# Patient Record
Sex: MALE | Race: ASIAN | NOT HISPANIC OR LATINO | URBAN - METROPOLITAN AREA
[De-identification: names, ages, dates, MRNs, and addresses within clinical notes are randomized per-mention and may not be internally consistent; named-entity substitution may affect disease eponyms.]

---

## 2018-08-02 ENCOUNTER — INPATIENT (INPATIENT)
Facility: HOSPITAL | Age: 48
LOS: 3 days | Discharge: HOME | End: 2018-08-06
Attending: HOSPITALIST | Admitting: HOSPITALIST
Payer: COMMERCIAL

## 2018-08-02 VITALS
DIASTOLIC BLOOD PRESSURE: 105 MMHG | OXYGEN SATURATION: 100 % | HEART RATE: 117 BPM | RESPIRATION RATE: 20 BRPM | TEMPERATURE: 98 F | SYSTOLIC BLOOD PRESSURE: 136 MMHG

## 2018-08-02 DIAGNOSIS — R18.8 OTHER ASCITES: ICD-10-CM

## 2018-08-02 LAB
ALBUMIN SERPL ELPH-MCNC: 4.1 G/DL — SIGNIFICANT CHANGE UP (ref 3.3–5)
ALP SERPL-CCNC: 73 U/L — SIGNIFICANT CHANGE UP (ref 40–120)
ALT FLD-CCNC: 303 U/L — HIGH (ref 4–41)
APPEARANCE UR: CLEAR — SIGNIFICANT CHANGE UP
AST SERPL-CCNC: 392 U/L — HIGH (ref 4–40)
BASE EXCESS BLDV CALC-SCNC: -3.4 MMOL/L — SIGNIFICANT CHANGE UP
BASOPHILS # BLD AUTO: 0.04 K/UL — SIGNIFICANT CHANGE UP (ref 0–0.2)
BASOPHILS NFR BLD AUTO: 0.3 % — SIGNIFICANT CHANGE UP (ref 0–2)
BILIRUB SERPL-MCNC: 2 MG/DL — HIGH (ref 0.2–1.2)
BILIRUB UR-MCNC: NEGATIVE — SIGNIFICANT CHANGE UP
BLOOD UR QL VISUAL: NEGATIVE — SIGNIFICANT CHANGE UP
BUN SERPL-MCNC: 8 MG/DL — SIGNIFICANT CHANGE UP (ref 7–23)
CALCIUM SERPL-MCNC: 9.3 MG/DL — SIGNIFICANT CHANGE UP (ref 8.4–10.5)
CHLORIDE SERPL-SCNC: 100 MMOL/L — SIGNIFICANT CHANGE UP (ref 98–107)
CO2 SERPL-SCNC: 16 MMOL/L — LOW (ref 22–31)
COLOR SPEC: YELLOW — SIGNIFICANT CHANGE UP
CREAT SERPL-MCNC: 0.88 MG/DL — SIGNIFICANT CHANGE UP (ref 0.5–1.3)
EOSINOPHIL # BLD AUTO: 0.04 K/UL — SIGNIFICANT CHANGE UP (ref 0–0.5)
EOSINOPHIL NFR BLD AUTO: 0.3 % — SIGNIFICANT CHANGE UP (ref 0–6)
GAS PNL BLDV: 126 MMOL/L — LOW (ref 136–146)
GLUCOSE BLDV-MCNC: 153 — HIGH (ref 70–99)
GLUCOSE SERPL-MCNC: 144 MG/DL — HIGH (ref 70–99)
GLUCOSE UR-MCNC: NEGATIVE — SIGNIFICANT CHANGE UP
HCO3 BLDV-SCNC: 20 MMOL/L — SIGNIFICANT CHANGE UP (ref 20–27)
HCT VFR BLD CALC: 46.7 % — SIGNIFICANT CHANGE UP (ref 39–50)
HCT VFR BLDV CALC: 49.9 % — SIGNIFICANT CHANGE UP (ref 39–51)
HGB BLD-MCNC: 15.7 G/DL — SIGNIFICANT CHANGE UP (ref 13–17)
HGB BLDV-MCNC: 16.3 G/DL — SIGNIFICANT CHANGE UP (ref 13–17)
HYALINE CASTS # UR AUTO: SIGNIFICANT CHANGE UP (ref 0–?)
IMM GRANULOCYTES # BLD AUTO: 0.04 # — SIGNIFICANT CHANGE UP
IMM GRANULOCYTES NFR BLD AUTO: 0.3 % — SIGNIFICANT CHANGE UP (ref 0–1.5)
KETONES UR-MCNC: NEGATIVE — SIGNIFICANT CHANGE UP
LEUKOCYTE ESTERASE UR-ACNC: NEGATIVE — SIGNIFICANT CHANGE UP
LIDOCAIN IGE QN: 19 U/L — SIGNIFICANT CHANGE UP (ref 7–60)
LYMPHOCYTES # BLD AUTO: 1.76 K/UL — SIGNIFICANT CHANGE UP (ref 1–3.3)
LYMPHOCYTES # BLD AUTO: 15.3 % — SIGNIFICANT CHANGE UP (ref 13–44)
MAGNESIUM SERPL-MCNC: 1.9 MG/DL — SIGNIFICANT CHANGE UP (ref 1.6–2.6)
MCHC RBC-ENTMCNC: 32.1 PG — SIGNIFICANT CHANGE UP (ref 27–34)
MCHC RBC-ENTMCNC: 33.6 % — SIGNIFICANT CHANGE UP (ref 32–36)
MCV RBC AUTO: 95.5 FL — SIGNIFICANT CHANGE UP (ref 80–100)
MONOCYTES # BLD AUTO: 1.49 K/UL — HIGH (ref 0–0.9)
MONOCYTES NFR BLD AUTO: 12.9 % — SIGNIFICANT CHANGE UP (ref 2–14)
MUCOUS THREADS # UR AUTO: SIGNIFICANT CHANGE UP
NEUTROPHILS # BLD AUTO: 8.17 K/UL — HIGH (ref 1.8–7.4)
NEUTROPHILS NFR BLD AUTO: 70.9 % — SIGNIFICANT CHANGE UP (ref 43–77)
NITRITE UR-MCNC: NEGATIVE — SIGNIFICANT CHANGE UP
NON-SQ EPI CELLS # UR AUTO: <1 — SIGNIFICANT CHANGE UP
NRBC # FLD: 0.09 — SIGNIFICANT CHANGE UP
NT-PROBNP SERPL-SCNC: 4755 PG/ML — SIGNIFICANT CHANGE UP
PCO2 BLDV: 40 MMHG — LOW (ref 41–51)
PH BLDV: 7.35 PH — SIGNIFICANT CHANGE UP (ref 7.32–7.43)
PH UR: 6 — SIGNIFICANT CHANGE UP (ref 4.6–8)
PHOSPHATE SERPL-MCNC: 4.3 MG/DL — SIGNIFICANT CHANGE UP (ref 2.5–4.5)
PLATELET # BLD AUTO: 188 K/UL — SIGNIFICANT CHANGE UP (ref 150–400)
PMV BLD: 10.9 FL — SIGNIFICANT CHANGE UP (ref 7–13)
PO2 BLDV: 33 MMHG — LOW (ref 35–40)
POTASSIUM BLDV-SCNC: 10.1 MMOL/L — CRITICAL HIGH (ref 3.4–4.5)
POTASSIUM SERPL-MCNC: 4.8 MMOL/L — SIGNIFICANT CHANGE UP (ref 3.5–5.3)
POTASSIUM SERPL-SCNC: 4.8 MMOL/L — SIGNIFICANT CHANGE UP (ref 3.5–5.3)
PROT SERPL-MCNC: 6.6 G/DL — SIGNIFICANT CHANGE UP (ref 6–8.3)
PROT UR-MCNC: 100 MG/DL — SIGNIFICANT CHANGE UP
RBC # BLD: 4.89 M/UL — SIGNIFICANT CHANGE UP (ref 4.2–5.8)
RBC # FLD: 11.8 % — SIGNIFICANT CHANGE UP (ref 10.3–14.5)
RBC CASTS # UR COMP ASSIST: SIGNIFICANT CHANGE UP (ref 0–?)
SAO2 % BLDV: 52.6 % — LOW (ref 60–85)
SODIUM SERPL-SCNC: 133 MMOL/L — LOW (ref 135–145)
SP GR SPEC: 1.01 — SIGNIFICANT CHANGE UP (ref 1–1.04)
SQUAMOUS # UR AUTO: SIGNIFICANT CHANGE UP
TROPONIN T, HIGH SENSITIVITY: 12 NG/L — SIGNIFICANT CHANGE UP (ref ?–14)
TROPONIN T, HIGH SENSITIVITY: 12 NG/L — SIGNIFICANT CHANGE UP (ref ?–14)
UROBILINOGEN FLD QL: NORMAL MG/DL — SIGNIFICANT CHANGE UP
WBC # BLD: 11.54 K/UL — HIGH (ref 3.8–10.5)
WBC # FLD AUTO: 11.54 K/UL — HIGH (ref 3.8–10.5)
WBC UR QL: SIGNIFICANT CHANGE UP (ref 0–?)

## 2018-08-02 PROCEDURE — 71046 X-RAY EXAM CHEST 2 VIEWS: CPT | Mod: 26

## 2018-08-02 PROCEDURE — 74177 CT ABD & PELVIS W/CONTRAST: CPT | Mod: 26

## 2018-08-02 PROCEDURE — 93010 ELECTROCARDIOGRAM REPORT: CPT

## 2018-08-02 PROCEDURE — 76705 ECHO EXAM OF ABDOMEN: CPT | Mod: 26

## 2018-08-02 RX ORDER — PIPERACILLIN AND TAZOBACTAM 4; .5 G/20ML; G/20ML
3.38 INJECTION, POWDER, LYOPHILIZED, FOR SOLUTION INTRAVENOUS EVERY 8 HOURS
Qty: 0 | Refills: 0 | Status: DISCONTINUED | OUTPATIENT
Start: 2018-08-03 | End: 2018-08-06

## 2018-08-02 RX ORDER — CEFTRIAXONE 500 MG/1
1 INJECTION, POWDER, FOR SOLUTION INTRAMUSCULAR; INTRAVENOUS ONCE
Qty: 0 | Refills: 0 | Status: COMPLETED | OUTPATIENT
Start: 2018-08-02 | End: 2018-08-02

## 2018-08-02 RX ORDER — ONDANSETRON 8 MG/1
4 TABLET, FILM COATED ORAL ONCE
Qty: 0 | Refills: 0 | Status: COMPLETED | OUTPATIENT
Start: 2018-08-02 | End: 2018-08-02

## 2018-08-02 RX ORDER — SODIUM CHLORIDE 9 MG/ML
2000 INJECTION INTRAMUSCULAR; INTRAVENOUS; SUBCUTANEOUS ONCE
Qty: 0 | Refills: 0 | Status: COMPLETED | OUTPATIENT
Start: 2018-08-02 | End: 2018-08-02

## 2018-08-02 RX ORDER — PIPERACILLIN AND TAZOBACTAM 4; .5 G/20ML; G/20ML
3.38 INJECTION, POWDER, LYOPHILIZED, FOR SOLUTION INTRAVENOUS ONCE
Qty: 0 | Refills: 0 | Status: COMPLETED | OUTPATIENT
Start: 2018-08-02 | End: 2018-08-02

## 2018-08-02 RX ORDER — MORPHINE SULFATE 50 MG/1
4 CAPSULE, EXTENDED RELEASE ORAL ONCE
Qty: 0 | Refills: 0 | Status: DISCONTINUED | OUTPATIENT
Start: 2018-08-02 | End: 2018-08-02

## 2018-08-02 RX ORDER — CEFTRIAXONE 500 MG/1
INJECTION, POWDER, FOR SOLUTION INTRAMUSCULAR; INTRAVENOUS
Qty: 0 | Refills: 0 | Status: DISCONTINUED | OUTPATIENT
Start: 2018-08-02 | End: 2018-08-02

## 2018-08-02 RX ORDER — LIDOCAINE 4 G/100G
10 CREAM TOPICAL ONCE
Qty: 0 | Refills: 0 | Status: COMPLETED | OUTPATIENT
Start: 2018-08-02 | End: 2018-08-02

## 2018-08-02 RX ORDER — MORPHINE SULFATE 50 MG/1
2 CAPSULE, EXTENDED RELEASE ORAL ONCE
Qty: 0 | Refills: 0 | Status: DISCONTINUED | OUTPATIENT
Start: 2018-08-02 | End: 2018-08-02

## 2018-08-02 RX ORDER — FAMOTIDINE 10 MG/ML
20 INJECTION INTRAVENOUS ONCE
Qty: 0 | Refills: 0 | Status: COMPLETED | OUTPATIENT
Start: 2018-08-02 | End: 2018-08-02

## 2018-08-02 RX ORDER — SODIUM CHLORIDE 9 MG/ML
3 INJECTION INTRAMUSCULAR; INTRAVENOUS; SUBCUTANEOUS EVERY 8 HOURS
Qty: 0 | Refills: 0 | Status: DISCONTINUED | OUTPATIENT
Start: 2018-08-02 | End: 2018-08-06

## 2018-08-02 RX ORDER — FUROSEMIDE 40 MG
20 TABLET ORAL ONCE
Qty: 0 | Refills: 0 | Status: COMPLETED | OUTPATIENT
Start: 2018-08-02 | End: 2018-08-02

## 2018-08-02 RX ORDER — ENOXAPARIN SODIUM 100 MG/ML
40 INJECTION SUBCUTANEOUS EVERY 24 HOURS
Qty: 0 | Refills: 0 | Status: DISCONTINUED | OUTPATIENT
Start: 2018-08-02 | End: 2018-08-06

## 2018-08-02 RX ADMIN — ONDANSETRON 4 MILLIGRAM(S): 8 TABLET, FILM COATED ORAL at 12:19

## 2018-08-02 RX ADMIN — Medication 30 MILLILITER(S): at 12:20

## 2018-08-02 RX ADMIN — Medication 20 MILLIGRAM(S): at 16:39

## 2018-08-02 RX ADMIN — SODIUM CHLORIDE 2000 MILLILITER(S): 9 INJECTION INTRAMUSCULAR; INTRAVENOUS; SUBCUTANEOUS at 11:55

## 2018-08-02 RX ADMIN — FAMOTIDINE 20 MILLIGRAM(S): 10 INJECTION INTRAVENOUS at 12:20

## 2018-08-02 RX ADMIN — LIDOCAINE 10 MILLILITER(S): 4 CREAM TOPICAL at 12:20

## 2018-08-02 RX ADMIN — MORPHINE SULFATE 4 MILLIGRAM(S): 50 CAPSULE, EXTENDED RELEASE ORAL at 12:20

## 2018-08-02 RX ADMIN — PIPERACILLIN AND TAZOBACTAM 200 GRAM(S): 4; .5 INJECTION, POWDER, LYOPHILIZED, FOR SOLUTION INTRAVENOUS at 23:44

## 2018-08-02 RX ADMIN — MORPHINE SULFATE 2 MILLIGRAM(S): 50 CAPSULE, EXTENDED RELEASE ORAL at 22:26

## 2018-08-02 RX ADMIN — MORPHINE SULFATE 2 MILLIGRAM(S): 50 CAPSULE, EXTENDED RELEASE ORAL at 22:41

## 2018-08-02 RX ADMIN — CEFTRIAXONE 100 GRAM(S): 500 INJECTION, POWDER, FOR SOLUTION INTRAMUSCULAR; INTRAVENOUS at 16:11

## 2018-08-02 NOTE — ED PROVIDER NOTE - OBJECTIVE STATEMENT
48yo M, no pmh (does not really see doctors) p/w epigastric pain x 1wk. + abd distention, + 1d of NBNB diarrhea which has since resolved. Decreased appetite, hasn't had any PO intake in 5d. +palpitations and sob during exertion. epigastric pain doesn't radiate. +flatus, normal BM. Denies n/v, abd surgeries, f/c, cough, urinary symptoms, recnet illnesses, recent travel, h/o DVT, peripheral edema.

## 2018-08-02 NOTE — ED ADULT TRIAGE NOTE - CHIEF COMPLAINT QUOTE
Pt. c/o epigastric pain, sob, nausea and palpitations x 5 days. States pain is worse after eating. Abdomen distended -last BM yesterday AM.

## 2018-08-02 NOTE — ED PROVIDER NOTE - MEDICAL DECISION MAKING DETAILS
48yo M, no pmh (does not really see doctors) p/w epigastric pain x 1wk. Epigastric pain, abd distention on exam. r/o obstruction, cardiac etiology. Labs, trop, ekg, cxr, gi cocktail, pain control, ctap.

## 2018-08-02 NOTE — ED PROVIDER NOTE - ATTENDING CONTRIBUTION TO CARE
I agree with the above H&P.  Briefly this is a 47 year male denies pmh presents with abd pain and increasing abdominal distention.  1 BM yeserday. denies cp, denies sob.  increasing orthopnea.  no fever.  will check lab s ruq us, ct abdomen pelvis to rule out gb disease or sbo, send labs for pancreatitis. send trop and bnp. some concern for cardiac diease, although ekg nondiagnostic

## 2018-08-02 NOTE — ED PROVIDER NOTE - NS ED ROS FT
Constitutional: no fevers, chills  HEENT: no visual changes, no sore throat, no rhinorrhea  CV: no cp  Resp: no sob  GI: + abd pain, +diarrhea, +n/v/constipation  : no dysuria, hematuria  MSK: no joint pains  skin: no rashes  neuro: no HA, no confusion

## 2018-08-02 NOTE — ED PROVIDER NOTE - PHYSICAL EXAMINATION
Vitals: WNL  Gen: laying comfortably in NAD  Head: NCAT  ENT: sclerae white, anicterus, moist mucous membranes. No exudates. Neck supple, no LAD,  no carotid bruits, no JVD  CV: RRR. Audible S1 and S2. No murmurs, rubs, gallops, S3, nor S4, 2+ radial and DP pulses   Pulm: Clear to auscultation bilaterally. No wheezes, rales, or rhonchi  Abd: soft, normoactive BS x4, distended, generalized ttp abd x4, no rebound, no guarding, no rashes  Musculoskeletal:  No peripheral edema  Skin: no lesions or scars noted  Neurologic: AAOx3  : no CVA tenderness Vitals: WNL  Gen: laying comfortably in NAD  Head: NCAT  ENT: sclerae white, anicterus, dry mucous membranes. No exudates. Neck supple, no LAD,  no carotid bruits, no JVD  CV: RRR. Audible S1 and S2. No murmurs, rubs, gallops, S3, nor S4, 2+ radial and DP pulses   Pulm: Clear to auscultation bilaterally. No wheezes, rales, or rhonchi  Abd: soft, normoactive BS x4, distended, generalized ttp abd x4, no rebound, no guarding, no rashes  Musculoskeletal:  No peripheral edema  Skin: no lesions or scars noted  Neurologic: AAOx3  : no CVA tenderness

## 2018-08-02 NOTE — ED PROVIDER NOTE - PROGRESS NOTE DETAILS
Joe SCHRADER: pt with small ascites in abd and small pleural effusion, cardiomegaly on CT. Will need cardiac w/u, pt likely with chf. Pt now complaining of palpitations (tachy 117) and "drowning." Likely fluid overload, will give lasix. Pt with thickening of gallbladder wall on US - will need HIDA.

## 2018-08-02 NOTE — ED ADULT NURSE NOTE - OBJECTIVE STATEMENT
received pt A&Ox3 in no apparent distress at this time. #20g IVL to L hand, bloods drawn and sent to the lab. no s/s of infiltration noted at this time.

## 2018-08-03 DIAGNOSIS — K59.00 CONSTIPATION, UNSPECIFIED: ICD-10-CM

## 2018-08-03 DIAGNOSIS — R80.9 PROTEINURIA, UNSPECIFIED: ICD-10-CM

## 2018-08-03 DIAGNOSIS — I51.7 CARDIOMEGALY: ICD-10-CM

## 2018-08-03 DIAGNOSIS — Z29.9 ENCOUNTER FOR PROPHYLACTIC MEASURES, UNSPECIFIED: ICD-10-CM

## 2018-08-03 DIAGNOSIS — R18.8 OTHER ASCITES: ICD-10-CM

## 2018-08-03 DIAGNOSIS — E87.1 HYPO-OSMOLALITY AND HYPONATREMIA: ICD-10-CM

## 2018-08-03 LAB
ALBUMIN SERPL ELPH-MCNC: 4.2 G/DL — SIGNIFICANT CHANGE UP (ref 3.3–5)
ALP SERPL-CCNC: 86 U/L — SIGNIFICANT CHANGE UP (ref 40–120)
ALT FLD-CCNC: 857 U/L — HIGH (ref 4–41)
AST SERPL-CCNC: 1351 U/L — HIGH (ref 4–40)
BILIRUB DIRECT SERPL-MCNC: 1.1 MG/DL — HIGH (ref 0.1–0.2)
BILIRUB SERPL-MCNC: 2.9 MG/DL — HIGH (ref 0.2–1.2)
BUN SERPL-MCNC: 11 MG/DL — SIGNIFICANT CHANGE UP (ref 7–23)
CALCIUM SERPL-MCNC: 8.5 MG/DL — SIGNIFICANT CHANGE UP (ref 8.4–10.5)
CHLORIDE SERPL-SCNC: 99 MMOL/L — SIGNIFICANT CHANGE UP (ref 98–107)
CHOLEST SERPL-MCNC: 105 MG/DL — LOW (ref 120–199)
CO2 SERPL-SCNC: 18 MMOL/L — LOW (ref 22–31)
CREAT SERPL-MCNC: 1.19 MG/DL — SIGNIFICANT CHANGE UP (ref 0.5–1.3)
GLUCOSE SERPL-MCNC: 122 MG/DL — HIGH (ref 70–99)
HBA1C BLD-MCNC: 5.4 % — SIGNIFICANT CHANGE UP (ref 4–5.6)
HCT VFR BLD CALC: 45.6 % — SIGNIFICANT CHANGE UP (ref 39–50)
HDLC SERPL-MCNC: 32 MG/DL — LOW (ref 35–55)
HGB BLD-MCNC: 15 G/DL — SIGNIFICANT CHANGE UP (ref 13–17)
LIPID PNL WITH DIRECT LDL SERPL: 69 MG/DL — SIGNIFICANT CHANGE UP
MAGNESIUM SERPL-MCNC: 1.7 MG/DL — SIGNIFICANT CHANGE UP (ref 1.6–2.6)
MCHC RBC-ENTMCNC: 31.8 PG — SIGNIFICANT CHANGE UP (ref 27–34)
MCHC RBC-ENTMCNC: 32.9 % — SIGNIFICANT CHANGE UP (ref 32–36)
MCV RBC AUTO: 96.6 FL — SIGNIFICANT CHANGE UP (ref 80–100)
NRBC # FLD: 0.07 — SIGNIFICANT CHANGE UP
PHOSPHATE SERPL-MCNC: 3.3 MG/DL — SIGNIFICANT CHANGE UP (ref 2.5–4.5)
PLATELET # BLD AUTO: 159 K/UL — SIGNIFICANT CHANGE UP (ref 150–400)
PMV BLD: 11.4 FL — SIGNIFICANT CHANGE UP (ref 7–13)
POTASSIUM SERPL-MCNC: 4.5 MMOL/L — SIGNIFICANT CHANGE UP (ref 3.5–5.3)
POTASSIUM SERPL-SCNC: 4.5 MMOL/L — SIGNIFICANT CHANGE UP (ref 3.5–5.3)
PROT SERPL-MCNC: 6.6 G/DL — SIGNIFICANT CHANGE UP (ref 6–8.3)
RBC # BLD: 4.72 M/UL — SIGNIFICANT CHANGE UP (ref 4.2–5.8)
RBC # FLD: 11.8 % — SIGNIFICANT CHANGE UP (ref 10.3–14.5)
SODIUM SERPL-SCNC: 136 MMOL/L — SIGNIFICANT CHANGE UP (ref 135–145)
TRIGL SERPL-MCNC: 77 MG/DL — SIGNIFICANT CHANGE UP (ref 10–149)
TSH SERPL-MCNC: 1.02 UIU/ML — SIGNIFICANT CHANGE UP (ref 0.27–4.2)
WBC # BLD: 13.94 K/UL — HIGH (ref 3.8–10.5)
WBC # FLD AUTO: 13.94 K/UL — HIGH (ref 3.8–10.5)

## 2018-08-03 PROCEDURE — 93306 TTE W/DOPPLER COMPLETE: CPT | Mod: 26

## 2018-08-03 PROCEDURE — 99223 1ST HOSP IP/OBS HIGH 75: CPT | Mod: GC

## 2018-08-03 PROCEDURE — 12345: CPT

## 2018-08-03 PROCEDURE — 78226 HEPATOBILIARY SYSTEM IMAGING: CPT | Mod: 26,GC

## 2018-08-03 RX ORDER — SENNA PLUS 8.6 MG/1
2 TABLET ORAL AT BEDTIME
Qty: 0 | Refills: 0 | Status: DISCONTINUED | OUTPATIENT
Start: 2018-08-03 | End: 2018-08-06

## 2018-08-03 RX ORDER — DOCUSATE SODIUM 100 MG
100 CAPSULE ORAL THREE TIMES A DAY
Qty: 0 | Refills: 0 | Status: DISCONTINUED | OUTPATIENT
Start: 2018-08-03 | End: 2018-08-06

## 2018-08-03 RX ORDER — FUROSEMIDE 40 MG
20 TABLET ORAL DAILY
Qty: 0 | Refills: 0 | Status: DISCONTINUED | OUTPATIENT
Start: 2018-08-03 | End: 2018-08-03

## 2018-08-03 RX ORDER — CARVEDILOL PHOSPHATE 80 MG/1
3.12 CAPSULE, EXTENDED RELEASE ORAL EVERY 12 HOURS
Qty: 0 | Refills: 0 | Status: DISCONTINUED | OUTPATIENT
Start: 2018-08-03 | End: 2018-08-04

## 2018-08-03 RX ORDER — FUROSEMIDE 40 MG
20 TABLET ORAL
Qty: 0 | Refills: 0 | Status: DISCONTINUED | OUTPATIENT
Start: 2018-08-03 | End: 2018-08-07

## 2018-08-03 RX ORDER — LACTOBACILLUS ACIDOPHILUS 100MM CELL
1 CAPSULE ORAL EVERY 8 HOURS
Qty: 0 | Refills: 0 | Status: DISCONTINUED | OUTPATIENT
Start: 2018-08-03 | End: 2018-08-06

## 2018-08-03 RX ADMIN — SODIUM CHLORIDE 3 MILLILITER(S): 9 INJECTION INTRAMUSCULAR; INTRAVENOUS; SUBCUTANEOUS at 05:12

## 2018-08-03 RX ADMIN — SODIUM CHLORIDE 3 MILLILITER(S): 9 INJECTION INTRAMUSCULAR; INTRAVENOUS; SUBCUTANEOUS at 14:07

## 2018-08-03 RX ADMIN — SODIUM CHLORIDE 3 MILLILITER(S): 9 INJECTION INTRAMUSCULAR; INTRAVENOUS; SUBCUTANEOUS at 22:05

## 2018-08-03 RX ADMIN — PIPERACILLIN AND TAZOBACTAM 25 GRAM(S): 4; .5 INJECTION, POWDER, LYOPHILIZED, FOR SOLUTION INTRAVENOUS at 22:05

## 2018-08-03 RX ADMIN — Medication 1 TABLET(S): at 22:03

## 2018-08-03 RX ADMIN — Medication 100 MILLIGRAM(S): at 22:03

## 2018-08-03 RX ADMIN — ENOXAPARIN SODIUM 40 MILLIGRAM(S): 100 INJECTION SUBCUTANEOUS at 05:12

## 2018-08-03 RX ADMIN — CARVEDILOL PHOSPHATE 3.12 MILLIGRAM(S): 80 CAPSULE, EXTENDED RELEASE ORAL at 17:14

## 2018-08-03 RX ADMIN — Medication 1 TABLET(S): at 14:13

## 2018-08-03 RX ADMIN — Medication 20 MILLIGRAM(S): at 05:12

## 2018-08-03 RX ADMIN — PIPERACILLIN AND TAZOBACTAM 25 GRAM(S): 4; .5 INJECTION, POWDER, LYOPHILIZED, FOR SOLUTION INTRAVENOUS at 14:33

## 2018-08-03 RX ADMIN — Medication 1 TABLET(S): at 05:12

## 2018-08-03 RX ADMIN — Medication 20 MILLIGRAM(S): at 17:14

## 2018-08-03 RX ADMIN — PIPERACILLIN AND TAZOBACTAM 25 GRAM(S): 4; .5 INJECTION, POWDER, LYOPHILIZED, FOR SOLUTION INTRAVENOUS at 05:12

## 2018-08-03 RX ADMIN — SENNA PLUS 2 TABLET(S): 8.6 TABLET ORAL at 22:03

## 2018-08-03 NOTE — PROGRESS NOTE ADULT - PROBLEM SELECTOR PLAN 2
ProBNP = 4755 likely heart failure  ECG = sinus tachycardia at 105 bpm, QTc = 533, ?T-wave inversions in V3 to V6  F/UTTE.   Monitor I's O' s daily weight, Lytes, PT and dietary consult.   s/p 1 dose of lasix, will get cards consult  Low salt diet when not NPO.

## 2018-08-03 NOTE — H&P ADULT - ASSESSMENT
47M admitted for Ascites, cholecystis and Cardiomegaly. 47M admitted for Ascites, cholecystitis and Cardiomegaly. 47M, with no significant PMH, admitted for Ascites, cholecystitis and Cardiomegaly.

## 2018-08-03 NOTE — H&P ADULT - FAMILY HISTORY
No pertinent family history in first degree relatives Mother  Still living? Unknown  Family history of heart disease, Age at diagnosis: Age Unknown     Father  Still living? Unknown  Family history of hypertension, Age at diagnosis: Age Unknown     Grandparent  Still living? Unknown  Family history of lung disease, Age at diagnosis: Age Unknown

## 2018-08-03 NOTE — CONSULT NOTE ADULT - SUBJECTIVE AND OBJECTIVE BOX
Chief Complaint:  Patient is a 47y old  Male who presents with a chief complaint of Abdominal pain x 5 days (03 Aug 2018 00:42)    HPI:    46 y/o M w/ no significant past medical history presenting with abdominal pain; hepatology consulted for evaluation of elevated liver enzymes.    Allergies:  Motrin (Hives)    Home Medications:  None    Hospital Medications:  enoxaparin Injectable 40 milliGRAM(s) SubCutaneous every 24 hours  furosemide   Injectable 20 milliGRAM(s) IV Push daily  lactobacillus acidophilus 1 Tablet(s) Oral every 8 hours  multivitamin 1 Tablet(s) Oral daily  piperacillin/tazobactam IVPB. 3.375 Gram(s) IV Intermittent every 8 hours  sodium chloride 0.9% lock flush 3 milliLiter(s) IV Push every 8 hours      PMHX/PSHX:  No pertinent past medical history  No significant past surgical history      Family history:  Family history of lung disease (Grandparent)  Family history of hypertension (Father)  Family history of heart disease (Mother)  No pertinent family history in first degree relatives    Social History:   Tob:  EtOH:  Illicit Drugs:    ROS:     General:  No wt loss, fevers, chills, night sweats, fatigue,   Eyes:  Good vision, no reported pain  ENT:  No sore throat, pain, runny nose, dysphagia  CV:  No pain, palpitations, hypo/hypertension  Resp:  No dyspnea, cough, tachypnea, wheezing  GI:  No pain, No nausea, No vomiting, No diarrhea, No constipation, No weight loss, No fever, No pruritis, No rectal bleeding, No tarry stools, No dysphagia,  :  No pain, bleeding, incontinence, nocturia  Muscle:  No pain, weakness  Neuro:  No weakness, tingling, memory problems  Psych:  No fatigue, insomnia, mood problems, depression  Endocrine:  No polyuria, polydipsia, cold/heat intolerance  Heme:  No petechiae, ecchymosis, easy bruisability  Skin:  No rash, tattoos, scars, edema      PHYSICAL EXAM:     GENERAL:  Appears stated age, well-groomed, well-nourished, no distress  HEENT:  NC/AT,  conjunctivae clear and pink, no thyromegaly, nodules, adenopathy, no JVD, sclera -anicteric  CHEST:  Full & symmetric excursion, no increased effort, breath sounds clear  HEART:  Regular rhythm, S1, S2, no murmur/rub/S3/S4, no abdominal bruit, no edema  ABDOMEN:  Soft, non-tender, non-distended, normoactive bowel sounds,  no masses ,no hepato-splenomegaly, no signs of chronic liver disease  EXTEREMITIES:  no cyanosis,clubbing or edema  SKIN:  No rash/erythema/ecchymoses/petechiae/wounds/abscess/warm/dry  NEURO:  Alert, oriented, no asterixis, no tremor, no encephalopathy    Vital Signs:  Vital Signs Last 24 Hrs  T(C): 36.7 (03 Aug 2018 05:07), Max: 37.4 (02 Aug 2018 21:25)  T(F): 98 (03 Aug 2018 05:07), Max: 99.4 (02 Aug 2018 21:25)  HR: 116 (03 Aug 2018 05:07) (110 - 119)  BP: 133/99 (03 Aug 2018 05:07) (128/64 - 140/101)  BP(mean): --  RR: 16 (03 Aug 2018 05:07) (15 - 20)  SpO2: 97% (03 Aug 2018 05:07) (96% - 100%)  Daily Height in cm: 162.56 (03 Aug 2018 00:42)    Daily Weight in k (03 Aug 2018 05:07)    LABS:                        15.0   13.94 )-----------( 159      ( 03 Aug 2018 07:06 )             45.6     Mean Cell Volume: 96.6 fL (-18 @ 07:06)        136  |  99  |  11  ----------------------------<  122<H>  4.5   |  18<L>  |  1.19    Ca    8.5      03 Aug 2018 07:06  Phos  3.3     -  Mg     1.7     -    TPro  6.6  /  Alb  4.2  /  TBili  2.9<H>  /  DBili  1.1<H>  /  AST  1351<H>  /  ALT  857<H>  /  AlkPhos  86  -03    LIVER FUNCTIONS - ( 03 Aug 2018 07:06 )  Alb: 4.2 g/dL / Pro: 6.6 g/dL / ALK PHOS: 86 u/L / ALT: 857 u/L / AST: 1351 u/L / GGT: x             Urinalysis Basic - ( 02 Aug 2018 11:50 )    Color: YELLOW / Appearance: CLEAR / S.015 / pH: 6.0  Gluc: NEGATIVE / Ketone: NEGATIVE  / Bili: NEGATIVE / Urobili: NORMAL mg/dL   Blood: NEGATIVE / Protein: 100 mg/dL / Nitrite: NEGATIVE   Leuk Esterase: NEGATIVE / RBC: 2-5 / WBC 0-2   Sq Epi: OCC / Non Sq Epi: x / Bacteria: x                              15.0   13.94 )-----------( 159      ( 03 Aug 2018 07:06 )             45.6                         15.7   11.54 )-----------( 188      ( 02 Aug 2018 11:40 )             46.7     Imaging: Chief Complaint:  Patient is a 47y old  Male who presents with a chief complaint of Abdominal pain x 5 days (03 Aug 2018 00:42)    HPI:    46 y/o M w/ no significant past medical history presenting with abdominal pain; hepatology consulted for evaluation of elevated liver enzymes. The patient developed abdominal pain approximately 6 days ago. The pain was located in his epigastrium and upper right abdomen and radiated to his right flank. He described as a severe, intermittent, cramping sensation. He endorses one episode of non-bloody bilious emesis and reduced PO intake. The patient takes a multivitamin, but otherwise takes no new medications and has not started any new medications. The patient does endorse progressive shortness of breath and lower extremity edema and he was found to have a BNP of >4500. RUQ U/S was notable for thickened gallbladder wall, however, follow-up HIDA scan did not show any evidence of cholecystitis. Upon evaluation, the patient no longer complains of abdominal pain.    Allergies:  Motrin (Hives)    Home Medications:  None    Hospital Medications:  enoxaparin Injectable 40 milliGRAM(s) SubCutaneous every 24 hours  furosemide   Injectable 20 milliGRAM(s) IV Push daily  lactobacillus acidophilus 1 Tablet(s) Oral every 8 hours  multivitamin 1 Tablet(s) Oral daily  piperacillin/tazobactam IVPB. 3.375 Gram(s) IV Intermittent every 8 hours  sodium chloride 0.9% lock flush 3 milliLiter(s) IV Push every 8 hours    PMHX/PSHX:  No pertinent past medical history  No significant past surgical history    Family history:  Family history of lung disease (Grandparent)  Family history of hypertension (Father)  Family history of heart disease (Mother)  No pertinent family history in first degree relatives    Social History:   Tob: Former smoker, quit 10 years ago  EtOH: Occasional   Illicit Drugs: Denies    ROS:     General:  No fevers, chills, night sweats  CV:  No pain, palpitations  Pulm:  + dyspnea, cough  GI:  + pain, + nausea, + vomiting, No diarrhea, No constipation, No weight loss, No fever, No pruritis, No rectal bleeding, No tarry stools, No dysphagia  :  No pain, bleeding, incontinence, nocturia  Muscle:  No pain  Skin:  No rash    PHYSICAL EXAM:     GENERAL:  NAD  HEENT:  NC/AT, no scleral icterus  CHEST:  CTAB, no w/r/r  HEART:  RRR  ABDOMEN:  Soft, NT, non-distended, normoactive bowel sounds,  no masses ,no hepato-splenomegaly, no signs of chronic liver disease  EXTREMITIES: + LE edema  SKIN:  No rash  NEURO:  AAO x 3    Vital Signs:  Vital Signs Last 24 Hrs  T(C): 36.7 (03 Aug 2018 05:07), Max: 37.4 (02 Aug 2018 21:25)  T(F): 98 (03 Aug 2018 05:07), Max: 99.4 (02 Aug 2018 21:25)  HR: 116 (03 Aug 2018 05:07) (110 - 119)  BP: 133/99 (03 Aug 2018 05:07) (128/64 - 140/101)  BP(mean): --  RR: 16 (03 Aug 2018 05:07) (15 - 20)  SpO2: 97% (03 Aug 2018 05:07) (96% - 100%)  Daily Height in cm: 162.56 (03 Aug 2018 00:42)    Daily Weight in k (03 Aug 2018 05:07)    LABS:                        15.0   13.94 )-----------( 159      ( 03 Aug 2018 07:06 )             45.6     Mean Cell Volume: 96.6 fL (-18 @ 07:06)        136  |  99  |  11  ----------------------------<  122<H>  4.5   |  18<L>  |  1.19    Ca    8.5      03 Aug 2018 07:06  Phos  3.3       Mg     1.7         TPro  6.6  /  Alb  4.2  /  TBili  2.9<H>  /  DBili  1.1<H>  /  AST  1351<H>  /  ALT  857<H>  /  AlkPhos  86  03    LIVER FUNCTIONS - ( 03 Aug 2018 07:06 )  Alb: 4.2 g/dL / Pro: 6.6 g/dL / ALK PHOS: 86 u/L / ALT: 857 u/L / AST: 1351 u/L / GGT: x             Urinalysis Basic - ( 02 Aug 2018 11:50 )    Color: YELLOW / Appearance: CLEAR / S.015 / pH: 6.0  Gluc: NEGATIVE / Ketone: NEGATIVE  / Bili: NEGATIVE / Urobili: NORMAL mg/dL   Blood: NEGATIVE / Protein: 100 mg/dL / Nitrite: NEGATIVE   Leuk Esterase: NEGATIVE / RBC: 2-5 / WBC 0-2   Sq Epi: OCC / Non Sq Epi: x / Bacteria: x               15.0   13.94 )-----------( 159      ( 03 Aug 2018 07:06 )             45.6                         15.7   11.54 )-----------( 188      ( 02 Aug 2018 11:40 )             46.7     Imaging:    < from: US Abdomen Limited (18 @ 15:16) >  IMPRESSION:   1.  Limited exam as patient declined to complete exam.  2.  No cholelithiasis. Nonspecific thickening of the gallbladder wall. If   there is clinical concern for cholecystitis, consider nuclear medicine   scan.  3.  No biliary distention.    < end of copied text >    < from: NM Hepatobiliary Scan w/wo Gall Bladder (18 @ 09:24) >  IMPRESSION: Normal hepatobiliary scan. No radionuclide evidence of acute   cholecystitis.    < end of copied text >

## 2018-08-03 NOTE — H&P ADULT - RS GEN PE MLT RESP DETAILS PC
no wheezes/respirations non-labored/airway patent/no chest wall tenderness/no rhonchi/no subcutaneous emphysema/no intercostal retractions/no rales/clear to auscultation bilaterally/good air movement/breath sounds equal

## 2018-08-03 NOTE — PROGRESS NOTE ADULT - PROBLEM SELECTOR PLAN 1
Transaminitis w/ AST/ALT = 393/303  CT A/P w/ findings of - "LOWER CHEST: Cardiomegaly. Small right and trace left pleural effusions.  LIVER: Normal morphology. Subcentimeter hyperenhancing lesion in the right hepatic lobe dome with flash filling hemangioma or perfusion defect (series 2 image 15). Reflux of contrast into hepatic veins.  BILE DUCTS: Normal caliber.  GALLBLADDER: Within normal limits.  SPLEEN: 1.1 cm hyperenhancing lesion consistent with a hemangioma." HIDA scan neg  for Cholecystitis.  pt feels hungry will start on clears, f/u GI consult  will f/u with them about abx  Continue Zosyn for now, continue lactobacillus for normal robb protection.  F/U Hepatitis panel  F/U Coagulation profile  f/u LE avelinao

## 2018-08-03 NOTE — H&P ADULT - NEGATIVE MUSCULOSKELETAL SYMPTOMS
no muscle cramps/no stiffness/no arm pain L/no joint swelling/no muscle weakness/no myalgia/no neck pain/no arthritis

## 2018-08-03 NOTE — H&P ADULT - PROBLEM SELECTOR PLAN 2
CM  F/U EKG, TTE.   Monitor I's O' s daily weight, Lytes, PT and dietary consult.   give Lasix 20 mg IV.  Low salt diet when not NPO. ProBNP = 4755  ECG = sinus tachycardia at 105 bpm, QTc = 533, ?T-wave inversions in V3 to V6  F/U EKG, TTE.   Monitor I's O' s daily weight, Lytes, PT and dietary consult.   give Lasix 20 mg IV.  Low salt diet when not NPO.  f/u TSH level  Usually recommend HOB elevation, as tolerated, but patient reports feeling better when lying flat or almost flat - possibility of hepatopulmonary syndrome ProBNP = 4755  ECG = sinus tachycardia at 105 bpm, QTc = 533, ?T-wave inversions in V3 to V6  F/U EKG, TTE.   Monitor I's O' s daily weight, Lytes, PT and dietary consult.   give Lasix 20 mg IV.  Low salt diet when not NPO.  f/u TSH level

## 2018-08-03 NOTE — PROGRESS NOTE ADULT - ASSESSMENT
47M, with no significant PMH, admitted for Ascites, possible cholecystitis and Cardiomegaly found to have elevated bnp likely from heart failure.

## 2018-08-03 NOTE — PROGRESS NOTE ADULT - SUBJECTIVE AND OBJECTIVE BOX
Patient is a 47y old  Male who presents with a chief complaint of Abdominal pain x 5 days (03 Aug 2018 00:42)      SUBJECTIVE / OVERNIGHT EVENTS: Pt seen and examined at 11:45am, no overnight events, tele with sinus tachycardia at 110/mt,  pt reports improvement in his sob and abdominal pain, no nausea, vomiting or chest pain, has leg swelling, no other complaints. Pt reports last bm on Tuesday.    MEDICATIONS  (STANDING):  carvedilol 3.125 milliGRAM(s) Oral every 12 hours  enoxaparin Injectable 40 milliGRAM(s) SubCutaneous every 24 hours  furosemide   Injectable 20 milliGRAM(s) IV Push two times a day  lactobacillus acidophilus 1 Tablet(s) Oral every 8 hours  multivitamin 1 Tablet(s) Oral daily  piperacillin/tazobactam IVPB. 3.375 Gram(s) IV Intermittent every 8 hours  sodium chloride 0.9% lock flush 3 milliLiter(s) IV Push every 8 hours    MEDICATIONS  (PRN):      Vital Signs Last 24 Hrs  T(C): 36.8 (03 Aug 2018 13:51), Max: 37.4 (02 Aug 2018 21:25)  T(F): 98.2 (03 Aug 2018 13:51), Max: 99.4 (02 Aug 2018 21:25)  HR: 109 (03 Aug 2018 13:51) (109 - 119)  BP: 144/98 (03 Aug 2018 13:51) (128/64 - 144/98)  BP(mean): --  RR: 18 (03 Aug 2018 13:51) (15 - 20)  SpO2: 98% (03 Aug 2018 13:51) (96% - 100%)  CAPILLARY BLOOD GLUCOSE        I&O's Summary    02 Aug 2018 07:01  -  03 Aug 2018 07:00  --------------------------------------------------------  IN: 0 mL / OUT: 380 mL / NET: -380 mL    03 Aug 2018 07:01  -  03 Aug 2018 15:15  --------------------------------------------------------  IN: 200 mL / OUT: 0 mL / NET: 200 mL        PHYSICAL EXAM:  GENERAL: NAD, well-developed  CHEST/LUNG: Clear to auscultation bilaterally; No wheeze  HEART: Regular rate and rhythm  ABDOMEN: Soft, + epigastric tenderness, + distention  EXTREMITIES:  B/L LE edema +  PSYCH: calm  NEUROLOGY: AAOx3  SKIN: No rashes or lesions    LABS:                        15.0   13.94 )-----------( 159      ( 03 Aug 2018 07:06 )             45.6     08-03    136  |  99  |  11  ----------------------------<  122<H>  4.5   |  18<L>  |  1.19    Ca    8.5      03 Aug 2018 07:06  Phos  3.3     08-03  Mg     1.7     08-03    TPro  6.6  /  Alb  4.2  /  TBili  2.9<H>  /  DBili  1.1<H>  /  AST  1351<H>  /  ALT  857<H>  /  AlkPhos  86  08-03          Urinalysis Basic - ( 02 Aug 2018 11:50 )    Color: YELLOW / Appearance: CLEAR / S.015 / pH: 6.0  Gluc: NEGATIVE / Ketone: NEGATIVE  / Bili: NEGATIVE / Urobili: NORMAL mg/dL   Blood: NEGATIVE / Protein: 100 mg/dL / Nitrite: NEGATIVE   Leuk Esterase: NEGATIVE / RBC: 2-5 / WBC 0-2   Sq Epi: OCC / Non Sq Epi: x / Bacteria: x        RADIOLOGY & ADDITIONAL TESTS:    Imaging Personally Reviewed:    Consultant(s) Notes Reviewed:      Care Discussed with Consultants/Other Providers:

## 2018-08-03 NOTE — H&P ADULT - NEUROLOGICAL DETAILS
sensation intact/responds to verbal commands/normal strength/no spontaneous movement/alert and oriented x 3

## 2018-08-03 NOTE — CONSULT NOTE ADULT - ASSESSMENT
Impression:    1. Elevated liver enzymes: Differential diagnosis includes biliary pathology versus viral hepatitis versus congestive hepatotopathy: Patient's history consistent with passed gallstone as he had acute onset of epigastric / RUQ pain, which has since resolved without intervention. No evidence of cholecystitis on HIDA scan. Liver enzymes may be elevated in the setting of new diagnosis of heart failure.  2. Elevated BNP: Concern for new diagnosis of HF. TTE pending.    Recommendations:  - Monitor daily CMP, INR  - MRCP to evaluate biliary tree  - Acute hepatitis panel (A, B, C, E)  - Avoid hepatotoxins  - Heart failure work-up per primary team    Carlos Patel MD  Gastroenterology Fellow  Pager number: [phone] / [phone]

## 2018-08-03 NOTE — H&P ADULT - NSHPSOCIALHISTORY_GEN_ALL_CORE
.  Lives with a spouse.  Denies Nicotine.   Denies ETOH. .  Lives with a spouse.  No history of nicotine use  No history of alcohol abuse  No history of illegal drug use

## 2018-08-03 NOTE — H&P ADULT - HISTORY OF PRESENT ILLNESS
47M with no past medical history, experiencing constant epigastric pain, progressive anasarca, progressive DRUMMOND after ambulating 1 flight of steps, exertional palpitations, weight gain, orthopnoea, nausea and vomit x 1 non bloody episode on 7/31/18, decreased appetite, flatulence.   Denies diarrhea, chills, cough, diaphoresis, dysuria, body aches.  In the Ed, the pt was started on Ceftriaxone IV the switched to Zosyn IV, Zofran ,MSo4,  Pepcid IV and Maalox po.  The pt is currently abdominal pain and nausea free. 47M with no past medical history, experiencing constant epigastric pain, progressive anasarca, progressive DRUMMOND after ambulating 1 flight of steps, exertional palpitations, weight gain, orthopnoea, nausea and vomit x 1 non bloody episode on 7/31/18, decreased appetite, flatulence.  Prefers to lie semi-supine in bed because of abdominal discomfort.  Denies diarrhea, chills, cough, diaphoresis, dysuria, body aches.  In the ED, the pt was started on Ceftriaxone IV then switched to Zosyn IV, Zofran, Morphine sulfate,  Pepcid IV and Maalox po.  The pt is currently abdominal pain and nausea free.    Vital signs in ED as follows: BP = 136/105, HR = 117, RR = 20, T = 36.8 C (98.3 F), O2 Sat = 100% on RA.

## 2018-08-03 NOTE — H&P ADULT - NSHPLABSRESULTS_GEN_ALL_CORE
CT A ABD & PELVIS =No bowel obstruction. Cardiomegaly with small right and trace left pleural effusions. Small volume abdominal and pelvic ascites, most likely cardiogenic in etiology.  ABD Sono 1.  Limited exam as patient declined to complete exam.  2.  No cholelithiasis. Nonspecific thickening of the gallbladder wall. If there is clinical concern for cholecystitis, consider nuclear medicine scan.  3.  No biliary distention   CXR= Clear lungs. No pleural effusions or pneumothorax.  WBC = 11.5  AST/ ALt= 392/ 303   UA = Clear  Trop = 12---> 12  ProBNP= 4755  Glucose =144  BUN/ Creatinine= 8/ 0.88

## 2018-08-03 NOTE — H&P ADULT - NEGATIVE NEUROLOGICAL SYMPTOMS
no weakness/no generalized seizures/no vertigo/no syncope/no tremors/no headache/no focal seizures/no loss of sensation/no difficulty walking/no loss of consciousness/no paresthesias

## 2018-08-03 NOTE — H&P ADULT - GASTROINTESTINAL DETAILS
no bruit/no rebound tenderness/no rigidity/bowel sounds normal/nontender/no guarding/no masses palpable

## 2018-08-03 NOTE — CONSULT NOTE ADULT - ASSESSMENT
EKG - Sinus tach @ 116 bpm T wave inversion v4 - v6 LVH   Echo - Severe LV dysfunction mod MR, sev pulm HTN     a/p     1) Acute systolic CHF - increase lasix to 20mg q12, coreg 3.125mg q12, continue diuresis will need cath early next week    2) Constipation - cont colace and senna    3) DVT prophylasix - sc lovenox

## 2018-08-03 NOTE — CONSULT NOTE ADULT - SUBJECTIVE AND OBJECTIVE BOX
Malcolm Kerns MD  Interventional Cardiology / Advance Heart Failure and Cardiac Transplant Specialist  [place] Office : [address] [place] [id number]  Tel:   [place] Office : [address] [facility] [place] [address]  Tel: [phone]  Cell : [phone]    HISTORY OF PRESENT ILLNESS:  47M with no past medical history, experiencing constant epigastric pain, progressive anasarca, progressive DRUMMOND after ambulating 1 flight of steps, exertional palpitations, weight gain, orthopnoea, nausea and vomit x 1 non bloody episode on 7/31/18, decreased appetite, flatulence.  Prefers to lie semi-supine in bed because of abdominal discomfort.  Denies diarrhea, chills, cough, diaphoresis, dysuria, body aches. no h/o heart disease in the past, get SOB since saturday when he started having epigastric pain and diarrhea, no CP on exertion, no recent viral illnesses, no family h/o cardiomyopathy      PAST MEDICAL & SURGICAL HISTORY:  No pertinent past medical history  No significant past surgical history    	    MEDICATIONS:  carvedilol 3.125 milliGRAM(s) Oral every 12 hours  enoxaparin Injectable 40 milliGRAM(s) SubCutaneous every 24 hours  furosemide   Injectable 20 milliGRAM(s) IV Push two times a day    piperacillin/tazobactam IVPB. 3.375 Gram(s) IV Intermittent every 8 hours        docusate sodium 100 milliGRAM(s) Oral three times a day  senna 2 Tablet(s) Oral at bedtime      multivitamin 1 Tablet(s) Oral daily  sodium chloride 0.9% lock flush 3 milliLiter(s) IV Push every 8 hours      FAMILY HISTORY:  Family history of lung disease (Grandparent): maternal grandmother (no history of smoking)  Family history of hypertension (Father): father  Family history of heart disease (Mother): mother        Allergies    Motrin (Hives)    Intolerances    	      PHYSICAL EXAM:  T(C): 36.8 (08-03-18 @ 13:51), Max: 37.4 (08-02-18 @ 21:25)  HR: 109 (08-03-18 @ 13:51) (109 - 119)  BP: 144/98 (08-03-18 @ 13:51) (128/64 - 144/98)  RR: 18 (08-03-18 @ 13:51) (15 - 20)  SpO2: 98% (08-03-18 @ 13:51) (96% - 100%)  Wt(kg): --  I&O's Summary    02 Aug 2018 07:01  -  03 Aug 2018 07:00  --------------------------------------------------------  IN: 0 mL / OUT: 380 mL / NET: -380 mL    03 Aug 2018 07:01  -  03 Aug 2018 15:33  --------------------------------------------------------  IN: 200 mL / OUT: 0 mL / NET: 200 mL        Appearance: Normal	  HEENT:   Normal oral mucosa, PERRL, EOMI	  Cardiovascular: Normal S1 S2, + JVD, No murmurs, No edema  Respiratory: Lungs clear to auscultation	  Gastrointestinal:  Soft, Non-tender, + BS	  Extremities: 2+ edema    LABS:	 	    CARDIAC MARKERS:                                  15.0   13.94 )-----------( 159      ( 03 Aug 2018 07:06 )             45.6     08-03    136  |  99  |  11  ----------------------------<  122<H>  4.5   |  18<L>  |  1.19    Ca    8.5      03 Aug 2018 07:06  Phos  3.3     08-03  Mg     1.7     08-03    TPro  6.6  /  Alb  4.2  /  TBili  2.9<H>  /  DBili  1.1<H>  /  AST  1351<H>  /  ALT  857<H>  /  AlkPhos  86  08-03    proBNP:   Lipid Profile:   HgA1c: Hemoglobin A1C, Whole Blood: 5.4 % (08-03 @ 07:06)    TSH: Thyroid Stimulating Hormone, Serum: 1.02 uIU/mL (08-03 @ 07:06)      ASSESSMENT/PLAN:

## 2018-08-03 NOTE — H&P ADULT - PROBLEM SELECTOR PLAN 1
F/U HIDA scan to r/o Cholecystis.  NPO except meds for HIDA.  Spoke with Nuclear Med. HIDA scheduled for 8/3/18 AM.  Continue Zosyn &  lactobacillus for nereida robb protection. F/U HIDA scan to r/o Cholecystis.  NPO except meds for HIDA.  Spoke with Nuclear Med. HIDA scheduled for 8/3/18 AM.  Continue Zosyn &  lactobacillus for nereida robb protection.  F/U Hepatitis panel. Transaminitis w/ AST/ALT = 393/303  CT A/P w/ findings of - "LOWER CHEST: Cardiomegaly. Small right and trace left pleural effusions.  LIVER: Normal morphology. Subcentimeter hyperenhancing lesion in the right hepatic lobe dome with flash filling hemangioma or perfusion defect (series 2 image 15). Reflux of contrast into hepatic veins.  BILE DUCTS: Normal caliber.  GALLBLADDER: Within normal limits.  SPLEEN: 1.1 cm hyperenhancing lesion consistent with a hemangioma."  F/U HIDA scan to eval for Cholecystitis.  NPO except meds for HIDA.  Spoke with Nuclear Med. HIDA scheduled for 8/3/18 AM.  Continue Zosyn, continue lactobacillus for normal robb protection.  F/U Hepatitis panel  F/U Coagulation profile  Usually recommend HOB elevation, as tolerated, but patient reports feeling better when lying flat or almost flat - possibility of hepatopulmonary syndrome Transaminitis w/ AST/ALT = 393/303  CT A/P w/ findings of - "LOWER CHEST: Cardiomegaly. Small right and trace left pleural effusions.  LIVER: Normal morphology. Subcentimeter hyperenhancing lesion in the right hepatic lobe dome with flash filling hemangioma or perfusion defect (series 2 image 15). Reflux of contrast into hepatic veins.  BILE DUCTS: Normal caliber.  GALLBLADDER: Within normal limits.  SPLEEN: 1.1 cm hyperenhancing lesion consistent with a hemangioma."  F/U HIDA scan to eval for Cholecystitis.  NPO except meds for HIDA.  Spoke with Nuclear Med. HIDA scheduled for 8/3/18 AM.  Continue Zosyn, continue lactobacillus for normal robb protection.  F/U Hepatitis panel  F/U Coagulation profile  HOB elevation, as tolerated

## 2018-08-03 NOTE — H&P ADULT - PROBLEM SELECTOR PLAN 3
VTE with Lovenox sub cut.  Fall, Aspiration, safety and seizure precautions. Sodium = 133, in the setting of volume overload (ascites, proBNP = 4755)  - calculated serum osmolality = 277  - currently NPO  - f/u trend with repeat lab-work

## 2018-08-04 LAB
ALBUMIN SERPL ELPH-MCNC: 3.5 G/DL — SIGNIFICANT CHANGE UP (ref 3.3–5)
ALP SERPL-CCNC: 71 U/L — SIGNIFICANT CHANGE UP (ref 40–120)
ALT FLD-CCNC: 548 U/L — HIGH (ref 4–41)
APTT BLD: 30.5 SEC — SIGNIFICANT CHANGE UP (ref 27.5–37.4)
AST SERPL-CCNC: 363 U/L — HIGH (ref 4–40)
BILIRUB SERPL-MCNC: 3.2 MG/DL — HIGH (ref 0.2–1.2)
BUN SERPL-MCNC: 10 MG/DL — SIGNIFICANT CHANGE UP (ref 7–23)
CALCIUM SERPL-MCNC: 8.1 MG/DL — LOW (ref 8.4–10.5)
CHLORIDE SERPL-SCNC: 99 MMOL/L — SIGNIFICANT CHANGE UP (ref 98–107)
CO2 SERPL-SCNC: 26 MMOL/L — SIGNIFICANT CHANGE UP (ref 22–31)
CREAT SERPL-MCNC: 0.99 MG/DL — SIGNIFICANT CHANGE UP (ref 0.5–1.3)
GLUCOSE SERPL-MCNC: 132 MG/DL — HIGH (ref 70–99)
HAV IGM SER-ACNC: NONREACTIVE — SIGNIFICANT CHANGE UP
HBV CORE IGM SER-ACNC: NONREACTIVE — SIGNIFICANT CHANGE UP
HBV SURFACE AG SER-ACNC: NONREACTIVE — SIGNIFICANT CHANGE UP
HCT VFR BLD CALC: 42.9 % — SIGNIFICANT CHANGE UP (ref 39–50)
HCV AB S/CO SERPL IA: 0.12 S/CO — SIGNIFICANT CHANGE UP
HCV AB SERPL-IMP: SIGNIFICANT CHANGE UP
HGB BLD-MCNC: 14.4 G/DL — SIGNIFICANT CHANGE UP (ref 13–17)
INR BLD: 1.48 — HIGH (ref 0.88–1.17)
MCHC RBC-ENTMCNC: 31.9 PG — SIGNIFICANT CHANGE UP (ref 27–34)
MCHC RBC-ENTMCNC: 33.6 % — SIGNIFICANT CHANGE UP (ref 32–36)
MCV RBC AUTO: 94.9 FL — SIGNIFICANT CHANGE UP (ref 80–100)
NRBC # FLD: 0.04 — SIGNIFICANT CHANGE UP
NT-PROBNP SERPL-SCNC: 2447 PG/ML — SIGNIFICANT CHANGE UP
PLATELET # BLD AUTO: 145 K/UL — LOW (ref 150–400)
PMV BLD: 11.8 FL — SIGNIFICANT CHANGE UP (ref 7–13)
POTASSIUM SERPL-MCNC: 3.5 MMOL/L — SIGNIFICANT CHANGE UP (ref 3.5–5.3)
POTASSIUM SERPL-SCNC: 3.5 MMOL/L — SIGNIFICANT CHANGE UP (ref 3.5–5.3)
PROT SERPL-MCNC: 6.2 G/DL — SIGNIFICANT CHANGE UP (ref 6–8.3)
PROTHROM AB SERPL-ACNC: 17.1 SEC — HIGH (ref 9.8–13.1)
RBC # BLD: 4.52 M/UL — SIGNIFICANT CHANGE UP (ref 4.2–5.8)
RBC # FLD: 11.9 % — SIGNIFICANT CHANGE UP (ref 10.3–14.5)
SODIUM SERPL-SCNC: 137 MMOL/L — SIGNIFICANT CHANGE UP (ref 135–145)
WBC # BLD: 9.94 K/UL — SIGNIFICANT CHANGE UP (ref 3.8–10.5)
WBC # FLD AUTO: 9.94 K/UL — SIGNIFICANT CHANGE UP (ref 3.8–10.5)

## 2018-08-04 PROCEDURE — 99233 SBSQ HOSP IP/OBS HIGH 50: CPT

## 2018-08-04 RX ORDER — POLYETHYLENE GLYCOL 3350 17 G/17G
17 POWDER, FOR SOLUTION ORAL ONCE
Qty: 0 | Refills: 0 | Status: COMPLETED | OUTPATIENT
Start: 2018-08-04 | End: 2018-08-04

## 2018-08-04 RX ORDER — SIMETHICONE 80 MG/1
80 TABLET, CHEWABLE ORAL ONCE
Qty: 0 | Refills: 0 | Status: COMPLETED | OUTPATIENT
Start: 2018-08-04 | End: 2018-08-04

## 2018-08-04 RX ADMIN — ENOXAPARIN SODIUM 40 MILLIGRAM(S): 100 INJECTION SUBCUTANEOUS at 05:29

## 2018-08-04 RX ADMIN — PIPERACILLIN AND TAZOBACTAM 25 GRAM(S): 4; .5 INJECTION, POWDER, LYOPHILIZED, FOR SOLUTION INTRAVENOUS at 21:21

## 2018-08-04 RX ADMIN — Medication 1 TABLET(S): at 21:21

## 2018-08-04 RX ADMIN — Medication 1 TABLET(S): at 12:55

## 2018-08-04 RX ADMIN — PIPERACILLIN AND TAZOBACTAM 25 GRAM(S): 4; .5 INJECTION, POWDER, LYOPHILIZED, FOR SOLUTION INTRAVENOUS at 05:30

## 2018-08-04 RX ADMIN — Medication 100 MILLIGRAM(S): at 13:06

## 2018-08-04 RX ADMIN — SENNA PLUS 2 TABLET(S): 8.6 TABLET ORAL at 21:21

## 2018-08-04 RX ADMIN — SIMETHICONE 80 MILLIGRAM(S): 80 TABLET, CHEWABLE ORAL at 14:04

## 2018-08-04 RX ADMIN — Medication 100 MILLIGRAM(S): at 05:30

## 2018-08-04 RX ADMIN — CARVEDILOL PHOSPHATE 3.12 MILLIGRAM(S): 80 CAPSULE, EXTENDED RELEASE ORAL at 05:29

## 2018-08-04 RX ADMIN — PIPERACILLIN AND TAZOBACTAM 25 GRAM(S): 4; .5 INJECTION, POWDER, LYOPHILIZED, FOR SOLUTION INTRAVENOUS at 13:06

## 2018-08-04 RX ADMIN — SODIUM CHLORIDE 3 MILLILITER(S): 9 INJECTION INTRAMUSCULAR; INTRAVENOUS; SUBCUTANEOUS at 05:31

## 2018-08-04 RX ADMIN — Medication 1 TABLET(S): at 05:30

## 2018-08-04 RX ADMIN — POLYETHYLENE GLYCOL 3350 17 GRAM(S): 17 POWDER, FOR SOLUTION ORAL at 18:17

## 2018-08-04 RX ADMIN — Medication 100 MILLIGRAM(S): at 21:21

## 2018-08-04 RX ADMIN — SODIUM CHLORIDE 3 MILLILITER(S): 9 INJECTION INTRAMUSCULAR; INTRAVENOUS; SUBCUTANEOUS at 21:19

## 2018-08-04 RX ADMIN — Medication 20 MILLIGRAM(S): at 05:30

## 2018-08-04 RX ADMIN — Medication 1 TABLET(S): at 13:06

## 2018-08-04 RX ADMIN — SODIUM CHLORIDE 3 MILLILITER(S): 9 INJECTION INTRAMUSCULAR; INTRAVENOUS; SUBCUTANEOUS at 13:06

## 2018-08-04 RX ADMIN — Medication 20 MILLIGRAM(S): at 17:11

## 2018-08-04 NOTE — DIETITIAN INITIAL EVALUATION ADULT. - NS AS NUTRI INTERV ED CONTENT
Nutrition relationship to health/disease/Recommended modifications/The Pt was provided with Heart Healthy diet education (low sodium, low cholesterol, "good fats" vs "bad fats," fiber, label reading, meal planning, and daily weight monitoring), which he verbalized comprehension of./Priority modifications/Purpose of the nutrition education

## 2018-08-04 NOTE — DIETITIAN INITIAL EVALUATION ADULT. - PROBLEM SELECTOR PLAN 1
Transaminitis w/ AST/ALT = 393/303  CT A/P w/ findings of - "LOWER CHEST: Cardiomegaly. Small right and trace left pleural effusions.  LIVER: Normal morphology. Subcentimeter hyperenhancing lesion in the right hepatic lobe dome with flash filling hemangioma or perfusion defect (series 2 image 15). Reflux of contrast into hepatic veins.  BILE DUCTS: Normal caliber.  GALLBLADDER: Within normal limits.  SPLEEN: 1.1 cm hyperenhancing lesion consistent with a hemangioma."  F/U HIDA scan to eval for Cholecystitis.  NPO except meds for HIDA.  Spoke with Nuclear Med. HIDA scheduled for 8/3/18 AM.  Continue Zosyn, continue lactobacillus for normal robb protection.  F/U Hepatitis panel  F/U Coagulation profile  HOB elevation, as tolerated

## 2018-08-04 NOTE — DIETITIAN INITIAL EVALUATION ADULT. - OTHER INFO
Nutrition consult received for cardiomegally. Patient currently on Full liquids, DASH/TLC (cholesterol and sodium restricted) diet, tolerating well. Appetite improved and PO intake 100% at this time. Patient denies any nausea/vomiting/diarrhea/constipation or difficulty chewing and swallowing. NKFA. Therapeutic diet recommendations reviewed with patient.

## 2018-08-04 NOTE — PROGRESS NOTE ADULT - ASSESSMENT
EKG - Sinus tach @ 116 bpm T wave inversion v4 - v6 LVH   Echo - Severe LV dysfunction mod MR, sev pulm HTN     a/p     1) Acute systolic CHF - increase lasix to 20mg q12, d/c  coreg sec to hypotension, continue diuresis will need cath early next week, cardiac MRI    2) Constipation - cont colace and senna    3) DVT prophylasix - sc lovenox

## 2018-08-04 NOTE — PROGRESS NOTE ADULT - ASSESSMENT
47M, with no significant PMH, admitted for Ascites, possible cholecystitis and Cardiomegaly found to have elevated bnp likely from heart failure.    1. Transaminitis w/ AST/ALT = 393/303  CT A/P w/ findings of - "LOWER CHEST: Cardiomegaly. Small right and trace left pleural effusions.  LIVER: Normal morphology. Subcentimeter hyperenhancing lesion in the right hepatic lobe dome with flash filling hemangioma or perfusion defect (series 2 image 15). Reflux of contrast into hepatic veins.  BILE DUCTS: Normal caliber.  GALLBLADDER: Within normal limits.  SPLEEN: 1.1 cm hyperenhancing lesion consistent with a hemangioma." HIDA scan neg  for Cholecystitis.  , f/u GI consult  Continue Zosyn for now, continue lactobacillus for normal robb protection.      2.: Acute systolic CHF  cardio f/u appreciated   increase lasix to 20mg q12, coreg 3.125mg q12, continue diuresis will need cath early next week  Monitor I's O' s daily weight, Lytes, PT and dietary consult.       3. Hyponatremia   improved, cont to monitor Na level.     4. Proteinuria, unspecified type.     mild - protein = 100   renal function periodically.       Gi and dvt prophylaxis

## 2018-08-04 NOTE — DIETITIAN INITIAL EVALUATION ADULT. - ENERGY NEEDS
Current weight 139.9lbs Ht 64" BMI 24kg/m2  IBW: 130lbs (+/-10%) %IBW: 108%  +1 right and left edema noted. Skin intact.

## 2018-08-04 NOTE — DIETITIAN INITIAL EVALUATION ADULT. - PROBLEM SELECTOR PLAN 3
Sodium = 133, in the setting of volume overload (ascites, proBNP = 4755)  - calculated serum osmolality = 277  - currently NPO  - f/u trend with repeat lab-work

## 2018-08-04 NOTE — PROGRESS NOTE ADULT - SUBJECTIVE AND OBJECTIVE BOX
Malcolm Kerns MD  Interventional Cardiology / Advance Heart Failure and Cardiac Transplant Specialist  [place] Office : [address] [place] [address]  Tel:   [place] Office : [address] [facility] [place] [address]  Tel: [phone]  Cell : [phone]    Subjective : Pt lying in bed comfortable, not in distress, denies any chest pain or SOB  	  MEDICATIONS:  enoxaparin Injectable 40 milliGRAM(s) SubCutaneous every 24 hours  furosemide   Injectable 20 milliGRAM(s) IV Push two times a day    piperacillin/tazobactam IVPB. 3.375 Gram(s) IV Intermittent every 8 hours        docusate sodium 100 milliGRAM(s) Oral three times a day  senna 2 Tablet(s) Oral at bedtime      multivitamin 1 Tablet(s) Oral daily  sodium chloride 0.9% lock flush 3 milliLiter(s) IV Push every 8 hours      PHYSICAL EXAM:  T(C): 36.8 (08-04-18 @ 19:33), Max: 37.2 (08-04-18 @ 05:27)  HR: 95 (08-04-18 @ 19:33) (92 - 97)  BP: 97/64 (08-04-18 @ 19:33) (97/64 - 111/85)  RR: 16 (08-04-18 @ 19:33) (16 - 18)  SpO2: 100% (08-04-18 @ 19:33) (95% - 100%)  Wt(kg): --  I&O's Summary    03 Aug 2018 07:01  -  04 Aug 2018 07:00  --------------------------------------------------------  IN: 200 mL / OUT: 2000 mL / NET: -1800 mL    04 Aug 2018 07:01  -  04 Aug 2018 22:59  --------------------------------------------------------  IN: 1135 mL / OUT: 1350 mL / NET: -215 mL          Appearance: Normal	  HEENT:   Normal oral mucosa, PERRL, EOMI	  Cardiovascular: Normal S1 S2, + JVD, No murmurs, No edema  Respiratory: Lungs clear to auscultation	  Gastrointestinal:  Soft, Non-tender, + BS	  Extremities: 1+ edema                                    14.4   9.94  )-----------( 145      ( 04 Aug 2018 06:05 )             42.9     08-04    137  |  99  |  10  ----------------------------<  132<H>  3.5   |  26  |  0.99    Ca    8.1<L>      04 Aug 2018 06:05  Phos  3.3     08-03  Mg     1.7     08-03    TPro  6.2  /  Alb  3.5  /  TBili  3.2<H>  /  DBili  x   /  AST  363<H>  /  ALT  548<H>  /  AlkPhos  71  08-04    proBNP: Serum Pro-Brain Natriuretic Peptide: 2447 pg/mL (08-04 @ 06:05)    Lipid Profile:   HgA1c:   TSH:

## 2018-08-04 NOTE — DIETITIAN INITIAL EVALUATION ADULT. - PERTINENT LABORATORY DATA
08-04 Na137 mmol/L Glu 132 mg/dL<H> K+ 3.5 mmol/L Cr  0.99 mg/dL BUN 10 mg/dL 08-03 Phos 3.3 mg/dL 08-04 Alb 3.5 g/dL 08-03 [id number] 5.4 % 08-03 Chol 105 mg/dL<L> LDL 69 mg/dL HDL 32 mg/dL<L> Trig 77 mg/dL

## 2018-08-04 NOTE — PROGRESS NOTE ADULT - SUBJECTIVE AND OBJECTIVE BOX
47y old  Male who presents with a chief complaint of Abdominal pain x 5 days (03 Aug 2018 00:42)      patient seen and examine at bed side , denies fever, chills, rigors, nausea and vomiting       MEDICATIONS  (STANDING):  docusate sodium 100 milliGRAM(s) Oral three times a day  enoxaparin Injectable 40 milliGRAM(s) SubCutaneous every 24 hours  furosemide   Injectable 20 milliGRAM(s) IV Push two times a day  lactobacillus acidophilus 1 Tablet(s) Oral every 8 hours  multivitamin 1 Tablet(s) Oral daily  piperacillin/tazobactam IVPB. 3.375 Gram(s) IV Intermittent every 8 hours  senna 2 Tablet(s) Oral at bedtime  sodium chloride 0.9% lock flush 3 milliLiter(s) IV Push every 8 hours    MEDICATIONS  (PRN):      Vital Signs Last 24 Hrs  T(C): 36.3 (04 Aug 2018 12:47), Max: 37.4 (03 Aug 2018 17:08)  T(F): 97.4 (04 Aug 2018 12:47), Max: 99.3 (03 Aug 2018 17:08)  HR: 93 (04 Aug 2018 12:47) (93 - 113)  BP: 100/75 (04 Aug 2018 12:47) (100/75 - 144/98)  BP(mean): --  RR: 18 (04 Aug 2018 12:47) (18 - 18)  SpO2: 95% (04 Aug 2018 12:47) (95% - 100%)              PHYSICAL EXAM:  GENERAL: NAD, well-developed  CHEST/LUNG: Clear to auscultation bilaterally; No wheeze  HEART: Regular rate and rhythm  ABDOMEN: Soft, + epigastric tenderness, + distention  EXTREMITIES:  B/L LE edema +  PSYCH: calm  NEUROLOGY: AAOx3  SKIN: No rashes or lesions    LABS:                                     14.4   9.94  )-----------( 145      ( 04 Aug 2018 06:05 )             42.9       08-04    137  |  99  |  10  ----------------------------<  132<H>  3.5   |  26  |  0.99    Ca    8.1<L>      04 Aug 2018 06:05  Phos  3.3     08-03  Mg     1.7     08-03    TPro  6.2  /  Alb  3.5  /  TBili  3.2<H>  /  DBili  x   /  AST  363<H>  /  ALT  548<H>  /  AlkPhos  71  08-          Urinalysis Basic - ( 02 Aug 2018 11:50 )    Color: YELLOW / Appearance: CLEAR / S.015 / pH: 6.0  Gluc: NEGATIVE / Ketone: NEGATIVE  / Bili: NEGATIVE / Urobili: NORMAL mg/dL   Blood: NEGATIVE / Protein: 100 mg/dL / Nitrite: NEGATIVE   Leuk Esterase: NEGATIVE / RBC: 2-5 / WBC 0-2   Sq Epi: OCC / Non Sq Epi: x / Bacteria: x        RADIOLOGY & ADDITIONAL TESTS:    ct abd   IMPRESSION:    No bowel obstruction.    Cardiomegaly with small right and trace left pleural effusions. Small   volume abdominal and pelvic ascites, most likely cardiogenic in etiology.    hida sca : -ve    Imaging Personally Reviewed:    Consultant(s) Notes Reviewed:      Care Discussed with Consultants/Other Providers:

## 2018-08-04 NOTE — DIETITIAN INITIAL EVALUATION ADULT. - PROBLEM SELECTOR PLAN 2
ProBNP = 4755  ECG = sinus tachycardia at 105 bpm, QTc = 533, ?T-wave inversions in V3 to V6  F/U EKG, TTE.   Monitor I's O' s daily weight, Lytes, PT and dietary consult.   give Lasix 20 mg IV.  Low salt diet when not NPO.  f/u TSH level

## 2018-08-04 NOTE — PROGRESS NOTE ADULT - ASSESSMENT
89-year-old female PMHx HTN, CVA (2014), dementia, oropharyngeal non-cancerous tumor s/p resection p/w shortness of breath, generalized weakness admitted to r/o CHF vs PNA.     1. Acute on chronic systolic congestive heart failure.    Monitor I+O  Daily weight  continue lasix  ct scan show b/l atelectasis   insentive spirometry   hold of abx for now   cardio consult appreciated    increase Lasix to 20mg q12, coreg 3.125mg q12,   continue diuresis will need cath early next week      2. Essential hypertension.     Continue home Bp meds.     3. Dementia.    supportive measures.   haldo prn for agitation       GI and DVT prophylaxis

## 2018-08-04 NOTE — DIETITIAN INITIAL EVALUATION ADULT. - NS AS NUTRI INTERV MEALS SNACK
Composition of meals/snacks/Mineral - modified diet/Fat - modified diet/1. Recommend advance diet as tolerated to DASH/TLC (cholesterol and sodium restricted) diet 2. Monitor weights, labs, BM's, skin integrity, p.o. intake. 3. Please Encourage po intake, assist with meals and menu selections, provide alternatives PRN.

## 2018-08-04 NOTE — PROGRESS NOTE ADULT - SUBJECTIVE AND OBJECTIVE BOX
89-year-old female PMHx HTN, CVA (2014), dementia, oropharyngeal non-cancerous tumor s/p resection p/w shortness of breath, generalized weakness, and diaphoresis     patient seen and examine at bed side, still mild SOB   MEDICATIONS  (STANDING):  carvedilol 3.125 milliGRAM(s) Oral every 12 hours  docusate sodium 100 milliGRAM(s) Oral three times a day  enoxaparin Injectable 40 milliGRAM(s) SubCutaneous every 24 hours  furosemide   Injectable 20 milliGRAM(s) IV Push two times a day  lactobacillus acidophilus 1 Tablet(s) Oral every 8 hours  multivitamin 1 Tablet(s) Oral daily  piperacillin/tazobactam IVPB. 3.375 Gram(s) IV Intermittent every 8 hours  senna 2 Tablet(s) Oral at bedtime  sodium chloride 0.9% lock flush 3 milliLiter(s) IV Push every 8 hours    MEDICATIONS  (PRN):    Vital Signs Last 24 Hrs  T(C): 36.3 (04 Aug 2018 12:47), Max: 37.4 (03 Aug 2018 17:08)  T(F): 97.4 (04 Aug 2018 12:47), Max: 99.3 (03 Aug 2018 17:08)  HR: 93 (04 Aug 2018 12:47) (93 - 113)  BP: 100/75 (04 Aug 2018 12:47) (100/75 - 144/98)  BP(mean): --  RR: 18 (04 Aug 2018 12:47) (18 - 18)  SpO2: 95% (04 Aug 2018 12:47) (95% - 100%)      PHYSICAL EXAM:  GENERAL: NAD, well-developed  CHEST/LUNG: Clear to auscultation bilaterally; No wheeze  HEART: Regular rate and rhythm  ABDOMEN: Soft, non tender , BS +  EXTREMITIES:  B/L LE edema +  PSYCH: calm  NEUROLOGY: AAOx3  SKIN: No rashes or lesions                            14.4   9.94  )-----------( 145      ( 04 Aug 2018 06:05 )             42.9       08-04    137  |  99  |  10  ----------------------------<  132<H>  3.5   |  26  |  0.99    Ca    8.1<L>      04 Aug 2018 06:05  Phos  3.3     08-03  Mg     1.7     08-03    TPro  6.2  /  Alb  3.5  /  TBili  3.2<H>  /  DBili  x   /  AST  363<H>  /  ALT  548<H>  /  AlkPhos  71  08-04                Echo - Severe LV dysfunction mod MR, sev pulm HTN   CT chest   IMPRESSION:     Bilateral subsegmental atelectasis and a right greater than left pleural   effusion. Bibasilar subsegmental atelectasis. Lungs otherwise clear.    Mildly enlarged right paratracheal lymph node measures up to 1.2 cm short   axis.  Enlarged pulmonary artery can be seen in pulmonary artery hypertension.

## 2018-08-05 LAB
ALBUMIN SERPL ELPH-MCNC: 3.8 G/DL — SIGNIFICANT CHANGE UP (ref 3.3–5)
ALP SERPL-CCNC: 71 U/L — SIGNIFICANT CHANGE UP (ref 40–120)
ALT FLD-CCNC: 493 U/L — HIGH (ref 4–41)
AST SERPL-CCNC: 248 U/L — HIGH (ref 4–40)
BILIRUB SERPL-MCNC: 2.3 MG/DL — HIGH (ref 0.2–1.2)
BUN SERPL-MCNC: 10 MG/DL — SIGNIFICANT CHANGE UP (ref 7–23)
CALCIUM SERPL-MCNC: 8.7 MG/DL — SIGNIFICANT CHANGE UP (ref 8.4–10.5)
CHLORIDE SERPL-SCNC: 98 MMOL/L — SIGNIFICANT CHANGE UP (ref 98–107)
CO2 SERPL-SCNC: 24 MMOL/L — SIGNIFICANT CHANGE UP (ref 22–31)
CREAT SERPL-MCNC: 0.97 MG/DL — SIGNIFICANT CHANGE UP (ref 0.5–1.3)
GLUCOSE SERPL-MCNC: 126 MG/DL — HIGH (ref 70–99)
HCT VFR BLD CALC: 45.9 % — SIGNIFICANT CHANGE UP (ref 39–50)
HGB BLD-MCNC: 15.2 G/DL — SIGNIFICANT CHANGE UP (ref 13–17)
MCHC RBC-ENTMCNC: 32.1 PG — SIGNIFICANT CHANGE UP (ref 27–34)
MCHC RBC-ENTMCNC: 33.1 % — SIGNIFICANT CHANGE UP (ref 32–36)
MCV RBC AUTO: 96.8 FL — SIGNIFICANT CHANGE UP (ref 80–100)
NRBC # FLD: 0 — SIGNIFICANT CHANGE UP
PLATELET # BLD AUTO: 170 K/UL — SIGNIFICANT CHANGE UP (ref 150–400)
PMV BLD: 11.4 FL — SIGNIFICANT CHANGE UP (ref 7–13)
POTASSIUM SERPL-MCNC: 3.2 MMOL/L — LOW (ref 3.5–5.3)
POTASSIUM SERPL-SCNC: 3.2 MMOL/L — LOW (ref 3.5–5.3)
PROT SERPL-MCNC: 6.8 G/DL — SIGNIFICANT CHANGE UP (ref 6–8.3)
RBC # BLD: 4.74 M/UL — SIGNIFICANT CHANGE UP (ref 4.2–5.8)
RBC # FLD: 12.1 % — SIGNIFICANT CHANGE UP (ref 10.3–14.5)
SODIUM SERPL-SCNC: 137 MMOL/L — SIGNIFICANT CHANGE UP (ref 135–145)
WBC # BLD: 8.86 K/UL — SIGNIFICANT CHANGE UP (ref 3.8–10.5)
WBC # FLD AUTO: 8.86 K/UL — SIGNIFICANT CHANGE UP (ref 3.8–10.5)

## 2018-08-05 PROCEDURE — 99233 SBSQ HOSP IP/OBS HIGH 50: CPT

## 2018-08-05 PROCEDURE — 74183 MRI ABD W/O CNTR FLWD CNTR: CPT | Mod: 26

## 2018-08-05 RX ORDER — POTASSIUM CHLORIDE 20 MEQ
40 PACKET (EA) ORAL ONCE
Qty: 0 | Refills: 0 | Status: COMPLETED | OUTPATIENT
Start: 2018-08-05 | End: 2018-08-05

## 2018-08-05 RX ORDER — MAGNESIUM HYDROXIDE 400 MG/1
30 TABLET, CHEWABLE ORAL DAILY
Qty: 0 | Refills: 0 | Status: DISCONTINUED | OUTPATIENT
Start: 2018-08-05 | End: 2018-08-06

## 2018-08-05 RX ORDER — POTASSIUM CHLORIDE 20 MEQ
20 PACKET (EA) ORAL
Qty: 0 | Refills: 0 | Status: COMPLETED | OUTPATIENT
Start: 2018-08-05 | End: 2018-08-05

## 2018-08-05 RX ADMIN — Medication 1 TABLET(S): at 13:05

## 2018-08-05 RX ADMIN — Medication 20 MILLIEQUIVALENT(S): at 17:17

## 2018-08-05 RX ADMIN — Medication 20 MILLIGRAM(S): at 05:28

## 2018-08-05 RX ADMIN — Medication 1 TABLET(S): at 21:50

## 2018-08-05 RX ADMIN — SENNA PLUS 2 TABLET(S): 8.6 TABLET ORAL at 21:50

## 2018-08-05 RX ADMIN — Medication 100 MILLIGRAM(S): at 21:51

## 2018-08-05 RX ADMIN — Medication 20 MILLIEQUIVALENT(S): at 13:05

## 2018-08-05 RX ADMIN — SODIUM CHLORIDE 3 MILLILITER(S): 9 INJECTION INTRAMUSCULAR; INTRAVENOUS; SUBCUTANEOUS at 13:28

## 2018-08-05 RX ADMIN — PIPERACILLIN AND TAZOBACTAM 25 GRAM(S): 4; .5 INJECTION, POWDER, LYOPHILIZED, FOR SOLUTION INTRAVENOUS at 13:05

## 2018-08-05 RX ADMIN — Medication 20 MILLIGRAM(S): at 17:17

## 2018-08-05 RX ADMIN — SODIUM CHLORIDE 3 MILLILITER(S): 9 INJECTION INTRAMUSCULAR; INTRAVENOUS; SUBCUTANEOUS at 21:50

## 2018-08-05 RX ADMIN — PIPERACILLIN AND TAZOBACTAM 25 GRAM(S): 4; .5 INJECTION, POWDER, LYOPHILIZED, FOR SOLUTION INTRAVENOUS at 05:28

## 2018-08-05 RX ADMIN — Medication 100 MILLIGRAM(S): at 05:28

## 2018-08-05 RX ADMIN — SODIUM CHLORIDE 3 MILLILITER(S): 9 INJECTION INTRAMUSCULAR; INTRAVENOUS; SUBCUTANEOUS at 05:25

## 2018-08-05 RX ADMIN — PIPERACILLIN AND TAZOBACTAM 25 GRAM(S): 4; .5 INJECTION, POWDER, LYOPHILIZED, FOR SOLUTION INTRAVENOUS at 21:51

## 2018-08-05 RX ADMIN — Medication 1 TABLET(S): at 05:28

## 2018-08-05 RX ADMIN — ENOXAPARIN SODIUM 40 MILLIGRAM(S): 100 INJECTION SUBCUTANEOUS at 05:28

## 2018-08-05 NOTE — PROGRESS NOTE ADULT - ASSESSMENT
EKG - Sinus tach @ 116 bpm T wave inversion v4 - v6 LVH   Echo - Severe LV dysfunction mod MR, sev pulm HTN     a/p     1) Acute systolic CHF - c/w lasix to 20mg q12, R/L heart cath tomorrow, npo from tonight    2) Constipation - cont colace and senna, Had BM yesterday     3) DVT prophylasix - sc lovenox

## 2018-08-05 NOTE — PROGRESS NOTE ADULT - ASSESSMENT
47M, with no significant PMH, admitted for Ascites, possible cholecystitis and Cardiomegaly found to have elevated bnp likely from heart failure.    1. Transaminitis   CT A/P w/ findings of -   Cardiomegaly. Small right and trace left pleural effusions.    LIVER: Normal morphology. Subcentimeter hyperenhancing lesion in the right hepatic lobe dome with flash filling hemangioma or perfusion defect (series 2 image 15). Reflux of contrast into hepatic veins.    BILE DUCTS: Normal caliber.    GALLBLADDER: Within normal limits.    SPLEEN: 1.1 cm hyperenhancing lesion consistent with a hemangioma."   HIDA scan neg  for Cholecystitis.  , f/u GI consult  Continue Zosyn for now, continue lactobacillus for normal robb protection.      2.: Acute systolic CHF  cardio f/u appreciated   increase lasix to 20mg q12, coreg 3.125mg q12, continue diuresis will need cath early next week as per cardio   Monitor I's O' s daily weight, Lytes, PT and dietary consult.       3. Hyponatremia   improved, cont to monitor Na level.     4. Proteinuria, unspecified type.     mild - protein = 100   renal function periodically.     5. hypokalemia   K replace   repeat labs     6. constipation   bowel regimen   MOM prn     Gi and dvt prophylaxis 47M, with no significant PMH, admitted for Ascites, possible cholecystitis and Cardiomegaly found to have elevated bnp likely from heart failure.    1. Transaminitis   CT A/P w/ findings of -   Cardiomegaly. Small right and trace left pleural effusions.    LIVER: Normal morphology. Subcentimeter hyperenhancing lesion in the right hepatic lobe dome with flash filling hemangioma or perfusion defect (series 2 image 15). Reflux of contrast into hepatic veins.    BILE DUCTS: Normal caliber.    GALLBLADDER: Within normal limits.    SPLEEN: 1.1 cm hyperenhancing lesion consistent with a hemangioma."   HIDA scan neg  for Cholecystitis.  , f/u GI consult  Continue Zosyn for now, continue lactobacillus for normal robb protection.      2.: Acute systolic CHF/ severe pulmonary HTN   cardio f/u appreciated   increase lasix to 20mg q12, coreg 3.125mg q12, continue diuresis will need cath early next week as per cardio ,   may need right and left Heart cath   Monitor I's O' s daily weight, Lytes, PT and dietary consult.       3. Hyponatremia   improved, cont to monitor Na level.     4. Proteinuria, unspecified type.     mild - protein = 100   renal function periodically.     5. hypokalemia   K replace   repeat labs     6. constipation   bowel regimen   MOM prn     Gi and dvt prophylaxis

## 2018-08-05 NOTE — PROGRESS NOTE ADULT - SUBJECTIVE AND OBJECTIVE BOX
Malcolm Kerns MD  Interventional Cardiology / Advance Heart Failure and Cardiac Transplant Specialist  [place] Office : [address] [place] [address] [address]. [address]  Tel:   [place] Office : [address] [facility] [place] [address]  Tel: [phone]  Cell : [phone]    Subjective : Pt lying in bed comfortable, not in distress, denies any chest pain or SOB  	  MEDICATIONS:  enoxaparin Injectable 40 milliGRAM(s) SubCutaneous every 24 hours  furosemide   Injectable 20 milliGRAM(s) IV Push two times a day    piperacillin/tazobactam IVPB. 3.375 Gram(s) IV Intermittent every 8 hours        docusate sodium 100 milliGRAM(s) Oral three times a day  magnesium hydroxide Suspension 30 milliLiter(s) Oral daily PRN  senna 2 Tablet(s) Oral at bedtime      multivitamin 1 Tablet(s) Oral daily  sodium chloride 0.9% lock flush 3 milliLiter(s) IV Push every 8 hours      PHYSICAL EXAM:  T(C): 36.4 (08-05-18 @ 14:04), Max: 36.8 (08-04-18 @ 19:33)  HR: 96 (08-05-18 @ 17:16) (88 - 96)  BP: 114/85 (08-05-18 @ 17:16) (97/64 - 114/85)  RR: 18 (08-05-18 @ 14:04) (16 - 18)  SpO2: 100% (08-05-18 @ 14:04) (100% - 100%)  Wt(kg): --  I&O's Summary    04 Aug 2018 07:01  -  05 Aug 2018 07:00  --------------------------------------------------------  IN: 1310 mL / OUT: 1925 mL / NET: -615 mL    05 Aug 2018 07:01  -  05 Aug 2018 18:55  --------------------------------------------------------  IN: 550 mL / OUT: 1500 mL / NET: -950 mL          Appearance: Normal	  HEENT:   Normal oral mucosa, PERRL, EOMI	  Cardiovascular: Normal S1 S2, No JVD, No murmurs, No edema  Respiratory: Lungs clear to auscultation	  Gastrointestinal:  Soft, Non-tender, + BS	  Extremities: Normal range of motion, No clubbing, cyanosis or edema                                    15.2   8.86  )-----------( 170      ( 05 Aug 2018 07:32 )             45.9     08-05    137  |  98  |  10  ----------------------------<  126<H>  3.2<L>   |  24  |  0.97    Ca    8.7      05 Aug 2018 07:32    TPro  6.8  /  Alb  3.8  /  TBili  2.3<H>  /  DBili  x   /  AST  248<H>  /  ALT  493<H>  /  AlkPhos  71  08-05    proBNP:   Lipid Profile:   HgA1c:   TSH:

## 2018-08-05 NOTE — PROGRESS NOTE ADULT - SUBJECTIVE AND OBJECTIVE BOX
47y old  Male who presents with a chief complaint of Abdominal pain x 5 days (03 Aug 2018 00:42)      patient seen and examine at bed side ,   He have constipation and abd distention ,        MEDICATIONS  (STANDING):  docusate sodium 100 milliGRAM(s) Oral three times a day  enoxaparin Injectable 40 milliGRAM(s) SubCutaneous every 24 hours  furosemide   Injectable 20 milliGRAM(s) IV Push two times a day  lactobacillus acidophilus 1 Tablet(s) Oral every 8 hours  multivitamin 1 Tablet(s) Oral daily  piperacillin/tazobactam IVPB. 3.375 Gram(s) IV Intermittent every 8 hours  potassium chloride    Tablet ER 20 milliEquivalent(s) Oral every 2 hours  senna 2 Tablet(s) Oral at bedtime  sodium chloride 0.9% lock flush 3 milliLiter(s) IV Push every 8 hours    MEDICATIONS  (PRN):      Vital Signs Last 24 Hrs  T(C): 36.6 (05 Aug 2018 05:25), Max: 36.8 (04 Aug 2018 19:33)  T(F): 97.8 (05 Aug 2018 05:25), Max: 98.2 (04 Aug 2018 19:33)  HR: 88 (05 Aug 2018 05:25) (88 - 95)  BP: 102/74 (05 Aug 2018 05:25) (97/64 - 102/75)  BP(mean): --  RR: 16 (05 Aug 2018 05:25) (16 - 18)  SpO2: 100% (05 Aug 2018 05:25) (95% - 100%)            PHYSICAL EXAM:  GENERAL: NAD, well-developed  CHEST/LUNG: Clear to auscultation bilaterally; No wheeze  HEART: Regular rate and rhythm  ABDOMEN: Soft, + epigastric tenderness, + distention  EXTREMITIES:  B/L LE edema +  PSYCH: calm  NEUROLOGY: AAOx3  SKIN: No rashes or lesions    LABS:                                      15.2   8.86  )-----------( 170      ( 05 Aug 2018 07:32 )             45.9       08-05    137  |  98  |  10  ----------------------------<  126<H>  3.2<L>   |  24  |  0.97    Ca    8.7      05 Aug 2018 07:32    TPro  6.8  /  Alb  3.8  /  TBili  2.3<H>  /  DBili  x   /  AST  248<H>  /  ALT  493<H>  /  AlkPhos  71  08-05            Urinalysis Basic - ( 02 Aug 2018 11:50 )    Color: YELLOW / Appearance: CLEAR / S.015 / pH: 6.0  Gluc: NEGATIVE / Ketone: NEGATIVE  / Bili: NEGATIVE / Urobili: NORMAL mg/dL   Blood: NEGATIVE / Protein: 100 mg/dL / Nitrite: NEGATIVE   Leuk Esterase: NEGATIVE / RBC: 2-5 / WBC 0-2   Sq Epi: OCC / Non Sq Epi: x / Bacteria: x        RADIOLOGY & ADDITIONAL TESTS:    ct abd   IMPRESSION:    No bowel obstruction.    Cardiomegaly with small right and trace left pleural effusions. Small   volume abdominal and pelvic ascites, most likely cardiogenic in etiology.    hida sca : -ve    Imaging Personally Reviewed:    Consultant(s) Notes Reviewed:      Care Discussed with Consultants/Other Providers: 47y old  Male who presents with a chief complaint of Abdominal pain x 5 days (03 Aug 2018 00:42)      patient seen and examine at bed side ,   He have constipation and abd distention ,        MEDICATIONS  (STANDING):  docusate sodium 100 milliGRAM(s) Oral three times a day  enoxaparin Injectable 40 milliGRAM(s) SubCutaneous every 24 hours  furosemide   Injectable 20 milliGRAM(s) IV Push two times a day  lactobacillus acidophilus 1 Tablet(s) Oral every 8 hours  multivitamin 1 Tablet(s) Oral daily  piperacillin/tazobactam IVPB. 3.375 Gram(s) IV Intermittent every 8 hours  potassium chloride    Tablet ER 20 milliEquivalent(s) Oral every 2 hours  senna 2 Tablet(s) Oral at bedtime  sodium chloride 0.9% lock flush 3 milliLiter(s) IV Push every 8 hours    MEDICATIONS  (PRN):      Vital Signs Last 24 Hrs  T(C): 36.6 (05 Aug 2018 05:25), Max: 36.8 (04 Aug 2018 19:33)  T(F): 97.8 (05 Aug 2018 05:25), Max: 98.2 (04 Aug 2018 19:33)  HR: 88 (05 Aug 2018 05:25) (88 - 95)  BP: 102/74 (05 Aug 2018 05:25) (97/64 - 102/75)  BP(mean): --  RR: 16 (05 Aug 2018 05:25) (16 - 18)  SpO2: 100% (05 Aug 2018 05:25) (95% - 100%)            PHYSICAL EXAM:  GENERAL: NAD, well-developed  CHEST/LUNG: Clear to auscultation bilaterally; No wheeze  HEART: Regular rate and rhythm  ABDOMEN: Soft, + epigastric tenderness, + distention  EXTREMITIES:  B/L LE edema +  PSYCH: calm  NEUROLOGY: AAOx3  SKIN: No rashes or lesions    LABS:                                      15.2   8.86  )-----------( 170      ( 05 Aug 2018 07:32 )             45.9       08-05    137  |  98  |  10  ----------------------------<  126<H>  3.2<L>   |  24  |  0.97    Ca    8.7      05 Aug 2018 07:32    TPro  6.8  /  Alb  3.8  /  TBili  2.3<H>  /  DBili  x   /  AST  248<H>  /  ALT  493<H>  /  AlkPhos  71  08-05            Urinalysis Basic - ( 02 Aug 2018 11:50 )    Color: YELLOW / Appearance: CLEAR / S.015 / pH: 6.0  Gluc: NEGATIVE / Ketone: NEGATIVE  / Bili: NEGATIVE / Urobili: NORMAL mg/dL   Blood: NEGATIVE / Protein: 100 mg/dL / Nitrite: NEGATIVE   Leuk Esterase: NEGATIVE / RBC: 2-5 / WBC 0-2   Sq Epi: OCC / Non Sq Epi: x / Bacteria: x        RADIOLOGY & ADDITIONAL TESTS:    ct abd   IMPRESSION:    No bowel obstruction.    Cardiomegaly with small right and trace left pleural effusions. Small   volume abdominal and pelvic ascites, most likely cardiogenic in etiology.    hida sca : -ve    Imaging Personally Reviewed:    Consultant(s) Notes Reviewed:      Care Discussed with Consultants/Other Providers:        echo     CONCLUSIONS:  1. Tethered mitral valve leaflets. Moderate mitral  regurgitation.  2. Normal trileaflet aortic valve.  3. Mildly dilated left atrium.  LA volume index = 39 cc/m2.  4. Moderate left ventricular enlargement.  5. Severe global left ventricular systolic dysfunction.  6. Right ventricular enlargement with decreased right  ventricular systolic function.  7. Normal tricuspid valve. Moderate tricuspid  regurgitation.  8. Estimated pulmonary artery systolic pressure equals 62  mm Hg, assuming right atrial pressure equals 10  mm Hg,  consistent with severe pulmonary hypertension.

## 2018-08-06 LAB
ALBUMIN SERPL ELPH-MCNC: 3.7 G/DL — SIGNIFICANT CHANGE UP (ref 3.3–5)
ALP SERPL-CCNC: 65 U/L — SIGNIFICANT CHANGE UP (ref 40–120)
ALT FLD-CCNC: 403 U/L — HIGH (ref 4–41)
AST SERPL-CCNC: 157 U/L — HIGH (ref 4–40)
BILIRUB SERPL-MCNC: 1.2 MG/DL — SIGNIFICANT CHANGE UP (ref 0.2–1.2)
BUN SERPL-MCNC: 14 MG/DL — SIGNIFICANT CHANGE UP (ref 7–23)
BUN SERPL-MCNC: 14 MG/DL — SIGNIFICANT CHANGE UP (ref 7–23)
CALCIUM SERPL-MCNC: 8.6 MG/DL — SIGNIFICANT CHANGE UP (ref 8.4–10.5)
CALCIUM SERPL-MCNC: 8.6 MG/DL — SIGNIFICANT CHANGE UP (ref 8.4–10.5)
CHLORIDE SERPL-SCNC: 102 MMOL/L — SIGNIFICANT CHANGE UP (ref 98–107)
CHLORIDE SERPL-SCNC: 102 MMOL/L — SIGNIFICANT CHANGE UP (ref 98–107)
CO2 SERPL-SCNC: 23 MMOL/L — SIGNIFICANT CHANGE UP (ref 22–31)
CO2 SERPL-SCNC: 23 MMOL/L — SIGNIFICANT CHANGE UP (ref 22–31)
CREAT SERPL-MCNC: 1.01 MG/DL — SIGNIFICANT CHANGE UP (ref 0.5–1.3)
CREAT SERPL-MCNC: 1.01 MG/DL — SIGNIFICANT CHANGE UP (ref 0.5–1.3)
GLUCOSE SERPL-MCNC: 112 MG/DL — HIGH (ref 70–99)
GLUCOSE SERPL-MCNC: 112 MG/DL — HIGH (ref 70–99)
HCT VFR BLD CALC: 45.4 % — SIGNIFICANT CHANGE UP (ref 39–50)
HGB BLD-MCNC: 15.1 G/DL — SIGNIFICANT CHANGE UP (ref 13–17)
MAGNESIUM SERPL-MCNC: 2.2 MG/DL — SIGNIFICANT CHANGE UP (ref 1.6–2.6)
MCHC RBC-ENTMCNC: 32.3 PG — SIGNIFICANT CHANGE UP (ref 27–34)
MCHC RBC-ENTMCNC: 33.3 % — SIGNIFICANT CHANGE UP (ref 32–36)
MCV RBC AUTO: 97 FL — SIGNIFICANT CHANGE UP (ref 80–100)
NRBC # FLD: 0 — SIGNIFICANT CHANGE UP
PLATELET # BLD AUTO: 198 K/UL — SIGNIFICANT CHANGE UP (ref 150–400)
PMV BLD: 11.2 FL — SIGNIFICANT CHANGE UP (ref 7–13)
POTASSIUM SERPL-MCNC: 3.9 MMOL/L — SIGNIFICANT CHANGE UP (ref 3.5–5.3)
POTASSIUM SERPL-MCNC: 3.9 MMOL/L — SIGNIFICANT CHANGE UP (ref 3.5–5.3)
POTASSIUM SERPL-SCNC: 3.9 MMOL/L — SIGNIFICANT CHANGE UP (ref 3.5–5.3)
POTASSIUM SERPL-SCNC: 3.9 MMOL/L — SIGNIFICANT CHANGE UP (ref 3.5–5.3)
PROT SERPL-MCNC: 6.3 G/DL — SIGNIFICANT CHANGE UP (ref 6–8.3)
RBC # BLD: 4.68 M/UL — SIGNIFICANT CHANGE UP (ref 4.2–5.8)
RBC # FLD: 12.1 % — SIGNIFICANT CHANGE UP (ref 10.3–14.5)
SODIUM SERPL-SCNC: 139 MMOL/L — SIGNIFICANT CHANGE UP (ref 135–145)
SODIUM SERPL-SCNC: 139 MMOL/L — SIGNIFICANT CHANGE UP (ref 135–145)
WBC # BLD: 7.14 K/UL — SIGNIFICANT CHANGE UP (ref 3.8–10.5)
WBC # FLD AUTO: 7.14 K/UL — SIGNIFICANT CHANGE UP (ref 3.8–10.5)

## 2018-08-06 PROCEDURE — 99233 SBSQ HOSP IP/OBS HIGH 50: CPT

## 2018-08-06 RX ADMIN — Medication 20 MILLIGRAM(S): at 17:13

## 2018-08-06 RX ADMIN — Medication 20 MILLIGRAM(S): at 04:24

## 2018-08-06 RX ADMIN — Medication 1 TABLET(S): at 20:59

## 2018-08-06 RX ADMIN — ENOXAPARIN SODIUM 40 MILLIGRAM(S): 100 INJECTION SUBCUTANEOUS at 04:24

## 2018-08-06 RX ADMIN — SODIUM CHLORIDE 3 MILLILITER(S): 9 INJECTION INTRAMUSCULAR; INTRAVENOUS; SUBCUTANEOUS at 20:59

## 2018-08-06 RX ADMIN — SODIUM CHLORIDE 3 MILLILITER(S): 9 INJECTION INTRAMUSCULAR; INTRAVENOUS; SUBCUTANEOUS at 04:23

## 2018-08-06 RX ADMIN — Medication 1 TABLET(S): at 04:24

## 2018-08-06 RX ADMIN — PIPERACILLIN AND TAZOBACTAM 25 GRAM(S): 4; .5 INJECTION, POWDER, LYOPHILIZED, FOR SOLUTION INTRAVENOUS at 04:24

## 2018-08-06 RX ADMIN — Medication 100 MILLIGRAM(S): at 04:24

## 2018-08-06 NOTE — PROGRESS NOTE ADULT - SUBJECTIVE AND OBJECTIVE BOX
Patient is a 47y old  Male who presents with a chief complaint of Abdominal pain x 5 days (03 Aug 2018 00:42)      SUBJECTIVE / OVERNIGHT EVENTS: Pt without complaints, s/p cath.    MEDICATIONS  (STANDING):  docusate sodium 100 milliGRAM(s) Oral three times a day  enoxaparin Injectable 40 milliGRAM(s) SubCutaneous every 24 hours  furosemide   Injectable 20 milliGRAM(s) IV Push two times a day  lactobacillus acidophilus 1 Tablet(s) Oral every 8 hours  multivitamin 1 Tablet(s) Oral daily  senna 2 Tablet(s) Oral at bedtime  sodium chloride 0.9% lock flush 3 milliLiter(s) IV Push every 8 hours    MEDICATIONS  (PRN):  magnesium hydroxide Suspension 30 milliLiter(s) Oral daily PRN Constipation      Vital Signs Last 24 Hrs  T(C): 36.5 (08-06-18 @ 15:43), Max: 36.5 (08-06-18 @ 15:43)  T(F): 97.7 (08-06-18 @ 15:43), Max: 97.7 (08-06-18 @ 15:43)  HR: 103 (08-06-18 @ 15:43) (89 - 103)  BP: 121/93 (08-06-18 @ 15:43) (107/76 - 121/93)  BP(mean): --  RR: 16 (08-06-18 @ 15:43) (16 - 18)  SpO2: 99% (08-06-18 @ 15:43) (97% - 100%)  CAPILLARY BLOOD GLUCOSE        I&O's Summary    05 Aug 2018 07:01  -  06 Aug 2018 07:00  --------------------------------------------------------  IN: 925 mL / OUT: 1700 mL / NET: -775 mL    06 Aug 2018 07:01  -  06 Aug 2018 16:22  --------------------------------------------------------  IN: 0 mL / OUT: 900 mL / NET: -900 mL        PHYSICAL EXAM:  GENERAL: NAD, well-nourished  HEENT: mmm  CHEST/LUNG: CTABL  HEART: RRR, no m/r/g  ABDOMEN: soft, nt, nd  EXTREMITIES:  wwp, no c/c/e, right radial cath site c/d/i  PSYCH: AAOx3  NEUROLOGY: non-focal  SKIN: No rashes or lesions    LABS:                        15.1   7.14  )-----------( 198      ( 06 Aug 2018 04:15 )             45.4     08-06    139  |  102  |  14  ----------------------------<  112<H>  3.9   |  23  |  1.01    Ca    8.6      06 Aug 2018 04:15  Mg     2.2     08-06    TPro  6.3  /  Alb  3.7  /  TBili  1.2  /  DBili  x   /  AST  157<H>  /  ALT  403<H>  /  AlkPhos  65  08-06              RADIOLOGY & ADDITIONAL TESTS:    Imaging Personally Reviewed:    Consultant(s) Notes Reviewed:      Care Discussed with Consultants/Other Providers:

## 2018-08-06 NOTE — PROGRESS NOTE ADULT - ASSESSMENT
47M, with no significant PMH, admitted for Ascites, possible cholecystitis and Cardiomegaly found to have elevated bnp likely from heart failure.    # Acute systolic CHF/ severe pulmonary HTN   - s/p cath with clear coronaries  - pending cardiac MRI      # Transaminitis   - imaging without liver pathology, likely 2/2 HF, improving with diuresis      3. Hyponatremia   improved, cont to monitor Na level.       4. Proteinuria, unspecified type.     mild - protein = 100   renal function periodically.     5. hypokalemia   K replace   repeat labs     6. constipation   bowel regimen   MOM prn     Gi and dvt prophylaxis 47M, with no significant PMH, admitted for Ascites, possible cholecystitis and Cardiomegaly found to have elevated bnp likely from heart failure.    # Acute systolic CHF/ severe pulmonary HTN   - s/p cath with clear coronaries  - pending cardiac MRI  - c/w IV diuresis for now, monitor electrolytes daily      # Transaminitis   - imaging without liver pathology, likely 2/2 HF, improving with diuresis      3. Hyponatremia   improved, cont to monitor Na level.       4. Proteinuria, unspecified type.     mild - protein = 100   renal function periodically.     5. hypokalemia   K replace   repeat labs     6. constipation   bowel regimen   MOM prn     Gi and dvt prophylaxis

## 2018-08-06 NOTE — PROGRESS NOTE ADULT - SUBJECTIVE AND OBJECTIVE BOX
Malcolm Kerns MD  Interventional Cardiology / Advance Heart Failure and Cardiac Transplant Specialist  [place] Office : [phone] [place] [address] [place]. [address]  Tel:   [place] Office : [address] [facility] [place] [address]  Tel: [phone]  Cell : [phone]    Subjective : Pt lying in bed comfortable, not in distress, denies any chest pain or SOB s/p cath shows normal coronaries, CI 1.8   	  MEDICATIONS:  enoxaparin Injectable 40 milliGRAM(s) SubCutaneous every 24 hours  furosemide   Injectable 20 milliGRAM(s) IV Push two times a day  docusate sodium 100 milliGRAM(s) Oral three times a day  magnesium hydroxide Suspension 30 milliLiter(s) Oral daily PRN  senna 2 Tablet(s) Oral at bedtime  multivitamin 1 Tablet(s) Oral daily  sodium chloride 0.9% lock flush 3 milliLiter(s) IV Push every 8 hours      PHYSICAL EXAM:  T(C): 36.2 (08-06-18 @ 04:21), Max: 36.4 (08-05-18 @ 19:11)  HR: 92 (08-06-18 @ 04:21) (89 - 96)  BP: 107/76 (08-06-18 @ 04:21) (107/76 - 114/85)  RR: 16 (08-06-18 @ 04:21) (16 - 18)  SpO2: 97% (08-06-18 @ 04:21) (97% - 100%)  Wt(kg): --  I&O's Summary    05 Aug 2018 07:01  -  06 Aug 2018 07:00  --------------------------------------------------------  IN: 925 mL / OUT: 1700 mL / NET: -775 mL    06 Aug 2018 07:01  -  06 Aug 2018 14:29  --------------------------------------------------------  IN: 0 mL / OUT: 800 mL / NET: -800 mL          Appearance: Normal	  HEENT:   Normal oral mucosa, PERRL, EOMI	  Cardiovascular: Normal S1 S2, No JVD, No murmurs, No edema  Respiratory: Lungs clear to auscultation	  Gastrointestinal:  Soft, Non-tender, + BS	  Extremities: 1+ edema                                    15.1   7.14  )-----------( 198      ( 06 Aug 2018 04:15 )             45.4     08-06    139  |  102  |  14  ----------------------------<  112<H>  3.9   |  23  |  1.01    Ca    8.6      06 Aug 2018 04:15  Mg     2.2     08-06    TPro  6.3  /  Alb  3.7  /  TBili  1.2  /  DBili  x   /  AST  157<H>  /  ALT  403<H>  /  AlkPhos  65  08-06    proBNP:   Lipid Profile:   HgA1c:   TSH:

## 2018-08-06 NOTE — PROGRESS NOTE ADULT - ASSESSMENT
EKG - Sinus tach @ 116 bpm T wave inversion v4 - v6 LVH   Echo - Severe LV dysfunction mod MR, sev pulm HTN     a/p     1) Acute systolic CHF - c/w lasix to 20mg q12, cath shows normal coronaries, CI 1.8 LVEDP still elevated at 27mmHg , continue diuresis, Life vest , cardiac MRI, non ischemic cardiomyopathy    2) Constipation - cont colace and senna,     3) DVT prophylasix - sc lovenox

## 2018-08-07 ENCOUNTER — TRANSCRIPTION ENCOUNTER (OUTPATIENT)
Age: 48
End: 2018-08-07

## 2018-08-07 VITALS
RESPIRATION RATE: 18 BRPM | SYSTOLIC BLOOD PRESSURE: 121 MMHG | DIASTOLIC BLOOD PRESSURE: 91 MMHG | HEART RATE: 99 BPM | OXYGEN SATURATION: 99 %

## 2018-08-07 PROBLEM — Z00.00 ENCOUNTER FOR PREVENTIVE HEALTH EXAMINATION: Status: ACTIVE | Noted: 2018-08-07

## 2018-08-07 LAB
ALBUMIN SERPL ELPH-MCNC: 4.1 G/DL — SIGNIFICANT CHANGE UP (ref 3.3–5)
ALP SERPL-CCNC: 66 U/L — SIGNIFICANT CHANGE UP (ref 40–120)
ALT FLD-CCNC: 333 U/L — HIGH (ref 4–41)
AST SERPL-CCNC: 112 U/L — HIGH (ref 4–40)
BASOPHILS # BLD AUTO: 0.05 K/UL — SIGNIFICANT CHANGE UP (ref 0–0.2)
BASOPHILS NFR BLD AUTO: 0.7 % — SIGNIFICANT CHANGE UP (ref 0–2)
BILIRUB SERPL-MCNC: 1.5 MG/DL — HIGH (ref 0.2–1.2)
BUN SERPL-MCNC: 15 MG/DL — SIGNIFICANT CHANGE UP (ref 7–23)
CALCIUM SERPL-MCNC: 9.2 MG/DL — SIGNIFICANT CHANGE UP (ref 8.4–10.5)
CHLORIDE SERPL-SCNC: 100 MMOL/L — SIGNIFICANT CHANGE UP (ref 98–107)
CO2 SERPL-SCNC: 23 MMOL/L — SIGNIFICANT CHANGE UP (ref 22–31)
CREAT SERPL-MCNC: 0.96 MG/DL — SIGNIFICANT CHANGE UP (ref 0.5–1.3)
EOSINOPHIL # BLD AUTO: 0.39 K/UL — SIGNIFICANT CHANGE UP (ref 0–0.5)
EOSINOPHIL NFR BLD AUTO: 5.6 % — SIGNIFICANT CHANGE UP (ref 0–6)
GLUCOSE SERPL-MCNC: 122 MG/DL — HIGH (ref 70–99)
HCT VFR BLD CALC: 51.4 % — HIGH (ref 39–50)
HGB BLD-MCNC: 17.1 G/DL — HIGH (ref 13–17)
IMM GRANULOCYTES # BLD AUTO: 0.06 # — SIGNIFICANT CHANGE UP
IMM GRANULOCYTES NFR BLD AUTO: 0.9 % — SIGNIFICANT CHANGE UP (ref 0–1.5)
LYMPHOCYTES # BLD AUTO: 1.44 K/UL — SIGNIFICANT CHANGE UP (ref 1–3.3)
LYMPHOCYTES # BLD AUTO: 20.8 % — SIGNIFICANT CHANGE UP (ref 13–44)
MCHC RBC-ENTMCNC: 31.9 PG — SIGNIFICANT CHANGE UP (ref 27–34)
MCHC RBC-ENTMCNC: 33.3 % — SIGNIFICANT CHANGE UP (ref 32–36)
MCV RBC AUTO: 95.9 FL — SIGNIFICANT CHANGE UP (ref 80–100)
MONOCYTES # BLD AUTO: 0.69 K/UL — SIGNIFICANT CHANGE UP (ref 0–0.9)
MONOCYTES NFR BLD AUTO: 10 % — SIGNIFICANT CHANGE UP (ref 2–14)
NEUTROPHILS # BLD AUTO: 4.29 K/UL — SIGNIFICANT CHANGE UP (ref 1.8–7.4)
NEUTROPHILS NFR BLD AUTO: 62 % — SIGNIFICANT CHANGE UP (ref 43–77)
NRBC # FLD: 0 — SIGNIFICANT CHANGE UP
PLATELET # BLD AUTO: 208 K/UL — SIGNIFICANT CHANGE UP (ref 150–400)
PMV BLD: 11.2 FL — SIGNIFICANT CHANGE UP (ref 7–13)
POTASSIUM SERPL-MCNC: 3.4 MMOL/L — LOW (ref 3.5–5.3)
POTASSIUM SERPL-SCNC: 3.4 MMOL/L — LOW (ref 3.5–5.3)
PROT SERPL-MCNC: 7 G/DL — SIGNIFICANT CHANGE UP (ref 6–8.3)
RBC # BLD: 5.36 M/UL — SIGNIFICANT CHANGE UP (ref 4.2–5.8)
RBC # FLD: 11.9 % — SIGNIFICANT CHANGE UP (ref 10.3–14.5)
SODIUM SERPL-SCNC: 139 MMOL/L — SIGNIFICANT CHANGE UP (ref 135–145)
WBC # BLD: 6.92 K/UL — SIGNIFICANT CHANGE UP (ref 3.8–10.5)
WBC # FLD AUTO: 6.92 K/UL — SIGNIFICANT CHANGE UP (ref 3.8–10.5)

## 2018-08-07 RX ORDER — LISINOPRIL 2.5 MG/1
2 TABLET ORAL
Qty: 0 | Refills: 0 | COMMUNITY
Start: 2018-08-07 | End: 2018-09-05

## 2018-08-07 RX ORDER — FUROSEMIDE 40 MG
1 TABLET ORAL
Qty: 30 | Refills: 0 | OUTPATIENT
Start: 2018-08-07 | End: 2018-09-05

## 2018-08-07 RX ORDER — LISINOPRIL 2.5 MG/1
2.5 TABLET ORAL DAILY
Qty: 0 | Refills: 0 | Status: DISCONTINUED | OUTPATIENT
Start: 2018-08-07 | End: 2018-08-06

## 2018-08-07 RX ORDER — POTASSIUM CHLORIDE 20 MEQ
40 PACKET (EA) ORAL EVERY 4 HOURS
Qty: 0 | Refills: 0 | Status: COMPLETED | OUTPATIENT
Start: 2018-08-07 | End: 2018-08-07

## 2018-08-07 RX ORDER — FUROSEMIDE 40 MG
40 TABLET ORAL DAILY
Qty: 0 | Refills: 0 | Status: DISCONTINUED | OUTPATIENT
Start: 2018-08-07 | End: 2018-08-07

## 2018-08-07 RX ORDER — FUROSEMIDE 40 MG
1 TABLET ORAL
Qty: 60 | Refills: 0 | OUTPATIENT
Start: 2018-08-07 | End: 2018-09-05

## 2018-08-07 RX ORDER — LISINOPRIL 2.5 MG/1
1 TABLET ORAL
Qty: 30 | Refills: 0 | OUTPATIENT
Start: 2018-08-07 | End: 2018-09-05

## 2018-08-07 RX ORDER — POTASSIUM CHLORIDE 20 MEQ
1 PACKET (EA) ORAL
Qty: 10 | Refills: 0 | OUTPATIENT
Start: 2018-08-07 | End: 2018-08-16

## 2018-08-07 RX ORDER — SENNA PLUS 8.6 MG/1
2 TABLET ORAL
Qty: 60 | Refills: 0 | OUTPATIENT
Start: 2018-08-07 | End: 2018-09-05

## 2018-08-07 RX ORDER — FUROSEMIDE 40 MG
40 TABLET ORAL DAILY
Qty: 0 | Refills: 0 | Status: DISCONTINUED | OUTPATIENT
Start: 2018-08-07 | End: 2018-08-06

## 2018-08-07 RX ORDER — DOCUSATE SODIUM 100 MG
1 CAPSULE ORAL
Qty: 90 | Refills: 0 | OUTPATIENT
Start: 2018-08-07 | End: 2018-09-05

## 2018-08-07 RX ORDER — SPIRONOLACTONE 25 MG/1
12.5 TABLET, FILM COATED ORAL DAILY
Qty: 0 | Refills: 0 | Status: DISCONTINUED | OUTPATIENT
Start: 2018-08-07 | End: 2018-08-06

## 2018-08-07 RX ORDER — SPIRONOLACTONE 25 MG/1
0.5 TABLET, FILM COATED ORAL
Qty: 15 | Refills: 0 | OUTPATIENT
Start: 2018-08-07 | End: 2018-09-05

## 2018-08-07 RX ADMIN — Medication 40 MILLIEQUIVALENT(S): at 10:56

## 2018-08-07 RX ADMIN — Medication 1 TABLET(S): at 10:55

## 2018-08-07 RX ADMIN — SODIUM CHLORIDE 3 MILLILITER(S): 9 INJECTION INTRAMUSCULAR; INTRAVENOUS; SUBCUTANEOUS at 14:17

## 2018-08-07 RX ADMIN — Medication 40 MILLIEQUIVALENT(S): at 13:17

## 2018-08-07 RX ADMIN — ENOXAPARIN SODIUM 40 MILLIGRAM(S): 100 INJECTION SUBCUTANEOUS at 05:22

## 2018-08-07 RX ADMIN — SODIUM CHLORIDE 3 MILLILITER(S): 9 INJECTION INTRAMUSCULAR; INTRAVENOUS; SUBCUTANEOUS at 05:22

## 2018-08-07 RX ADMIN — Medication 20 MILLIGRAM(S): at 05:22

## 2018-08-07 RX ADMIN — LISINOPRIL 2.5 MILLIGRAM(S): 2.5 TABLET ORAL at 13:16

## 2018-08-07 RX ADMIN — Medication 1 TABLET(S): at 05:22

## 2018-08-07 RX ADMIN — Medication 40 MILLIGRAM(S): at 17:23

## 2018-08-07 RX ADMIN — SPIRONOLACTONE 12.5 MILLIGRAM(S): 25 TABLET, FILM COATED ORAL at 16:50

## 2018-08-07 NOTE — DISCHARGE NOTE ADULT - CARE PLAN
Principal Discharge DX:	NICM (nonischemic cardiomyopathy) Principal Discharge DX:	NICM (nonischemic cardiomyopathy)  Goal:	medical management to improve cardiac function  Assessment and plan of treatment:	You were prescribed Lasix, lisinopril, and Aldactone to help your heart function improve. You were also prescribed potassium supplements to take while you are on the Lasix, since your potassium levels here were low. In one week you will need to follow up with Dr. Kerns, at that time you should have repeated bloodwork to monitor your potassium levels and see if you still need to continue taking it. You also have an appointment SEPTEMBER 27 @ 9AM in the Oncology Building 4th Floor (EP office) with Dr. Ventura.  Secondary Diagnosis:	Elevated liver function tests  Assessment and plan of treatment:	Likely due to underlying heart disease. Repeat liver function tests at your PCP in 2 weeks.  Secondary Diagnosis:	Pulmonary hypertension

## 2018-08-07 NOTE — PROGRESS NOTE ADULT - ASSESSMENT
47M, with no significant PMH, admitted for Ascites, possible cholecystitis and Cardiomegaly found to have elevated bnp likely from heart failure.    # Acute systolic CHF/ severe pulmonary HTN   - s/p cath with clear coronaries  - pending cardiac MRI  - c/w IV diuresis for now, monitor electrolytes daily  - fu cards re CHF meds, pt started on ACEi, fu re toprol  - ensure OP fu      # Transaminitis   - imaging without liver pathology, likely 2/2 HF, improving with diuresis      3. Hyponatremia   improved, cont to monitor Na level.       4. Proteinuria, unspecified type.     mild - protein = 100   renal function periodically.     5. hypokalemia   K replace   repeat labs     6. constipation   bowel regimen   MOM prn     Gi and dvt prophylaxis

## 2018-08-07 NOTE — DISCHARGE NOTE ADULT - PLAN OF CARE
medical management to improve cardiac function You were prescribed Lasix, lisinopril, and Aldactone to help your heart function improve. You were also prescribed potassium supplements to take while you are on the Lasix, since your potassium levels here were low. In one week you will need to follow up with Dr. Kerns, at that time you should have repeated bloodwork to monitor your potassium levels and see if you still need to continue taking it. You also have an appointment SEPTEMBER 27 @ 9AM in the Oncology Building 4th Floor (EP office) with Dr. Ventura. Likely due to underlying heart disease. Repeat liver function tests at your PCP in 2 weeks.

## 2018-08-07 NOTE — DISCHARGE NOTE ADULT - HOSPITAL COURSE
47M with no past medical history, experiencing constant epigastric pain, progressive anasarca, progressive DRUMMOND after ambulating 1 flight of steps, exertional palpitations, weight gain, orthopnoea, nausea and vomit x 1 non bloody episode on 7/31/18, decreased appetite, flatulence.    LFTs were found to be elevated. Abdominal ultrasound revealed gallbladder wall thickening. Broad spectrum antibiotics were started in setting of leukocytosis and elevated LFTs for suspected cholecystis A HIDA scan 47M with no past medical history, experiencing constant epigastric pain, progressive anasarca, progressive DRUMMOND after ambulating 1 flight of steps, exertional palpitations, weight gain, orthopnoea, nausea and vomit x 1 non bloody episode on 7/31/18, decreased appetite, flatulence.    LFTs were found to be elevated. Abdominal ultrasound revealed gallbladder wall thickening. Broad spectrum antibiotics were started in setting of leukocytosis and elevated LFTs for suspected cholecystis A HIDA scan showed no evidence acute cholecystitis. Antibiotics discontinued. An MRCP only showed a small liver hemangioma.   Echocardiogram revealed severe LV dysfunction-a new finding, with moderate MR and severe pulmonary HTN. Pt started on Lasix, lisinopril, carvedilol, however developed hypotension after carvedilol so it was discontinued. Aldactone was started.  LHC revealed normal coronary arteries. 47M with no past medical history, experiencing constant epigastric pain, progressive anasarca, progressive DRUMMOND after ambulating 1 flight of steps, exertional palpitations, weight gain, orthopnoea, nausea and vomit x 1 non bloody episode on 7/31/18, decreased appetite, flatulence.    LFTs were found to be elevated. Abdominal ultrasound revealed gallbladder wall thickening. Broad spectrum antibiotics were started in setting of leukocytosis and elevated LFTs for suspected cholecystis A HIDA scan showed no evidence acute cholecystitis. Antibiotics discontinued. An MRCP only showed a small liver hemangioma.   Echocardiogram revealed severe LV dysfunction-a new finding, with moderate MR and severe pulmonary HTN. Pt started on Lasix, lisinopril, carvedilol, however developed hypotension after carvedilol so it was discontinued. Aldactone was started.  LHC revealed normal coronary arteries,  LVEDP 27. 47M with no past medical history, experiencing constant epigastric pain, progressive anasarca, progressive DRUMMOND after ambulating 1 flight of steps, exertional palpitations, weight gain, orthopnoea, nausea and vomit x 1 non bloody episode on 7/31/18, decreased appetite, flatulence.    LFTs were found to be elevated. Abdominal ultrasound revealed gallbladder wall thickening. Broad spectrum antibiotics were started in setting of leukocytosis and elevated LFTs for suspected cholecystis A HIDA scan showed no evidence acute cholecystitis. Antibiotics discontinued. An MRCP only showed a small liver hemangioma.   Echocardiogram revealed severe LV dysfunction-a new finding, with moderate MR and severe pulmonary HTN. Pt started on Lasix, lisinopril, carvedilol, however developed hypotension after carvedilol so it was discontinued. Aldactone was started.  LHC revealed normal coronary arteries,  LVEDP 27. A cardiac MRI was obtained which revealed ___________  Pt was fitted with lifevest. and is now medically cleared for discharge home with EP follow up in 3 months after medical therapy to determine need for AICD. Transaminitis likely due to underlying cardiac disease. 47M with no past medical history, experiencing constant epigastric pain, progressive anasarca, progressive DRUMMOND after ambulating 1 flight of steps, exertional palpitations, weight gain, orthopnoea, nausea and vomit x 1 non bloody episode on 7/31/18, decreased appetite, flatulence.    LFTs were found to be elevated. Abdominal ultrasound revealed gallbladder wall thickening. Broad spectrum antibiotics were started in setting of leukocytosis and elevated LFTs for suspected cholecystis A HIDA scan showed no evidence acute cholecystitis. Antibiotics discontinued. An MRCP only showed a small liver hemangioma.   Echocardiogram revealed severe LV dysfunction-a new finding, with moderate MR and severe pulmonary HTN. Pt started on Lasix, lisinopril, carvedilol, however developed hypotension after carvedilol so it was discontinued. Aldactone was started.  LHC revealed normal coronary arteries,  LVEDP 27. A cardiac MRI consistent with dilated cardiomyopathy.   Pt was fitted with lifevest. and is now medically cleared for discharge home with EP follow up in 3 months after medical therapy to determine need for AICD. Transaminitis likely due to underlying cardiac disease.

## 2018-08-07 NOTE — DISCHARGE NOTE ADULT - PATIENT PORTAL LINK FT
You can access the [facility] Patient Portal, offered by [facility], by registering with the following website: [email or web address]

## 2018-08-07 NOTE — DISCHARGE NOTE ADULT - MEDICATION SUMMARY - MEDICATIONS TO TAKE
I will START or STAY ON the medications listed below when I get home from the hospital:    spironolactone 25 mg oral tablet  -- 0.5 tab(s) by mouth once a day  -- Indication: For NICM (nonischemic cardiomyopathy)    lisinopril 2.5 mg oral tablet  -- 1 tab(s) by mouth once a day  -- Indication: For NICM (nonischemic cardiomyopathy)    furosemide 40 mg oral tablet  -- 1 tab(s) by mouth once a day  -- Indication: For NICM (nonischemic cardiomyopathy)    senna oral tablet  -- 2 tab(s) by mouth once a day (at bedtime)  -- Indication: For Constipation    docusate sodium 100 mg oral capsule  -- 1 cap(s) by mouth 3 times a day as needed for constipation   -- Indication: For Constipation    potassium chloride 20 mEq oral tablet, extended release  -- 1 tab(s) by mouth once a day   -- It is very important that you take or use this exactly as directed.  Do not skip doses or discontinue unless directed by your doctor.  Medication should be taken with plenty of water.  Take with food or milk.    -- Indication: For supplement     Multiple Vitamins oral tablet  -- 1 tab(s) by mouth once a day  -- Indication: For supplement I will START or STAY ON the medications listed below when I get home from the hospital:    lisinopril 2.5 mg oral tablet  -- 1 tab(s) by mouth once a day  -- Indication: For NICM (nonischemic cardiomyopathy)    Lasix 40 mg oral tablet  -- 1 tab(s) by mouth 2 times a day . PLEASE DISREGARD PREVIOUS LASIX DAILY DOSE AND ALDACTONE  -- Avoid prolonged or excessive exposure to direct and/or artificial sunlight while taking this medication.  It is very important that you take or use this exactly as directed.  Do not skip doses or discontinue unless directed by your doctor.  It may be advisable to drink a full glass orange juice or eat a banana daily while taking this medication.    -- Indication: For NICM (nonischemic cardiomyopathy)    senna oral tablet  -- 2 tab(s) by mouth once a day (at bedtime)  -- Indication: For Constipation    docusate sodium 100 mg oral capsule  -- 1 cap(s) by mouth 3 times a day as needed for constipation   -- Indication: For Constipation    potassium chloride 20 mEq oral tablet, extended release  -- 1 tab(s) by mouth once a day   -- It is very important that you take or use this exactly as directed.  Do not skip doses or discontinue unless directed by your doctor.  Medication should be taken with plenty of water.  Take with food or milk.    -- Indication: For supplement     Multiple Vitamins oral tablet  -- 1 tab(s) by mouth once a day  -- Indication: For supplement

## 2018-08-07 NOTE — PROGRESS NOTE ADULT - PROVIDER SPECIALTY LIST ADULT
Cardiology
Hospitalist

## 2018-08-07 NOTE — CONSULT NOTE ADULT - SUBJECTIVE AND OBJECTIVE BOX
Patient seen and evaluated at bedside    HPI:  47 M with no past medical history, experiencing constant epigastric pain, progressive anasarca, progressive DRUMMOND after ambulating 1 flight of steps, exertional palpitations, weight gain, orthopnoea, nausea and vomit x 1 non bloody episode on 7/31/18, decreased appetite, flatulence.  Prefers to lie semi-supine in bed because of abdominal discomfort.  Denies diarrhea, chills, cough, diaphoresis, dysuria, body aches.      TTE reveals an EF of 15% and coronary angiogram with normal coronaries.         PMH:   No pertinent past medical history      PSH:   No significant past surgical history      Medications:   docusate sodium 100 milliGRAM(s) Oral three times a day  enoxaparin Injectable 40 milliGRAM(s) SubCutaneous every 24 hours  furosemide   Injectable 20 milliGRAM(s) IV Push two times a day  lactobacillus acidophilus 1 Tablet(s) Oral every 8 hours  lisinopril 2.5 milliGRAM(s) Oral daily  magnesium hydroxide Suspension 30 milliLiter(s) Oral daily PRN  multivitamin 1 Tablet(s) Oral daily  potassium chloride    Tablet ER 40 milliEquivalent(s) Oral every 4 hours  senna 2 Tablet(s) Oral at bedtime  sodium chloride 0.9% lock flush 3 milliLiter(s) IV Push every 8 hours      Allergies:  Motrin (Hives)      FAMILY HISTORY:  Family history of lung disease (Grandparent): maternal grandmother (no history of smoking)  Family history of hypertension (Father): father  Family history of heart disease (Mother): mother      Review of Systems:  REVIEW OF SYSTEMS:  CONSTITUTIONAL: No weakness, fevers or chills  EYES/ENT: No visual changes;  No dysphagia  NECK: No pain or stiffness  RESPIRATORY: No cough, wheezing, hemoptysis; No shortness of breath  CARDIOVASCULAR: No chest pain or palpitations; No lower extremity edema  GASTROINTESTINAL: No abdominal or epigastric pain. No nausea, vomiting, or hematemesis; No diarrhea or constipation. No melena or hematochezia.  BACK: No back pain  GENITOURINARY: No dysuria, frequency or hematuria  NEUROLOGICAL: No numbness or weakness  SKIN: No itching, burning, rashes, or lesions   All other review of systems is negative unless indicated above.    Physical Exam:  T(F): 97.5 (08-07), Max: 97.7 (08-06)  HR: 90 (08-07) (89 - 103)  BP: 110/75 (08-07) (110/75 - 128/78)  RR: 16 (08-07)  SpO2: 98% (08-07)    GENERAL: No acute distress, well-developed  HEAD:  Atraumatic, Normocephalic  ENT: EOMI, PERRLA, conjunctiva and sclera clear, Neck supple, No JVD, moist mucosa  CHEST/LUNG: Clear to auscultation bilaterally; No wheeze, equal breath sounds bilaterally   BACK: No spinal tenderness  HEART: Regular rate and rhythm; No murmurs, rubs, or gallops  ABDOMEN: Soft, Nontender, Nondistended; Bowel sounds present  EXTREMITIES:  No clubbing, cyanosis, or edema  PSYCH: Nl behavior, nl affect  NEUROLOGY: AAOx3, non-focal, cranial nerves intact  SKIN: Normal color, No rashes or lesions  LINES:    Cardiovascular Diagnostic Testing:    ECG: Personally reviewed  < from: 12 Lead ECG (08.02.18 @ 23:48) >    Ventricular Rate 105 BPM    Atrial Rate 105 BPM    P-R Interval 162 ms    QRS Duration 100 ms    Q-T Interval 406 ms    QTC Calculation(Bezet) 536 ms    P Axis 62 degrees    R Axis -22 degrees    T Axis -6 degrees    Diagnosis Line Sinus tachycardia  Voltage criteria for left ventricular hypertrophy  Abnormal ECG  When compared with ECG of 02-AUG-2018 11:13,  Nonspecific T wave abnormality now evident in Anterior leads    < end of copied text >      Echo:  < from: Transthoracic Echocardiogram (08.03.18 @ 13:07) >  CONCLUSIONS:  1. Tethered mitralvalve leaflets. Moderate mitral  regurgitation.  2. Normal trileaflet aortic valve.  3. Mildly dilated left atrium.  LA volume index = 39 cc/m2.  4. Moderate left ventricular enlargement.  5. Severe global left ventricular systolic dysfunction.  6. Right ventricular enlargement with decreased right  ventricular systolic function.  7. Normal tricuspid valve. Moderate tricuspid  regurgitation.  8. Estimated pulmonary artery systolic pressure equals 62  mm Hg, assuming right atrial pressure equals 10  mm Hg,  consistent with severe pulmonary hypertension.  ------------------------------------------------------------------------    < end of copied text >      Stress Testing:    Cath:  < from: Cardiac Cath Lab - Adult (08.06.18 @ 11:48) >  VENTRICLES: No LV gram was performed; however, a recent echocardiogram  demonstrated an EF of 15 %.  CORONARY VESSELS: The coronary circulation is right dominant.  LM:   --  LM: Normal.  LAD:   --  LAD: Normal.  CX:   --  Circumflex: Normal.  RCA:   --  RCA: Normal.  COMPLICATIONS: There were no complications.  DIAGNOSTIC RECOMMENDATIONS: Elevated Filling pressures continue diuresis,  Guideline directed medical therapy for Cardiomyopathy. CMR pending  Prepared and signed by  Malcolm Kerns M.D.    < end of copied text >    Interpretation of Telemetry:      Imaging:  none     Labs: Personally reviewed                        17.1   6.92  )-----------( 208      ( 07 Aug 2018 06:45 )             51.4     08-07    139  |  100  |  15  ----------------------------<  122<H>  3.4<L>   |  23  |  0.96    Ca    9.2      07 Aug 2018 06:45  Mg     2.2     08-06    TPro  7.0  /  Alb  4.1  /  TBili  1.5<H>  /  DBili  x   /  AST  112<H>  /  ALT  333<H>  /  AlkPhos  66  08-07      Serum Pro-Brain Natriuretic Peptide: 2447 pg/mL (08-04 @ 06:05)  Serum Pro-Brain Natriuretic Peptide: 4755 pg/mL (08-02 @ 11:40)      Hemoglobin A1C, Whole Blood: 5.4 % (08-03 @ 07:06)    Thyroid Stimulating Hormone, Serum: 1.02 uIU/mL (08-03 @ 07:06)

## 2018-08-07 NOTE — PROGRESS NOTE ADULT - SUBJECTIVE AND OBJECTIVE BOX
Patient is a 47y old  Male who presents with a chief complaint of Abdominal pain x 5 days (03 Aug 2018 00:42)      SUBJECTIVE / OVERNIGHT EVENTS: Pt without complaints, anticipating d/c.    MEDICATIONS  (STANDING):  docusate sodium 100 milliGRAM(s) Oral three times a day  enoxaparin Injectable 40 milliGRAM(s) SubCutaneous every 24 hours  furosemide   Injectable 20 milliGRAM(s) IV Push two times a day  lactobacillus acidophilus 1 Tablet(s) Oral every 8 hours  lisinopril 2.5 milliGRAM(s) Oral daily  multivitamin 1 Tablet(s) Oral daily  potassium chloride    Tablet ER 40 milliEquivalent(s) Oral every 4 hours  senna 2 Tablet(s) Oral at bedtime  sodium chloride 0.9% lock flush 3 milliLiter(s) IV Push every 8 hours    MEDICATIONS  (PRN):  magnesium hydroxide Suspension 30 milliLiter(s) Oral daily PRN Constipation      Vital Signs Last 24 Hrs  T(C): 36.4 (08-07-18 @ 05:23), Max: 36.5 (08-06-18 @ 15:43)  T(F): 97.5 (08-07-18 @ 05:23), Max: 97.7 (08-06-18 @ 15:43)  HR: 90 (08-07-18 @ 05:23) (89 - 103)  BP: 110/75 (08-07-18 @ 05:23) (110/75 - 128/78)  BP(mean): --  RR: 16 (08-07-18 @ 05:23) (16 - 17)  SpO2: 98% (08-07-18 @ 05:23) (98% - 99%)  CAPILLARY BLOOD GLUCOSE        I&O's Summary    06 Aug 2018 07:01  -  07 Aug 2018 07:00  --------------------------------------------------------  IN: 300 mL / OUT: 2380 mL / NET: -2080 mL    07 Aug 2018 07:01  -  07 Aug 2018 11:15  --------------------------------------------------------  IN: 0 mL / OUT: 500 mL / NET: -500 mL        PHYSICAL EXAM:  GENERAL: NAD, well-nourished  HEENT: mmm  CHEST/LUNG: CTABL  HEART: RRR, no m/r/g  ABDOMEN: soft, nt, nd  EXTREMITIES:  wwp, no c/c/e  PSYCH: AAOx3  NEUROLOGY: non-focal  SKIN: No rashes or lesions    LABS:                        17.1   6.92  )-----------( 208      ( 07 Aug 2018 06:45 )             51.4     08-07    139  |  100  |  15  ----------------------------<  122<H>  3.4<L>   |  23  |  0.96    Ca    9.2      07 Aug 2018 06:45  Mg     2.2     08-06    TPro  7.0  /  Alb  4.1  /  TBili  1.5<H>  /  DBili  x   /  AST  112<H>  /  ALT  333<H>  /  AlkPhos  66  08-07              RADIOLOGY & ADDITIONAL TESTS:    Imaging Personally Reviewed:    Consultant(s) Notes Reviewed:      Care Discussed with Consultants/Other Providers:

## 2018-08-07 NOTE — DISCHARGE NOTE ADULT - CARE PROVIDERS DIRECT ADDRESSES
,DirectAddress_Unknown,[email or web address]

## 2018-08-07 NOTE — DISCHARGE NOTE ADULT - NS AS DC HF EDUCATION INSTRUCTIONS
Call Primary Care Provider for follow-up after discharge/Low salt diet/Report weight gain of 2 or more pounds in one day or 3 or more pounds in one week, worsening shortness of breath, fatigue, weakness, increased swelling of hands and feet to primary care provider/Activities as tolerated/Monitor Weight Daily

## 2018-08-07 NOTE — CONSULT NOTE ADULT - ASSESSMENT
A/P:  47 M with no past medical history, experiencing constant epigastric pain, progressive anasarca, progressive DRUMMOND with newly diagnosed CHF. EP consulted for evaluation.     Plan:  - agree with MRI   - would optimize with GDMT for 3 months   - will discuss need for LifeVest.     Julio Cesar Castle MD  EP fellow  x [id number] A/P:  47 M with no past medical history, experiencing constant epigastric pain, progressive anasarca, progressive DRUMMOND with newly diagnosed CHF. EP consulted for evaluation for AICD.     Plan:    - agree with cardiac MRI   - would optimize with GDMT for 3 months   - agree with ACE i (lisinopril 2.5 mg daily)  - pt unable to tolerate beta blockers (2/2 to hypotension and bradycardia )  - will discuss with EP attending [facility] Vest.   - plan to f/u as outpt with Dr. Ventura.     Julio Cesar Castle MD  EP fellow  x [id number] A/P:  47 M with no past medical history, experiencing constant epigastric pain, progressive anasarca, progressive DRUMMOND with newly diagnosed CHF. EP consulted for evaluation for AICD.     Plan:    - agree with cardiac MRI   - would optimize with GDMT for 3 months   - agree with ACE i (lisinopril 2.5 mg daily)  - pt unable to tolerate beta blockers (2/2 to hypotension and bradycardia )  -pt wth Life Vest already.   - plan to f/u as outpt with Dr. Ventura.     Julio Cesar Castle MD  EP fellow  x [id number] A/P:  47 M with no past medical history, experiencing constant epigastric pain, progressive anasarca, progressive DRUMMOND with newly diagnosed CHF. EP consulted for evaluation for AICD.     Plan:    - agree with cardiac MRI   - would optimize with GDMT for 3 months   - agree with ACE i (lisinopril 2.5 mg daily)  - pt unable to tolerate beta blockers (2/2 to hypotension and bradycardia )  -pt wth Life Vest already.   - plan to f/u as outpt with Dr. Rao.     - EP APPOINTMENT MADE FOR SEPTEMBER 27 @ 9AM in the [facility]cology Building 4th Floor (EP office) with DR. RAO.     Julio Cesar Castle MD  EP fellow  x [id number] A/P:  47 M with no past medical history, experiencing constant epigastric pain, progressive anasarca, progressive DRUMMOND with newly diagnosed CHF. EP consulted for evaluation for AICD.     Plan:    - agree with cardiac MRI   - would optimize with GDMT for 3 months   - agree with ACE i (lisinopril 2.5 mg daily)  - pt unable to tolerate beta blockers (2/2 to hypotension and bradycardia )  -pt wth Life Vest already.   - plan to f/u as outpt with Dr. Ventura.       Julio Cesar Castle MD  EP fellow  x [id number]

## 2018-08-07 NOTE — DISCHARGE NOTE ADULT - CARE PROVIDER_API CALL
Malcolm Kerns), Cardiovascular Disease; Internal Medicine; Nuclear Cardiology  [address] [place] [address]  Phone: [phone]  Fax: [phone]    Ruel Ventura), Cardiac Electrophysiology; Cardiology; Internal Medicine  [address] [place] [address]  Phone: [phone]  Fax: [phone]

## 2018-08-08 NOTE — CHART NOTE - NSCHARTNOTEFT_GEN_A_CORE
Called pt, left message of EP appointment with Dr. Ventura on Nov 12 at 9 am .   Julio Cesar Castle MD

## 2018-10-29 ENCOUNTER — EMERGENCY (EMERGENCY)
Facility: HOSPITAL | Age: 48
LOS: 1 days | Discharge: HOME | End: 2018-10-29
Attending: EMERGENCY MEDICINE | Admitting: EMERGENCY MEDICINE
Payer: COMMERCIAL

## 2018-10-29 VITALS — DIASTOLIC BLOOD PRESSURE: 89 MMHG | SYSTOLIC BLOOD PRESSURE: 110 MMHG | HEART RATE: 98 BPM

## 2018-10-29 VITALS
OXYGEN SATURATION: 100 % | RESPIRATION RATE: 18 BRPM | HEART RATE: 112 BPM | TEMPERATURE: 98 F | SYSTOLIC BLOOD PRESSURE: 114 MMHG | DIASTOLIC BLOOD PRESSURE: 85 MMHG

## 2018-10-29 LAB
ALBUMIN SERPL ELPH-MCNC: 4.1 G/DL — SIGNIFICANT CHANGE UP (ref 3.3–5)
ALP SERPL-CCNC: 78 U/L — SIGNIFICANT CHANGE UP (ref 40–120)
ALT FLD-CCNC: 57 U/L — HIGH (ref 4–41)
AST SERPL-CCNC: 52 U/L — HIGH (ref 4–40)
BASOPHILS # BLD AUTO: 0.05 K/UL — SIGNIFICANT CHANGE UP (ref 0–0.2)
BASOPHILS NFR BLD AUTO: 0.6 % — SIGNIFICANT CHANGE UP (ref 0–2)
BILIRUB SERPL-MCNC: 1.2 MG/DL — SIGNIFICANT CHANGE UP (ref 0.2–1.2)
BUN SERPL-MCNC: 17 MG/DL — SIGNIFICANT CHANGE UP (ref 7–23)
CALCIUM SERPL-MCNC: 9.4 MG/DL — SIGNIFICANT CHANGE UP (ref 8.4–10.5)
CHLORIDE SERPL-SCNC: 100 MMOL/L — SIGNIFICANT CHANGE UP (ref 98–107)
CO2 SERPL-SCNC: 22 MMOL/L — SIGNIFICANT CHANGE UP (ref 22–31)
CREAT SERPL-MCNC: 1.18 MG/DL — SIGNIFICANT CHANGE UP (ref 0.5–1.3)
EOSINOPHIL # BLD AUTO: 0.07 K/UL — SIGNIFICANT CHANGE UP (ref 0–0.5)
EOSINOPHIL NFR BLD AUTO: 0.8 % — SIGNIFICANT CHANGE UP (ref 0–6)
GLUCOSE SERPL-MCNC: 129 MG/DL — HIGH (ref 70–99)
HCT VFR BLD CALC: 49.2 % — SIGNIFICANT CHANGE UP (ref 39–50)
HGB BLD-MCNC: 16.1 G/DL — SIGNIFICANT CHANGE UP (ref 13–17)
IMM GRANULOCYTES # BLD AUTO: 0.05 # — SIGNIFICANT CHANGE UP
IMM GRANULOCYTES NFR BLD AUTO: 0.6 % — SIGNIFICANT CHANGE UP (ref 0–1.5)
LIDOCAIN IGE QN: 23 U/L — SIGNIFICANT CHANGE UP (ref 7–60)
LYMPHOCYTES # BLD AUTO: 2.12 K/UL — SIGNIFICANT CHANGE UP (ref 1–3.3)
LYMPHOCYTES # BLD AUTO: 25 % — SIGNIFICANT CHANGE UP (ref 13–44)
MAGNESIUM SERPL-MCNC: 2.1 MG/DL — SIGNIFICANT CHANGE UP (ref 1.6–2.6)
MCHC RBC-ENTMCNC: 31.3 PG — SIGNIFICANT CHANGE UP (ref 27–34)
MCHC RBC-ENTMCNC: 32.7 % — SIGNIFICANT CHANGE UP (ref 32–36)
MCV RBC AUTO: 95.5 FL — SIGNIFICANT CHANGE UP (ref 80–100)
MONOCYTES # BLD AUTO: 0.89 K/UL — SIGNIFICANT CHANGE UP (ref 0–0.9)
MONOCYTES NFR BLD AUTO: 10.5 % — SIGNIFICANT CHANGE UP (ref 2–14)
NEUTROPHILS # BLD AUTO: 5.31 K/UL — SIGNIFICANT CHANGE UP (ref 1.8–7.4)
NEUTROPHILS NFR BLD AUTO: 62.5 % — SIGNIFICANT CHANGE UP (ref 43–77)
NRBC # FLD: 0.06 — SIGNIFICANT CHANGE UP
NT-PROBNP SERPL-SCNC: 2567 PG/ML — SIGNIFICANT CHANGE UP
PHOSPHATE SERPL-MCNC: 5.1 MG/DL — HIGH (ref 2.5–4.5)
PLATELET # BLD AUTO: 214 K/UL — SIGNIFICANT CHANGE UP (ref 150–400)
PMV BLD: 11.5 FL — SIGNIFICANT CHANGE UP (ref 7–13)
POTASSIUM SERPL-MCNC: 4.7 MMOL/L — SIGNIFICANT CHANGE UP (ref 3.5–5.3)
POTASSIUM SERPL-SCNC: 4.7 MMOL/L — SIGNIFICANT CHANGE UP (ref 3.5–5.3)
PROT SERPL-MCNC: 7 G/DL — SIGNIFICANT CHANGE UP (ref 6–8.3)
RBC # BLD: 5.15 M/UL — SIGNIFICANT CHANGE UP (ref 4.2–5.8)
RBC # FLD: 11.9 % — SIGNIFICANT CHANGE UP (ref 10.3–14.5)
SODIUM SERPL-SCNC: 137 MMOL/L — SIGNIFICANT CHANGE UP (ref 135–145)
TROPONIN T, HIGH SENSITIVITY: 12 NG/L — SIGNIFICANT CHANGE UP (ref ?–14)
TROPONIN T, HIGH SENSITIVITY: 15 NG/L — SIGNIFICANT CHANGE UP (ref ?–14)
WBC # BLD: 8.49 K/UL — SIGNIFICANT CHANGE UP (ref 3.8–10.5)
WBC # FLD AUTO: 8.49 K/UL — SIGNIFICANT CHANGE UP (ref 3.8–10.5)

## 2018-10-29 PROCEDURE — 99284 EMERGENCY DEPT VISIT MOD MDM: CPT

## 2018-10-29 PROCEDURE — 71046 X-RAY EXAM CHEST 2 VIEWS: CPT | Mod: 26

## 2018-10-29 RX ORDER — FUROSEMIDE 40 MG
1 TABLET ORAL
Qty: 15 | Refills: 0 | OUTPATIENT
Start: 2018-10-29 | End: 2018-11-12

## 2018-10-29 RX ORDER — LIDOCAINE 4 G/100G
5 CREAM TOPICAL ONCE
Qty: 0 | Refills: 0 | Status: COMPLETED | OUTPATIENT
Start: 2018-10-29 | End: 2018-10-29

## 2018-10-29 RX ORDER — SPIRONOLACTONE 25 MG/1
1 TABLET, FILM COATED ORAL
Qty: 15 | Refills: 0 | OUTPATIENT
Start: 2018-10-29 | End: 2018-11-12

## 2018-10-29 RX ORDER — POTASSIUM CHLORIDE 20 MEQ
1 PACKET (EA) ORAL
Qty: 15 | Refills: 0 | OUTPATIENT
Start: 2018-10-29 | End: 2018-11-12

## 2018-10-29 RX ORDER — FAMOTIDINE 10 MG/ML
20 INJECTION INTRAVENOUS ONCE
Qty: 0 | Refills: 0 | Status: COMPLETED | OUTPATIENT
Start: 2018-10-29 | End: 2018-10-29

## 2018-10-29 RX ADMIN — Medication 30 MILLILITER(S): at 17:43

## 2018-10-29 RX ADMIN — LIDOCAINE 5 MILLILITER(S): 4 CREAM TOPICAL at 17:43

## 2018-10-29 RX ADMIN — FAMOTIDINE 20 MILLIGRAM(S): 10 INJECTION INTRAVENOUS at 17:43

## 2018-10-29 NOTE — ED PROVIDER NOTE - NS_ ATTENDINGSCRIBEDETAILS _ED_A_ED_FT
The scribe's documentation has been prepared under my direction and personally reviewed by me, Christin Vargas MD, in its entirety. I confirm that the note above accurately reflects all work, treatment, procedures, and medical decision making performed by me.

## 2018-10-29 NOTE — ED PROVIDER NOTE - PROGRESS NOTE DETAILS
Sam: spoke to charge about pt needing to be moved to Main ED for cardiac monitoring. no beds currently, pt to be started in intake and then moved. Sam: Pt endorsed to Dr. Gonzalez pending labs, re-eval, d/w cards. AJM: pt received in signout. feeling improved. workup neg. pedning delta trop. spoke with pts cardiologist dr hernandez who agrees with plan to dc home with office follow up if pt continues to feel improved and delta trop is neg AJM: delta trop neg. pt feeling improved. tolerating po. dc home with pepcid. pt requesting refills for lasix, aldactone, K. understands the importance of following up with cards this week. pt and wife comfortable with plan.

## 2018-10-29 NOTE — ED ADULT NURSE NOTE - OBJECTIVE STATEMENT
pt initially seen in intake. c.o. epigastric pain x 3 days. denies vomiting. sl placed labs sent. pt for continued evaluation in main ED pt initially seen in intake. c.o. epigastric pain x 3 days. denies vomiting. cardiac monitor sinus tach, no noted ectopy. medicated as ordered. respirations even and unlabored. sl placed labs sent. pt for continued evaluation in main ED pt initially seen in intake. c.o. epigastric pain x 3 days. denies vomiting. cardiac monitor sinus tach, no noted ectopy. medicated as ordered. respirations even and unlabored. sl placed labs sent. pt for continued evaluation in main ED . Pt moved to main ED 1a, report to area rn.

## 2018-10-29 NOTE — ED PROVIDER NOTE - OBJECTIVE STATEMENT
47y M with PMHx cardiomyopathy and HTN presents to the ED c/o epigastric pain with palpitations, nausea, and SOB x3 days. Pt reports epigastric pain is intermittent and has pain before and eating. Has difficulty lying on back due to SOB and reports mild swelling in legs x2 days. No dizziness, vomit, fever, cough, diarrhea, or constipation. Allergic to aspirin and ibuprofen. Currently taking potassium and lasix 20mg once a day. Pt had cardiac catheter on August 6th. No CAD. Echo from August 3rd show moderate mitral regurgitation, LVH, and there is severe left ventricular dysfunction 47y M with PMHx cardiomyopathy and HTN presents to the ED c/o epigastric pain a/w palpitations, nausea, and SOB x3 days. Pt reports epigastric pain is intermittent and has pain before and eating. Has difficulty lying on back due to SOB and reports mild swelling in legs x2 days. No dizziness, vomit, fever, cough, diarrhea, or constipation. Allergic to aspirin and ibuprofen. Currently taking potassium and lasix 20mg once a day. Pt had cardiac cath on August 6th. No CAD. Echo from August 3rd show moderate mitral regurgitation, LVH, and  severe left ventricular dysfunction.

## 2018-10-31 ENCOUNTER — EMERGENCY (EMERGENCY)
Facility: HOSPITAL | Age: 48
LOS: 1 days | Discharge: LEFT BEFORE DISCHARGE | End: 2018-10-31
Admitting: EMERGENCY MEDICINE

## 2018-11-01 PROBLEM — I10 ESSENTIAL (PRIMARY) HYPERTENSION: Chronic | Status: ACTIVE | Noted: 2018-10-29

## 2018-11-01 PROBLEM — I42.9 CARDIOMYOPATHY, UNSPECIFIED: Chronic | Status: ACTIVE | Noted: 2018-10-29

## 2018-11-12 ENCOUNTER — APPOINTMENT (OUTPATIENT)
Dept: ELECTROPHYSIOLOGY | Facility: CLINIC | Age: 48
End: 2018-11-12

## 2018-11-23 ENCOUNTER — INPATIENT (INPATIENT)
Facility: HOSPITAL | Age: 48
LOS: 2 days | Discharge: HOME | DRG: 292 | End: 2018-11-26
Attending: INTERNAL MEDICINE | Admitting: INTERNAL MEDICINE
Payer: COMMERCIAL

## 2018-11-23 VITALS
DIASTOLIC BLOOD PRESSURE: 83 MMHG | WEIGHT: 148.37 LBS | HEART RATE: 118 BPM | RESPIRATION RATE: 18 BRPM | OXYGEN SATURATION: 96 % | TEMPERATURE: 98 F | SYSTOLIC BLOOD PRESSURE: 127 MMHG

## 2018-11-23 LAB
ALBUMIN SERPL ELPH-MCNC: 3.8 G/DL — SIGNIFICANT CHANGE UP (ref 3.5–5)
ALP SERPL-CCNC: 90 U/L — SIGNIFICANT CHANGE UP (ref 40–120)
ALT FLD-CCNC: 143 U/L DA — HIGH (ref 10–60)
ANION GAP SERPL CALC-SCNC: 11 MMOL/L — SIGNIFICANT CHANGE UP (ref 5–17)
AST SERPL-CCNC: 173 U/L — HIGH (ref 10–40)
BASE EXCESS BLDV CALC-SCNC: -2.9 MMOL/L — LOW (ref -2–2)
BASOPHILS # BLD AUTO: 0.1 K/UL — SIGNIFICANT CHANGE UP (ref 0–0.2)
BASOPHILS NFR BLD AUTO: 1.2 % — SIGNIFICANT CHANGE UP (ref 0–2)
BILIRUB SERPL-MCNC: 2.8 MG/DL — HIGH (ref 0.2–1.2)
BLOOD GAS COMMENTS, VENOUS: SIGNIFICANT CHANGE UP
BUN SERPL-MCNC: 23 MG/DL — HIGH (ref 7–18)
CALCIUM SERPL-MCNC: 9 MG/DL — SIGNIFICANT CHANGE UP (ref 8.4–10.5)
CHLORIDE SERPL-SCNC: 106 MMOL/L — SIGNIFICANT CHANGE UP (ref 96–108)
CK MB CFR SERPL CALC: 2.4 NG/ML — SIGNIFICANT CHANGE UP (ref 0–3.6)
CO2 SERPL-SCNC: 23 MMOL/L — SIGNIFICANT CHANGE UP (ref 22–31)
CREAT SERPL-MCNC: 1.73 MG/DL — HIGH (ref 0.5–1.3)
EOSINOPHIL # BLD AUTO: 0.2 K/UL — SIGNIFICANT CHANGE UP (ref 0–0.5)
EOSINOPHIL NFR BLD AUTO: 1.6 % — SIGNIFICANT CHANGE UP (ref 0–6)
GLUCOSE SERPL-MCNC: 109 MG/DL — HIGH (ref 70–99)
HCO3 BLDV-SCNC: 23 MMOL/L — SIGNIFICANT CHANGE UP (ref 21–29)
HCT VFR BLD CALC: 56.3 % — HIGH (ref 39–50)
HGB BLD-MCNC: 17.3 G/DL — HIGH (ref 13–17)
HOROWITZ INDEX BLDV+IHG-RTO: 21 — SIGNIFICANT CHANGE UP
LIDOCAIN IGE QN: 113 U/L — SIGNIFICANT CHANGE UP (ref 73–393)
LYMPHOCYTES # BLD AUTO: 1.6 K/UL — SIGNIFICANT CHANGE UP (ref 1–3.3)
LYMPHOCYTES # BLD AUTO: 13.4 % — SIGNIFICANT CHANGE UP (ref 13–44)
MCHC RBC-ENTMCNC: 30.7 GM/DL — LOW (ref 32–36)
MCHC RBC-ENTMCNC: 31.1 PG — SIGNIFICANT CHANGE UP (ref 27–34)
MCV RBC AUTO: 101.1 FL — HIGH (ref 80–100)
MONOCYTES # BLD AUTO: 1.2 K/UL — HIGH (ref 0–0.9)
MONOCYTES NFR BLD AUTO: 10.1 % — SIGNIFICANT CHANGE UP (ref 2–14)
NEUTROPHILS # BLD AUTO: 8.7 K/UL — HIGH (ref 1.8–7.4)
NEUTROPHILS NFR BLD AUTO: 73.7 % — SIGNIFICANT CHANGE UP (ref 43–77)
PCO2 BLDV: 46 MMHG — SIGNIFICANT CHANGE UP (ref 35–50)
PH BLDV: 7.32 — LOW (ref 7.35–7.45)
PLATELET # BLD AUTO: 210 K/UL — SIGNIFICANT CHANGE UP (ref 150–400)
PO2 BLDV: <44 MMHG — SIGNIFICANT CHANGE UP (ref 25–45)
POTASSIUM SERPL-MCNC: 5.6 MMOL/L — HIGH (ref 3.5–5.3)
POTASSIUM SERPL-SCNC: 5.6 MMOL/L — HIGH (ref 3.5–5.3)
PROT SERPL-MCNC: 7.8 G/DL — SIGNIFICANT CHANGE UP (ref 6–8.3)
RBC # BLD: 5.57 M/UL — SIGNIFICANT CHANGE UP (ref 4.2–5.8)
RBC # FLD: 11.8 % — SIGNIFICANT CHANGE UP (ref 10.3–14.5)
SAO2 % BLDV: 14 % — LOW (ref 67–88)
SODIUM SERPL-SCNC: 140 MMOL/L — SIGNIFICANT CHANGE UP (ref 135–145)
TROPONIN I SERPL-MCNC: 0.02 NG/ML — SIGNIFICANT CHANGE UP (ref 0–0.04)
TROPONIN I SERPL-MCNC: 0.02 NG/ML — SIGNIFICANT CHANGE UP (ref 0–0.04)
WBC # BLD: 11.8 K/UL — HIGH (ref 3.8–10.5)
WBC # FLD AUTO: 11.8 K/UL — HIGH (ref 3.8–10.5)

## 2018-11-23 PROCEDURE — 71045 X-RAY EXAM CHEST 1 VIEW: CPT | Mod: 26

## 2018-11-23 RX ORDER — SPIRONOLACTONE 25 MG/1
25 TABLET, FILM COATED ORAL DAILY
Qty: 0 | Refills: 0 | Status: DISCONTINUED | OUTPATIENT
Start: 2018-11-23 | End: 2018-11-26

## 2018-11-23 RX ORDER — SENNA PLUS 8.6 MG/1
2 TABLET ORAL AT BEDTIME
Qty: 0 | Refills: 0 | Status: DISCONTINUED | OUTPATIENT
Start: 2018-11-23 | End: 2018-11-26

## 2018-11-23 RX ORDER — FUROSEMIDE 40 MG
40 TABLET ORAL ONCE
Qty: 0 | Refills: 0 | Status: COMPLETED | OUTPATIENT
Start: 2018-11-23 | End: 2018-11-23

## 2018-11-23 RX ORDER — IPRATROPIUM/ALBUTEROL SULFATE 18-103MCG
3 AEROSOL WITH ADAPTER (GRAM) INHALATION ONCE
Qty: 0 | Refills: 0 | Status: COMPLETED | OUTPATIENT
Start: 2018-11-23 | End: 2018-11-23

## 2018-11-23 RX ORDER — DOCUSATE SODIUM 100 MG
100 CAPSULE ORAL THREE TIMES A DAY
Qty: 0 | Refills: 0 | Status: DISCONTINUED | OUTPATIENT
Start: 2018-11-23 | End: 2018-11-26

## 2018-11-23 RX ORDER — FUROSEMIDE 40 MG
20 TABLET ORAL ONCE
Qty: 0 | Refills: 0 | Status: COMPLETED | OUTPATIENT
Start: 2018-11-23 | End: 2018-11-23

## 2018-11-23 RX ORDER — SODIUM POLYSTYRENE SULFONATE 4.1 MEQ/G
30 POWDER, FOR SUSPENSION ORAL ONCE
Qty: 0 | Refills: 0 | Status: COMPLETED | OUTPATIENT
Start: 2018-11-23 | End: 2018-11-23

## 2018-11-23 RX ORDER — MORPHINE SULFATE 50 MG/1
4 CAPSULE, EXTENDED RELEASE ORAL ONCE
Qty: 0 | Refills: 0 | Status: DISCONTINUED | OUTPATIENT
Start: 2018-11-23 | End: 2018-11-23

## 2018-11-23 RX ADMIN — MORPHINE SULFATE 4 MILLIGRAM(S): 50 CAPSULE, EXTENDED RELEASE ORAL at 22:26

## 2018-11-23 RX ADMIN — MORPHINE SULFATE 4 MILLIGRAM(S): 50 CAPSULE, EXTENDED RELEASE ORAL at 21:29

## 2018-11-23 NOTE — ED ADULT NURSE NOTE - ED STAT RN HANDOFF DETAILS
pt.  remained  stable.  denies  chest  pain. on tele box I with NSR. admitted  to tele  for  CHF. transfer  to  510A report  given  to francesco holloway.pt. not   in distress

## 2018-11-23 NOTE — ED PROVIDER NOTE - OBJECTIVE STATEMENT
47M with recent dx of cardiomyopathy and chf, EF of 13%, htn, started on aldactone and lasix, comes in with one day of sob and epigastric pain. No nausea, lightheadedness or radiation of pain or diaphoresis. No fever or chills.

## 2018-11-24 DIAGNOSIS — N28.9 DISORDER OF KIDNEY AND URETER, UNSPECIFIED: ICD-10-CM

## 2018-11-24 DIAGNOSIS — I50.9 HEART FAILURE, UNSPECIFIED: ICD-10-CM

## 2018-11-24 DIAGNOSIS — I42.9 CARDIOMYOPATHY, UNSPECIFIED: ICD-10-CM

## 2018-11-24 DIAGNOSIS — I10 ESSENTIAL (PRIMARY) HYPERTENSION: ICD-10-CM

## 2018-11-24 DIAGNOSIS — J20.8 ACUTE BRONCHITIS DUE TO OTHER SPECIFIED ORGANISMS: ICD-10-CM

## 2018-11-24 DIAGNOSIS — Z29.9 ENCOUNTER FOR PROPHYLACTIC MEASURES, UNSPECIFIED: ICD-10-CM

## 2018-11-24 LAB
ALBUMIN SERPL ELPH-MCNC: 3.8 G/DL — SIGNIFICANT CHANGE UP (ref 3.5–5)
ALP SERPL-CCNC: 81 U/L — SIGNIFICANT CHANGE UP (ref 40–120)
ALT FLD-CCNC: 177 U/L DA — HIGH (ref 10–60)
ANION GAP SERPL CALC-SCNC: 15 MMOL/L — SIGNIFICANT CHANGE UP (ref 5–17)
ANION GAP SERPL CALC-SCNC: 16 MMOL/L — SIGNIFICANT CHANGE UP (ref 5–17)
APPEARANCE UR: CLEAR — SIGNIFICANT CHANGE UP
AST SERPL-CCNC: 220 U/L — HIGH (ref 10–40)
BASOPHILS # BLD AUTO: 0.2 K/UL — SIGNIFICANT CHANGE UP (ref 0–0.2)
BASOPHILS NFR BLD AUTO: 1.4 % — SIGNIFICANT CHANGE UP (ref 0–2)
BILIRUB SERPL-MCNC: 2.8 MG/DL — HIGH (ref 0.2–1.2)
BILIRUB UR-MCNC: NEGATIVE — SIGNIFICANT CHANGE UP
BUN SERPL-MCNC: 21 MG/DL — HIGH (ref 7–18)
BUN SERPL-MCNC: 23 MG/DL — HIGH (ref 7–18)
CALCIUM SERPL-MCNC: 9.1 MG/DL — SIGNIFICANT CHANGE UP (ref 8.4–10.5)
CALCIUM SERPL-MCNC: 9.1 MG/DL — SIGNIFICANT CHANGE UP (ref 8.4–10.5)
CHLORIDE SERPL-SCNC: 103 MMOL/L — SIGNIFICANT CHANGE UP (ref 96–108)
CHLORIDE SERPL-SCNC: 106 MMOL/L — SIGNIFICANT CHANGE UP (ref 96–108)
CHOLEST SERPL-MCNC: 115 MG/DL — SIGNIFICANT CHANGE UP (ref 10–199)
CK MB BLD-MCNC: 1.2 % — SIGNIFICANT CHANGE UP (ref 0–3.5)
CK MB CFR SERPL CALC: 1.8 NG/ML — SIGNIFICANT CHANGE UP (ref 0–3.6)
CK SERPL-CCNC: 153 U/L — SIGNIFICANT CHANGE UP (ref 35–232)
CO2 SERPL-SCNC: 18 MMOL/L — LOW (ref 22–31)
CO2 SERPL-SCNC: 21 MMOL/L — LOW (ref 22–31)
COLOR SPEC: YELLOW — SIGNIFICANT CHANGE UP
CREAT SERPL-MCNC: 1.62 MG/DL — HIGH (ref 0.5–1.3)
CREAT SERPL-MCNC: 1.79 MG/DL — HIGH (ref 0.5–1.3)
DIFF PNL FLD: NEGATIVE — SIGNIFICANT CHANGE UP
EOSINOPHIL # BLD AUTO: 0.1 K/UL — SIGNIFICANT CHANGE UP (ref 0–0.5)
EOSINOPHIL NFR BLD AUTO: 1 % — SIGNIFICANT CHANGE UP (ref 0–6)
GLUCOSE SERPL-MCNC: 103 MG/DL — HIGH (ref 70–99)
GLUCOSE SERPL-MCNC: 118 MG/DL — HIGH (ref 70–99)
GLUCOSE UR QL: NEGATIVE — SIGNIFICANT CHANGE UP
HBA1C BLD-MCNC: 6 % — HIGH (ref 4–5.6)
HCT VFR BLD CALC: 49 % — SIGNIFICANT CHANGE UP (ref 39–50)
HDLC SERPL-MCNC: 31 MG/DL — LOW
HGB BLD-MCNC: 15.4 G/DL — SIGNIFICANT CHANGE UP (ref 13–17)
KETONES UR-MCNC: NEGATIVE — SIGNIFICANT CHANGE UP
LEUKOCYTE ESTERASE UR-ACNC: NEGATIVE — SIGNIFICANT CHANGE UP
LIPID PNL WITH DIRECT LDL SERPL: 69 MG/DL — SIGNIFICANT CHANGE UP
LYMPHOCYTES # BLD AUTO: 1.3 K/UL — SIGNIFICANT CHANGE UP (ref 1–3.3)
LYMPHOCYTES # BLD AUTO: 10.9 % — LOW (ref 13–44)
MAGNESIUM SERPL-MCNC: 1.7 MG/DL — SIGNIFICANT CHANGE UP (ref 1.6–2.6)
MCHC RBC-ENTMCNC: 30.9 PG — SIGNIFICANT CHANGE UP (ref 27–34)
MCHC RBC-ENTMCNC: 31.4 GM/DL — LOW (ref 32–36)
MCV RBC AUTO: 98.3 FL — SIGNIFICANT CHANGE UP (ref 80–100)
MONOCYTES # BLD AUTO: 1.3 K/UL — HIGH (ref 0–0.9)
MONOCYTES NFR BLD AUTO: 10.8 % — SIGNIFICANT CHANGE UP (ref 2–14)
NEUTROPHILS # BLD AUTO: 9.2 K/UL — HIGH (ref 1.8–7.4)
NEUTROPHILS NFR BLD AUTO: 75.9 % — SIGNIFICANT CHANGE UP (ref 43–77)
NITRITE UR-MCNC: NEGATIVE — SIGNIFICANT CHANGE UP
PH UR: 5 — SIGNIFICANT CHANGE UP (ref 5–8)
PHOSPHATE SERPL-MCNC: 4.3 MG/DL — SIGNIFICANT CHANGE UP (ref 2.5–4.5)
PLATELET # BLD AUTO: 198 K/UL — SIGNIFICANT CHANGE UP (ref 150–400)
POTASSIUM SERPL-MCNC: 3.7 MMOL/L — SIGNIFICANT CHANGE UP (ref 3.5–5.3)
POTASSIUM SERPL-MCNC: 5.7 MMOL/L — HIGH (ref 3.5–5.3)
POTASSIUM SERPL-SCNC: 3.7 MMOL/L — SIGNIFICANT CHANGE UP (ref 3.5–5.3)
POTASSIUM SERPL-SCNC: 5.7 MMOL/L — HIGH (ref 3.5–5.3)
PROT SERPL-MCNC: 7.2 G/DL — SIGNIFICANT CHANGE UP (ref 6–8.3)
PROT UR-MCNC: NEGATIVE — SIGNIFICANT CHANGE UP
RAPID RVP RESULT: DETECTED
RBC # BLD: 4.99 M/UL — SIGNIFICANT CHANGE UP (ref 4.2–5.8)
RBC # FLD: 11.7 % — SIGNIFICANT CHANGE UP (ref 10.3–14.5)
RV+EV RNA SPEC QL NAA+PROBE: DETECTED
SODIUM SERPL-SCNC: 139 MMOL/L — SIGNIFICANT CHANGE UP (ref 135–145)
SODIUM SERPL-SCNC: 140 MMOL/L — SIGNIFICANT CHANGE UP (ref 135–145)
SP GR SPEC: 1.01 — SIGNIFICANT CHANGE UP (ref 1.01–1.02)
TOTAL CHOLESTEROL/HDL RATIO MEASUREMENT: 3.7 RATIO — SIGNIFICANT CHANGE UP (ref 3.4–9.6)
TRIGL SERPL-MCNC: 74 MG/DL — SIGNIFICANT CHANGE UP (ref 10–149)
TROPONIN I SERPL-MCNC: <0.015 NG/ML — SIGNIFICANT CHANGE UP (ref 0–0.04)
TSH SERPL-MCNC: 1.11 UU/ML — SIGNIFICANT CHANGE UP (ref 0.34–4.82)
UROBILINOGEN FLD QL: NEGATIVE — SIGNIFICANT CHANGE UP
VIT B12 SERPL-MCNC: 1015 PG/ML — SIGNIFICANT CHANGE UP (ref 232–1245)
WBC # BLD: 12.1 K/UL — HIGH (ref 3.8–10.5)
WBC # FLD AUTO: 12.1 K/UL — HIGH (ref 3.8–10.5)

## 2018-11-24 PROCEDURE — 99285 EMERGENCY DEPT VISIT HI MDM: CPT

## 2018-11-24 RX ORDER — IPRATROPIUM/ALBUTEROL SULFATE 18-103MCG
3 AEROSOL WITH ADAPTER (GRAM) INHALATION EVERY 6 HOURS
Qty: 0 | Refills: 0 | Status: DISCONTINUED | OUTPATIENT
Start: 2018-11-24 | End: 2018-11-26

## 2018-11-24 RX ORDER — SODIUM CHLORIDE 0.65 %
1 AEROSOL, SPRAY (ML) NASAL ONCE
Qty: 0 | Refills: 0 | Status: COMPLETED | OUTPATIENT
Start: 2018-11-24 | End: 2018-11-24

## 2018-11-24 RX ORDER — INSULIN HUMAN 100 [IU]/ML
8 INJECTION, SOLUTION SUBCUTANEOUS ONCE
Qty: 0 | Refills: 0 | Status: COMPLETED | OUTPATIENT
Start: 2018-11-24 | End: 2018-11-24

## 2018-11-24 RX ORDER — ACETAMINOPHEN 500 MG
650 TABLET ORAL ONCE
Qty: 0 | Refills: 0 | Status: COMPLETED | OUTPATIENT
Start: 2018-11-24 | End: 2018-11-24

## 2018-11-24 RX ORDER — FUROSEMIDE 40 MG
20 TABLET ORAL DAILY
Qty: 0 | Refills: 0 | Status: DISCONTINUED | OUTPATIENT
Start: 2018-11-25 | End: 2018-11-26

## 2018-11-24 RX ORDER — FUROSEMIDE 40 MG
20 TABLET ORAL DAILY
Qty: 0 | Refills: 0 | Status: DISCONTINUED | OUTPATIENT
Start: 2018-11-24 | End: 2018-11-24

## 2018-11-24 RX ORDER — DEXTROSE 50 % IN WATER 50 %
50 SYRINGE (ML) INTRAVENOUS ONCE
Qty: 0 | Refills: 0 | Status: COMPLETED | OUTPATIENT
Start: 2018-11-24 | End: 2018-11-24

## 2018-11-24 RX ORDER — ENOXAPARIN SODIUM 100 MG/ML
40 INJECTION SUBCUTANEOUS DAILY
Qty: 0 | Refills: 0 | Status: DISCONTINUED | OUTPATIENT
Start: 2018-11-24 | End: 2018-11-26

## 2018-11-24 RX ORDER — ALBUMIN HUMAN 25 %
50 VIAL (ML) INTRAVENOUS
Qty: 0 | Refills: 0 | Status: COMPLETED | OUTPATIENT
Start: 2018-11-24 | End: 2018-11-24

## 2018-11-24 RX ORDER — CALCIUM GLUCONATE 100 MG/ML
1 VIAL (ML) INTRAVENOUS ONCE
Qty: 0 | Refills: 0 | Status: COMPLETED | OUTPATIENT
Start: 2018-11-24 | End: 2018-11-24

## 2018-11-24 RX ADMIN — Medication 100 MILLIGRAM(S): at 13:22

## 2018-11-24 RX ADMIN — Medication 50 MILLILITER(S): at 05:01

## 2018-11-24 RX ADMIN — Medication 50 MILLILITER(S): at 02:22

## 2018-11-24 RX ADMIN — Medication 650 MILLIGRAM(S): at 22:30

## 2018-11-24 RX ADMIN — SODIUM POLYSTYRENE SULFONATE 30 GRAM(S): 4.1 POWDER, FOR SUSPENSION ORAL at 01:07

## 2018-11-24 RX ADMIN — Medication 20 MILLIGRAM(S): at 00:40

## 2018-11-24 RX ADMIN — Medication 200 GRAM(S): at 01:58

## 2018-11-24 RX ADMIN — SENNA PLUS 2 TABLET(S): 8.6 TABLET ORAL at 01:04

## 2018-11-24 RX ADMIN — Medication 3 MILLILITER(S): at 09:59

## 2018-11-24 RX ADMIN — Medication 100 MILLIGRAM(S): at 06:20

## 2018-11-24 RX ADMIN — Medication 650 MILLIGRAM(S): at 23:38

## 2018-11-24 RX ADMIN — Medication 3 MILLILITER(S): at 20:32

## 2018-11-24 RX ADMIN — INSULIN HUMAN 8 UNIT(S): 100 INJECTION, SOLUTION SUBCUTANEOUS at 01:54

## 2018-11-24 RX ADMIN — Medication 3 MILLILITER(S): at 15:22

## 2018-11-24 RX ADMIN — SENNA PLUS 2 TABLET(S): 8.6 TABLET ORAL at 22:31

## 2018-11-24 RX ADMIN — ENOXAPARIN SODIUM 40 MILLIGRAM(S): 100 INJECTION SUBCUTANEOUS at 13:22

## 2018-11-24 RX ADMIN — Medication 1 TABLET(S): at 01:04

## 2018-11-24 RX ADMIN — Medication 1 TABLET(S): at 13:23

## 2018-11-24 RX ADMIN — Medication 100 MILLIGRAM(S): at 01:03

## 2018-11-24 RX ADMIN — Medication 3 MILLILITER(S): at 01:06

## 2018-11-24 RX ADMIN — Medication 50 MILLILITER(S): at 01:58

## 2018-11-24 RX ADMIN — Medication 100 MILLIGRAM(S): at 22:31

## 2018-11-24 RX ADMIN — Medication 1 SPRAY(S): at 22:30

## 2018-11-24 RX ADMIN — Medication 40 MILLIGRAM(S): at 00:30

## 2018-11-24 NOTE — H&P ADULT - NSHPPHYSICALEXAM_GEN_ALL_CORE
PHYSICAL EXAM:  GENERAL: NAD  HEENT: Normocephalic;  conjunctivae and sclerae clear; moist mucous membranes;   NECK : supple  CHEST/LUNG: Clear to auscultation bilaterally with good air entry ; crepts + in b/l lobes   HEART: S1 S2  regular; no murmurs, gallops or rubs  ABDOMEN: Soft, Nontender, Nondistended; Bowel sounds present  EXTREMITIES: no cyanosis; no edema; no calf tenderness  SKIN: warm and dry; no rash  NERVOUS SYSTEM:  Awake and alert; Oriented  to place, person and time ; no new deficits

## 2018-11-24 NOTE — H&P ADULT - FAMILY HISTORY
Family history of heart disease, mother     Family history of hypertension, father     Grandparent  Still living? Unknown  Family history of lung disease, Age at diagnosis: Age Unknown

## 2018-11-24 NOTE — H&P ADULT - HISTORY OF PRESENT ILLNESS
47 male with Non0 ischemic Cardiomyopathy ( recently diagnosed in [facility] , with EF of 20% with Severe Pulm htn ) , comes in with complaint of sob on exertion with chest pain , palpitations. Patient is having these symptoms since 2-3 weeks and is now worsened in last 1-2 days. Chest pain was described as chest pressure , with no radiation to back. It was not related to the sob. Sob is present with 2 flight of stairs and from bed to bathroom. Denies any leg swelling , Orthopnea , PND. Complaints of dry cough and congestion and generalized abdominal pain with no relation to food. Pain is more on deep breathing. Denies fever with chills , nausea , vomiting.   Patient was discharged from [facility] with optimal tx for Advanced HF , however patient has not been taking Lisinopril. Was given a life vest , has not been wearing that either. Was supposed to get repeat Echo in 3 weeks, hasn't followed up with cardio.    In Ed , BP : 114/85 mm hg , Hr : 64 , Temp : 97.2 F  Cbc shows elevated h/h , white count with left shift   Bmp shows K of 5.6 with elevated BUN/ Creatinine   Pro-bnp elevated   T2 negative ; EKG shows mild LVH , sinus tachycardia , TWI in V4-V6 (same as in old EKG)

## 2018-11-24 NOTE — H&P ADULT - PROBLEM SELECTOR PLAN 6
IMPROVE VTE Individual Risk Assessment          RISK                                                          Points  [  ] Previous VTE                                                3  [  ] Thrombophilia                                             2  [  ] Lower limb paralysis                                   2        (unable to hold up >15 seconds)    [  ] Current Cancer                                             2         (within 6 months)  [x ] Immobilization > 24 hrs                              1  [  ] ICU/CCU stay > 24 hours                             1  [x  ] Age > 60                                                         1    IMPROVE VTE Score: 2  Levonox for dvt ppx

## 2018-11-24 NOTE — H&P ADULT - PROBLEM SELECTOR PLAN 3
Mild LARISSA noted , likely from intravascular volume depletion   should respond to albumin   Monitor bmp   I&os   urine lytes

## 2018-11-24 NOTE — H&P ADULT - ASSESSMENT
47 male with Non0 ischemic Cardiomyopathy ( recently diagnosed in [facility] , with EF of 20% with Severe Pulm htn ) , comes in with complaint of sob on exertion with chest pain , palpitations. Patient is having these symptoms since 2-3 weeks and is now worsened in last 1-2 days. Chest pain was described as chest pressure , with no radiation to back. It was not related to the sob. Sob is present with 2 flight of stairs and from bed to bathroom. Denies any leg swelling , Orthopnea , PND. Complaints of dry cough and congestion and generalized abdominal pain with no relation to food. Pain is more on deep breathing. Denies fever with chills , nausea , vomiting.   Patient was discharged from [facility] with optimal tx for Advanced HF , however patient has not been taking Lisinopril. Was given a life vest , has not been wearing that either. Was supposed to get repeat Echo in 3 weeks, hasn't followed up with cardio.    In Ed , BP : 114/85 mm hg , Hr : 64 , Temp : 97.2 F  Cbc shows elevated h/h , white count with left shift   Bmp shows K of 5.6 with elevated BUN/ Creatinine   Pro-bnp elevated   T2 negative ; EKG shows mild LVH , sinus tachycardia , TWI in V4-V6 (same as in old EKG)    Will admit to telemetry for CHF exacerbation and Viral bronchitis.

## 2018-11-24 NOTE — H&P ADULT - PROBLEM SELECTOR PLAN 1
Comes in with sob with mild congestion on CXR , elevated Pro-bnp   s/p Lasix 60 iv in Ed   Would c/w Lasix 20 iv daily ( home dose is Lasix 20 OD )  c/w Aldectone 25 OD  Last Echo in August with EF of 20% with Severe Pulm htn  Non ischemic dilated cardiomyopathy , has life vest , which he doesn't use   On lisinopril at home , but not taking it   Would c/w diuretics, should be on b-blocker after not in exacerbation ( was not discharged in last admission due to hypotension and bradycardia )  Repeat Echo   Monitor on tele

## 2018-11-24 NOTE — H&P ADULT - PROBLEM SELECTOR PLAN 4
Last Echo in August with EF of 20% with Severe Pulm htn  Non ischemic dilated cardiomyopathy , has life vest , which he doesn't use  Cath WNL

## 2018-11-24 NOTE — H&P ADULT - NSHPLABSRESULTS_GEN_ALL_CORE
17.3   11.8  )-----------( 210      ( 23 Nov 2018 20:59 )             56.3     11-23    140  |  106  |  23<H>  ----------------------------<  109<H>  5.6<H>   |  23  |  1.73<H>    Ca    9.0      23 Nov 2018 20:59    TPro  7.8  /  Alb  3.8  /  TBili  2.8<H>  /  DBili  x   /  AST  173<H>  /  ALT  143<H>  /  AlkPhos  90  11-23

## 2018-11-25 LAB
24R-OH-CALCIDIOL SERPL-MCNC: 36.3 NG/ML — SIGNIFICANT CHANGE UP (ref 30–80)
ALBUMIN SERPL ELPH-MCNC: 3.4 G/DL — LOW (ref 3.5–5)
ALP SERPL-CCNC: 73 U/L — SIGNIFICANT CHANGE UP (ref 40–120)
ALT FLD-CCNC: 137 U/L DA — HIGH (ref 10–60)
ANION GAP SERPL CALC-SCNC: 9 MMOL/L — SIGNIFICANT CHANGE UP (ref 5–17)
AST SERPL-CCNC: 99 U/L — HIGH (ref 10–40)
BASOPHILS # BLD AUTO: 0.1 K/UL — SIGNIFICANT CHANGE UP (ref 0–0.2)
BASOPHILS NFR BLD AUTO: 1.6 % — SIGNIFICANT CHANGE UP (ref 0–2)
BILIRUB SERPL-MCNC: 2 MG/DL — HIGH (ref 0.2–1.2)
BUN SERPL-MCNC: 21 MG/DL — HIGH (ref 7–18)
CALCIUM SERPL-MCNC: 8.6 MG/DL — SIGNIFICANT CHANGE UP (ref 8.4–10.5)
CHLORIDE SERPL-SCNC: 102 MMOL/L — SIGNIFICANT CHANGE UP (ref 96–108)
CO2 SERPL-SCNC: 28 MMOL/L — SIGNIFICANT CHANGE UP (ref 22–31)
CREAT SERPL-MCNC: 1.33 MG/DL — HIGH (ref 0.5–1.3)
EOSINOPHIL # BLD AUTO: 0.5 K/UL — SIGNIFICANT CHANGE UP (ref 0–0.5)
EOSINOPHIL NFR BLD AUTO: 5.6 % — SIGNIFICANT CHANGE UP (ref 0–6)
ETHANOL SERPL-MCNC: <3 MG/DL — SIGNIFICANT CHANGE UP (ref 0–10)
GLUCOSE SERPL-MCNC: 110 MG/DL — HIGH (ref 70–99)
HAV IGM SER-ACNC: SIGNIFICANT CHANGE UP
HBV CORE IGM SER-ACNC: SIGNIFICANT CHANGE UP
HBV SURFACE AG SER-ACNC: SIGNIFICANT CHANGE UP
HCT VFR BLD CALC: 52 % — HIGH (ref 39–50)
HCV AB S/CO SERPL IA: 0.09 S/CO — SIGNIFICANT CHANGE UP
HCV AB SERPL-IMP: SIGNIFICANT CHANGE UP
HGB BLD-MCNC: 16.4 G/DL — SIGNIFICANT CHANGE UP (ref 13–17)
LYMPHOCYTES # BLD AUTO: 1.2 K/UL — SIGNIFICANT CHANGE UP (ref 1–3.3)
LYMPHOCYTES # BLD AUTO: 14.6 % — SIGNIFICANT CHANGE UP (ref 13–44)
MAGNESIUM SERPL-MCNC: 2.1 MG/DL — SIGNIFICANT CHANGE UP (ref 1.6–2.6)
MCHC RBC-ENTMCNC: 31.4 PG — SIGNIFICANT CHANGE UP (ref 27–34)
MCHC RBC-ENTMCNC: 31.5 GM/DL — LOW (ref 32–36)
MCV RBC AUTO: 99.5 FL — SIGNIFICANT CHANGE UP (ref 80–100)
MONOCYTES # BLD AUTO: 1.2 K/UL — HIGH (ref 0–0.9)
MONOCYTES NFR BLD AUTO: 14.7 % — HIGH (ref 2–14)
NEUTROPHILS # BLD AUTO: 5.3 K/UL — SIGNIFICANT CHANGE UP (ref 1.8–7.4)
NEUTROPHILS NFR BLD AUTO: 63.4 % — SIGNIFICANT CHANGE UP (ref 43–77)
PHOSPHATE SERPL-MCNC: 3.6 MG/DL — SIGNIFICANT CHANGE UP (ref 2.5–4.5)
PLATELET # BLD AUTO: 185 K/UL — SIGNIFICANT CHANGE UP (ref 150–400)
POTASSIUM SERPL-MCNC: 3.7 MMOL/L — SIGNIFICANT CHANGE UP (ref 3.5–5.3)
POTASSIUM SERPL-SCNC: 3.7 MMOL/L — SIGNIFICANT CHANGE UP (ref 3.5–5.3)
PROT SERPL-MCNC: 6.7 G/DL — SIGNIFICANT CHANGE UP (ref 6–8.3)
RBC # BLD: 5.23 M/UL — SIGNIFICANT CHANGE UP (ref 4.2–5.8)
RBC # FLD: 11.6 % — SIGNIFICANT CHANGE UP (ref 10.3–14.5)
SODIUM SERPL-SCNC: 139 MMOL/L — SIGNIFICANT CHANGE UP (ref 135–145)
WBC # BLD: 8.4 K/UL — SIGNIFICANT CHANGE UP (ref 3.8–10.5)
WBC # FLD AUTO: 8.4 K/UL — SIGNIFICANT CHANGE UP (ref 3.8–10.5)

## 2018-11-25 RX ORDER — METOPROLOL TARTRATE 50 MG
12.5 TABLET ORAL
Qty: 0 | Refills: 0 | Status: DISCONTINUED | OUTPATIENT
Start: 2018-11-25 | End: 2018-11-26

## 2018-11-25 RX ORDER — FUROSEMIDE 40 MG
20 TABLET ORAL ONCE
Qty: 0 | Refills: 0 | Status: COMPLETED | OUTPATIENT
Start: 2018-11-25 | End: 2018-11-25

## 2018-11-25 RX ADMIN — Medication 20 MILLIGRAM(S): at 00:35

## 2018-11-25 RX ADMIN — SPIRONOLACTONE 25 MILLIGRAM(S): 25 TABLET, FILM COATED ORAL at 06:17

## 2018-11-25 RX ADMIN — SENNA PLUS 2 TABLET(S): 8.6 TABLET ORAL at 21:41

## 2018-11-25 RX ADMIN — Medication 3 MILLILITER(S): at 09:33

## 2018-11-25 RX ADMIN — Medication 100 MILLIGRAM(S): at 17:56

## 2018-11-25 RX ADMIN — Medication 1 TABLET(S): at 12:34

## 2018-11-25 RX ADMIN — Medication 3 MILLILITER(S): at 15:20

## 2018-11-25 RX ADMIN — Medication 3 MILLILITER(S): at 20:41

## 2018-11-25 RX ADMIN — Medication 100 MILLIGRAM(S): at 06:17

## 2018-11-25 RX ADMIN — Medication 100 MILLIGRAM(S): at 21:41

## 2018-11-25 RX ADMIN — ENOXAPARIN SODIUM 40 MILLIGRAM(S): 100 INJECTION SUBCUTANEOUS at 12:34

## 2018-11-25 NOTE — PROGRESS NOTE ADULT - PROBLEM SELECTOR PLAN 3
Last Echo in August with EF of 20% with Severe Pulm htn  Non ischemic dilated cardiomyopathy , has life vest , which he doesn't use  Cath WNL.

## 2018-11-25 NOTE — CONSULT NOTE ADULT - ATTENDING COMMENTS
Thank you for the courtesy of the consultation,I would be available for any further discussion if needed.  Reynold Roberts MD,FACC.  [address] [place], [place] [id number] [phone]

## 2018-11-25 NOTE — CONSULT NOTE ADULT - SUBJECTIVE AND OBJECTIVE BOX
CHIEF COMPLAINT:Dyspnea    HPI:-47 male with Non Ischemic Cardiomyopathy ( recently diagnosed in [facility] , with EF of 20% with Severe Pulm htn ) , comes in with complaint of sob on exertion with chest pain , palpitations. Patient is having these symptoms since 2-3 weeks and is now worsened in last 1-2 days. Chest pain was described as chest pressure , with no radiation to back. It was not related to the sob. Sob is present with 2 flight of stairs and from bed to bathroom. Denies any leg swelling , Orthopnea , PND. Complaints of dry cough and congestion and generalized abdominal pain with no relation to food. Pain is more on deep breathing. Denies fever with chills , nausea , vomiting.   Patient was discharged from [facility] with optimal tx for Advanced HF , however patient has not been taking Lisinopril. Was given a life vest , has not been wearing that either.      In Ed , BP : 114/85 mm hg , Hr : 64 , Temp : 97.2 F  Cbc shows elevated h/h , white count with left shift   Bmp shows K of 5.6 with elevated BUN/ Creatinine   Pro-bnp elevated   T2 negative ; EKG shows mild LVH , sinus tachycardia , TWI in V4-V6 (same as in old EKG) (24 Nov 2018 01:07)      PAST MEDICAL & SURGICAL HISTORY:  HTN (hypertension)  Non Ischemic Cardiomyopathy  No significant past surgical history      MEDICATIONS  (STANDING):  ALBUTerol/ipratropium for Nebulization 3 milliLiter(s) Nebulizer every 6 hours  docusate sodium 100 milliGRAM(s) Oral three times a day  enoxaparin Injectable 40 milliGRAM(s) SubCutaneous daily  furosemide    Tablet 20 milliGRAM(s) Oral daily  multivitamin 1 Tablet(s) Oral daily  senna 2 Tablet(s) Oral at bedtime  spironolactone 25 milliGRAM(s) Oral daily    MEDICATIONS  (PRN):      FAMILY HISTORY:  Family history of lung disease (Grandparent): maternal grandmother (no history of smoking)  Family history of hypertension: father  Family history of heart disease: mother  No family history of premature coronary artery disease or sudden cardiac death    SOCIAL HISTORY:  Smoking-Former Smoker  Alcohol-Social  Ilicit Drug use-Denies    REVIEW OF SYSTEMS:  Constitutional: [ ] fever, [ ]weight loss, [x ]fatigue Activity [ ] Bedbound,[x ] Ambulates [x ] Unassisted[ ] Cane/Walker [ ] Assistence.  Eyes: [ ] visual changes  Respiratory: [x]shortness of breath;  [ ] cough, [ ]wheezing, [ ]chills, [ ]hemoptysis  Cardiovascular: [x ] chest pain, [ ]palpitations, [ ]dizziness,  [ ]leg swelling[ ]orthopnea [ ]PND  Gastrointestinal: [ ] abdominal pain, [ ]nausea, [ ]vomiting,  [ ]diarrhea,[ ]constipation  Genitourinary: [ ] dysuria, [ ] hematuria  Neurologic: [ ] headaches [ ] tremors[ ] weakness  Skin: [ ] itching, [ ]burning, [ ] rashes  Endocrine: [ ] heat or cold intolerance  Musculoskeletal: [ ] joint pain or swelling; [ ] muscle, back, or extremity pain  Psychiatric: [ ] depression, [ ]anxiety, [ ]mood swings, or [ ]difficulty sleeping  Hematologic: [ ] easy bruising, [ ] bleeding gums       [ x] All others negative	  [ ] Unable to obtain    Vital Signs Last 24 Hrs  T(C): 36.3 (25 Nov 2018 07:58), Max: 37.1 (24 Nov 2018 11:17)  T(F): 97.3 (25 Nov 2018 07:58), Max: 98.8 (24 Nov 2018 11:17)  HR: 108 (25 Nov 2018 07:58) (101 - 117)  BP: 97/75 (25 Nov 2018 07:58) (90/55 - 101/76)  RR: 18 (25 Nov 2018 07:58) (16 - 18)  SpO2: 100% (25 Nov 2018 07:58) (97% - 100%)  I&O's Summary    24 Nov 2018 07:01  -  25 Nov 2018 07:00  --------------------------------------------------------  IN: 0 mL / OUT: 300 mL / NET: -300 mL        PHYSICAL EXAM:  General: No acute distress BMI-24  HEENT: EOMI, PERRL[ ] Icteric  Neck: Supple,+ JVD  Lungs: Equal air entry bilaterally; [ ] Rales [ ] Rhonchi [ ] Wheezing  Heart: Regular rate and rhythm;[x ] Murmurs-   2/6 [x ] Systolic [ ] Diastolic [ ] Radiation,No rubs, or gallops  Abdomen: Nontender, bowel sounds present  Extremities: No clubbing, cyanosis, or edema[ ] Calf tenderness  Nervous system:  Alert & Oriented X3, no focal deficits  Psychiatric: Normal affect  Skin: No rashes or lesions      LABS:  11-25    139  |  102  |  21<H>  ----------------------------<  110<H>  3.7   |  28  |  1.33<H>    Ca    8.6      25 Nov 2018 06:29  Phos  3.6     11-25  Mg     2.1     11-25    TPro  6.7  /  Alb  3.4<L>  /  TBili  2.0<H>  /  DBili  x   /  AST  99<H>  /  ALT  137<H>  /  AlkPhos  73  11-25    Creatinine Trend: 1.33<--, 1.62<--, 1.79<--, 1.73<--, 1.18<--                        16.4   x     )-----------( 185      ( 25 Nov 2018 06:29 )             52.0         Lipid Panel:   Cardiac Enzymes: CARDIAC MARKERS ( 24 Nov 2018 04:00 )  <0.015 ng/mL / x     / 153 U/L / x     / 1.8 ng/mL  CARDIAC MARKERS ( 23 Nov 2018 23:03 )  0.021 ng/mL / x     / x     / x     / x      CARDIAC MARKERS ( 23 Nov 2018 20:59 )  0.019 ng/mL / x     / x     / x     / 2.4 ng/mL      Serum Pro-Brain Natriuretic Peptide: 3844 pg/mL (11-23-18 @ 23:03)    11-24 [id number] 6.0      RADIOLOGY: XR CHEST PORTABL-IMPRESSION:  Support Devices: None  Heart: Cardiomegaly  Mediastinum:  Unremarkable  Lungs/Airways:  Unremarkable  Bones/Soft tissues: Unremarkable    ECG [my interpretation]:Sinus Tachycardia LAD LVH    TELEMETRY:Sinus tachycardia    ECHO: Study Date: 8/3/2018  Severe global left ventricular systolic dysfunction.  EF-20 %  Moderate mitral regurgitation.  Mildly dilated left atrium.   Severe pulmonary hypertension.      CATHETERIZATION:Study date: 08/06/2018    VENTRICLES: No LV gram was performed; however, a recent echocardiogram demonstrated an EF of 15 %.  CORONARY VESSELS: The coronary circulation is right dominant.  LM:   --  LM: Normal.  LAD:   --  LAD: Normal.  CX:   --  Circumflex: Normal.  RCA:   --  RCA: Normal.

## 2018-11-25 NOTE — PROGRESS NOTE ADULT - PROBLEM SELECTOR PLAN 1
Elevated Pro-bnp   Continue Lasix 20 iv daily ( home dose is Lasix 20 OD )  Continue  Aldectone 25 OD  Last Echo in August with EF of 20% with Severe Pulm htn  Non ischemic dilated cardiomyopathy , has life vest , which he doesn't use   F/U Repeat Echo

## 2018-11-25 NOTE — PROGRESS NOTE ADULT - ASSESSMENT
47 male with Non0 ischemic Cardiomyopathy ( recently diagnosed in [facility] , with EF of 20% with Severe Pulm htn ) , comes in with complaint of sob on exertion with chest pain , palpitations.

## 2018-11-25 NOTE — CONSULT NOTE ADULT - ASSESSMENT
· Assessment		  47 male with Non0 ischemic Cardiomyopathy ( recently diagnosed in [facility] , with EF of 20% with Severe Pulm htn ) , comes in with complaint of sob on exertion with chest pain , palpitations.Non adherent with follow up-was not on BBlockers 2/2 hypotension        Problem/Plan - 1:  ·  Problem: Acute on chronic Systolic HF.  Plan:Continue diuresis.  Add low dose BBlocker  Repeat TTE-      Problem/Plan - 2:  ·  Problem: Viral bronchitis.  Plan: RVP + also explains some symptoms , s/p 2 doses of albumin   Would c/w supportive Tx.     Problem/Plan - 3:  ·  Problem: Renal insufficiency.  Plan: Mild LARISSA noted , likely from intravascular volume depletion   should respond to albumin   Monitor bmp   I&os   urine lytes.     Problem/Plan - 4:  ·  Problem: Cardiomyopathy.  Plan: Last Echo in August with EF of 20% with Severe Pulm htn  Non ischemic dilated cardiomyopathy , has life vest , which he doesn't use  Cath WNL.     Problem/Plan - 5:  ·  Problem: HTN (hypertension).  Plan: c/w home meds   Dash diet.

## 2018-11-26 ENCOUNTER — TRANSCRIPTION ENCOUNTER (OUTPATIENT)
Age: 48
End: 2018-11-26

## 2018-11-26 VITALS — WEIGHT: 144.4 LBS

## 2018-11-26 LAB
ALBUMIN SERPL ELPH-MCNC: 3.4 G/DL — LOW (ref 3.5–5)
ALP SERPL-CCNC: 69 U/L — SIGNIFICANT CHANGE UP (ref 40–120)
ALT FLD-CCNC: 116 U/L DA — HIGH (ref 10–60)
ANION GAP SERPL CALC-SCNC: 8 MMOL/L — SIGNIFICANT CHANGE UP (ref 5–17)
AST SERPL-CCNC: 64 U/L — HIGH (ref 10–40)
BASOPHILS # BLD AUTO: 0.1 K/UL — SIGNIFICANT CHANGE UP (ref 0–0.2)
BASOPHILS NFR BLD AUTO: 1.4 % — SIGNIFICANT CHANGE UP (ref 0–2)
BILIRUB SERPL-MCNC: 1.6 MG/DL — HIGH (ref 0.2–1.2)
BUN SERPL-MCNC: 16 MG/DL — SIGNIFICANT CHANGE UP (ref 7–18)
CALCIUM SERPL-MCNC: 9.2 MG/DL — SIGNIFICANT CHANGE UP (ref 8.4–10.5)
CHLORIDE SERPL-SCNC: 104 MMOL/L — SIGNIFICANT CHANGE UP (ref 96–108)
CO2 SERPL-SCNC: 29 MMOL/L — SIGNIFICANT CHANGE UP (ref 22–31)
CREAT SERPL-MCNC: 1.25 MG/DL — SIGNIFICANT CHANGE UP (ref 0.5–1.3)
EOSINOPHIL # BLD AUTO: 0.6 K/UL — HIGH (ref 0–0.5)
EOSINOPHIL NFR BLD AUTO: 6.9 % — HIGH (ref 0–6)
GLUCOSE SERPL-MCNC: 97 MG/DL — SIGNIFICANT CHANGE UP (ref 70–99)
HCT VFR BLD CALC: 54.3 % — HIGH (ref 39–50)
HGB BLD-MCNC: 16.6 G/DL — SIGNIFICANT CHANGE UP (ref 13–17)
LYMPHOCYTES # BLD AUTO: 1.7 K/UL — SIGNIFICANT CHANGE UP (ref 1–3.3)
LYMPHOCYTES # BLD AUTO: 20.5 % — SIGNIFICANT CHANGE UP (ref 13–44)
MAGNESIUM SERPL-MCNC: 2.2 MG/DL — SIGNIFICANT CHANGE UP (ref 1.6–2.6)
MCHC RBC-ENTMCNC: 30.6 GM/DL — LOW (ref 32–36)
MCHC RBC-ENTMCNC: 32.1 PG — SIGNIFICANT CHANGE UP (ref 27–34)
MCV RBC AUTO: 104.6 FL — HIGH (ref 80–100)
MONOCYTES # BLD AUTO: 1 K/UL — HIGH (ref 0–0.9)
MONOCYTES NFR BLD AUTO: 12.6 % — SIGNIFICANT CHANGE UP (ref 2–14)
NEUTROPHILS # BLD AUTO: 4.8 K/UL — SIGNIFICANT CHANGE UP (ref 1.8–7.4)
NEUTROPHILS NFR BLD AUTO: 58.6 % — SIGNIFICANT CHANGE UP (ref 43–77)
PHOSPHATE SERPL-MCNC: 4.9 MG/DL — HIGH (ref 2.5–4.5)
PLATELET # BLD AUTO: 209 K/UL — SIGNIFICANT CHANGE UP (ref 150–400)
POTASSIUM SERPL-MCNC: 4.3 MMOL/L — SIGNIFICANT CHANGE UP (ref 3.5–5.3)
POTASSIUM SERPL-SCNC: 4.3 MMOL/L — SIGNIFICANT CHANGE UP (ref 3.5–5.3)
PROT SERPL-MCNC: 6.9 G/DL — SIGNIFICANT CHANGE UP (ref 6–8.3)
RBC # BLD: 5.19 M/UL — SIGNIFICANT CHANGE UP (ref 4.2–5.8)
RBC # FLD: 12.1 % — SIGNIFICANT CHANGE UP (ref 10.3–14.5)
RHEUMATOID FACT SERPL-ACNC: <10 IU/ML — SIGNIFICANT CHANGE UP (ref 0–13)
SODIUM SERPL-SCNC: 141 MMOL/L — SIGNIFICANT CHANGE UP (ref 135–145)
WBC # BLD: 8.2 K/UL — SIGNIFICANT CHANGE UP (ref 3.8–10.5)
WBC # FLD AUTO: 8.2 K/UL — SIGNIFICANT CHANGE UP (ref 3.8–10.5)

## 2018-11-26 PROCEDURE — 94640 AIRWAY INHALATION TREATMENT: CPT

## 2018-11-26 PROCEDURE — 87486 CHLMYD PNEUM DNA AMP PROBE: CPT

## 2018-11-26 PROCEDURE — 84443 ASSAY THYROID STIM HORMONE: CPT

## 2018-11-26 PROCEDURE — 84100 ASSAY OF PHOSPHORUS: CPT

## 2018-11-26 PROCEDURE — 80053 COMPREHEN METABOLIC PANEL: CPT

## 2018-11-26 PROCEDURE — 80074 ACUTE HEPATITIS PANEL: CPT

## 2018-11-26 PROCEDURE — 80307 DRUG TEST PRSMV CHEM ANLYZR: CPT

## 2018-11-26 PROCEDURE — 82306 VITAMIN D 25 HYDROXY: CPT

## 2018-11-26 PROCEDURE — 87798 DETECT AGENT NOS DNA AMP: CPT

## 2018-11-26 PROCEDURE — 83036 HEMOGLOBIN GLYCOSYLATED A1C: CPT

## 2018-11-26 PROCEDURE — P9047: CPT

## 2018-11-26 PROCEDURE — 85027 COMPLETE CBC AUTOMATED: CPT

## 2018-11-26 PROCEDURE — 93005 ELECTROCARDIOGRAM TRACING: CPT

## 2018-11-26 PROCEDURE — 83880 ASSAY OF NATRIURETIC PEPTIDE: CPT

## 2018-11-26 PROCEDURE — 93306 TTE W/DOPPLER COMPLETE: CPT

## 2018-11-26 PROCEDURE — 83690 ASSAY OF LIPASE: CPT

## 2018-11-26 PROCEDURE — 82550 ASSAY OF CK (CPK): CPT

## 2018-11-26 PROCEDURE — 96374 THER/PROPH/DIAG INJ IV PUSH: CPT

## 2018-11-26 PROCEDURE — 87581 M.PNEUMON DNA AMP PROBE: CPT

## 2018-11-26 PROCEDURE — 82607 VITAMIN B-12: CPT

## 2018-11-26 PROCEDURE — 82553 CREATINE MB FRACTION: CPT

## 2018-11-26 PROCEDURE — 87633 RESP VIRUS 12-25 TARGETS: CPT

## 2018-11-26 PROCEDURE — 86225 DNA ANTIBODY NATIVE: CPT

## 2018-11-26 PROCEDURE — 81003 URINALYSIS AUTO W/O SCOPE: CPT

## 2018-11-26 PROCEDURE — 84484 ASSAY OF TROPONIN QUANT: CPT

## 2018-11-26 PROCEDURE — 86431 RHEUMATOID FACTOR QUANT: CPT

## 2018-11-26 PROCEDURE — 71045 X-RAY EXAM CHEST 1 VIEW: CPT

## 2018-11-26 PROCEDURE — 82803 BLOOD GASES ANY COMBINATION: CPT

## 2018-11-26 PROCEDURE — 99285 EMERGENCY DEPT VISIT HI MDM: CPT | Mod: 25

## 2018-11-26 PROCEDURE — 86038 ANTINUCLEAR ANTIBODIES: CPT

## 2018-11-26 PROCEDURE — 80048 BASIC METABOLIC PNL TOTAL CA: CPT

## 2018-11-26 PROCEDURE — 83735 ASSAY OF MAGNESIUM: CPT

## 2018-11-26 PROCEDURE — 80061 LIPID PANEL: CPT

## 2018-11-26 RX ORDER — ALBUTEROL 90 UG/1
2 AEROSOL, METERED ORAL EVERY 6 HOURS
Qty: 0 | Refills: 0 | Status: DISCONTINUED | OUTPATIENT
Start: 2018-11-26 | End: 2018-11-26

## 2018-11-26 RX ORDER — SENNA PLUS 8.6 MG/1
2 TABLET ORAL
Qty: 60 | Refills: 0 | OUTPATIENT
Start: 2018-11-26 | End: 2018-12-25

## 2018-11-26 RX ORDER — SPIRONOLACTONE 25 MG/1
1 TABLET, FILM COATED ORAL
Qty: 30 | Refills: 0 | OUTPATIENT
Start: 2018-11-26 | End: 2018-12-25

## 2018-11-26 RX ORDER — FUROSEMIDE 40 MG
1 TABLET ORAL
Qty: 30 | Refills: 0 | OUTPATIENT
Start: 2018-11-26 | End: 2018-12-25

## 2018-11-26 RX ORDER — ACETAMINOPHEN 500 MG
650 TABLET ORAL ONCE
Qty: 0 | Refills: 0 | Status: COMPLETED | OUTPATIENT
Start: 2018-11-26 | End: 2018-11-26

## 2018-11-26 RX ORDER — DOCUSATE SODIUM 100 MG
1 CAPSULE ORAL
Qty: 45 | Refills: 0 | OUTPATIENT
Start: 2018-11-26 | End: 2018-12-10

## 2018-11-26 RX ORDER — METOPROLOL TARTRATE 50 MG
1 TABLET ORAL
Qty: 60 | Refills: 0 | OUTPATIENT
Start: 2018-11-26 | End: 2018-12-25

## 2018-11-26 RX ORDER — LISINOPRIL 2.5 MG/1
1 TABLET ORAL
Qty: 30 | Refills: 0 | OUTPATIENT
Start: 2018-11-26 | End: 2018-12-25

## 2018-11-26 RX ORDER — LISINOPRIL 2.5 MG/1
2.5 TABLET ORAL DAILY
Qty: 0 | Refills: 0 | Status: DISCONTINUED | OUTPATIENT
Start: 2018-11-26 | End: 2018-11-26

## 2018-11-26 RX ADMIN — Medication 100 MILLIGRAM(S): at 06:29

## 2018-11-26 RX ADMIN — ENOXAPARIN SODIUM 40 MILLIGRAM(S): 100 INJECTION SUBCUTANEOUS at 12:57

## 2018-11-26 RX ADMIN — Medication 12.5 MILLIGRAM(S): at 06:29

## 2018-11-26 RX ADMIN — LISINOPRIL 2.5 MILLIGRAM(S): 2.5 TABLET ORAL at 12:56

## 2018-11-26 RX ADMIN — Medication 650 MILLIGRAM(S): at 02:26

## 2018-11-26 RX ADMIN — Medication 1 TABLET(S): at 12:57

## 2018-11-26 NOTE — DISCHARGE NOTE ADULT - HOSPITAL COURSE
47 male with Non ischemic Cardiomyopathy ( recently diagnosed in [facility] , with EF of 20% with Severe Pulm htn ) , came  in with complaint of sob on exertion with chest pain , palpitations. Patient is having these symptoms since 2-3 weeks and is now worsened in last 1-2 days. Chest pain was described as chest pressure , with no radiation to back. It was not related to the sob. Sob is present with 2 flight of stairs and from bed to bathroom. Denies any leg swelling , Orthopnea , PND. Complaints of dry cough and congestion and generalized abdominal pain with no relation to food. Pain is more on deep breathing. Denies fever with chills , nausea , vomiting.   Patient was discharged from [facility] with optimal tx for Advanced HF , however patient has not been taking Lisinopril. Was given a life vest , has not been wearing that either. Was supposed to get repeat Echo in 3 weeks, hasn't followed up with cardio.    114/85 mm hg , Hr : 64 , Temp : 97.2 F  Cbc shows elevated h/h , white count with left shift   Bmp shows K of 5.6 with elevated BUN/ Creatinine   Pro-bnp elevated   T2 negative ; EKG shows mild LVH , sinus tachycardia , TWI in V4-V6 (same as in old EKG) 47 male with Non ischemic Cardiomyopathy ( recently diagnosed in [facility] , with EF of 20% with Severe Pulm htn ) , came  in with complaint of sob on exertion with chest pain , palpitations. Chest pain was described as chest pressure , with no radiation to back. Shortness of breath was present with 2 flight of stairs and from bed to bathroom. Pt was given a life vest but he  has not been wearing on a regular basis. Pro-bnp was elevated . EKG shows mild LVH , sinus tachycardia , T wave inversion  in V4-V6. Serial cardiac enzymes were normal. Echocardiogram was done which showed ejection fraction of 10% with grade 3 diastolic dysfunction, moderate left atrial enlargement. Pt was treated with Lasix metoprolol and spironolactone. Lisinopril was added later. 47 male with Non ischemic Cardiomyopathy ( recently diagnosed in [facility] , with EF of 20% with Severe Pulm htn ) , came  in with complaint of sob on exertion with chest pain , palpitations. Chest pain was described as chest pressure , with no radiation to back. Shortness of breath was present with 2 flight of stairs and from bed to bathroom. Pt was given a life vest but he  has not been wearing on a regular basis. Pro-bnp was elevated . EKG shows mild LVH , sinus tachycardia , T wave inversion  in V4-V6. Serial cardiac enzymes were normal. Echocardiogram was done which showed ejection fraction of 10% with grade 3 diastolic dysfunction, moderate left atrial enlargement. Pt was treated with Lasix metoprolol and spironolactone. Lisinopril was added later. Pt symptoms has improved. Pt will follow up within 1 week with cardiology Dr. Roberts. Case has been discussed with the attending. Pt is stable for discharge. This is just a summary of the case. For further information please refer to pt chart document.

## 2018-11-26 NOTE — PROGRESS NOTE ADULT - ATTENDING COMMENTS
Thank you for the courtesy of the consultation,I would be available for any further discussion if needed.  Reynold Roberts MD,FACC.  [address] [place], [place] [id number] [phone]

## 2018-11-26 NOTE — DISCHARGE NOTE ADULT - MEDICATION SUMMARY - MEDICATIONS TO TAKE
I will START or STAY ON the medications listed below when I get home from the hospital:    spironolactone 25 mg oral tablet  -- 1 tab(s) by mouth once a day  -- Indication: For CHF exacerbation    lisinopril 2.5 mg oral tablet  -- 1 tab(s) by mouth once a day  -- Indication: For HTN (hypertension)    metoprolol succinate 25 mg oral tablet, extended release  -- 1 tab(s) by mouth 2 times a day   -- It is very important that you take or use this exactly as directed.  Do not skip doses or discontinue unless directed by your doctor.  May cause drowsiness.  Alcohol may intensify this effect.  Use care when operating dangerous machinery.  Some non-prescription drugs may aggravate your condition.  Read all labels carefully.  If a warning appears, check with your doctor before taking.  Swallow whole.  Do not crush.  Take with food or milk.  This drug may impair the ability to drive or operate machinery.  Use care until you become familiar with its effects.    -- Indication: For Heart failure    furosemide 20 mg oral tablet  -- 1 tab(s) by mouth once a day  -- Indication: For CHF exacerbation    docusate sodium 100 mg oral capsule  -- 1 cap(s) by mouth 3 times a day  -- Indication: For Constipation    senna oral tablet  -- 2 tab(s) by mouth once a day (at bedtime)  -- Indication: For Constipation    K-Tab 20 mEq oral tablet, extended release  -- 1 tab(s) by mouth once a day   -- It is very important that you take or use this exactly as directed.  Do not skip doses or discontinue unless directed by your doctor.  Medication should be taken with plenty of water.  Take with food or milk.    -- Indication: For Hypokalemia    Multiple Vitamins oral tablet  -- 1 tab(s) by mouth once a day  -- Indication: For Prophylactic measure I will START or STAY ON the medications listed below when I get home from the hospital:    spironolactone 25 mg oral tablet  -- 1 tab(s) by mouth once a day  -- Indication: For CHF exacerbation    lisinopril 2.5 mg oral tablet  -- 1 tab(s) by mouth once a day  -- Indication: For HTN (hypertension)    metoprolol succinate 25 mg oral tablet, extended release  -- 1 tab(s) by mouth 2 times a day   -- It is very important that you take or use this exactly as directed.  Do not skip doses or discontinue unless directed by your doctor.  May cause drowsiness.  Alcohol may intensify this effect.  Use care when operating dangerous machinery.  Some non-prescription drugs may aggravate your condition.  Read all labels carefully.  If a warning appears, check with your doctor before taking.  Swallow whole.  Do not crush.  Take with food or milk.  This drug may impair the ability to drive or operate machinery.  Use care until you become familiar with its effects.    -- Indication: For Heart failure    furosemide 20 mg oral tablet  -- 1 tab(s) by mouth once a day  -- Indication: For CHF exacerbation    docusate sodium 100 mg oral capsule  -- 1 cap(s) by mouth 3 times a day  -- Indication: For Constipation    senna oral tablet  -- 2 tab(s) by mouth once a day (at bedtime)  -- Indication: For Constipation    Multiple Vitamins oral tablet  -- 1 tab(s) by mouth once a day  -- Indication: For Prophylactic measure

## 2018-11-26 NOTE — DISCHARGE NOTE ADULT - PATIENT PORTAL LINK FT
You can access the [facility] Portal, offered by [facility], by registering with the following website: [email or web address]

## 2018-11-26 NOTE — DISCHARGE NOTE ADULT - MEDICATION SUMMARY - MEDICATIONS TO STOP TAKING
I will STOP taking the medications listed below when I get home from the hospital:  None I will STOP taking the medications listed below when I get home from the hospital:    K-Tab 20 mEq oral tablet, extended release  -- 1 tab(s) by mouth once a day   -- It is very important that you take or use this exactly as directed.  Do not skip doses or discontinue unless directed by your doctor.  Medication should be taken with plenty of water.  Take with food or milk.

## 2018-11-26 NOTE — DISCHARGE NOTE ADULT - PLAN OF CARE
To manage symptoms and prevent complications You came with shortness of breath. Chest x ray showed mild congestion. Echocardiography showed decreased ejection fraction of 10% with grade 3 diastolic dysfunction. We managed your symptoms with diuretics. You should continue your medications as above. You should follow up with cardiology Dr. Roberts within 1 week. To manage symptoms and prevent complications. You were diagnosed with non ischemic cardiomyopathy. You were diagnosed with non ischemic cardiomyopathy. Your echocardiogram showed severely decreased ejection fraction of 10%. You should strictly continue using life vest. You should continue medications as above. You should follow up with cardiology  within 1 week. Resolved Tour RVP test was positive. We did supportive treatment. Your symptoms have  improved. <130/80 You should continue your medications as above. You should consume healthy diet with low sodium , more fruits and vegetables. Your creatinine level was elevated during admission, most likely due to intravascular volume depletion.  Your renal function has improved now. You should avoid nephrotoxic medications like ibuprofen. Your liver enzymes were found to be elevated. Hepatitis pane study was normal. You should follow up with primary care physician and Gastroenterologist. You came with shortness of breath. Chest x ray showed mild congestion. Echocardiography showed decreased ejection fraction of 10% with grade 3 diastolic dysfunction. We managed your symptoms with diuretics. You should continue your medications as above. You should follow up with cardiology Dr. Roberts within 1 week.  Reynold Roberts), Medicine  Dept Director  [address] [place], [place] [address]  Phone: [phone]  Fax: [phone]

## 2018-11-26 NOTE — DISCHARGE NOTE ADULT - CARE PROVIDER_API CALL
Reynold Roberts), Medicine  Dept Director  [address] [place], [place] [address]  Phone: [phone]  Fax: [phone]

## 2018-11-26 NOTE — DISCHARGE NOTE ADULT - CARE PLAN
Principal Discharge DX:	CHF exacerbation  Goal:	To manage symptoms and prevent complications  Secondary Diagnosis:	Cardiomyopathy  Secondary Diagnosis:	Viral bronchitis  Secondary Diagnosis:	HTN (hypertension)  Secondary Diagnosis:	Renal insufficiency Principal Discharge DX:	CHF exacerbation  Goal:	To manage symptoms and prevent complications  Assessment and plan of treatment:	You came with shortness of breath. Chest x ray showed mild congestion. Echocardiography showed decreased ejection fraction of 10% with grade 3 diastolic dysfunction. We managed your symptoms with diuretics. You should continue your medications as above. You should follow up with cardiology Dr. Roberts within 1 week.  Secondary Diagnosis:	Cardiomyopathy  Goal:	To manage symptoms and prevent complications.  Assessment and plan of treatment:	You were diagnosed with non ischemic cardiomyopathy.  Secondary Diagnosis:	Viral bronchitis  Secondary Diagnosis:	HTN (hypertension)  Secondary Diagnosis:	Renal insufficiency Principal Discharge DX:	CHF exacerbation  Goal:	To manage symptoms and prevent complications  Assessment and plan of treatment:	You came with shortness of breath. Chest x ray showed mild congestion. Echocardiography showed decreased ejection fraction of 10% with grade 3 diastolic dysfunction. We managed your symptoms with diuretics. You should continue your medications as above. You should follow up with cardiology Dr. Roberts within 1 week.  Secondary Diagnosis:	Cardiomyopathy  Goal:	To manage symptoms and prevent complications.  Assessment and plan of treatment:	You were diagnosed with non ischemic cardiomyopathy. Your echocardiogram showed severely decreased ejection fraction of 10%. You should strictly continue using life vest. You should continue medications as above. You should follow up with cardiology  within 1 week.  Secondary Diagnosis:	Viral bronchitis  Goal:	Resolved  Assessment and plan of treatment:	Tour RVP test was positive. We did supportive treatment. Your symptoms have  improved.  Secondary Diagnosis:	HTN (hypertension)  Goal:	<130/80  Assessment and plan of treatment:	You should continue your medications as above. You should consume healthy diet with low sodium , more fruits and vegetables.  Secondary Diagnosis:	Renal insufficiency  Goal:	Resolved  Assessment and plan of treatment:	Your creatinine level was elevated during admission, most likely due to intravascular volume depletion.  Your renal function has improved now. You should avoid nephrotoxic medications like ibuprofen. Principal Discharge DX:	CHF exacerbation  Goal:	To manage symptoms and prevent complications  Assessment and plan of treatment:	You came with shortness of breath. Chest x ray showed mild congestion. Echocardiography showed decreased ejection fraction of 10% with grade 3 diastolic dysfunction. We managed your symptoms with diuretics. You should continue your medications as above. You should follow up with cardiology Dr. Roberts within 1 week.  Secondary Diagnosis:	Cardiomyopathy  Goal:	To manage symptoms and prevent complications.  Assessment and plan of treatment:	You were diagnosed with non ischemic cardiomyopathy. Your echocardiogram showed severely decreased ejection fraction of 10%. You should strictly continue using life vest. You should continue medications as above. You should follow up with cardiology  within 1 week.  Secondary Diagnosis:	Viral bronchitis  Goal:	Resolved  Assessment and plan of treatment:	Tour RVP test was positive. We did supportive treatment. Your symptoms have  improved.  Secondary Diagnosis:	HTN (hypertension)  Goal:	<130/80  Assessment and plan of treatment:	You should continue your medications as above. You should consume healthy diet with low sodium , more fruits and vegetables.  Secondary Diagnosis:	Renal insufficiency  Goal:	Resolved  Assessment and plan of treatment:	Your creatinine level was elevated during admission, most likely due to intravascular volume depletion.  Your renal function has improved now. You should avoid nephrotoxic medications like ibuprofen.  Secondary Diagnosis:	Transaminitis  Assessment and plan of treatment:	Your liver enzymes were found to be elevated. Hepatitis pane study was normal. You should follow up with primary care physician and Gastroenterologist. Principal Discharge DX:	CHF exacerbation  Goal:	To manage symptoms and prevent complications  Assessment and plan of treatment:	You came with shortness of breath. Chest x ray showed mild congestion. Echocardiography showed decreased ejection fraction of 10% with grade 3 diastolic dysfunction. We managed your symptoms with diuretics. You should continue your medications as above. You should follow up with cardiology Dr. Roberts within 1 week.  Reynold Roberts), Medicine  Dept Director  [address] [place], [place] [address]  Phone: [phone]  Fax: [phone]  Secondary Diagnosis:	Cardiomyopathy  Goal:	To manage symptoms and prevent complications.  Assessment and plan of treatment:	You were diagnosed with non ischemic cardiomyopathy. Your echocardiogram showed severely decreased ejection fraction of 10%. You should strictly continue using life vest. You should continue medications as above. You should follow up with cardiology  within 1 week.  Secondary Diagnosis:	Viral bronchitis  Goal:	Resolved  Assessment and plan of treatment:	Tour RVP test was positive. We did supportive treatment. Your symptoms have  improved.  Secondary Diagnosis:	HTN (hypertension)  Goal:	<130/80  Assessment and plan of treatment:	You should continue your medications as above. You should consume healthy diet with low sodium , more fruits and vegetables.  Secondary Diagnosis:	Renal insufficiency  Goal:	Resolved  Assessment and plan of treatment:	Your creatinine level was elevated during admission, most likely due to intravascular volume depletion.  Your renal function has improved now. You should avoid nephrotoxic medications like ibuprofen.  Secondary Diagnosis:	Transaminitis  Assessment and plan of treatment:	Your liver enzymes were found to be elevated. Hepatitis pane study was normal. You should follow up with primary care physician and Gastroenterologist.

## 2018-11-26 NOTE — PROGRESS NOTE ADULT - SUBJECTIVE AND OBJECTIVE BOX
Patient is a 47y old  Male who presents with a chief complaint of sob (2018 01:07)    PATIENT IS SEEN AND EXAMINED IN MEDICAL FLOOR.    ALLERGIES:  aspirin (Swelling)  Motrin (Hives)      Daily Height in cm: 162.56 (2018 20:20)    Daily Weight in k.3 (2018 02:00)    VITALS:    Vital Signs Last 24 Hrs  T(C): 37 (2018 07:54), Max: 37 (2018 07:54)  T(F): 98.6 (2018 07:54), Max: 98.6 (2018 07:54)  HR: 115 (2018 07:54) (64 - 120)  BP: 104/65 (2018 07:54) (104/65 - 162/77)  BP(mean): --  RR: 16 (2018 07:54) (16 - 18)  SpO2: 95% (2018 07:54) (95% - 100%)    LABS:  CBC Full  -  ( 2018 04:00 )  WBC Count : 12.1 K/uL  Hemoglobin : 15.4 g/dL  Hematocrit : 49.0 %  Platelet Count - Automated : 198 K/uL  Mean Cell Volume : 98.3 fl  Mean Cell Hemoglobin : 30.9 pg  Mean Cell Hemoglobin Concentration : 31.4 gm/dL  Auto Neutrophil # : 9.2 K/uL  Auto Lymphocyte # : 1.3 K/uL  Auto Monocyte # : 1.3 K/uL  Auto Eosinophil # : 0.1 K/uL  Auto Basophil # : 0.2 K/uL  Auto Neutrophil % : 75.9 %  Auto Lymphocyte % : 10.9 %  Auto Monocyte % : 10.8 %  Auto Eosinophil % : 1.0 %  Auto Basophil % : 1.4 %          139  |  103  |  21<H>  ----------------------------<  118<H>  3.7   |  21<L>  |  1.62<H>    Ca    9.1      2018 04:00  Phos  4.3     11-24  Mg     1.7     -24    TPro  7.2  /  Alb  3.8  /  TBili  2.8<H>  /  DBili  x   /  AST  220<H>  /  ALT  177<H>  /  AlkPhos  81  11-24    CAPILLARY BLOOD GLUCOSE      CARDIAC MARKERS ( 2018 04:00 )  <0.015 ng/mL / x     / 153 U/L / x     / 1.8 ng/mL  CARDIAC MARKERS ( 2018 23:03 )  0.021 ng/mL / x     / x     / x     / x      CARDIAC MARKERS ( 2018 20:59 )  0.019 ng/mL / x     / x     / x     / 2.4 ng/mL      LIVER FUNCTIONS - ( 2018 04:00 )  Alb: 3.8 g/dL / Pro: 7.2 g/dL / ALK PHOS: 81 U/L / ALT: 177 U/L DA / AST: 220 U/L / GGT: x             MEDICATIONS:    MEDICATIONS  (STANDING):  ALBUTerol/ipratropium for Nebulization 3 milliLiter(s) Nebulizer every 6 hours  docusate sodium 100 milliGRAM(s) Oral three times a day  enoxaparin Injectable 40 milliGRAM(s) SubCutaneous daily  multivitamin 1 Tablet(s) Oral daily  senna 2 Tablet(s) Oral at bedtime  spironolactone 25 milliGRAM(s) Oral daily      MEDICATIONS  (PRN):      REVIEW OF SYSTEMS:                           ALL ROS DONE [ X   ]    CONSTITUTIONAL:  LETHARGIC [   ], FEVER [   ], UNRESPONSIVE [   ]  CVS:  CP  [   ], SOB, [   ], PALPITATIONS [   ], DIZZYNESS [   ]  RS: COUGH [   ], SPUTUM [   ]  GI: ABDOMINAL PAIN [   ], NAUSEA [   ], VOMITINGS [   ], DIARRHEA [   ], CONSTIPATION [   ]  :  DYSURIA [   ], NOCTURIA [   ], INCREASED FREQUENCY [   ], DRIBLING [   ],  SKELETAL: PAINFUL JOINTS [   ], SWOLLEN JOINTS [   ], NECK ACHE [   ], LOW BACK ACHE [   ],  SKIN : ULCERS [   ], RASH [   ], ITCHING [   ]  CNS: HEAD ACHE [   ], DOUBLE VISION [   ], BLURRED VISION [   ], AMS / CONFUSION [   ], SEIZURES [   ], WEAKNESS [   ],TINGLING / NUMBNESS [   ]    PHYSICAL EXAMINATION:  GENERAL APPEARANCE: NO DISTRESS  HEENT:  NO PALLOR, NO  JVD,  NO   NODES, NECK SUPPLE  CVS: S1 +, S2 +,   RS: AEEB,  OCCASIONAL  RALES +,   NO RONCHI  ABD: SOFT, NT, NO, BS +  EXT: PE +  SKIN: WARM,   SKELETAL:  ROM ACCEPTABLE  CNS:  AAO X  3  ,NO   DEFICITS    RADIOLOGY :    < from: Xray Chest 1 View-PORTABLE IMMEDIATE (18 @ 21:31) >  IMPRESSION:    Support Devices: None  Heart: Cardiomegaly  Mediastinum:  Unremarkable  Lungs/Airways:  Unremarkable  Bones/Soft tissues: Unremarkable    < end of copied text >    < from: Transthoracic Echocardiogram (18 @ 13:07) >  ------------------------------------------------------------------------  CONCLUSIONS:  1. Tethered mitralvalve leaflets. Moderate mitral  regurgitation.  2. Normal trileaflet aortic valve.  3. Mildly dilated left atrium.  LA volume index = 39 cc/m2.  4. Moderate left ventricular enlargement.  5. Severe global left ventricular systolic dysfunction.  LVEF 20 %  6. Right ventricular enlargement with decreased right  ventricular systolic function.  7. Normal tricuspid valve. Moderate tricuspid  regurgitation.  8. Estimated pulmonary artery systolic pressure equals 62  mm Hg, assuming right atrial pressure equals 10  mm Hg,  consistent with severe pulmonary hypertension.    < end of copied text >      ASSESSMENT :     Heart failure  HTN (hypertension)  Cardiomyopathy      PLAN:  HPI:  47 male with Non0 ischemic Cardiomyopathy ( recently diagnosed in [facility] , with EF of 20% with Severe Pulm htn ) , comes in with complaint of sob on exertion with chest pain , palpitations. Patient is having these symptoms since 2-3 weeks and is now worsened in last 1-2 days. Chest pain was described as chest pressure , with no radiation to back. It was not related to the sob. Sob is present with 2 flight of stairs and from bed to bathroom. Denies any leg swelling , Orthopnea , PND. Complaints of dry cough and congestion and generalized abdominal pain with no relation to food. Pain is more on deep breathing. Denies fever with chills , nausea , vomiting.   Patient was discharged from [facility] with optimal tx for Advanced HF , however patient has not been taking Lisinopril. Was given a life vest , has not been wearing that either. Was supposed to get repeat Echo in 3 weeks, hasn't followed up with cardio.    In Ed , BP : 114/85 mm hg , Hr : 64 , Temp : 97.2 F  Cbc shows elevated h/h , white count with left shift   Bmp shows K of 5.6 with elevated BUN/ Creatinine   Pro-bnp elevated   T2 negative ; EKG shows mild LVH , sinus tachycardia , TWI in V4-V6 (same as in old EKG) (2018 01:07)    - SOB, SUSPECT ACUTE ON CHRONIC SYSTOLIC CHF ( lvef 20 % ) ON SPIRONOLACTONE. NO ACE DUE TO ARF / CKD. NON COMPLIANT WITH HOME Rx AND LIFE VEST  - OLD NON ISCHEMIC CARDIOMYOPATHY  - ARF / CKD  - VIRAL URTI AND ACUTE BRONCHITIS - ON NEBS  - GI AND DVT PROPHYLAXIS  - DR. MORAES
PGY 1 Note discussed with supervising resident and primary attending    Patient is a 47y old  Male who presents with a chief complaint of sob (2018 09:09)      INTERVAL HPI/OVERNIGHT EVENTS: Pt seen and examined at bedside. Pt has no new complains.    MEDICATIONS  (STANDING):  ALBUTerol/ipratropium for Nebulization 3 milliLiter(s) Nebulizer every 6 hours  docusate sodium 100 milliGRAM(s) Oral three times a day  enoxaparin Injectable 40 milliGRAM(s) SubCutaneous daily  furosemide    Tablet 20 milliGRAM(s) Oral daily  multivitamin 1 Tablet(s) Oral daily  senna 2 Tablet(s) Oral at bedtime  spironolactone 25 milliGRAM(s) Oral daily    MEDICATIONS  (PRN):      __________________________________________________  REVIEW OF SYSTEMS:    CONSTITUTIONAL: No fever,   EYES: no acute visual disturbances  NECK: No pain or stiffness  RESPIRATORY: No cough; No shortness of breath  CARDIOVASCULAR: No chest pain, no palpitations  GASTROINTESTINAL: No pain. No nausea or vomiting; No diarrhea   NEUROLOGICAL: No headache or numbness, no tremors  MUSCULOSKELETAL: No joint pain, no muscle pain  GENITOURINARY: no dysuria, no frequency, no hesitancy  PSYCHIATRY: no depression , no anxiety  ALL OTHER  ROS negative        Vital Signs Last 24 Hrs  T(C): 36.3 (2018 04:57), Max: 37.1 (2018 11:17)  T(F): 97.4 (2018 04:57), Max: 98.8 (2018 11:17)  HR: 101 (2018 04:57) (101 - 117)  BP: 97/67 (2018 04:57) (90/55 - 104/65)  BP(mean): --  RR: 18 (2018 04:57) (16 - 18)  SpO2: 98% (2018 04:57) (95% - 100%)    ________________________________________________  PHYSICAL EXAM:  GENERAL: NAD  HEENT: Normocephalic;  conjunctivae and sclerae clear; moist mucous membranes;   NECK : supple  CHEST/LUNG: Decreased b/l breath sounds at bases.  HEART: S1 S2  regular; no murmurs, gallops or rubs  ABDOMEN: Soft, Nontender, Nondistended; Bowel sounds present  EXTREMITIES: no cyanosis; no edema; no calf tenderness  SKIN: warm and dry; no rash  NERVOUS SYSTEM:  Awake and alert; Oriented  to place, person and time ; no new deficits    _________________________________________________  LABS:                        16.4   x     )-----------( 185      ( 2018 06:29 )             52.0         139  |  102  |  21<H>  ----------------------------<  110<H>  3.7   |  28  |  1.33<H>    Ca    8.6      2018 06:29  Phos  3.6       Mg     2.1         TPro  6.7  /  Alb  3.4<L>  /  TBili  2.0<H>  /  DBili  x   /  AST  99<H>  /  ALT  137<H>  /  AlkPhos  73        Urinalysis Basic - ( 2018 01:37 )    Color: Yellow / Appearance: Clear / S.015 / pH: x  Gluc: x / Ketone: Negative  / Bili: Negative / Urobili: Negative   Blood: x / Protein: Negative / Nitrite: Negative   Leuk Esterase: Negative / RBC: x / WBC x   Sq Epi: x / Non Sq Epi: x / Bacteria: x      CAPILLARY BLOOD GLUCOSE            RADIOLOGY & ADDITIONAL TESTS:    Imaging Personally Reviewed:  YES/NO    Consultant(s) Notes Reviewed:   YES/ No    Care Discussed with Consultants :     Plan of care was discussed with patient and /or primary care giver; all questions and concerns were addressed and care was aligned with patient's wishes.
PRESENTING CC:Dyspnea    SUBJ: 47 male with Non Ischemic Cardiomyopathy ( recently diagnosed in [facility] , with EF of 20% with Severe Pulm htn ) , comes in with complaint of sob on exertion with chest pain , palpitations. Is less dyspneac no arrhythmias noted on telemetry      PMH -reviewed admission note, no change since admission  Heart failure: acute [ ] chronic [ ] acute or chronic [x ] diastolic [ ] systolic [x ] combined systolic and diastolic[ ]  LARISSA: ATN[ ] renal medullary necrosis [ ] CKD I [ ]CKDII [ ]CKD III [ ]CKD IV [ ]CKD V [ ]Other pathological lesions [ ]    MEDICATIONS  (STANDING):  ALBUTerol/ipratropium for Nebulization 3 milliLiter(s) Nebulizer every 6 hours  docusate sodium 100 milliGRAM(s) Oral three times a day  enoxaparin Injectable 40 milliGRAM(s) SubCutaneous daily  furosemide    Tablet 20 milliGRAM(s) Oral daily  metoprolol tartrate 12.5 milliGRAM(s) Oral two times a day  multivitamin 1 Tablet(s) Oral daily  senna 2 Tablet(s) Oral at bedtime  spironolactone 25 milliGRAM(s) Oral daily      FAMILY HISTORY:  Family history of lung disease (Grandparent): maternal grandmother (no history of smoking)  Family history of hypertension: father  Family history of heart disease: mother  No family history of premature coronary artery disease or sudden cardiac death      REVIEW OF SYSTEMS:  Constitutional: [ ] fever, [ ]weight loss,  [x ]fatigue  Eyes: [ ] visual changes  Respiratory: [x ]shortness of breath;  [ ] cough, [ ]wheezing, [ ]chills, [ ]hemoptysis  Cardiovascular: [ ] chest pain, [ ]palpitations, [ ]dizziness,  [ ]leg swelling[ ]orthopnea[ ]PND  Gastrointestinal: [ ] abdominal pain, [ ]nausea, [ ]vomiting,  [ ]diarrhea   Genitourinary: [ ] dysuria, [ ] hematuria  Neurologic: [ ] headaches [ ] tremors[ ]weakness  Skin: [ ] itching, [ ]burning, [ ] rashes  Endocrine: [ ] heat or cold intolerance  Musculoskeletal: [ ] joint pain or swelling; [ ] muscle, back, or extremity pain  Psychiatric: [ ] depression, [ ]anxiety, [ ]mood swings, or [ ]difficulty sleeping  Hematologic: [ ] easy bruising, [ ] bleeding gums    [x] All remaining systems negative except as per above.   [ ]Unable to obtain.    Vital Signs Last 24 Hrs  T(C): 36.8 (26 Nov 2018 08:00), Max: 36.8 (26 Nov 2018 08:00)  T(F): 98.2 (26 Nov 2018 08:00), Max: 98.2 (26 Nov 2018 08:00)  HR: 110 (26 Nov 2018 08:00) (103 - 114)  BP: 95/70 (26 Nov 2018 08:00) (95/70 - 109/81)  RR: 16 (26 Nov 2018 08:00) (16 - 18)  SpO2: 99% (26 Nov 2018 08:00) (97% - 99%)  I&O's Summary      PHYSICAL EXAM:  General: No acute distress BMI-26  HEENT: EOMI, PERRL  Neck: Supple, [ ] JVD  Lungs: Equal air entry bilaterally; [ ] rales [ ] wheezing [ ] rhonchi  Heart: Regular rate and rhythm; [x ] murmur  2 /6 [x ] systolic [ ] diastolic [ ] radiation[ ] rubs [ ]  gallops  Abdomen: Nontender, bowel sounds present  Extremities: No clubbing, cyanosis, [ ] edema  Nervous system:  Alert & Oriented X3, no focal deficits  Psychiatric: Normal affect  Skin: No rashes or lesions    LABS:  11-26    141  |  104  |  16  ----------------------------<  97  4.3   |  29  |  1.25    Ca    9.2      26 Nov 2018 06:49  Phos  4.9     11-26  Mg     2.2     11-26    TPro  6.9  /  Alb  3.4<L>  /  TBili  1.6<H>  /  DBili  x   /  AST  64<H>  /  ALT  116<H>  /  AlkPhos  69  11-26    Creatinine Trend: 1.25<--, 1.33<--, 1.62<--, 1.79<--, 1.73<--, 1.18<--                        16.6   8.2   )-----------( 209      ( 26 Nov 2018 06:49 )             54.3     ECHO:Study Date: 11/24/2018  Severe global left ventricular systolic-EF- 10 %  Moderate mitral regurgitation.   Moderate left ventricular enlargement.   Grade III diastolic dysfunction.  Moderate pulmonary hypertension.    CATHETERIZATION:VENTRICLES: No LV gram was performed; however, a recent echocardiogram demonstrated an EF of 15 %.  CORONARY VESSELS: The coronary circulation is right dominant.  LM:   --  LM: Normal.  LAD:   --  LAD: Normal.  CX:   --  Circumflex: Normal.  RCA:   --  RCA: Normal.        IMPRESSION AND PLAN:    47 male with Non0 ischemic Cardiomyopathy ( recently diagnosed in [facility] , with EF of 20% with Severe Pulm htn ) , comes in with complaint of sob on exertion with chest pain , palpitations.Non adherent with follow up-was not on BBlockers 2/2 hypotension        Problem/Plan - 1:  ·  Problem: Acute on chronic Systolic HF.  Plan:Continue diuresis.  Add low dose BBlocker  Repeat TTE-EF 14%      Problem/Plan - 2:  ·  Problem: Viral bronchitis.  Plan: RVP + also explains some symptoms , s/p 2 doses of albumin   Would c/w supportive Tx.     Problem/Plan - 3:  ·  Problem: Renal insufficiency.  Plan: Mild LARISSA noted , likely from intravascular volume depletion   should respond to albumin   Improved      Problem/Plan - 4:  ·  Problem: Cardiomyopathy.  Plan: Last Echo in August with EF of 20% with Severe Pulm htn  Non ischemic dilated cardiomyopathy , has life vest  Cath WNL.     Problem/Plan - 5:  ·  Problem: HTN (hypertension).  Plan: c/w home meds   Dash diet.
Patient is a 47y old  Male who presents with a chief complaint of sob (2018 09:12)    PATIENT IS SEEN AND EXAMINED IN MEDICAL FLOOR.    ALLERGIES:  aspirin (Swelling)  Motrin (Hives)    Daily Weight in k.5 (2018 04:57)    VITALS:    Vital Signs Last 24 Hrs  T(C): 36.3 (2018 07:58), Max: 37.1 (2018 11:17)  T(F): 97.3 (2018 07:58), Max: 98.8 (2018 11:17)  HR: 108 (2018 07:58) (101 - 117)  BP: 97/75 (2018 07:58) (90/55 - 101/76)  BP(mean): --  RR: 18 (2018 07:58) (16 - 18)  SpO2: 100% (2018 07:58) (97% - 100%)    LABS:  CBC Full  -  ( 2018 06:29 )  WBC Count : 8.4 K/uL  Hemoglobin : 16.4 g/dL  Hematocrit : 52.0 %  Platelet Count - Automated : 185 K/uL  Mean Cell Volume : 99.5 fl  Mean Cell Hemoglobin : 31.4 pg  Mean Cell Hemoglobin Concentration : 31.5 gm/dL  Auto Neutrophil # : 5.3 K/uL  Auto Lymphocyte # : 1.2 K/uL  Auto Monocyte # : 1.2 K/uL  Auto Eosinophil # : 0.5 K/uL  Auto Basophil # : 0.1 K/uL  Auto Neutrophil % : 63.4 %  Auto Lymphocyte % : 14.6 %  Auto Monocyte % : 14.7 %  Auto Eosinophil % : 5.6 %  Auto Basophil % : 1.6 %          139  |  102  |  21<H>  ----------------------------<  110<H>  3.7   |  28  |  1.33<H>    Ca    8.6      2018 06:29  Phos  3.6       Mg     2.1         TPro  6.7  /  Alb  3.4<L>  /  TBili  2.0<H>  /  DBili  x   /  AST  99<H>  /  ALT  137<H>  /  AlkPhos  73      CAPILLARY BLOOD GLUCOSE        CARDIAC MARKERS ( 2018 04:00 )  <0.015 ng/mL / x     / 153 U/L / x     / 1.8 ng/mL  CARDIAC MARKERS ( 2018 23:03 )  0.021 ng/mL / x     / x     / x     / x      CARDIAC MARKERS ( 2018 20:59 )  0.019 ng/mL / x     / x     / x     / 2.4 ng/mL      LIVER FUNCTIONS - ( 2018 06:29 )  Alb: 3.4 g/dL / Pro: 6.7 g/dL / ALK PHOS: 73 U/L / ALT: 137 U/L DA / AST: 99 U/L / GGT: x                     MEDICATIONS:    MEDICATIONS  (STANDING):  ALBUTerol/ipratropium for Nebulization 3 milliLiter(s) Nebulizer every 6 hours  docusate sodium 100 milliGRAM(s) Oral three times a day  enoxaparin Injectable 40 milliGRAM(s) SubCutaneous daily  furosemide    Tablet 20 milliGRAM(s) Oral daily  metoprolol tartrate 12.5 milliGRAM(s) Oral two times a day  multivitamin 1 Tablet(s) Oral daily  senna 2 Tablet(s) Oral at bedtime  spironolactone 25 milliGRAM(s) Oral daily      MEDICATIONS  (PRN):      REVIEW OF SYSTEMS:                           ALL ROS DONE [ X   ]    CONSTITUTIONAL:  LETHARGIC [   ], FEVER [   ], UNRESPONSIVE [   ]  CVS:  CP  [   ], SOB, [   ], PALPITATIONS [   ], DIZZYNESS [   ]  RS: COUGH [   ], SPUTUM [   ]  GI: ABDOMINAL PAIN [   ], NAUSEA [   ], VOMITINGS [   ], DIARRHEA [   ], CONSTIPATION [   ]  :  DYSURIA [   ], NOCTURIA [   ], INCREASED FREQUENCY [   ], DRIBLING [   ],  SKELETAL: PAINFUL JOINTS [   ], SWOLLEN JOINTS [   ], NECK ACHE [   ], LOW BACK ACHE [   ],  SKIN : ULCERS [   ], RASH [   ], ITCHING [   ]  CNS: HEAD ACHE [   ], DOUBLE VISION [   ], BLURRED VISION [   ], AMS / CONFUSION [   ], SEIZURES [   ], WEAKNESS [   ],TINGLING / NUMBNESS [   ]    PHYSICAL EXAMINATION:  GENERAL APPEARANCE: NO DISTRESS  HEENT:  NO PALLOR, NO  JVD,  NO   NODES, NECK SUPPLE  CVS: S1 +, S2 +,   RS: AEEB,  OCCASIONAL  RALES +,   NO RONCHI  ABD: SOFT, NT, NO, BS +  EXT: PE +  SKIN: WARM,   SKELETAL:  ROM ACCEPTABLE  CNS:  AAO X  3  ,NO   DEFICITS    RADIOLOGY :    < from: Xray Chest 1 View-PORTABLE IMMEDIATE (18 @ 21:31) >  IMPRESSION:    Support Devices: None  Heart: Cardiomegaly  Mediastinum:  Unremarkable  Lungs/Airways:  Unremarkable  Bones/Soft tissues: Unremarkable    < end of copied text >    < from: Transthoracic Echocardiogram (18 @ 13:07) >  ------------------------------------------------------------------------  CONCLUSIONS:  1. Tethered mitralvalve leaflets. Moderate mitral  regurgitation.  2. Normal trileaflet aortic valve.  3. Mildly dilated left atrium.  LA volume index = 39 cc/m2.  4. Moderate left ventricular enlargement.  5. Severe global left ventricular systolic dysfunction.  LVEF 20 %  6. Right ventricular enlargement with decreased right  ventricular systolic function.  7. Normal tricuspid valve. Moderate tricuspid  regurgitation.  8. Estimated pulmonary artery systolic pressure equals 62  mm Hg, assuming right atrial pressure equals 10  mm Hg,  consistent with severe pulmonary hypertension.    < end of copied text >      ASSESSMENT :     Heart failure  HTN (hypertension)  Cardiomyopathy      PLAN:  HPI:  47 male with Non0 ischemic Cardiomyopathy ( recently diagnosed in [facility] , with EF of 20% with Severe Pulm htn ) , comes in with complaint of sob on exertion with chest pain , palpitations. Patient is having these symptoms since 2-3 weeks and is now worsened in last 1-2 days. Chest pain was described as chest pressure , with no radiation to back. It was not related to the sob. Sob is present with 2 flight of stairs and from bed to bathroom. Denies any leg swelling , Orthopnea , PND. Complaints of dry cough and congestion and generalized abdominal pain with no relation to food. Pain is more on deep breathing. Denies fever with chills , nausea , vomiting.   Patient was discharged from [facility] with optimal tx for Advanced HF , however patient has not been taking Lisinopril. Was given a life vest , has not been wearing that either. Was supposed to get repeat Echo in 3 weeks, hasn't followed up with cardio.    In Ed , BP : 114/85 mm hg , Hr : 64 , Temp : 97.2 F  Cbc shows elevated h/h , white count with left shift   Bmp shows K of 5.6 with elevated BUN/ Creatinine   Pro-bnp elevated   T2 negative ; EKG shows mild LVH , sinus tachycardia , TWI in V4-V6 (same as in old EKG) (2018 01:07)    - SOB, SUSPECT ACUTE ON CHRONIC SYSTOLIC CHF ( lvef 20 % ) ON SPIRONOLACTONE. NO ACE DUE TO ARF / CKD. NON COMPLIANT WITH HOME Rx AND LIFE VEST. F/UP RPT ECHO  - OLD NON ISCHEMIC CARDIOMYOPATHY  - ARF / CKD  - VIRAL URTI AND ACUTE BRONCHITIS - ON NEBS  - TRANSAMINITIS - F/UP ALCOHOL LEVELS, KERA, DS DNA, RH FACTOR, HEPATITIS PANEL  - GI AND DVT PROPHYLAXIS  - DR. MORAES

## 2018-11-27 LAB — DSDNA AB SER-ACNC: <12 IU/ML — SIGNIFICANT CHANGE UP

## 2018-11-28 LAB — ANA TITR SER: NEGATIVE — SIGNIFICANT CHANGE UP

## 2018-11-29 ENCOUNTER — INPATIENT (INPATIENT)
Facility: HOSPITAL | Age: 48
LOS: 0 days | Discharge: ANOTHER HOSPITAL | End: 2018-11-29
Attending: INTERNAL MEDICINE | Admitting: INTERNAL MEDICINE
Payer: COMMERCIAL

## 2018-11-29 ENCOUNTER — INPATIENT (INPATIENT)
Facility: HOSPITAL | Age: 48
LOS: 9 days | Discharge: HOME | DRG: 270 | End: 2018-12-09
Attending: INTERNAL MEDICINE | Admitting: STUDENT IN AN ORGANIZED HEALTH CARE EDUCATION/TRAINING PROGRAM
Payer: COMMERCIAL

## 2018-11-29 VITALS — OXYGEN SATURATION: 100 % | RESPIRATION RATE: 16 BRPM | HEART RATE: 90 BPM

## 2018-11-29 VITALS — HEART RATE: 92 BPM | OXYGEN SATURATION: 98 % | TEMPERATURE: 99 F | RESPIRATION RATE: 20 BRPM

## 2018-11-29 VITALS
HEART RATE: 87 BPM | OXYGEN SATURATION: 90 % | RESPIRATION RATE: 24 BRPM | DIASTOLIC BLOOD PRESSURE: 98 MMHG | SYSTOLIC BLOOD PRESSURE: 116 MMHG

## 2018-11-29 DIAGNOSIS — I10 ESSENTIAL (PRIMARY) HYPERTENSION: ICD-10-CM

## 2018-11-29 DIAGNOSIS — R57.0 CARDIOGENIC SHOCK: ICD-10-CM

## 2018-11-29 DIAGNOSIS — R07.9 CHEST PAIN, UNSPECIFIED: ICD-10-CM

## 2018-11-29 LAB
ALBUMIN SERPL ELPH-MCNC: 3.6 G/DL — SIGNIFICANT CHANGE UP (ref 3.3–5)
ALBUMIN SERPL ELPH-MCNC: 3.6 G/DL — SIGNIFICANT CHANGE UP (ref 3.3–5)
ALBUMIN SERPL ELPH-MCNC: 3.8 G/DL — SIGNIFICANT CHANGE UP (ref 3.3–5)
ALBUMIN SERPL ELPH-MCNC: 3.8 G/DL — SIGNIFICANT CHANGE UP (ref 3.3–5)
ALBUMIN SERPL ELPH-MCNC: 4.8 G/DL — SIGNIFICANT CHANGE UP (ref 3.3–5)
ALP SERPL-CCNC: 56 U/L — SIGNIFICANT CHANGE UP (ref 40–120)
ALP SERPL-CCNC: 56 U/L — SIGNIFICANT CHANGE UP (ref 40–120)
ALP SERPL-CCNC: 62 U/L — SIGNIFICANT CHANGE UP (ref 40–120)
ALP SERPL-CCNC: 62 U/L — SIGNIFICANT CHANGE UP (ref 40–120)
ALP SERPL-CCNC: 76 U/L — SIGNIFICANT CHANGE UP (ref 40–120)
ALT FLD-CCNC: 117 U/L — HIGH (ref 4–41)
ALT FLD-CCNC: 173 U/L — HIGH (ref 4–41)
ALT FLD-CCNC: 173 U/L — HIGH (ref 4–41)
ALT FLD-CCNC: 607 U/L — HIGH (ref 4–41)
ALT FLD-CCNC: 655 U/L — HIGH (ref 4–41)
APTT BLD: 26.6 SEC — LOW (ref 27.5–36.3)
AST SERPL-CCNC: 101 U/L — HIGH (ref 4–40)
AST SERPL-CCNC: 1066 U/L — HIGH (ref 4–40)
AST SERPL-CCNC: 195 U/L — HIGH (ref 4–40)
AST SERPL-CCNC: 195 U/L — HIGH (ref 4–40)
AST SERPL-CCNC: 936 U/L — HIGH (ref 4–40)
BASE EXCESS BLDA CALC-SCNC: -13.1 MMOL/L — SIGNIFICANT CHANGE UP
BASE EXCESS BLDV CALC-SCNC: -3.1 MMOL/L — SIGNIFICANT CHANGE UP
BASE EXCESS BLDV CALC-SCNC: -4.9 MMOL/L — SIGNIFICANT CHANGE UP
BASE EXCESS BLDV CALC-SCNC: -5.2 MMOL/L — SIGNIFICANT CHANGE UP
BASE EXCESS BLDV CALC-SCNC: -8.3 MMOL/L — SIGNIFICANT CHANGE UP
BASOPHILS # BLD AUTO: 0.09 K/UL — SIGNIFICANT CHANGE UP (ref 0–0.2)
BASOPHILS NFR BLD AUTO: 0.7 % — SIGNIFICANT CHANGE UP (ref 0–2)
BILIRUB DIRECT SERPL-MCNC: 1.1 MG/DL — HIGH (ref 0.1–0.2)
BILIRUB SERPL-MCNC: 2 MG/DL — HIGH (ref 0.2–1.2)
BILIRUB SERPL-MCNC: 2.2 MG/DL — HIGH (ref 0.2–1.2)
BILIRUB SERPL-MCNC: 2.9 MG/DL — HIGH (ref 0.2–1.2)
BILIRUB SERPL-MCNC: 2.9 MG/DL — HIGH (ref 0.2–1.2)
BILIRUB SERPL-MCNC: 3 MG/DL — HIGH (ref 0.2–1.2)
BLD GP AB SCN SERPL QL: NEGATIVE — SIGNIFICANT CHANGE UP
BLOOD GAS VENOUS - CREATININE: 1.52 MG/DL — HIGH (ref 0.5–1.3)
BLOOD GAS VENOUS - CREATININE: 1.69 MG/DL — HIGH (ref 0.5–1.3)
BLOOD GAS VENOUS - CREATININE: 1.78 MG/DL — HIGH (ref 0.5–1.3)
BUN SERPL-MCNC: 39 MG/DL — HIGH (ref 7–23)
BUN SERPL-MCNC: 40 MG/DL — HIGH (ref 7–23)
CALCIUM SERPL-MCNC: 8.5 MG/DL — SIGNIFICANT CHANGE UP (ref 8.4–10.5)
CALCIUM SERPL-MCNC: 8.6 MG/DL — SIGNIFICANT CHANGE UP (ref 8.4–10.5)
CALCIUM SERPL-MCNC: 9.9 MG/DL — SIGNIFICANT CHANGE UP (ref 8.4–10.5)
CHLORIDE BLDA-SCNC: 109 MMOL/L — HIGH (ref 96–108)
CHLORIDE BLDV-SCNC: 100 MMOL/L — SIGNIFICANT CHANGE UP (ref 96–108)
CHLORIDE BLDV-SCNC: 101 MMOL/L — SIGNIFICANT CHANGE UP (ref 96–108)
CHLORIDE BLDV-SCNC: 103 MMOL/L — SIGNIFICANT CHANGE UP (ref 96–108)
CHLORIDE SERPL-SCNC: 94 MMOL/L — LOW (ref 98–107)
CHLORIDE SERPL-SCNC: 95 MMOL/L — LOW (ref 98–107)
CHLORIDE SERPL-SCNC: 95 MMOL/L — LOW (ref 98–107)
CHLORIDE SERPL-SCNC: 96 MMOL/L — LOW (ref 98–107)
CHLORIDE SERPL-SCNC: 99 MMOL/L — SIGNIFICANT CHANGE UP (ref 98–107)
CK MB BLD-MCNC: 1.3 — SIGNIFICANT CHANGE UP (ref 0–2.5)
CK MB BLD-MCNC: 3.21 NG/ML — SIGNIFICANT CHANGE UP (ref 1–6.6)
CK SERPL-CCNC: 242 U/L — HIGH (ref 30–200)
CO2 SERPL-SCNC: 14 MMOL/L — LOW (ref 22–31)
CO2 SERPL-SCNC: 16 MMOL/L — LOW (ref 22–31)
CO2 SERPL-SCNC: 17 MMOL/L — LOW (ref 22–31)
CO2 SERPL-SCNC: 17 MMOL/L — LOW (ref 22–31)
CO2 SERPL-SCNC: 19 MMOL/L — LOW (ref 22–31)
CREAT SERPL-MCNC: 1.62 MG/DL — HIGH (ref 0.5–1.3)
CREAT SERPL-MCNC: 1.63 MG/DL — HIGH (ref 0.5–1.3)
CREAT SERPL-MCNC: 1.85 MG/DL — HIGH (ref 0.5–1.3)
CREAT SERPL-MCNC: 1.85 MG/DL — HIGH (ref 0.5–1.3)
CREAT SERPL-MCNC: 1.87 MG/DL — HIGH (ref 0.5–1.3)
EOSINOPHIL # BLD AUTO: 0.07 K/UL — SIGNIFICANT CHANGE UP (ref 0–0.5)
EOSINOPHIL NFR BLD AUTO: 0.6 % — SIGNIFICANT CHANGE UP (ref 0–6)
GAS PNL BLDV: 126 MMOL/L — LOW (ref 136–146)
GAS PNL BLDV: 127 MMOL/L — LOW (ref 136–146)
GAS PNL BLDV: 128 MMOL/L — LOW (ref 136–146)
GAS PNL BLDV: 130 MMOL/L — LOW (ref 136–146)
GLUCOSE BLDA-MCNC: 114 MG/DL — HIGH (ref 70–99)
GLUCOSE BLDC GLUCOMTR-MCNC: 120 MG/DL — HIGH (ref 70–99)
GLUCOSE BLDC GLUCOMTR-MCNC: 214 MG/DL — HIGH (ref 70–99)
GLUCOSE BLDV-MCNC: 128 — HIGH (ref 70–99)
GLUCOSE BLDV-MCNC: 133 — HIGH (ref 70–99)
GLUCOSE BLDV-MCNC: 139 — HIGH (ref 70–99)
GLUCOSE BLDV-MCNC: 206 — HIGH (ref 70–99)
GLUCOSE SERPL-MCNC: 118 MG/DL — HIGH (ref 70–99)
GLUCOSE SERPL-MCNC: 126 MG/DL — HIGH (ref 70–99)
GLUCOSE SERPL-MCNC: 129 MG/DL — HIGH (ref 70–99)
GLUCOSE SERPL-MCNC: 181 MG/DL — HIGH (ref 70–99)
GLUCOSE SERPL-MCNC: 181 MG/DL — HIGH (ref 70–99)
HCO3 BLDA-SCNC: 16 MMOL/L — LOW (ref 22–26)
HCO3 BLDV-SCNC: 16 MMOL/L — LOW (ref 20–27)
HCO3 BLDV-SCNC: 18 MMOL/L — LOW (ref 20–27)
HCO3 BLDV-SCNC: 20 MMOL/L — SIGNIFICANT CHANGE UP (ref 20–27)
HCO3 BLDV-SCNC: 21 MMOL/L — SIGNIFICANT CHANGE UP (ref 20–27)
HCT VFR BLD CALC: 45.8 % — SIGNIFICANT CHANGE UP (ref 39–50)
HCT VFR BLD CALC: 46.5 % — SIGNIFICANT CHANGE UP (ref 39–50)
HCT VFR BLD CALC: 58.4 % — CRITICAL HIGH (ref 39–50)
HCT VFR BLDA CALC: 54.5 % — HIGH (ref 39–51)
HCT VFR BLDV CALC: 48.1 % — SIGNIFICANT CHANGE UP (ref 39–51)
HCT VFR BLDV CALC: 48.8 % — SIGNIFICANT CHANGE UP (ref 39–51)
HCT VFR BLDV CALC: 48.9 % — SIGNIFICANT CHANGE UP (ref 39–51)
HCT VFR BLDV CALC: 59 % — CRITICAL HIGH (ref 39–51)
HGB BLD-MCNC: 15.3 G/DL — SIGNIFICANT CHANGE UP (ref 13–17)
HGB BLD-MCNC: 15.6 G/DL — SIGNIFICANT CHANGE UP (ref 13–17)
HGB BLD-MCNC: 18.9 G/DL — HIGH (ref 13–17)
HGB BLDA-MCNC: 17.8 G/DL — HIGH (ref 13–17)
HGB BLDV-MCNC: 15.7 G/DL — SIGNIFICANT CHANGE UP (ref 13–17)
HGB BLDV-MCNC: 15.9 G/DL — SIGNIFICANT CHANGE UP (ref 13–17)
HGB BLDV-MCNC: 16 G/DL — SIGNIFICANT CHANGE UP (ref 13–17)
HGB BLDV-MCNC: 19.3 G/DL — CRITICAL HIGH (ref 13–17)
IMM GRANULOCYTES # BLD AUTO: 0.13 # — SIGNIFICANT CHANGE UP
IMM GRANULOCYTES NFR BLD AUTO: 1.1 % — SIGNIFICANT CHANGE UP (ref 0–1.5)
INR BLD: 1.31 — HIGH (ref 0.88–1.17)
LACTATE BLDA-SCNC: 4.4 MMOL/L — CRITICAL HIGH (ref 0.5–2)
LACTATE BLDV-MCNC: 1.8 MMOL/L — SIGNIFICANT CHANGE UP (ref 0.5–2)
LACTATE BLDV-MCNC: 2.6 MMOL/L — HIGH (ref 0.5–2)
LACTATE BLDV-MCNC: 5 MMOL/L — CRITICAL HIGH (ref 0.5–2)
LACTATE BLDV-MCNC: 5.2 MMOL/L — CRITICAL HIGH (ref 0.5–2)
LACTATE SERPL-SCNC: 1.7 MMOL/L — SIGNIFICANT CHANGE UP (ref 0.5–2)
LYMPHOCYTES # BLD AUTO: 19.2 % — SIGNIFICANT CHANGE UP (ref 13–44)
LYMPHOCYTES # BLD AUTO: 2.37 K/UL — SIGNIFICANT CHANGE UP (ref 1–3.3)
MAGNESIUM SERPL-MCNC: 2 MG/DL — SIGNIFICANT CHANGE UP (ref 1.6–2.6)
MAGNESIUM SERPL-MCNC: 2.2 MG/DL — SIGNIFICANT CHANGE UP (ref 1.6–2.6)
MAGNESIUM SERPL-MCNC: 2.5 MG/DL — SIGNIFICANT CHANGE UP (ref 1.6–2.6)
MAGNESIUM SERPL-MCNC: 2.7 MG/DL — HIGH (ref 1.6–2.6)
MCHC RBC-ENTMCNC: 31.8 PG — SIGNIFICANT CHANGE UP (ref 27–34)
MCHC RBC-ENTMCNC: 31.9 PG — SIGNIFICANT CHANGE UP (ref 27–34)
MCHC RBC-ENTMCNC: 32.2 PG — SIGNIFICANT CHANGE UP (ref 27–34)
MCHC RBC-ENTMCNC: 32.4 % — SIGNIFICANT CHANGE UP (ref 32–36)
MCHC RBC-ENTMCNC: 32.9 % — SIGNIFICANT CHANGE UP (ref 32–36)
MCHC RBC-ENTMCNC: 34.1 % — SIGNIFICANT CHANGE UP (ref 32–36)
MCV RBC AUTO: 94.4 FL — SIGNIFICANT CHANGE UP (ref 80–100)
MCV RBC AUTO: 96.7 FL — SIGNIFICANT CHANGE UP (ref 80–100)
MCV RBC AUTO: 98.5 FL — SIGNIFICANT CHANGE UP (ref 80–100)
MONOCYTES # BLD AUTO: 1.08 K/UL — HIGH (ref 0–0.9)
MONOCYTES NFR BLD AUTO: 8.7 % — SIGNIFICANT CHANGE UP (ref 2–14)
NEUTROPHILS # BLD AUTO: 8.63 K/UL — HIGH (ref 1.8–7.4)
NEUTROPHILS NFR BLD AUTO: 69.7 % — SIGNIFICANT CHANGE UP (ref 43–77)
NRBC # FLD: 0 — SIGNIFICANT CHANGE UP
NRBC # FLD: 0 — SIGNIFICANT CHANGE UP
NRBC # FLD: 0.02 — SIGNIFICANT CHANGE UP
NT-PROBNP SERPL-SCNC: 4034 PG/ML — SIGNIFICANT CHANGE UP
PCO2 BLDA: 18 MMHG — LOW (ref 35–48)
PCO2 BLDV: 36 MMHG — LOW (ref 41–51)
PCO2 BLDV: 36 MMHG — LOW (ref 41–51)
PCO2 BLDV: 43 MMHG — SIGNIFICANT CHANGE UP (ref 41–51)
PCO2 BLDV: 46 MMHG — SIGNIFICANT CHANGE UP (ref 41–51)
PH BLDA: 7.41 PH — SIGNIFICANT CHANGE UP (ref 7.35–7.45)
PH BLDV: 7.22 PH — LOW (ref 7.32–7.43)
PH BLDV: 7.3 PH — LOW (ref 7.32–7.43)
PH BLDV: 7.36 PH — SIGNIFICANT CHANGE UP (ref 7.32–7.43)
PH BLDV: 7.39 PH — SIGNIFICANT CHANGE UP (ref 7.32–7.43)
PHOSPHATE SERPL-MCNC: 4.4 MG/DL — SIGNIFICANT CHANGE UP (ref 2.5–4.5)
PHOSPHATE SERPL-MCNC: 4.8 MG/DL — HIGH (ref 2.5–4.5)
PHOSPHATE SERPL-MCNC: 5.8 MG/DL — HIGH (ref 2.5–4.5)
PHOSPHATE SERPL-MCNC: 6.2 MG/DL — HIGH (ref 2.5–4.5)
PLATELET # BLD AUTO: 175 K/UL — SIGNIFICANT CHANGE UP (ref 150–400)
PLATELET # BLD AUTO: 182 K/UL — SIGNIFICANT CHANGE UP (ref 150–400)
PLATELET # BLD AUTO: 227 K/UL — SIGNIFICANT CHANGE UP (ref 150–400)
PMV BLD: 11.3 FL — SIGNIFICANT CHANGE UP (ref 7–13)
PMV BLD: 11.4 FL — SIGNIFICANT CHANGE UP (ref 7–13)
PMV BLD: 11.7 FL — SIGNIFICANT CHANGE UP (ref 7–13)
PO2 BLDA: 456 MMHG — HIGH (ref 83–108)
PO2 BLDV: 17 MMHG — LOW (ref 35–40)
PO2 BLDV: 28 MMHG — LOW (ref 35–40)
PO2 BLDV: 42 MMHG — HIGH (ref 35–40)
PO2 BLDV: 47 MMHG — HIGH (ref 35–40)
POTASSIUM BLDA-SCNC: 7.1 MMOL/L — CRITICAL HIGH (ref 3.4–4.5)
POTASSIUM BLDV-SCNC: 4.5 MMOL/L — SIGNIFICANT CHANGE UP (ref 3.4–4.5)
POTASSIUM BLDV-SCNC: 5 MMOL/L — HIGH (ref 3.4–4.5)
POTASSIUM BLDV-SCNC: 6.2 MMOL/L — CRITICAL HIGH (ref 3.4–4.5)
POTASSIUM BLDV-SCNC: 6.6 MMOL/L — CRITICAL HIGH (ref 3.4–4.5)
POTASSIUM SERPL-MCNC: 4.3 MMOL/L — SIGNIFICANT CHANGE UP (ref 3.5–5.3)
POTASSIUM SERPL-MCNC: 4.7 MMOL/L — SIGNIFICANT CHANGE UP (ref 3.5–5.3)
POTASSIUM SERPL-MCNC: 5.2 MMOL/L — SIGNIFICANT CHANGE UP (ref 3.5–5.3)
POTASSIUM SERPL-MCNC: 6.8 MMOL/L — CRITICAL HIGH (ref 3.5–5.3)
POTASSIUM SERPL-MCNC: 6.8 MMOL/L — CRITICAL HIGH (ref 3.5–5.3)
POTASSIUM SERPL-MCNC: 7 MMOL/L — CRITICAL HIGH (ref 3.5–5.3)
POTASSIUM SERPL-SCNC: 4.3 MMOL/L — SIGNIFICANT CHANGE UP (ref 3.5–5.3)
POTASSIUM SERPL-SCNC: 4.7 MMOL/L — SIGNIFICANT CHANGE UP (ref 3.5–5.3)
POTASSIUM SERPL-SCNC: 5.2 MMOL/L — SIGNIFICANT CHANGE UP (ref 3.5–5.3)
POTASSIUM SERPL-SCNC: 6.8 MMOL/L — CRITICAL HIGH (ref 3.5–5.3)
POTASSIUM SERPL-SCNC: 6.8 MMOL/L — CRITICAL HIGH (ref 3.5–5.3)
POTASSIUM SERPL-SCNC: 7 MMOL/L — CRITICAL HIGH (ref 3.5–5.3)
PROT SERPL-MCNC: 6.1 G/DL — SIGNIFICANT CHANGE UP (ref 6–8.3)
PROT SERPL-MCNC: 6.3 G/DL — SIGNIFICANT CHANGE UP (ref 6–8.3)
PROT SERPL-MCNC: 8.1 G/DL — SIGNIFICANT CHANGE UP (ref 6–8.3)
PROTHROM AB SERPL-ACNC: 15.1 SEC — HIGH (ref 9.8–13.1)
RBC # BLD: 4.81 M/UL — SIGNIFICANT CHANGE UP (ref 4.2–5.8)
RBC # BLD: 4.85 M/UL — SIGNIFICANT CHANGE UP (ref 4.2–5.8)
RBC # BLD: 5.93 M/UL — HIGH (ref 4.2–5.8)
RBC # FLD: 11.6 % — SIGNIFICANT CHANGE UP (ref 10.3–14.5)
RBC # FLD: 11.7 % — SIGNIFICANT CHANGE UP (ref 10.3–14.5)
RBC # FLD: 12.1 % — SIGNIFICANT CHANGE UP (ref 10.3–14.5)
RH IG SCN BLD-IMP: POSITIVE — SIGNIFICANT CHANGE UP
SAO2 % BLDA: 100 % — HIGH (ref 95–99)
SAO2 % BLDV: 10.8 % — LOW (ref 60–85)
SAO2 % BLDV: 27 % — LOW (ref 60–85)
SAO2 % BLDV: 68 % — SIGNIFICANT CHANGE UP (ref 60–85)
SAO2 % BLDV: 72.6 % — SIGNIFICANT CHANGE UP (ref 60–85)
SODIUM BLDA-SCNC: 122 MMOL/L — LOW (ref 136–146)
SODIUM SERPL-SCNC: 130 MMOL/L — LOW (ref 135–145)
SODIUM SERPL-SCNC: 131 MMOL/L — LOW (ref 135–145)
SODIUM SERPL-SCNC: 133 MMOL/L — LOW (ref 135–145)
TROPONIN T, HIGH SENSITIVITY: 18 NG/L — SIGNIFICANT CHANGE UP (ref ?–14)
WBC # BLD: 12.27 K/UL — HIGH (ref 3.8–10.5)
WBC # BLD: 12.37 K/UL — HIGH (ref 3.8–10.5)
WBC # BLD: 13.25 K/UL — HIGH (ref 3.8–10.5)
WBC # FLD AUTO: 12.27 K/UL — HIGH (ref 3.8–10.5)
WBC # FLD AUTO: 12.37 K/UL — HIGH (ref 3.8–10.5)
WBC # FLD AUTO: 13.25 K/UL — HIGH (ref 3.8–10.5)

## 2018-11-29 PROCEDURE — 71045 X-RAY EXAM CHEST 1 VIEW: CPT | Mod: 26,77

## 2018-11-29 PROCEDURE — 93451 RIGHT HEART CATH: CPT | Mod: 26

## 2018-11-29 PROCEDURE — 76937 US GUIDE VASCULAR ACCESS: CPT | Mod: 26

## 2018-11-29 PROCEDURE — 99223 1ST HOSP IP/OBS HIGH 75: CPT | Mod: GC

## 2018-11-29 PROCEDURE — 99291 CRITICAL CARE FIRST HOUR: CPT

## 2018-11-29 PROCEDURE — 93306 TTE W/DOPPLER COMPLETE: CPT | Mod: 26

## 2018-11-29 PROCEDURE — 71045 X-RAY EXAM CHEST 1 VIEW: CPT | Mod: 26

## 2018-11-29 PROCEDURE — 93010 ELECTROCARDIOGRAM REPORT: CPT

## 2018-11-29 PROCEDURE — 33967 INSERT I-AORT PERCUT DEVICE: CPT

## 2018-11-29 RX ORDER — FUROSEMIDE 40 MG
10 TABLET ORAL
Qty: 0 | Refills: 0 | COMMUNITY
Start: 2018-11-29

## 2018-11-29 RX ORDER — HEPARIN SODIUM 5000 [USP'U]/ML
10 INJECTION INTRAVENOUS; SUBCUTANEOUS
Qty: 0 | Refills: 0 | COMMUNITY
Start: 2018-11-29

## 2018-11-29 RX ORDER — VANCOMYCIN HCL 1 G
1000 VIAL (EA) INTRAVENOUS ONCE
Qty: 0 | Refills: 0 | Status: COMPLETED | OUTPATIENT
Start: 2018-11-29 | End: 2018-11-29

## 2018-11-29 RX ORDER — CHLORHEXIDINE GLUCONATE 213 G/1000ML
1 SOLUTION TOPICAL
Qty: 0 | Refills: 0 | Status: DISCONTINUED | OUTPATIENT
Start: 2018-11-29 | End: 2018-11-29

## 2018-11-29 RX ORDER — ACETAMINOPHEN 500 MG
1000 TABLET ORAL ONCE
Qty: 0 | Refills: 0 | Status: COMPLETED | OUTPATIENT
Start: 2018-11-29 | End: 2018-11-29

## 2018-11-29 RX ORDER — PIPERACILLIN AND TAZOBACTAM 4; .5 G/20ML; G/20ML
3.38 INJECTION, POWDER, LYOPHILIZED, FOR SOLUTION INTRAVENOUS ONCE
Qty: 0 | Refills: 0 | Status: COMPLETED | OUTPATIENT
Start: 2018-11-29 | End: 2018-11-29

## 2018-11-29 RX ORDER — DOBUTAMINE HCL 250MG/20ML
5 VIAL (ML) INTRAVENOUS
Qty: 500 | Refills: 0 | Status: DISCONTINUED | OUTPATIENT
Start: 2018-11-29 | End: 2018-11-29

## 2018-11-29 RX ORDER — INSULIN HUMAN 100 [IU]/ML
10 INJECTION, SOLUTION SUBCUTANEOUS ONCE
Qty: 0 | Refills: 0 | Status: COMPLETED | OUTPATIENT
Start: 2018-11-29 | End: 2018-11-29

## 2018-11-29 RX ORDER — LIDOCAINE HCL 20 MG/ML
20 VIAL (ML) INJECTION ONCE
Qty: 0 | Refills: 0 | Status: COMPLETED | OUTPATIENT
Start: 2018-11-29 | End: 2018-11-29

## 2018-11-29 RX ORDER — PANTOPRAZOLE SODIUM 20 MG/1
40 TABLET, DELAYED RELEASE ORAL
Qty: 0 | Refills: 0 | Status: DISCONTINUED | OUTPATIENT
Start: 2018-11-29 | End: 2018-11-29

## 2018-11-29 RX ORDER — DOBUTAMINE HCL 250MG/20ML
5 VIAL (ML) INTRAVENOUS
Qty: 0 | Refills: 0 | COMMUNITY
Start: 2018-11-29

## 2018-11-29 RX ORDER — DOBUTAMINE HCL 250MG/20ML
10 VIAL (ML) INTRAVENOUS
Qty: 1000 | Refills: 0 | Status: DISCONTINUED | OUTPATIENT
Start: 2018-11-29 | End: 2018-11-29

## 2018-11-29 RX ORDER — MORPHINE SULFATE 50 MG/1
12 CAPSULE, EXTENDED RELEASE ORAL ONCE
Qty: 0 | Refills: 0 | Status: DISCONTINUED | OUTPATIENT
Start: 2018-11-29 | End: 2018-11-29

## 2018-11-29 RX ORDER — NOREPINEPHRINE BITARTRATE/D5W 8 MG/250ML
0.3 PLASTIC BAG, INJECTION (ML) INTRAVENOUS
Qty: 0 | Refills: 0 | COMMUNITY
Start: 2018-11-29

## 2018-11-29 RX ORDER — FUROSEMIDE 40 MG
10 TABLET ORAL
Qty: 500 | Refills: 0 | Status: DISCONTINUED | OUTPATIENT
Start: 2018-11-29 | End: 2018-11-29

## 2018-11-29 RX ORDER — DEXTROSE 50 % IN WATER 50 %
50 SYRINGE (ML) INTRAVENOUS ONCE
Qty: 0 | Refills: 0 | Status: COMPLETED | OUTPATIENT
Start: 2018-11-29 | End: 2018-11-29

## 2018-11-29 RX ORDER — MORPHINE SULFATE 50 MG/1
1 CAPSULE, EXTENDED RELEASE ORAL ONCE
Qty: 0 | Refills: 0 | Status: DISCONTINUED | OUTPATIENT
Start: 2018-11-29 | End: 2018-11-29

## 2018-11-29 RX ORDER — FENTANYL CITRATE 50 UG/ML
50 INJECTION INTRAVENOUS ONCE
Qty: 0 | Refills: 0 | Status: DISCONTINUED | OUTPATIENT
Start: 2018-11-29 | End: 2018-11-29

## 2018-11-29 RX ORDER — HEPARIN SODIUM 5000 [USP'U]/ML
1000 INJECTION INTRAVENOUS; SUBCUTANEOUS
Qty: 25000 | Refills: 0 | Status: DISCONTINUED | OUTPATIENT
Start: 2018-11-29 | End: 2018-11-29

## 2018-11-29 RX ORDER — NOREPINEPHRINE BITARTRATE/D5W 8 MG/250ML
0.05 PLASTIC BAG, INJECTION (ML) INTRAVENOUS
Qty: 8 | Refills: 0 | Status: DISCONTINUED | OUTPATIENT
Start: 2018-11-29 | End: 2018-11-29

## 2018-11-29 RX ADMIN — Medication 20 MILLILITER(S): at 11:30

## 2018-11-29 RX ADMIN — INSULIN HUMAN 10 UNIT(S): 100 INJECTION, SOLUTION SUBCUTANEOUS at 12:14

## 2018-11-29 RX ADMIN — MORPHINE SULFATE 1 MILLIGRAM(S): 50 CAPSULE, EXTENDED RELEASE ORAL at 20:04

## 2018-11-29 RX ADMIN — FENTANYL CITRATE 50 MICROGRAM(S): 50 INJECTION INTRAVENOUS at 09:30

## 2018-11-29 RX ADMIN — MORPHINE SULFATE 1 MILLIGRAM(S): 50 CAPSULE, EXTENDED RELEASE ORAL at 20:19

## 2018-11-29 RX ADMIN — HEPARIN SODIUM 10 UNIT(S)/HR: 5000 INJECTION INTRAVENOUS; SUBCUTANEOUS at 15:12

## 2018-11-29 RX ADMIN — Medication 9.75 MICROGRAM(S)/KG/MIN: at 10:32

## 2018-11-29 RX ADMIN — Medication 1000 MILLIGRAM(S): at 09:40

## 2018-11-29 RX ADMIN — Medication 9.75 MICROGRAM(S)/KG/MIN: at 11:14

## 2018-11-29 RX ADMIN — FENTANYL CITRATE 50 MICROGRAM(S): 50 INJECTION INTRAVENOUS at 09:45

## 2018-11-29 RX ADMIN — PANTOPRAZOLE SODIUM 40 MILLIGRAM(S): 20 TABLET, DELAYED RELEASE ORAL at 18:24

## 2018-11-29 RX ADMIN — Medication 250 MILLIGRAM(S): at 10:33

## 2018-11-29 RX ADMIN — Medication 5 MG/HR: at 16:07

## 2018-11-29 RX ADMIN — PIPERACILLIN AND TAZOBACTAM 3.38 GRAM(S): 4; .5 INJECTION, POWDER, LYOPHILIZED, FOR SOLUTION INTRAVENOUS at 10:33

## 2018-11-29 RX ADMIN — Medication 50 MILLILITER(S): at 12:14

## 2018-11-29 RX ADMIN — Medication 6.09 MICROGRAM(S)/KG/MIN: at 15:12

## 2018-11-29 RX ADMIN — Medication 1000 MILLIGRAM(S): at 11:33

## 2018-11-29 RX ADMIN — Medication 400 MILLIGRAM(S): at 09:20

## 2018-11-29 RX ADMIN — Medication 6.09 MICROGRAM(S)/KG/MIN: at 10:18

## 2018-11-29 RX ADMIN — Medication 9.75 MICROGRAM(S)/KG/MIN: at 15:13

## 2018-11-29 RX ADMIN — PIPERACILLIN AND TAZOBACTAM 200 GRAM(S): 4; .5 INJECTION, POWDER, LYOPHILIZED, FOR SOLUTION INTRAVENOUS at 10:00

## 2018-11-29 NOTE — H&P ADULT - ASSESSMENT
48 YO [employer] male with recent diagnosis in 2018 of nonischemic cardiomyopathy (EF 10%), coming in with SOB and Epigastric pain, admitted to CCU for cardiogenic shock,        augmenting 140s on balloon pump --> levo decreased to 0. --> now augmenting 130s    - will wean levo as tolerated, cont  at 5  - start lasix gtt at 10 for diuresis given high wedge  - hep gtt for balloon pump, CXR ordered to check balloon pump placement  - will repeat swan numbers in one hour to reassess after balloon pump placement  - will transfer to [facility] when bed available

## 2018-11-29 NOTE — H&P ADULT - HISTORY OF PRESENT ILLNESS
48 yo M with recent diagnosis in august of 2018 of nICMP (EF 10% 11/2018) who presents for SOB and abd pain for 1 day. He had N/V yesterday. No Chest pain, no edema, no fevers/chills.  He first presented in 8/2018 for MARELY and SOB and was diagnosed with nICMP after LHC showed clean coronaries. CMRI showed dilated cardiomyopathy but no infiltrative diseases. Since then he was seen by Dr. Barnes as outpatient but did not yet follow up with the HF team. He was recently at  for SOB and abd pain and seen by Dr. Flaherty and recommended to transfer to [facility] for further treatment. However, pt did not want to be transferred and was discharged for follow up. He now returns with similar symptoms of SOB and worsening abdominal pain.   Per wife, pt is not compliant with medications. Stopped taking the HF medications he was discharged with except for lasix. She was also concerned about IVDU 3-4 years ago. Also notes significant fam hx in father and brother of sudden death in 40s.

## 2018-11-29 NOTE — ED ADULT NURSE NOTE - NSIMPLEMENTINTERV_GEN_ALL_ED
Implemented All Fall Risk Interventions:  [place] to call system. Call bell, personal items and telephone within reach. Instruct patient to call for assistance. Room bathroom lighting operational. Non-slip footwear when patient is off stretcher. Physically safe environment: no spills, clutter or unnecessary equipment. Stretcher in lowest position, wheels locked, appropriate side rails in place. Provide visual cue, wrist band, yellow gown, etc. Monitor gait and stability. Monitor for mental status changes and reorient to person, place, and time. Review medications for side effects contributing to fall risk. Reinforce activity limits and safety measures with patient and family.

## 2018-11-29 NOTE — CONSULT NOTE ADULT - ATTENDING COMMENTS
48M severe NICM (LVEF 10%, LVEDD~7.0 cm) possibly familial (father and brother both  suddenly in their 40's-50's), moderate alcohol use in the past (not everyday; quit 15 yrs ago). Was told in August of cardiomyopathy. In August had RHC with CO~3.0, RA 14, PVR~4.5, and PCWP~26. Since then has been variably compliant with meds and following medical advice. Was hospitalized several days ago at [facility] but left despite being urged to tx his care to [facility].    At home has been having orthopnea and PND. Wears Life Vest.    Today was brought by EMS to [facility] ER in extremis. S3 gallop. Cyanotic, with SBP~. Lactate ~5. SCr up to 1.8 from 1.2. Transaminases elevated. Was started on dobutamine and norepi with improvement in mental status and BP. Now more comfortable and able to converse.    Will send for PA catheter and IABP. Once stable, will tx to [facility]. Call placed to Dr. Kern.

## 2018-11-29 NOTE — H&P ADULT - REASON FOR ADMISSION
Acute on chronic combined systolic congestive and diastolic  congestive heart failure - LVAD Workup Cardiogenic Shock - LVAD Workup Cardiogenic Shock

## 2018-11-29 NOTE — H&P ADULT - PROBLEM SELECTOR PLAN 1
Pt recently diagnosed with NICM, Possible genetic component, (history of alcohol- not every day, in the past, and possible IVDU)  - low flow state w elevated lactate, transaminases and JVD   - on  5 and levo 0.5, got swan and balloon pump  - will get TTE and go to [facility] once they get a bed  - HF following  - RHC showing RA18, RV 48/5/16,  PA 49/23/37 Wedge 26, PA sat 62.5%, CO 2.7, CI 1.87 on levo 0.5,  5, hbg 18.  - weaning levo as tolerated,   - will start lasix drip at 10 for high wedge  - heparin gtt and CXR  - will repeat swan #s at 4PM

## 2018-11-29 NOTE — DISCHARGE NOTE ADULT - PLAN OF CARE
Patient will be medically optimized Continue current medication regimen and close follow up with cardiology BP will be well-controlled Continue current medication regimen and close follow up with cardiology, transfer to [facility] Continue current medication regimen and close follow up with cardiology transfer to [facility]

## 2018-11-29 NOTE — H&P ADULT - NSHPLABSRESULTS_GEN_ALL_CORE
Complete Blood Count + Automated Diff (11.29.18 @ 08:56)    Nucleated RBC #: 0.02    WBC Count: 12.37 K/uL    RBC Count: 5.93 M/uL    Hemoglobin: 18.9: Test Repeated g/dL    Hematocrit: 58.4 %    Mean Cell Volume: 98.5 fL    Mean Cell Hemoglobin: 31.9 pg    Mean Cell Hemoglobin Conc: 32.4 %    Red Cell Distrib Width: 12.1 %    Platelet Count - Automated: 227 K/uL       Comprehensive Metabolic Panel (11.29.18 @ 11:05)    Sodium, Serum: 130 mmol/L    Potassium, Serum: 6.8: SPECIMEN MILDLY HEMOLYZED mmol/L    Chloride, Serum: 95 mmol/L    Carbon Dioxide, Serum: 17 mmol/L    Blood Urea Nitrogen, Serum: 39 mg/dL    Creatinine, Serum: 1.85 mg/dL    Glucose, Serum: 181 mg/dL    Calcium, Total Serum: 8.6 mg/dL    Protein Total, Serum: 6.3: Delta: 8.1 on 11/29/    SPECIMEN MILDLY HEMOLYZED  Delta: 8.1 on 11/29/ g/dL    Albumin, Serum: 3.8: Delta: 4.8 on 11/29/  Delta: 4.8 on 11/29/ g/dL    Bilirubin Total, Serum: 2.9 mg/dL    Alkaline Phosphatase, Serum: 62 u/L    Aspartate Aminotransferase (AST/SGOT): 195: SPECIMEN MILDLY HEMOLYZED u/L    Alanine Aminotransferase (ALT/SGPT): 173: SPECIMEN MILDLY HEMOLYZED u/L    eGFR if Non : 43: The units for eGFR are ml/min/1.73m2 (normalized body  surface area). The eGFR is calculated from a serum  creatinine using the CKD-EPI equation. Other variables  required for calculation are race, age and sex. Among  patients with chronic kidney disease (CKD), the eGFR is  useful in determining the stage of disease according to  KDOQI CKD classification. All eGFR results are reported  numerically with the following interpretation.    Blood Gas Venous Comprehensive (11.29.18 @ 11:05)    Blood Gas Venous - Lactate: 5.2: Please note updated reference range. mmol/L    Blood Gas Venous - Chloride: 100 mmol/L    Blood Gas Venous - Creatinine: 1.78 mg/dL    pH, Venous: 7.22 pH    pCO2, Venous: 46 mmHg    pO2, Venous: 28 mmHg    HCO3, Venous: 16 mmol/L    Base Excess, Venous: -8.3: REFERENCE RANGE = -3 + 2 mmol/L mmol/L    Oxygen Saturation, Venous: 27.0 %    Blood Gas Venous - Sodium: 127 mmol/L    Blood Gas Venous - Potassium: 6.2 mmol/L    Blood Gas Venous - Glucose: 206    Blood Gas Venous - Hemoglobin: 16.0: Delta: 19.3 on 11/29/  Delta: 19.3 on 11/29/ g/dL    Blood Gas Venous - Hematocrit: 48.9: Delta: 59.0 on 11/29/  Delta: 59.0 on 11/29/ %

## 2018-11-29 NOTE — H&P ADULT - NSHPLABSRESULTS_GEN_ALL_CORE
OBJECTIVE:  VITAL SIGNS:  ICU Vital Signs Last 24 Hrs  T(C): 37.2 (29 Nov 2018 23:00), Max: 37.2 (29 Nov 2018 23:00)  T(F): 99 (29 Nov 2018 23:00), Max: 99 (29 Nov 2018 23:00)  HR: 86 (29 Nov 2018 23:30) (83 - 94)  BP: 114/80 (29 Nov 2018 11:55) (60/43 - 121/73)  BP(mean): 88 (29 Nov 2018 11:55) (74 - 93)  ABP: --  ABP(mean): --  RR: 17 (29 Nov 2018 23:30) (12 - 35)  SpO2: 99% (29 Nov 2018 23:30) (83% - 100%)      CAPILLARY BLOOD GLUCOSE      POCT Blood Glucose.: 214 mg/dL (29 Nov 2018 12:43)      LABS:                        15.3   12.27 )-----------( 175      ( 29 Nov 2018 19:30 )             46.5     11-29    133<L>  |  96<L>  |  39<H>  ----------------------------<  118<H>  4.7   |  19<L>  |  1.63<H>    Ca    8.6      29 Nov 2018 19:20  Phos  4.8     11-29  Mg     2.0     11-29    TPro  6.3  /  Alb  3.6  /  TBili  2.0<H>  /  DBili  x   /  AST  1066<H>  /  ALT  655<H>  /  AlkPhos  56  11-29    LIVER FUNCTIONS - ( 29 Nov 2018 19:20 )  Alb: 3.6 g/dL / Pro: 6.3 g/dL / ALK PHOS: 56 u/L / ALT: 655 u/L / AST: 1066 u/L / GGT: x           PT/INR - ( 29 Nov 2018 08:56 )   PT: 15.1 SEC;   INR: 1.31          PTT - ( 29 Nov 2018 08:56 )  PTT:26.6 SEC    CARDIAC MARKERS ( 29 Nov 2018 08:56 )  x     / x     / 242 u/L / 3.21 ng/mL / x              RADIOLOGY & ADDITIONAL TESTS:     CXR (11/29/18): FINDINGS/  IMPRESSION:    Tip of the right IJ Imperial-Deirdre catheter within the right main pulmonary   artery. Tip of the intra-aortic balloon pump 3 cm below the aortic knob.    No consolidation, pleural effusion or pneumothorax. No pulmonary edema.    The cardiac silhouette remains enlarged..    TTE (11/29/18): EF: 10-15%  CONCLUSIONS:  1. Tethered, thickened mitral valve leaflets with normal  opening. Moderate-severe mitral regurgitation which could  be underestimated.  2. Calcified trileaflet aortic valve with normal opening.  3. Severe left ventricular enlargement.  4. Endocardial visualization enhanced with intravenous  injection of echo contrast (Definity).   Severe global left  ventricular systolic dysfunction.   No left ventricular  thrombus.  5. Normal right atrium.  6. Right ventricular enlargement with decreased right  ventricular systolic function.  7. Normal tricuspid valve. Moderate-severe tricuspid  regurgitation.    R Heart Cardiac Catheterization (11/29/18):    Clean Coronaries.     HEMODYNAMIC TABLES  Pressures:  Baseline  Pressures:  - HR: 95  Pressures:  - Rhythm:  Pressures:  -- Aortic Pressure (S/D/M): 122/86/101  Pressures:  -- Pulmonary Artery (S/D/M): 49/23/37  Pressures:  -- Pulmonary Capillary Wedge: 31/36/26  Pressures:  -- Right Atrium (a/v/M): 21/21/18  Pressures:  -- Right Ventricle (s/edp): 48/16/-- OBJECTIVE:  VITAL SIGNS:  ICU Vital Signs Last 24 Hrs  T(C): 36.4 (30 Nov 2018 06:00), Max: 37.2 (29 Nov 2018 23:00)  T(F): 97.5 (30 Nov 2018 06:00), Max: 99 (29 Nov 2018 23:00)  HR: 86 (30 Nov 2018 06:45) (80 - 94)  BP: 114/80 (29 Nov 2018 11:55) (60/43 - 121/73)  BP(mean): 88 (29 Nov 2018 11:55) (74 - 93)  ABP: --  ABP(mean): --  RR: 16 (30 Nov 2018 06:45) (12 - 35)  SpO2: 99% (30 Nov 2018 06:45) (83% - 100%)        11-29 @ 07:01  -  11-30 @ 07:00  --------------------------------------------------------  IN: 338.9 mL / OUT: 2730 mL / NET: -2391.1 mL      CAPILLARY BLOOD GLUCOSE      POCT Blood Glucose.: 214 mg/dL (29 Nov 2018 12:43)    LABS:                        16.8   9.3   )-----------( 133      ( 30 Nov 2018 05:24 )             51.3     11-30    137  |  97  |  32<H>  ----------------------------<  168<H>  3.4<L>   |  25  |  1.23    Ca    8.8      30 Nov 2018 05:24  Phos  3.5     11-30  Mg     1.9     11-30    TPro  7.0  /  Alb  4.2  /  TBili  2.7<H>  /  DBili  x   /  AST  1091<H>  /  ALT  806<H>  /  AlkPhos  66  11-30    LIVER FUNCTIONS - ( 30 Nov 2018 05:24 )  Alb: 4.2 g/dL / Pro: 7.0 g/dL / ALK PHOS: 66 U/L / ALT: 806 U/L / AST: 1091 U/L / GGT: x           PT/INR - ( 30 Nov 2018 06:41 )   PT: 18.0 sec;   INR: 1.56 ratio         PTT - ( 30 Nov 2018 06:41 )  PTT:90.0 sec    CARDIAC MARKERS ( 29 Nov 2018 08:56 )  x     / x     / 242 u/L / 3.21 ng/mL / x            RADIOLOGY & ADDITIONAL TESTS:     CXR (11/29/18): FINDINGS/  IMPRESSION:    Tip of the right IJ Berlin-Deirdre catheter within the right main pulmonary   artery. Tip of the intra-aortic balloon pump 3 cm below the aortic knob.    No consolidation, pleural effusion or pneumothorax. No pulmonary edema.    The cardiac silhouette remains enlarged..    TTE (11/29/18): EF: 10-15%  CONCLUSIONS:  1. Tethered, thickened mitral valve leaflets with normal  opening. Moderate-severe mitral regurgitation which could  be underestimated.  2. Calcified trileaflet aortic valve with normal opening.  3. Severe left ventricular enlargement.  4. Endocardial visualization enhanced with intravenous  injection of echo contrast (Definity).   Severe global left  ventricular systolic dysfunction.   No left ventricular  thrombus.  5. Normal right atrium.  6. Right ventricular enlargement with decreased right  ventricular systolic function.  7. Normal tricuspid valve. Moderate-severe tricuspid  regurgitation.    R Heart Cardiac Catheterization (11/29/18):    Clean Coronaries.     HEMODYNAMIC TABLES  Pressures:  Baseline  Pressures:  - HR: 95  Pressures:  - Rhythm:  Pressures:  -- Aortic Pressure (S/D/M): 122/86/101  Pressures:  -- Pulmonary Artery (S/D/M): 49/23/37  Pressures:  -- Pulmonary Capillary Wedge: 31/36/26  Pressures:  -- Right Atrium (a/v/M): 21/21/18  Pressures:  -- Right Ventricle (s/edp): 48/16/--

## 2018-11-29 NOTE — ED ADULT NURSE NOTE - NS ED NURSE TRANSPORT WITH
Cardiac Monitor/Defib/ACLS/Rescue Kit/O2/BVM/pulse ox/nonrebreather, mckeon cath draining/oxygen/IV pump/drains

## 2018-11-29 NOTE — H&P ADULT - NSHPREVIEWOFSYSTEMS_GEN_ALL_CORE
Gen: Denies fevers, chills  HEENT: Denies oral ulcers  CV: Denies chest pain, palpitations  Resp: +SOB  Abd: +abdominal pain, +N/V, - melena  Skin: Denies rashes  Ext: +edema  Neuro: Denies headaches, visual changes  Psych: Denies depression  Endo: Denies heat intolerance

## 2018-11-29 NOTE — ED ADULT NURSE REASSESSMENT NOTE - NS ED NURSE REASSESS COMMENT FT1
Pt treated for hyperkalemia as per MD order and EMAR, instructed to bring pt up to cath after meds. CCU made aware. Report given to cath lab. High flow nasal cannula brought to cath lab by respiratory therapist. Pt brought up on nonrebreather with stable VS and O2 sat maintaining at 100%, with respirations even and unlabored. Pt brought up while on continuos cardiac monitoring, accompanied by this RN and ED uziel Salgado. Pt NPO. Consent obtained for cardiac catheterization.

## 2018-11-29 NOTE — DISCHARGE NOTE ADULT - PATIENT PORTAL LINK FT
You can access the [facility] Patient Portal, offered by [facility], by registering with the following website: [email or web address]

## 2018-11-29 NOTE — H&P ADULT - HISTORY OF PRESENT ILLNESS
48 yo M, w/ PMH of nICMP (EF 10% 11/2018 - recently diagnosed in August 2018), presenting as transfer from [facility] CCU for LVAD workup. Patient initially presented to [facility] ED morning of 11/29/18 d/t one day of shortness of breath, also with abdominal pain, worst in the epigastrium, and with one episode of nausea/vomiting the previous day. Patient denies chest pain, palpitations, fevers, chills, or other complaints.     Patient was first diagnosed with non-ischemic cardiomyopathy in August, 2018, after he presented to ED d/t Lower Extremity edema and shortness of breath. He had a L heart catheterization, which showed clean coronaries, but significant for nICMP. Patient also had a Cardiac MRI which showed dilated cardiomyopathy but no infiltrative diseases. Following this hospitalization, patient followed up with Dr. Barens as outpatient, but has not seen him since September 2018. Patient was also supposed to follow up with the HF team, but never did so.    Patient presented to [facility] ED one week ago d/t similar presentation of SOB and abd pain. Patient was seen by Dr. Flaherty and recommended to transfer to [facility] for further treatment. However, pt did not want to be transferred and was discharged for follow up.    Previous note from [facility] states that per wife, pt is not compliant with medications. Stopped taking the HF medications he was discharged with except for lasix. She was also concerned about IVDU 3-4 years ago. Also notes significant fam hx in father and brother of sudden death in 40s. Patient acknowledges that he only takes spironolactone, Lasix, and potassium, and that he only wears his Life Vest some of the time. States that he has never used intravenous drugs. Used marijuana for very brief period in college over 20 years ago. Denies any other drug use, and states no longer drinks alcohol.     In the [facility] CCU, patient continues to endorse diffuse, mild abdominal pain, worst in the epigastrium, but states that he no longer is experiencing shortness of breath, and is breathing comfortably on nasal canula in the room. Patient denies other complaints. Denies headache, neck stiffness, fever/chills, vision change, chest pain, current shortness of breath or difficulty breathing, palpitations, lightheadedness, weakness, dizziness, numbness, tingling, current nausea, vomiting, diarrhea, dysuria, hematuria, syncope. 46 yo Marshallese M, w/ PMH of nICMP (EF 10% 11/2018 - recently diagnosed in August 2018), presenting as transfer from [facility] CCU d/t cardiogenic shock and workup for LVAD. Patient initially presented to [facility] ED morning of 11/29/18 d/t one day of shortness of breath, also with abdominal pain, worst in the epigastrium, and with one episode of nausea/vomiting the previous day. Patient denies chest pain, palpitations, fevers, chills, or other complaints.     Patient was first diagnosed with non-ischemic cardiomyopathy in August, 2018, after he presented to ED d/t Lower Extremity edema and shortness of breath. He had a L heart catheterization, which showed clean coronaries, but significant for nICMP. Patient also had a Cardiac MRI which showed dilated cardiomyopathy but no infiltrative diseases. Following this hospitalization, patient followed up with Dr. Barnes as outpatient, but has not seen him since September 2018. Patient was also supposed to follow up with the HF team, but never did so.    Patient presented to [facility] ED one week ago d/t similar presentation of SOB and abd pain. Patient was seen by Dr. Flaherty and recommended to transfer to [facility] for further treatment. However, pt did not want to be transferred and was discharged for follow up.    Previous note from [facility] states that per wife, pt is not compliant with medications. Stopped taking the HF medications he was discharged with except for lasix. She was also concerned about IVDU 3-4 years ago. Also notes significant fam hx in father and brother of sudden death in 40s. Patient acknowledges that he only takes spironolactone, Lasix, and potassium, and that he only wears his Life Vest some of the time. States that he has never used intravenous drugs. Used marijuana for very brief period in college over 20 years ago. Denies any other drug use, and states no longer drinks alcohol.     In the [facility] ER, patient BP low with elevated lactate, poor oxygenation. Also with hyperkalemia.  Dobutamine gtt started and levophed gtt initiated. Patient emergently transferred to cardiac cath lab for a RHC. A balloon pump was inserted, levophed gtt decreased to 0.3mcg/kg/min. Dobutamine gtt titrated to 5mck/kg/min. Patient titrated off BIPAP and onto High Flow  oxygen. Augmenting 120s on ballon pump.     In the [facility] CCU, patient continues to endorse diffuse, mild abdominal pain, worst in the epigastrium, but states that he no longer is experiencing shortness of breath, and is breathing comfortably on nasal canula in the room. Patient denies other complaints. Denies headache, neck stiffness, fever/chills, vision change, chest pain, current shortness of breath or difficulty breathing, palpitations, lightheadedness, weakness, dizziness, numbness, tingling, current nausea, vomiting, diarrhea, dysuria, hematuria, syncope. 48 yo Maldivian M, w/ PMH of nICMP (EF 10% 11/2018 - recently diagnosed in August 2018), presenting as transfer from [facility] CCU d/t cardiogenic shock. Patient initially presented to [facility] ED morning of 11/29/18 d/t one day of shortness of breath, also with abdominal pain, worst in the epigastrium, and with one episode of nausea/vomiting the previous day. Patient denies chest pain, palpitations, fevers, chills, or other complaints.     Patient was first diagnosed with non-ischemic cardiomyopathy in August, 2018, after he presented to ED d/t Lower Extremity edema and shortness of breath. He had a L heart catheterization, which showed clean coronaries, but significant for nICMP. Patient also had a Cardiac MRI which showed dilated cardiomyopathy but no infiltrative diseases. Following this hospitalization, patient followed up with Dr. Barnes as outpatient, but has not seen him since September 2018. Patient was also supposed to follow up with the HF team, but never did so.    Patient presented to [facility] ED one week ago d/t similar presentation of SOB and abd pain. Patient was seen by Dr. Flaherty and recommended to transfer to [facility] for further treatment. However, pt did not want to be transferred and was discharged for follow up.    Previous note from [facility] states that per wife, pt is not compliant with medications. Stopped taking the HF medications he was discharged with except for lasix. She was also concerned about IVDU 3-4 years ago. Also notes significant fam hx in father and brother of sudden death in 40s. Patient acknowledges that he only takes spironolactone, Lasix, and potassium, and that he only wears his Life Vest some of the time. States that he has never used intravenous drugs. Used marijuana for very brief period in college over 20 years ago. Denies any other drug use, and states no longer drinks alcohol.     In the [facility] ER, patient BP low with elevated lactate, poor oxygenation. Also with hyperkalemia.  Dobutamine gtt started and levophed gtt initiated. Patient emergently transferred to cardiac cath lab for a RHC. A balloon pump was inserted, levophed gtt decreased to 0.3mcg/kg/min. Dobutamine gtt titrated to 5mck/kg/min. Patient titrated off BIPAP and onto High Flow  oxygen. Augmenting 120s on ballon pump.     In the [facility] CCU, patient continues to endorse diffuse, mild abdominal pain, worst in the epigastrium, but states that he no longer is experiencing shortness of breath, and is breathing comfortably on nasal canula in the room. Patient denies other complaints. Denies headache, neck stiffness, fever/chills, vision change, chest pain, current shortness of breath or difficulty breathing, palpitations, lightheadedness, weakness, dizziness, numbness, tingling, current nausea, vomiting, diarrhea, dysuria, hematuria, syncope. 46 yo Thai M, w/ PMH of nICMP (EF 10% 11/2018 - recently diagnosed in August 2018), presenting as transfer from [facility] CCU d/t cardiogenic shock, possible LVAD workup. Patient initially presented to [facility] ED morning of 11/29/18 d/t one day of shortness of breath, also with abdominal pain, worst in the epigastrium, and with one episode of nausea/vomiting the previous day. Patient denies chest pain, palpitations, fevers, chills, or other complaints.     Patient was first diagnosed with non-ischemic cardiomyopathy in August, 2018, after he presented to ED d/t Lower Extremity edema and shortness of breath. He had a L heart catheterization, which showed clean coronaries, but significant for nICMP. Patient also had a Cardiac MRI which showed dilated cardiomyopathy but no infiltrative diseases. Following this hospitalization, patient followed up with Dr. Barnes as outpatient, but has not seen him since September 2018. Patient was also supposed to follow up with the HF team, but never did so.    Patient presented to [facility] ED one week ago d/t similar presentation of SOB and abd pain. Patient was seen by Dr. Flaherty and recommended to transfer to [facility] for further treatment. However, pt did not want to be transferred and was discharged for follow up.    Previous note from [facility] states that per wife, pt is not compliant with medications. Stopped taking the HF medications he was discharged with except for lasix. She was also concerned about IVDU 3-4 years ago. Also notes significant fam hx in father and brother of sudden death in 40s. Patient acknowledges that he only takes spironolactone, Lasix, and potassium, and that he only wears his Life Vest some of the time. States that he has never used intravenous drugs. Used marijuana for very brief period in college over 20 years ago. Denies any other drug use, and states no longer drinks alcohol.     In the [facility] ER, patient BP low with elevated lactate, poor oxygenation. Also with hyperkalemia.  Dobutamine gtt started and levophed gtt initiated. Patient emergently transferred to cardiac cath lab for a RHC. A balloon pump was inserted, levophed gtt decreased to 0.3mcg/kg/min. Dobutamine gtt titrated to 5mck/kg/min. Patient titrated off BIPAP and onto High Flow  oxygen. Augmenting 120s on ballon pump.     In the [facility] CCU, patient continues to endorse diffuse, mild abdominal pain, worst in the epigastrium, but states that he no longer is experiencing shortness of breath, and is breathing comfortably on nasal canula in the room. Patient denies other complaints. Denies headache, neck stiffness, fever/chills, vision change, chest pain, current shortness of breath or difficulty breathing, palpitations, lightheadedness, weakness, dizziness, numbness, tingling, current nausea, vomiting, diarrhea, dysuria, hematuria, syncope.

## 2018-11-29 NOTE — H&P ADULT - NSHPPHYSICALEXAM_GEN_ALL_CORE
General appearance: NAD, conversant, afebrile   Eyes: anicteric sclerae, moist conjunctivae; no lid-lag; Pupils equal, round and reactive to light; Extraocular muscles intact.  HEENT: Atraumatic; oropharynx clear with moist mucous membranes and no mucosal ulcerations; normal hard and soft palate; no pharyngeal erythema or exudate  Neck: Trachea midline; Full range of motion, supple; no thyromegaly or lymphadenopathy  Pulm: Lungs clear to auscultation bilaterally, with normal respiratory effort and no intercostal retractions; normal work of breathing; nasal canula in place.  CV: Regular Rhythm and Rate; Normal S1, S2; Balloon pump murmur. 2+ peripheral pulses.   Abdomen: Soft, non-distended; no masses or hepatosplenomegaly. Normoactive bowel sounds. + Diffuse tenderness to palpation, worst in the epigastrium.   Extremities: Mild LE peripheral edema. 5/5 strength in all four extremities.  Skin: Normal temperature, turgor and texture; no rash, ulcers or subcutaneous nodules. No cyanosis.  Psych: Appropriate affect, cooperative, pleasant; alert and oriented to person, place and time. General appearance: NAD, conversant, afebrile   Eyes: anicteric sclerae, moist conjunctivae; no lid-lag; Pupils equal, round and reactive to light; Extraocular muscles intact.  HEENT: Atraumatic; oropharynx clear with moist mucous membranes and no mucosal ulcerations; normal hard and soft palate; no pharyngeal erythema or exudate  Neck: Trachea midline; Full range of motion, supple; no thyromegaly or lymphadenopathy  Pulm: Lungs clear to auscultation bilaterally, with normal respiratory effort and no intercostal retractions; normal work of breathing; nasal canula in place.  CV: Regular Rhythm and Rate; Normal S1, S2; Balloon pump murmur. 2+ peripheral pulses.   Abdomen: Soft, non-distended; no masses or hepatosplenomegaly. Normoactive bowel sounds. + Diffuse tenderness to palpation, worst in the epigastrium.   Extremities: Minimal LE peripheral edema. 5/5 strength in all four extremities.  Skin: Normal temperature, turgor and texture; no rash, ulcers or subcutaneous nodules. No cyanosis.  Psych: Appropriate affect, cooperative, pleasant; alert and oriented to person, place and time.

## 2018-11-29 NOTE — ED PROVIDER NOTE - PROGRESS NOTE DETAILS
King: Evaluated by CCU and advanced heart failure. Plan for cath lab for swan placement, possible IABP. BPs stabilized on /norepi with MAPs ~high 60s. Mentating well. Weaned to high flow with improved work of breathing. Villalobos placed for output monitoring.

## 2018-11-29 NOTE — H&P ADULT - NSHPPHYSICALEXAM_GEN_ALL_CORE
Gen: NAD  HEENT: moist mucus membranes  CV: +S1S2, S3, no murmurs, rubs or gallops  Chest: CTA bilaterally  Abd: BS+ in all 4 quadrants, Soft, NTND  Ext: mild edema, cold extremities  Neuro: AAOx3  Skin: No rashes

## 2018-11-29 NOTE — DISCHARGE NOTE ADULT - MEDICATION SUMMARY - MEDICATIONS TO TAKE
I will START or STAY ON the medications listed below when I get home from the hospital:    heparin  -- 10 milliliter(s) by continuous intravenous infusion now  -- Indication: For Cardiogenic shock    DOBUTamine  -- 5 milligram(s) by continuous intravenous infusion now  -- Indication: For Cardiogenic shock    norepinephrine  -- 0.3 milligram(s) by continuous intravenous infusion now  -- Indication: For Cardiogenic shock    furosemide 100 mg/100 mL-0.9% intravenous solution  -- 10 milligram(s) by continuous intravenous infusion now  -- Indication: For Cardiogenic shock

## 2018-11-29 NOTE — CONSULT NOTE ADULT - SUBJECTIVE AND OBJECTIVE BOX
Date of Admission: 18     CHIEF COMPLAINT: SOB, abdominal pain     HISTORY OF PRESENT ILLNESS:      Allergies    aspirin (Swelling)  Motrin (Hives)    MEDICATIONS:  DOBUTamine Infusion 5 MICROgram(s)/kG/Min IV Continuous <Continuous>  norepinephrine Infusion 0.05 MICROgram(s)/kG/Min IV Continuous <Continuous>      PAST MEDICAL & SURGICAL HISTORY:  HTN (hypertension)  Cardiomyopathy  No significant past surgical history      FAMILY HISTORY:  Family history of lung disease (Grandparent): maternal grandmother (no history of smoking)  Family history of hypertension: father  Family history of heart disease: mother      SOCIAL HISTORY:    IV drug user (past)  Former smoker  Former ETOH abuse     REVIEW OF SYSTEMS:  CONSTITUTIONAL: No fever, weight loss, or fatigue  EYES: No eye pain, visual disturbances, or discharge  ENMT:  No difficulty hearing, tinnitus, vertigo; No sinus or throat pain  NECK: No pain or stiffness  RESPIRATORY: No cough, wheezing, chills or hemoptysis; No Shortness of Breath  CARDIOVASCULAR: No chest pain, palpitations, passing out, dizziness, or leg swelling  GASTROINTESTINAL: No abdominal or epigastric pain. No nausea, vomiting, or hematemesis; No diarrhea or constipation. No melena or hematochezia.  GENITOURINARY: No dysuria, frequency, hematuria, or incontinence  NEUROLOGICAL: No headaches, memory loss, loss of strength, numbness, or tremors  SKIN: No itching, burning, rashes, or lesions   LYMPH Nodes: No enlarged glands  ENDOCRINE: No heat or cold intolerance; No hair loss  MUSCULOSKELETAL: No joint pain or swelling; No muscle, back, or extremity pain  PSYCHIATRIC: No depression, anxiety, mood swings, or difficulty sleeping  HEME/LYMPH: No easy bruising, or bleeding gums  ALLERY AND IMMUNOLOGIC: No hives or eczema	      PHYSICAL EXAM:  T(C): 36.4 (18 @ 10:01), Max: 36.4 (18 @ 10:01)  HR: 91 (18 @ 11:16) (83 - 91)  BP: 117/86 (18 @ 11:16) (60/43 - 121/73)  RR: 17 (18 @ 11:16) (16 - 25)  SpO2: 90% (18 @ 11:16) (83% - 100%)  Wt(kg): --  I&O's Summary      Appearance: Normal	  HEENT:   Normal oral mucosa, PERRL, EOMI	  Lymphatic: No lymphadenopathy  Cardiovascular: Normal S1 S2, No JVD, No murmurs, No edema  Respiratory: Lungs clear to auscultation	  Psychiatry: A & O x 3, Mood & affect appropriate  Gastrointestinal:  Soft, Non-tender, + BS	  Skin: No rashes, No ecchymoses, No cyanosis	  Neurologic: Non-focal  Extremities: Normal range of motion, No clubbing, cyanosis or edema  Vascular: Peripheral pulses palpable 2+ bilaterally        LABS:	 	    CBC Full  -  ( 2018 08:56 )  WBC Count : 12.37 K/uL  Hemoglobin : 18.9 g/dL  Hematocrit : 58.4 %  Platelet Count - Automated : 227 K/uL  Mean Cell Volume : 98.5 fL  Mean Cell Hemoglobin : 31.9 pg  Mean Cell Hemoglobin Concentration : 32.4 %  Auto Neutrophil # : 8.63 K/uL  Auto Lymphocyte # : 2.37 K/uL  Auto Monocyte # : 1.08 K/uL  Auto Eosinophil # : 0.07 K/uL  Auto Basophil # : 0.09 K/uL  Auto Neutrophil % : 69.7 %  Auto Lymphocyte % : 19.2 %  Auto Monocyte % : 8.7 %  Auto Eosinophil % : 0.6 %  Auto Basophil % : 0.7 %        130<L>  |  94<L>  |  39<H>  ----------------------------<  126<H>  7.0<HH>   |  14<L>  |  1.87<H>    Ca    9.9      2018 08:56  Phos  5.8       Mg     2.7         TPro  8.1  /  Alb  4.8  /  TBili  3.0<H>  /  DBili  x   /  AST  101<H>  /  ALT  117<H>  /  AlkPhos  76        proBNP: Serum Pro-Brain Natriuretic Peptide: 4034 pg/mL ( @ 08:56)    Lipid Profile:   HgA1c:   TSH:       CARDIAC MARKERS:      CKMB: 3.21 ng/mL ( @ 08:56)    CKMB Relative Index: 1.3 ( @ 08:56)      < from: Transthoracic Echocardiogram (18 @ 07:00) >  DIMENSIONS:  Dimensions:     Normal Values:  LA:     4.3 cm    2.0 - 4.0 cm  Ao:     3.1 cm    2.0 - 3.8 cm  SEPTUM: 0.8 cm    0.6 - 1.2 cm  PWT:    0.8 cm    0.6 - 1.1 cm  LVIDd:  6.8 cm    3.0 - 5.6 cm  LVIDs:  6.5 cm    1.8 - 4.0 cm      Derived Variables:  LVMI: 138 g/m2  RWT: 0.23  Ejection Fraction Visual Estimate: 10 %    ------------------------------------------------------------------------  OBSERVATIONS:  Mitral Valve: Mitral annular calcification. Moderate mitral  regurgitation.  Aortic Root: Aortic Root: 3.1 cm.  Aortic Valve: Normal trileaflet aortic valve.  Left Atrium: Moderate left atrial enlargement.  Left Ventricle: Severe global left ventricular systolic  dysfunction. Moderate left ventricular enlargement. Grade  III diastolic dysfunction.  Right Heart: Moderate right atrial enlargement. Normal  right ventricular size and function. There is moderate  tricuspid regurgitation. There is mild-moderate pulmonic  regurgitation.  Pericardium/PleuraNormal pericardium with no pericardial  effusion.  Hemodynamic: RA Pressure is 10 mm Hg. RV systolic pressure  is 44 mm Hg. Moderate pulmonary hypertension.    < end of copied text >    < from: Cardiac Cath Lab - Adult (18 @ 11:48) >  CORONARY VESSELS: The coronary circulation is right dominant.  LM:   --  LM: Normal.  LAD:   --  LAD: Normal.  CX:   --  Circumflex: Normal.  RCA:   --  RCA: Normal.  COMPLICATIONS: There were no complications.  DIAGNOSTIC RECOMMENDATIONS: Elevated Filling pressures continue diuresis,  Guideline directed medical therapy for Cardiomyopathy. CMR pending  Prepared and signed by  Malcolm Kerns M.D.  Signed 2018 11:56:38  HEMODYNAMIC TABLES  Pressures:  Baseline  Pressures:  - HR: 106  Pressures:  - Rhythm:  Pressures:  -- Aortic Pressure (S/D/M): 122/98/109  Pressures:  -- Left Ventricle (s/edp): 126/27/--  Pressures:  -- Pulmonary Artery (S/D/M): 56/19/40  Pressures:  -- Pulmonary Capillary Wedge: 31/37/26  Pressures:  -- Right Atrium (a/v/M): 18/17/14  Pressures:  -- Right Ventricle (s/edp): 55/15/--  O2 Sats:  Baseline  O2 Sats:  - HR: 106  O2 Sats:  - Rhythm:  O2 Sats:  -- AO: 14.8/97.5/19.62  O2 Sats:  -- PA: 15.1/62/12.73  Outputs:Baseline  Outputs:  -- CALCULATIONS[age]  Outputs:  -- CALCULATIONS: Body Surface Area: 1.68  Outputs:  -- CALCULATIONS: Height in cm: 162.00  Outputs:  -- CALCULATIONS: Sex: Male  Outputs:  -- CALCULATIONS: Weight in k.50  Outputs:  -- OUTPUTS: CO by Manny: 3.04  Outputs:  -- OUTPUTS: Manny cardiac index: 1.81  Outputs:  -- OUTPUTS: O2 consumption: 209.58  Outputs:  -- OUTPUTS: Vo2 Indexed: 125.00  Outputs:  -- RESISTANCES: Left ventricular stroke work: 33.32  Outputs:  -- RESISTANCES: Left Ventricular Stroke Work index: 19.88  Outputs:  -- RESISTANCES: Pulmonary vascular index (dsc): 617.41  Outputs:  -- RESISTANCES: Pulmonary vascular index (Wood Units): 7.72  Outputs:  -- RESISTANCES: Pulmonary vascular resistance (dsc): 368.24  Outputs:  -- RESISTANCES: Pulmonary vascular resistance (Wood Units): 4.60  Outputs:  -- RESISTANCES: PVR_SVR Ratio: 0.15  Outputs:  -- RESISTANCES: Right ventricular stroke work: 10.24  Outputs:  -- RESISTANCES: Right ventricular stroke work index: 6.11  Outputs:  -- RESISTANCES: Systemic vascular index (dsc): 4189.58  Outputs:  -- RESISTANCES: Systemic vascular index (Wood Units): 52.38  Outputs:  -- RESISTANCES: Systemic vascular resistance (dsc): 2498.77  Outputs:  -- RESISTANCES: Systemic vascular resistance (Wood Units): 31.24  Outputs:  -- RESISTANCES: Total pulmonary index (dsc): 1764.03  Outputs:  -- RESISTANCES: Total pulmonary index (Wood Units): 22.06  Outputs:  -- RESISTANCES: Total pulmonary resistance (dsc): 1052.12  Outputs:  -- RESISTANCES: Total pulmonary resistance (Wood Units): 13.15  Outputs:  -- RESISTANCES: Total vascular index (Wood Units): 60.10  Outputs:  -- RESISTANCES: Total vascular resistance (dsc): 2867.01  Outputs:  -- RESISTANCES: Total vascular resistance (Wood Units): 35.85  Outputs:  -- RESISTANCES: Total vascular resistance index (dsc): 4806.99  Outputs:  -- RESISTANCES: TPR_TVR Ratio: 0.37  Outputs:  -- SHUNTS: Pulmonary flow: 3.04  Outputs:  -- SHUNTS: Qp Indexed: 1.81  Outputs:  -- SHUNTS: Qs Indexed: 1.81  Outputs:  -- SHUNTS: Systemic flow: 3.04    < end of copied text > Date of Admission: 18     CHIEF COMPLAINT: SOB, abdominal pain     HISTORY OF PRESENT ILLNESS:    48M severe NICM (LVEF 10%, LVEDD~7.0 cm) possibly familial (father and brother both  suddenly in their 40's-50's), moderate alcohol use in the past (not everyday; quit 15 yrs ago). Was told in August of cardiomyopathy. In August had RHC with CO~3.0, RA 14, PVR~4.5, and PCWP~26. Since then has been variably compliant with meds and following medical advice. Was hospitalized several days ago at [facility] but left despite being urged to tx his care to [facility].    At home has been having orthopnea and PND. Wears Life Vest.    Today was brought by EMS to [facility] ER in extremis. S3 gallop. Cyanotic, with SBP~. Lactate ~5. SCr up to 1.8 from 1.2. Transaminases elevated. Was started on dobutamine and norepi with improvement in mental status and BP. Now more comfortable and able to converse.      Allergies    aspirin (Swelling)  Motrin (Hives)    MEDICATIONS:  DOBUTamine Infusion 5 MICROgram(s)/kG/Min IV Continuous <Continuous>  norepinephrine Infusion 0.05 MICROgram(s)/kG/Min IV Continuous <Continuous>      PAST MEDICAL & SURGICAL HISTORY:  HTN (hypertension)  Cardiomyopathy  No significant past surgical history      FAMILY HISTORY:  Family history of lung disease (Grandparent): maternal grandmother (no history of smoking)  Family history of hypertension: father  Family history of heart disease: mother      SOCIAL HISTORY:    IV drug user (past)  Former smoker  Former ETOH abuse     REVIEW OF SYSTEMS:  CONSTITUTIONAL: No fever, weight loss, admits to fatigue  EYES: No eye pain, visual disturbances, or discharge  ENMT:  No difficulty hearing, tinnitus, vertigo; No sinus or throat pain  NECK: No pain or stiffness  RESPIRATORY: No cough, wheezing, chills or hemoptysis; admits to Shortness of Breath  CARDIOVASCULAR: No chest pain, palpitations, passing out, dizziness, or leg swelling  GASTROINTESTINAL: Has abdominal pain, nausea   GENITOURINARY: No dysuria, frequency, hematuria, or incontinence  NEUROLOGICAL: No headaches, memory loss, loss of strength, numbness, or tremors  SKIN: No itching, burning, rashes, or lesions   LYMPH Nodes: No enlarged glands  ENDOCRINE: No heat or cold intolerance; No hair loss  MUSCULOSKELETAL: No joint pain or swelling; No muscle, back, or extremity pain  PSYCHIATRIC: No depression, anxiety, mood swings, or difficulty sleeping  HEME/LYMPH: No easy bruising, or bleeding gums  ALLERY AND IMMUNOLOGIC: No hives or eczema	      PHYSICAL EXAM:  T(C): 36.4 (18 @ 10:01), Max: 36.4 (18 @ 10:01)  HR: 91 (18 @ 11:16) (83 - 91)  BP: 117/86 (18 @ 11:16) (60/43 - 121/73)  RR: 17 (18 @ 11:16) (16 - 25)  SpO2: 90% (18 @ 11:16) (83% - 100%)  Wt(kg): --  I&O's Summary      Appearance:  tachypnic, diaphoretic 	  HEENT:   Normal oral mucosa, PERRL, EOMI	  Lymphatic: No lymphadenopathy  Cardiovascular:  S1 S2, S3 gallop, cold b/l   Respiratory: Lungs clear to auscultation	  Psychiatry: A & O x 3, Mood & affect appropriate  Gastrointestinal:  Soft, Non-tender, + BS	  Skin: No rashes, No ecchymoses, No cyanosis	  Neurologic: Non-focal  Extremities: Normal range of motion, No clubbing, cyanosis   Vascular: Peripheral pulses palpable 2+ bilaterally        LABS:	 	    CBC Full  -  ( 2018 08:56 )  WBC Count : 12.37 K/uL  Hemoglobin : 18.9 g/dL  Hematocrit : 58.4 %  Platelet Count - Automated : 227 K/uL  Mean Cell Volume : 98.5 fL  Mean Cell Hemoglobin : 31.9 pg  Mean Cell Hemoglobin Concentration : 32.4 %  Auto Neutrophil # : 8.63 K/uL  Auto Lymphocyte # : 2.37 K/uL  Auto Monocyte # : 1.08 K/uL  Auto Eosinophil # : 0.07 K/uL  Auto Basophil # : 0.09 K/uL  Auto Neutrophil % : 69.7 %  Auto Lymphocyte % : 19.2 %  Auto Monocyte % : 8.7 %  Auto Eosinophil % : 0.6 %  Auto Basophil % : 0.7 %        130<L>  |  94<L>  |  39<H>  ----------------------------<  126<H>  7.0<HH>   |  14<L>  |  1.87<H>    Ca    9.9      2018 08:56  Phos  5.8       Mg     2.7         TPro  8.1  /  Alb  4.8  /  TBili  3.0<H>  /  DBili  x   /  AST  101<H>  /  ALT  117<H>  /  AlkPhos  76        proBNP: Serum Pro-Brain Natriuretic Peptide: 4034 pg/mL ( @ 08:56)    Lipid Profile:   HgA1c:   TSH:       CARDIAC MARKERS:      CKMB: 3.21 ng/mL ( @ 08:56)    CKMB Relative Index: 1.3 ( @ 08:56)      < from: Transthoracic Echocardiogram (18 @ 07:00) >  DIMENSIONS:  Dimensions:     Normal Values:  LA:     4.3 cm    2.0 - 4.0 cm  Ao:     3.1 cm    2.0 - 3.8 cm  SEPTUM: 0.8 cm    0.6 - 1.2 cm  PWT:    0.8 cm    0.6 - 1.1 cm  LVIDd:  6.8 cm    3.0 - 5.6 cm  LVIDs:  6.5 cm    1.8 - 4.0 cm      Derived Variables:  LVMI: 138 g/m2  RWT: 0.23  Ejection Fraction Visual Estimate: 10 %    ------------------------------------------------------------------------  OBSERVATIONS:  Mitral Valve: Mitral annular calcification. Moderate mitral  regurgitation.  Aortic Root: Aortic Root: 3.1 cm.  Aortic Valve: Normal trileaflet aortic valve.  Left Atrium: Moderate left atrial enlargement.  Left Ventricle: Severe global left ventricular systolic  dysfunction. Moderate left ventricular enlargement. Grade  III diastolic dysfunction.  Right Heart: Moderate right atrial enlargement. Normal  right ventricular size and function. There is moderate  tricuspid regurgitation. There is mild-moderate pulmonic  regurgitation.  Pericardium/PleuraNormal pericardium with no pericardial  effusion.  Hemodynamic: RA Pressure is 10 mm Hg. RV systolic pressure  is 44 mm Hg. Moderate pulmonary hypertension.    < end of copied text >    < from: Cardiac Cath Lab - Adult (18 @ 11:48) >  CORONARY VESSELS: The coronary circulation is right dominant.  LM:   --  LM: Normal.  LAD:   --  LAD: Normal.  CX:   --  Circumflex: Normal.  RCA:   --  RCA: Normal.  COMPLICATIONS: There were no complications.  DIAGNOSTIC RECOMMENDATIONS: Elevated Filling pressures continue diuresis,  Guideline directed medical therapy for Cardiomyopathy. CMR pending  Prepared and signed by  Malcolm Kerns M.D.  Signed 2018 11:56:38  HEMODYNAMIC TABLES  Pressures:  Baseline  Pressures:  - HR: 106  Pressures:  - Rhythm:  Pressures:  -- Aortic Pressure (S/D/M): 122/98/109  Pressures:  -- Left Ventricle (s/edp): 126/27/--  Pressures:  -- Pulmonary Artery (S/D/M): 56/19/40  Pressures:  -- Pulmonary Capillary Wedge: 31/37/26  Pressures:  -- Right Atrium (a/v/M): 18/17/14  Pressures:  -- Right Ventricle (s/edp): 55/15/--  O2 Sats:  Baseline  O2 Sats:  - HR: 106  O2 Sats:  - Rhythm:  O2 Sats:  -- AO: 14.8/97.5/19.62  O2 Sats:  -- PA: 15.1/62/12.73  Outputs:Baseline  Outputs:  -- CALCULATIONS[age] 45.64  Outputs:  -- CALCULATIONS: Body Surface Area: 1.68  Outputs:  -- CALCULATIONS: Height in cm: 162.00  Outputs:  -- CALCULATIONS: Sex: Male  Outputs:  -- CALCULATIONS: Weight in k.50  Outputs:  -- OUTPUTS: CO by Manny: 3.04  Outputs:  -- OUTPUTS: Manny cardiac index: 1.81  Outputs:  -- OUTPUTS: O2 consumption: 209.58  Outputs:  -- OUTPUTS: Vo2 Indexed: 125.00  Outputs:  -- RESISTANCES: Left ventricular stroke work: 33.32  Outputs:  -- RESISTANCES: Left Ventricular Stroke Work index: 19.88  Outputs:  -- RESISTANCES: Pulmonary vascular index (dsc): 617.41  Outputs:  -- RESISTANCES: Pulmonary vascular index (Wood Units): 7.72  Outputs:  -- RESISTANCES: Pulmonary vascular resistance (dsc): 368.24  Outputs:  -- RESISTANCES: Pulmonary vascular resistance (Wood Units): 4.60  Outputs:  -- RESISTANCES: PVR_SVR Ratio: 0.15  Outputs:  -- RESISTANCES: Right ventricular stroke work: 10.24  Outputs:  -- RESISTANCES: Right ventricular stroke work index: 6.11  Outputs:  -- RESISTANCES: Systemic vascular index (dsc): 4189.58  Outputs:  -- RESISTANCES: Systemic vascular index (Wood Units): 52.38  Outputs:  -- RESISTANCES: Systemic vascular resistance (dsc): 2498.77  Outputs:  -- RESISTANCES: Systemic vascular resistance (Wood Units): 31.24  Outputs:  -- RESISTANCES: Total pulmonary index (dsc): 1764.03  Outputs:  -- RESISTANCES: Total pulmonary index (Wood Units): 22.06  Outputs:  -- RESISTANCES: Total pulmonary resistance (dsc): 1052.12  Outputs:  -- RESISTANCES: Total pulmonary resistance (Wood Units): 13.15  Outputs:  -- RESISTANCES: Total vascular index (Wood Units): 60.10  Outputs:  -- RESISTANCES: Total vascular resistance (dsc): 2867.01  Outputs:  -- RESISTANCES: Total vascular resistance (Wood Units): 35.85  Outputs:  -- RESISTANCES: Total vascular resistance index (dsc): 4806.99  Outputs:  -- RESISTANCES: TPR_TVR Ratio: 0.37  Outputs:  -- SHUNTS: Pulmonary flow: 3.04  Outputs:  -- SHUNTS: Qp Indexed: 1.81  Outputs:  -- SHUNTS: Qs Indexed: 1.81  Outputs:  -- SHUNTS: Systemic flow: 3.04    < end of copied text >

## 2018-11-29 NOTE — DISCHARGE NOTE ADULT - CARE PROVIDERS DIRECT ADDRESSES
,DirectAddress_Unknown,[email or web address]

## 2018-11-29 NOTE — ED ADULT NURSE NOTE - OBJECTIVE STATEMENT
Pt brought in by ems from home for respiratory distress. Arrives to ED on cpap. On scene hypoxic 60%. alert and oriented x3, ambualtory at baseline. Hx of cardiomyopathy, pulmonary hypertension. EF 10% with lifevest. Co shortness of breath and cough x 1 week. Co abdominal discomfort. denies nausea, vomiting, diarrhea. No swelling noted to extremities. Extremities cool to touch. Afebrile. Placed on cpap on arrival. labs sent. IV lines x2 placed. skin intact. will continue to monitor. Pt brought in by ems from home for respiratory distress. Arrives to ED on cpap. On scene hypoxic 60%. alert and oriented x3, ambualtory at baseline. Hx of cardiomyopathy, pulmonary hypertension. EF 10% with lifevest. Co shortness of breath and cough x 1 week. Co abdominal discomfort. denies nausea, vomiting, diarrhea. No swelling noted to extremities. Extremities cool to touch. Afebrile. Placed on cpap on arrival. Difficulty in obtaining pulse ox, abg obtained by MD. labs sent. IV lines x2 placed. skin intact. will continue to monitor.

## 2018-11-29 NOTE — DISCHARGE NOTE ADULT - HOSPITAL COURSE
Patient is a 46 yo M with PMH (recently diagnosedin August)  NICM (EF 10% 11/2018), who presents for SOB and abd pain for 1 day. He had N/V yesterday. No Chest pain, no edema, no fevers/chills.  He first presented in 8/2018 for MARELY and SOB and was diagnosed with nICMP after LHC showed clean coronaries. CMRI showed dilated cardiomyopathy but no infiltrative diseases. Since then he was seen by Dr. Barnes as outpatient but did not yet follow up with the HF team. He was recently at  for SOB and abd pain and seen by Dr. Flaherty and recommended to transfer to [facility] for further treatment. However, pt did not want to be transferred and was discharged for follow up. He now returns with similar symptoms of SOB and worsening abdominal pain.   Per wife, pt is not compliant with medications. Stopped taking the HF medications he was discharged with except for lasix. She was also concerned about IVDU 3-4 years ago. Also notes significant fam hx in father and brother of sudden death in 40s.   In the ER, patient BP low with elevated lactate, poor oxygenation. Also with hyperkalemia.  Dobutamine gtt started and levophed gtt initiated. Patient emergently transferred to cardiac cath lab for a RHC. A balloon pump was inserted, levophed gtt decreased to 0.3mcg/kg/min. Dobutamine gtt titrated to 5mck/kg/min. Patient titrated off BIPAP and onto High Flow  oxygen. Plan is for transfer to [facility] for LVAD work up. Patient is a 48 yo M with PMH (recently diagnosedin August)  NICM (EF 10% 11/2018), who presents for SOB and abd pain for 1 day. He had N/V yesterday. No Chest pain, no edema, no fevers/chills. He first presented in 8/2018 for MARELY and SOB and was diagnosed with nICMP after LHC showed clean coronaries. CMRI showed dilated cardiomyopathy but no infiltrative diseases. Since then he was seen by Dr. Barnes as outpatient but did not yet follow up with the HF team. He was recently at  for SOB and abd pain and seen by Dr. Flaherty and recommended to transfer to [facility] for further treatment. However, pt did not want to be transferred and was discharged for follow up. He now returns with similar symptoms of SOB and worsening abdominal pain.   In the ER, patient BP low with elevated lactate, poor oxygenation. Also with hyperkalemia.  Dobutamine gtt started and levophed gtt initiated. Patient emergently transferred to cardiac cath lab for a RHC. A balloon pump was inserted, levophed gtt decreased to 0.3mcg/kg/min. Dobutamine gtt titrated to 5mck/kg/min. Patient titrated off BIPAP and onto High Flow  oxygen. Augmenting 120s on ballon pump. Plan is for transfer to [facility] for LVAD work up.     RA 18  RV 48/5/16  PA 49/23/37  wedge 26  PA sat 62.5%  CO 2.7  CI 1.87

## 2018-11-29 NOTE — ED PROCEDURE NOTE - PROCEDURE ADDITIONAL DETAILS
Severe global LV dysfunction, no effusion
a-line predominant, no pleural effusions, no evidence of pulmonary edema

## 2018-11-29 NOTE — DISCHARGE NOTE ADULT - CARE PROVIDER_API CALL
Reynold Roberts), Medicine  Dept Director  [address] [place] [address]  Phone: [phone]  Fax: [phone]    Mauricio Ramos), Adv Heart Fail Trnsplnt Cardio  [address] [place] [address]  Phone: [phone]  Fax: [phone]

## 2018-11-29 NOTE — ED PROCEDURE NOTE - US POC STATEMENT
The patient/family was/were informed of limited nature of the exam. Representative images were printed to be scanned into the chart.
The patient/family was/were informed of limited nature of the exam. Representative images were printed to be scanned into the chart.

## 2018-11-29 NOTE — CONSULT NOTE ADULT - SUBJECTIVE AND OBJECTIVE BOX
Patient is a 47y old  Male who presents with a chief complaint of ADHF (2018 11:24)      HISTORY OF PRESENT ILLNESS:   48 yo M with recent diagnosis of nICMP (EF 10% 2018) who presents for SOB and abd pain. Patient does not regularly see physicians. First presented in 2018 for MARELY and SOB and was diagnosed with nICMP after LHC showed clean coronaries. CMR showed cardiomyopathy but no infiltrative diseases. Since then he was seen by Dr. Barnes as outpatient but did not yet follow up with the HF team. He was recently at  for SOB and abd pain and seen by Dr. Flaherty and recommended to transfer to [facility] for further treatment. However, pt did not want to be transferred and was discharged for follow up. He now returns with similar symptoms of SOB but worsening abd pain that started again yesterday. Also had episode of n/v yesterday. Denies CP, MARELY, palpitations, diarrhea, fevers/chills.    Per wife, pt is not compliant with medications. Stopped taking the HF medications he was discharged with except for lasix. She was also concerned about IVDU 3-4 years ago. Also notes significant fam hx in father and brother of sudden death in 40s.       Allergies  aspirin (Swelling)  Motrin (Hives)      MEDICATIONS:  DOBUTamine Infusion 5 MICROgram(s)/kG/Min IV Continuous <Continuous>  norepinephrine Infusion 0.05 MICROgram(s)/kG/Min IV Continuous <Continuous>      PAST MEDICAL & SURGICAL HISTORY:  HTN (hypertension)  Cardiomyopathy  No significant past surgical history      FAMILY HISTORY:  Father and brother both passed away in 40-50s from sudden death - likely cardiac      SOCIAL HISTORY:    Prior smoker, prior drug use (MJ), questionable hx of IVDU (wife suspects may have used recently but does not know details)      REVIEW OF SYSTEMS:  CONSTITUTIONAL: No weakness, fevers or chills  EYES/ENT: No visual changes;  No dysphagia  RESPIRATORY: No cough, wheezing, hemoptysis; + shortness of breath  CARDIOVASCULAR: No chest pain or palpitations; No lower extremity edema  GASTROINTESTINAL: +abdominal or epigastric pain. +nausea, vomiting, or hematemesis; No diarrhea or constipation.  GENITOURINARY: No dysuria, frequency or hematuria  NEUROLOGICAL: No numbness or weakness  SKIN: No itching, burning, rashes, or lesions   All other review of systems is negative unless indicated above.    PHYSICAL EXAM:  T(C): 36.4 (18 @ 10:01), Max: 36.4 (18 @ 10:01)  HR: 91 (18 @ 11:16) (83 - 91)  BP: 117/86 (18 @ 11:16) (60/43 - 121/73)  RR: 17 (18 @ 11:16) (16 - 25)  SpO2: 90% (18 @ 11:16) (83% - 100%)  Wt(kg): --  I&O's Summary      Appearance: Uncomfortable	  HEENT:  Perioral cyanosis	  Cardiovascular: Normal S1 S2, elevated JVD, No murmurs, No edema  Respiratory: Lungs clear to auscultation	  Psychiatry: A & O x 3, Mood & affect appropriate  Gastrointestinal:  Soft, Non-tender, + BS	  Skin: No rashes, No ecchymoses, No cyanosis	  Neurologic: Non-focal  Extremities: Cold to touch        LABS:	 	    CBC Full  -  ( 2018 08:56 )  WBC Count : 12.37 K/uL  Hemoglobin : 18.9 g/dL  Hematocrit : 58.4 %  Platelet Count - Automated : 227 K/uL  Mean Cell Volume : 98.5 fL  Mean Cell Hemoglobin : 31.9 pg  Mean Cell Hemoglobin Concentration : 32.4 %  Auto Neutrophil # : 8.63 K/uL  Auto Lymphocyte # : 2.37 K/uL  Auto Monocyte # : 1.08 K/uL  Auto Eosinophil # : 0.07 K/uL  Auto Basophil # : 0.09 K/uL  Auto Neutrophil % : 69.7 %  Auto Lymphocyte % : 19.2 %  Auto Monocyte % : 8.7 %  Auto Eosinophil % : 0.6 %  Auto Basophil % : 0.7 %        130<L>  |  94<L>  |  39<H>  ----------------------------<  126<H>  7.0<HH>   |  14<L>  |  1.87<H>    Ca    9.9      2018 08:56  Phos  5.8       Mg     2.7         TPro  8.1  /  Alb  4.8  /  TBili  3.0<H>  /  DBili  x   /  AST  101<H>  /  ALT  117<H>  /  AlkPhos  76        proBNP: Serum Pro-Brain Natriuretic Peptide: 4034 pg/mL ( @ 08:56)  CARDIAC MARKERS:  CKMB: 3.21 ng/mL ( @ 08:56)    CKMB Relative Index: 1.3 ( @ 08:56)      TELEMETRY: NSR  ECG:  	NSR  RADIOLOGY: mild vascular congestion on CXR    PREVIOUS DIAGNOSTIC TESTING:    Echo 2018  CONCLUSIONS:  1. Mitral annular calcification. Moderate mitral  regurgitation.  2. Normal trileaflet aortic valve.  3. Aortic Root: 3.1 cm.  4. Moderate left atrial enlargement.  5. Moderate left ventricular enlargement.  6. Severe global left ventricular systolic dysfunction.  7. Grade III diastolic dysfunction.  8. Moderate right atrial enlargement.  9. Normal right ventricular size and function.  10. RA Pressure is 10 mm Hg.  11. RV systolic pressure is 44 mm Hg. Moderate pulmonary  hypertension.  12. There is moderate tricuspid regurgitation.  13. Normal pericardium with no pericardial effusion.    Cath 2018  VENTRICLES: No LV gram was performed; however, a recent echocardiogram  demonstrated an EF of 15 %.  CORONARY VESSELS: The coronary circulation is right dominant.  LM:   --  LM: Normal.  LAD:   --  LAD: Normal.  CX:   --  Circumflex: Normal.  RCA:   --  RCA: Normal.  COMPLICATIONS: There were no complications.  DIAGNOSTIC RECOMMENDATIONS: Elevated Filling pressures continue diuresis,  Guideline directed medical therapy for Cardiomyopathy. CMR pending    	  ASSESSMENT/PLAN: 	  48 yo M with recent diagnosis of nICMP (EF 10% 2018) who presents for SOB and abd pain/n/v, found to be hypotensive to 70s/40s with perioral cyanosis, cold extremities, elevated lactate, and very low peripheral venous sat (10%) concerning for cardiogenic shock. BP improved to 110/70s with levo 0.5mcg and  5. Mentating well.    Possible cardiogenic shock, nICMP EF 10%  - recent diagnosis of nICMP, possibly genetic given fam hx, pt w/ possible IVDU as well  - no pulm edema or MARELY but elevated JVD with concern for low flow state given above findings  - now on  5 and levo 0.5 with good MAP, however, may need mechanical support  - pt to go to cath lab for RHC/swan and possible balloon pump  - TTE pending  - will need to be transferred to [facility] after stabilization  - HF following      Discussed with Dr. Andersen and Dr. Ramos.    Samm Munoz MD  Cardiology Fellow Patient is a 47y old  Male who presents with a chief complaint of ADHF (2018 11:24)      HISTORY OF PRESENT ILLNESS:   46 yo M with recent diagnosis of nICMP (EF 10% 2018) who presents for SOB and abd pain. Patient does not regularly see physicians. First presented in 2018 for MARELY and SOB and was diagnosed with nICMP after LHC showed clean coronaries. CMR showed cardiomyopathy but no infiltrative diseases. Since then he was seen by Dr. Barnes as outpatient but did not yet follow up with the HF team. He was recently at  for SOB and abd pain and seen by Dr. Flaherty and recommended to transfer to [facility] for further treatment. However, pt did not want to be transferred and was discharged for follow up. He now returns with similar symptoms of SOB but worsening abd pain that started again yesterday. Also had episode of n/v yesterday. Denies CP, MARELY, palpitations, diarrhea, fevers/chills.    Per wife, pt is not compliant with medications. Stopped taking the HF medications he was discharged with except for lasix. She was also concerned about IVDU 3-4 years ago. Also notes significant fam hx in father and brother of sudden death in 40s.       Allergies  aspirin (Swelling)  Motrin (Hives)      MEDICATIONS:  DOBUTamine Infusion 5 MICROgram(s)/kG/Min IV Continuous <Continuous>  norepinephrine Infusion 0.05 MICROgram(s)/kG/Min IV Continuous <Continuous>      PAST MEDICAL & SURGICAL HISTORY:  HTN (hypertension)  Cardiomyopathy  No significant past surgical history      FAMILY HISTORY:  Father and brother both passed away in 40-50s from sudden death - likely cardiac      SOCIAL HISTORY:    Prior smoker, prior drug use (MJ), questionable hx of IVDU (wife suspects may have used recently but does not know details)      REVIEW OF SYSTEMS:  CONSTITUTIONAL: No weakness, fevers or chills  EYES/ENT: No visual changes;  No dysphagia  RESPIRATORY: No cough, wheezing, hemoptysis; + shortness of breath  CARDIOVASCULAR: No chest pain or palpitations; No lower extremity edema  GASTROINTESTINAL: +abdominal or epigastric pain. +nausea, vomiting, or hematemesis; No diarrhea or constipation.  GENITOURINARY: No dysuria, frequency or hematuria  NEUROLOGICAL: No numbness or weakness  SKIN: No itching, burning, rashes, or lesions   All other review of systems is negative unless indicated above.    PHYSICAL EXAM:  T(C): 36.4 (18 @ 10:01), Max: 36.4 (18 @ 10:01)  HR: 91 (18 @ 11:16) (83 - 91)  BP: 117/86 (18 @ 11:16) (60/43 - 121/73)  RR: 17 (18 @ 11:16) (16 - 25)  SpO2: 90% (18 @ 11:16) (83% - 100%)  Wt(kg): --  I&O's Summary      Appearance: Uncomfortable	  HEENT:  Perioral cyanosis	  Cardiovascular: Normal S1 S2, elevated JVD, No murmurs, No edema  Respiratory: Lungs clear to auscultation	  Psychiatry: A & O x 3, Mood & affect appropriate  Gastrointestinal:  Soft, Non-tender, + BS	  Skin: No rashes, No ecchymoses, No cyanosis	  Neurologic: Non-focal  Extremities: Cold to touch        LABS:	 	    CBC Full  -  ( 2018 08:56 )  WBC Count : 12.37 K/uL  Hemoglobin : 18.9 g/dL  Hematocrit : 58.4 %  Platelet Count - Automated : 227 K/uL  Mean Cell Volume : 98.5 fL  Mean Cell Hemoglobin : 31.9 pg  Mean Cell Hemoglobin Concentration : 32.4 %  Auto Neutrophil # : 8.63 K/uL  Auto Lymphocyte # : 2.37 K/uL  Auto Monocyte # : 1.08 K/uL  Auto Eosinophil # : 0.07 K/uL  Auto Basophil # : 0.09 K/uL  Auto Neutrophil % : 69.7 %  Auto Lymphocyte % : 19.2 %  Auto Monocyte % : 8.7 %  Auto Eosinophil % : 0.6 %  Auto Basophil % : 0.7 %        130<L>  |  94<L>  |  39<H>  ----------------------------<  126<H>  7.0<HH>   |  14<L>  |  1.87<H>    Ca    9.9      2018 08:56  Phos  5.8       Mg     2.7         TPro  8.1  /  Alb  4.8  /  TBili  3.0<H>  /  DBili  x   /  AST  101<H>  /  ALT  117<H>  /  AlkPhos  76        proBNP: Serum Pro-Brain Natriuretic Peptide: 4034 pg/mL ( @ 08:56)  CARDIAC MARKERS:  CKMB: 3.21 ng/mL ( @ 08:56)    CKMB Relative Index: 1.3 ( @ 08:56)      TELEMETRY: NSR  ECG:  	NSR  RADIOLOGY: mild vascular congestion on CXR    PREVIOUS DIAGNOSTIC TESTING:    Echo 2018  CONCLUSIONS:  1. Mitral annular calcification. Moderate mitral  regurgitation.  2. Normal trileaflet aortic valve.  3. Aortic Root: 3.1 cm.  4. Moderate left atrial enlargement.  5. Moderate left ventricular enlargement.  6. Severe global left ventricular systolic dysfunction.  7. Grade III diastolic dysfunction.  8. Moderate right atrial enlargement.  9. Normal right ventricular size and function.  10. RA Pressure is 10 mm Hg.  11. RV systolic pressure is 44 mm Hg. Moderate pulmonary  hypertension.  12. There is moderate tricuspid regurgitation.  13. Normal pericardium with no pericardial effusion.    Cath 2018  VENTRICLES: No LV gram was performed; however, a recent echocardiogram  demonstrated an EF of 15 %.  CORONARY VESSELS: The coronary circulation is right dominant.  LM:   --  LM: Normal.  LAD:   --  LAD: Normal.  CX:   --  Circumflex: Normal.  RCA:   --  RCA: Normal.  COMPLICATIONS: There were no complications.  DIAGNOSTIC RECOMMENDATIONS: Elevated Filling pressures continue diuresis,  Guideline directed medical therapy for Cardiomyopathy. CMR pending    	  ASSESSMENT/PLAN: 	  46 yo M with recent diagnosis of nICMP (EF 10% 2018) who presents for SOB and abd pain/n/v, found to be hypotensive to 70s/40s with perioral cyanosis, cold extremities, elevated lactate, and very low peripheral venous sat (10%) concerning for cardiogenic shock. BP improved to 110/70s with levo 0.5mcg and  5. Mentating well.    Possible cardiogenic shock, nICMP EF 10%  - recent diagnosis of nICMP, possibly genetic given fam hx, pt w/ possible IVDU as well  - no pulm edema or MARELY but elevated JVD with concern for low flow state given above findings  - now on  5 and levo 0.5 with good MAP, however, may need mechanical support  - pt to go to cath lab for RHC/swan and possible balloon pump  - TTE pending  - will need to be transferred to [facility] after stabilization  - HF following      Discussed with Dr. Andersen and Dr. Ramos.    Samm Munoz MD  Cardiology Fellow      Addendum:  s/p RHC with swan in place, s/p balloon pump    RA 18  RV 48/5/16  PA 49/23/37  wedge 26  PA sat 62.5%  CO 2.7  CI 1.87    on levo 0.5,  5, hbg 18    augmenting 140s on balloon pump --> levo decreased to 0. --> now augmenting 130s    - will wean levo as tolerated, cont  at 5  - start lasix gtt at 10 for diuresis given high wedge  - hep gtt for balloon pump, CXR ordered to check balloon pump placement  - will repeat swan numbers in one hour to reassess after balloon pump placement  - will transfer to [facility] when bed available    Samm Munoz MD  Cardiology Fellow Patient is a 47y old  Male who presents with a chief complaint of ADHF (2018 11:24)      HISTORY OF PRESENT ILLNESS:   46 yo M with recent diagnosis of nICMP (EF 10% 2018) who presents for SOB and abd pain. Patient does not regularly see physicians. First presented in 2018 for MARELY and SOB and was diagnosed with nICMP after LHC showed clean coronaries. CMR showed cardiomyopathy but no infiltrative diseases. Since then he was seen by Dr. Barnes as outpatient but did not yet follow up with the HF team. He was recently at  for SOB and abd pain and seen by Dr. Flaherty and recommended to transfer to [facility] for further treatment. However, pt did not want to be transferred and was discharged for follow up. He now returns with similar symptoms of SOB but worsening abd pain that started again yesterday. Also had episode of n/v yesterday. Denies CP, MARELY, palpitations, diarrhea, fevers/chills.    Per wife, pt is not compliant with medications. Stopped taking the HF medications he was discharged with except for lasix. She was also concerned about IVDU 3-4 years ago. Also notes significant fam hx in father and brother of sudden death in 40s.       Allergies  aspirin (Swelling)  Motrin (Hives)      MEDICATIONS:  DOBUTamine Infusion 5 MICROgram(s)/kG/Min IV Continuous <Continuous>  norepinephrine Infusion 0.05 MICROgram(s)/kG/Min IV Continuous <Continuous>      PAST MEDICAL & SURGICAL HISTORY:  HTN (hypertension)  Cardiomyopathy  No significant past surgical history      FAMILY HISTORY:  Father and brother both passed away in 40-50s from sudden death - likely cardiac      SOCIAL HISTORY:    Prior smoker, prior drug use (MJ), questionable hx of IVDU (wife suspects may have used recently but does not know details)      REVIEW OF SYSTEMS:  CONSTITUTIONAL: No weakness, fevers or chills  EYES/ENT: No visual changes;  No dysphagia  RESPIRATORY: No cough, wheezing, hemoptysis; + shortness of breath  CARDIOVASCULAR: No chest pain or palpitations; No lower extremity edema  GASTROINTESTINAL: +abdominal or epigastric pain. +nausea, vomiting, or hematemesis; No diarrhea or constipation.  GENITOURINARY: No dysuria, frequency or hematuria  NEUROLOGICAL: No numbness or weakness  SKIN: No itching, burning, rashes, or lesions   All other review of systems is negative unless indicated above.    PHYSICAL EXAM:  T(C): 36.4 (18 @ 10:01), Max: 36.4 (18 @ 10:01)  HR: 91 (18 @ 11:16) (83 - 91)  BP: 117/86 (18 @ 11:16) (60/43 - 121/73)  RR: 17 (18 @ 11:16) (16 - 25)  SpO2: 90% (18 @ 11:16) (83% - 100%)  Wt(kg): --  I&O's Summary      Appearance: Uncomfortable	  HEENT:  Perioral cyanosis	  Cardiovascular: Normal S1 S2, elevated JVD, No murmurs, No edema  Respiratory: Lungs clear to auscultation	  Psychiatry: A & O x 3, Mood & affect appropriate  Gastrointestinal:  Soft, Non-tender, + BS	  Skin: No rashes, No ecchymoses, No cyanosis	  Neurologic: Non-focal  Extremities: Cold to touch        LABS:	 	    CBC Full  -  ( 2018 08:56 )  WBC Count : 12.37 K/uL  Hemoglobin : 18.9 g/dL  Hematocrit : 58.4 %  Platelet Count - Automated : 227 K/uL  Mean Cell Volume : 98.5 fL  Mean Cell Hemoglobin : 31.9 pg  Mean Cell Hemoglobin Concentration : 32.4 %  Auto Neutrophil # : 8.63 K/uL  Auto Lymphocyte # : 2.37 K/uL  Auto Monocyte # : 1.08 K/uL  Auto Eosinophil # : 0.07 K/uL  Auto Basophil # : 0.09 K/uL  Auto Neutrophil % : 69.7 %  Auto Lymphocyte % : 19.2 %  Auto Monocyte % : 8.7 %  Auto Eosinophil % : 0.6 %  Auto Basophil % : 0.7 %        130<L>  |  94<L>  |  39<H>  ----------------------------<  126<H>  7.0<HH>   |  14<L>  |  1.87<H>    Ca    9.9      2018 08:56  Phos  5.8       Mg     2.7         TPro  8.1  /  Alb  4.8  /  TBili  3.0<H>  /  DBili  x   /  AST  101<H>  /  ALT  117<H>  /  AlkPhos  76        proBNP: Serum Pro-Brain Natriuretic Peptide: 4034 pg/mL ( @ 08:56)  CARDIAC MARKERS:  CKMB: 3.21 ng/mL ( @ 08:56)    CKMB Relative Index: 1.3 ( @ 08:56)      TELEMETRY: NSR  ECG:  	NSR  RADIOLOGY: mild vascular congestion on CXR    PREVIOUS DIAGNOSTIC TESTING:    Echo 2018  CONCLUSIONS:  1. Mitral annular calcification. Moderate mitral  regurgitation.  2. Normal trileaflet aortic valve.  3. Aortic Root: 3.1 cm.  4. Moderate left atrial enlargement.  5. Moderate left ventricular enlargement.  6. Severe global left ventricular systolic dysfunction.  7. Grade III diastolic dysfunction.  8. Moderate right atrial enlargement.  9. Normal right ventricular size and function.  10. RA Pressure is 10 mm Hg.  11. RV systolic pressure is 44 mm Hg. Moderate pulmonary  hypertension.  12. There is moderate tricuspid regurgitation.  13. Normal pericardium with no pericardial effusion.    Cath 2018  VENTRICLES: No LV gram was performed; however, a recent echocardiogram  demonstrated an EF of 15 %.  CORONARY VESSELS: The coronary circulation is right dominant.  LM:   --  LM: Normal.  LAD:   --  LAD: Normal.  CX:   --  Circumflex: Normal.  RCA:   --  RCA: Normal.  COMPLICATIONS: There were no complications.  DIAGNOSTIC RECOMMENDATIONS: Elevated Filling pressures continue diuresis,  Guideline directed medical therapy for Cardiomyopathy. CMR pending    	  ASSESSMENT/PLAN: 	  46 yo M with recent diagnosis of nICMP (EF 10% 2018) who presents for SOB and abd pain/n/v, found to be hypotensive to 70s/40s with perioral cyanosis, cold extremities, elevated lactate, and very low peripheral venous sat (10%) concerning for cardiogenic shock. BP improved to 110/70s with levo 0.5mcg and  5. Mentating well.    Possible cardiogenic shock, nICMP EF 10%  - recent diagnosis of nICMP, possibly genetic given fam hx, pt w/ possible IVDU as well  - no pulm edema or MARELY but elevated JVD with concern for low flow state given above findings  - now on  5 and levo 0.5 with good MAP, however, may need mechanical support  - pt to go to cath lab for RHC/swan and possible balloon pump  - TTE pending  - will need to be transferred to [facility] after stabilization  - HF following      Discussed with Dr. Andersen and Dr. Ramos.    Samm Munoz MD  Cardiology Fellow      Addendum:  s/p RHC with swan in place, s/p balloon pump    RA 18  RV 48/5/16  PA 49/23/37  wedge 26  PA sat 62.5%  CO 2.7  CI 1.87    on levo 0.5,  5, hbg 18    augmenting 140s on balloon pump --> levo decreased to 0.3 --> now augmenting 130s    - will wean levo as tolerated, cont  at 5  - start lasix gtt at 10 for diuresis given high wedge  - hep gtt for balloon pump, CXR ordered to check balloon pump placement  - will repeat swan numbers in one hour to reassess after balloon pump placement  - will transfer to [facility] when bed available    Samm Munoz MD  Cardiology Fellow

## 2018-11-29 NOTE — ED PROVIDER NOTE - ATTENDING CONTRIBUTION TO CARE
47yom w/ Non ischemic Cardiomyopathy EF 10% w/ lifevest, pulmonary HTN has had cough and increased dyspnea for the last week with intermittent epigastric pain, hypoxic to 70s per EMS requiring CPAP. Denies any fevers or chills. Adherent to HF regimen at home. On arrival awake on Bipap, hypoxic, hypotensive, lungs clear heart sounds soft systolic murmur, abdo mild epigastric tenderness, no edema, neuro normal, EKG SR LBBB, CXR cardiomegaly no pulm edema or infiltrate. Plan:  Bipap, monitor, CXR EKG labs ABG, trial of pressors, CCU consult 47yom w/ Non ischemic Cardiomyopathy EF 10% w/ lifevest, pulmonary HTN has had cough and increased dyspnea for the last week with intermittent epigastric pain, hypoxic to 70s per EMS requiring CPAP. Denies any fevers or chills. Adherent to HF regimen at home. On arrival awake on Bipap, hypoxic, hypotensive, lungs clear heart sounds soft systolic murmur, abdo mild epigastric tenderness, no edema, neuro normal, EKG SR LBBB, CXR cardiomegaly no pulm edema or infiltrate. Plan:  Bipap, monitor, CXR EKG labs ABG, trial of pressors, antibiotics in case of sepsis as etiology, CCU consult

## 2018-11-29 NOTE — ED ADULT TRIAGE NOTE - CHIEF COMPLAINT QUOTE
Pt brought in via EMS for resp distress. Pt comes with CPAP in place. As per EMS pt was hypoxic on scene 70% o2 with non rebreather. Switched to CPAP. hx COPD and CHF

## 2018-11-29 NOTE — H&P ADULT - NSHPSOCIALHISTORY_GEN_ALL_CORE
lives with wife  drug use (marijuana, and some ?IV drug use in the past)  alcohol use in the past  prior smoker, quit a while ago.

## 2018-11-29 NOTE — ED PROVIDER NOTE - MEDICAL DECISION MAKING DETAILS
critically ill w/ severe heart failure and pulmonary htn, no severe volume overload on exam, concern for decompensated RV failure. Requiring Bipap for work of breathing but will attempt to wean to highflow as positive pressure way be worsening hemodynamics. Difficulty obtaining reliable SpO2 due to low flow state but oxygenating well on ABG. Dobutamine/norepi combo to augment contractility, disposition to CCU. critically ill w/ severe heart failure and pulmonary htn, no severe volume overload on exam but hypotensive with low flow state. Requiring Bipap for work of breathing but will attempt to wean to highflow as positive pressure way be worsening hemodynamics. Difficulty obtaining reliable SpO2 due to low flow state but oxygenating well on ABG. Dobutamine/norepi combo to augment contractility, disposition to CCU.

## 2018-11-29 NOTE — H&P ADULT - NSHPOUTPATIENTPROVIDERS_GEN_ALL_CORE
Cardiology: Dr. Malcolm Kerns  Care Providers for Follow up (PCP/Outpatient Provider) Reynold Roberts), Medicine  Dept Director  [address] [id number]  Phone: [phone]  Fax: [phone]    Mauricio Ramos), Adv Heart [facility] Cardio  [address] [place] [address]  Phone: [phone]  Fax: [phone].

## 2018-11-29 NOTE — ED PROVIDER NOTE - OBJECTIVE STATEMENT
47yom w/ NICM EF 10% w/ lifevest, pulmonary HTN has had cough and increased dyspnea for the last week with intermittent epigastric pain, hypoxic to 70s per EMS requiring CPAP. Denies any fevers or chills. Adherent to HF regimen at home.

## 2018-11-29 NOTE — DISCHARGE NOTE ADULT - CARE PLAN
Principal Discharge DX:	Cardiogenic shock  Goal:	Patient will be medically optimized  Assessment and plan of treatment:	Continue current medication regimen and close follow up with cardiology  Secondary Diagnosis:	Cardiomyopathy  Goal:	Patient will be medically optimized  Assessment and plan of treatment:	Continue current medication regimen and close follow up with cardiology  Secondary Diagnosis:	HTN (hypertension)  Goal:	BP will be well-controlled  Assessment and plan of treatment:	Continue current medication regimen and close follow up with cardiology Principal Discharge DX:	Cardiogenic shock  Goal:	Patient will be medically optimized  Assessment and plan of treatment:	Continue current medication regimen and close follow up with cardiology  Secondary Diagnosis:	Cardiomyopathy  Goal:	Patient will be medically optimized  Assessment and plan of treatment:	Continue current medication regimen and close follow up with cardiology, transfer to [facility]  Secondary Diagnosis:	HTN (hypertension)  Goal:	BP will be well-controlled  Assessment and plan of treatment:	Continue current medication regimen and close follow up with cardiology transfer to [facility]

## 2018-11-29 NOTE — H&P ADULT - ASSESSMENT
#Neuro  -AAO x 4. No active issues.    #Pulm  - On nasal canula.   -Continue to wean supplemental oxygen.  -Respiratory distress likely secondary to acute decompensated heart failure.   -Continue IV Lasix gtt, hold for systolic BP <90    #Cardiovascular  -  -Strict I/Os, daily wts, keep I < O, trend BMP, supplement lytes prn     #GI  - No active issues.   -Continue Protonix 40mg for GI ppx.    #Renal/  - LARISSA.  - trend Cr, trend lytes, supplement PRN    #ID  - Afebrile, no active issues.  -Administer influenza vaccine.   -Check UA    #Endo  - Follow up HgbA1c.    #Skin  -No active issues.    #DVT  - Heparin gtt. 48 yo M w/ recent diagnosis in 2018 of nonischemic cardiomyopathy (EF 10%), initially presented to [facility] d/t SOB and Epigastric pain, now s/p R heart cardiac cath, showing clean coronaries, but with high wedge pressures and need for blood pressure support with pressors, concerning for cardiogenic shock, transferred to [facility] CCU for LVAD workup.     #Neuro  -AAO x 4. No active issues.    #Pulm  - On nasal canula.   -Continue to wean supplemental oxygen.  -Respiratory distress likely secondary to acute decompensated heart failure.   -Continue IV Lasix gtt, hold for systolic BP <90    #Cardiovascular  - Cardiogenic Shock  -Patient recently diagnosed with NICM, Possible genetic component, (history of alcohol- not every day, in the past, and possible IVDU?)  - low flow state w elevated lactate, transaminases and JVD   - on  5 and levo 0.05, got swan and balloon pump  - TTE showing Severe global left ventricular systolic dysfunction with EF 10-15%, moderate-severe mitral regurgitation and moderate-severe tricuspid regurgitation.  - HF following.  - RHC showing RA18, RV 48/5/16,  PA 49/23/37 Wedge 26, PA sat 62.5%, CO 2.7, CI 1.87 on levo 0.05,  5, hbg 18.  - weaning levo as tolerated,   - Continue lasix drip at 10 for high wedge  - heparin gtt for balloon pump. Follow up CXR ordered to check balloon pump placement.    -History of Hypertension; Plan - holding all meds for now.   -Strict I/Os, daily wts, keep I < O, trend BMP, supplement lytes prn     #GI  - No active issues.   -Continue Protonix 40mg for GI ppx.    #Renal/  - LARISSA.  - trend Cr, trend lytes, supplement PRN    #ID  - Afebrile, no active issues.  -Administer influenza vaccine.   -Check UA    #Endo  - Follow up HgbA1c.    #Skin  -No active issues.    #DVT  - Heparin gtt. 46 yo M w/ recent diagnosis in 2018 of nonischemic cardiomyopathy (EF 10%), initially presented to [facility] d/t SOB and Epigastric pain, now s/p R heart cardiac cath, showing clean coronaries, but with high wedge pressures and need for blood pressure support with pressors,transferred to [facility] CCU for cardiogenic shock.     #Neuro  -AAO x 4. No active issues.    #Pulm  - On nasal canula.   -Continue to wean supplemental oxygen.  -Respiratory distress likely secondary to acute decompensated heart failure.   -Continue IV Lasix gtt, hold for systolic BP <90    #Cardiovascular  - Cardiogenic Shock  -Patient recently diagnosed with NICM, Possible genetic component, (history of alcohol- not every day, in the past, and possible IVDU?)  - low flow state w elevated lactate, transaminases and JVD   - on  5 and levo 0.05, got swan and balloon pump  - TTE showing Severe global left ventricular systolic dysfunction with EF 10-15%, moderate-severe mitral regurgitation and moderate-severe tricuspid regurgitation.  - HF following.  - RHC showing RA18, RV 48/5/16,  PA 49/23/37 Wedge 26, PA sat 62.5%, CO 2.7, CI 1.87 on levo 0.05,  5, hbg 18.  - weaning levo as tolerated,   - Continue lasix drip at 10 for high wedge. Repeat TTE following diuresis.   - heparin gtt for balloon pump. Follow up CXR ordered to check balloon pump placement.    -History of Hypertension; Plan - holding all meds for now.   -Strict I/Os, daily wts, keep I < O, trend BMP, supplement lytes prn     #GI  - No active issues.   -Continue Protonix 40mg for GI ppx.    #Renal/  - LARISSA.  - trend Cr, trend lytes, supplement PRN    #ID  - Afebrile, no active issues.  -Administer influenza vaccine.   -Check UA    #Endo  - Follow up HgbA1c.    #Skin  -No active issues.    #DVT  - Heparin gtt. 48 yo M w/ recent diagnosis in 2018 of nonischemic cardiomyopathy (EF 10%), initially presented to [facility] d/t SOB and Epigastric pain, now s/p R heart cardiac cath, showing clean coronaries, but with high wedge pressures and need for blood pressure support with pressors,transferred to [facility] CCU for cardiogenic shock.     #Neuro  -AAO x 4. No active issues.    #Pulm  - On nasal canula.   -Continue to wean supplemental oxygen.  -Respiratory distress likely secondary to acute decompensated heart failure/cardiogenic shock.  -Continue IV Lasix gtt, hold for systolic BP <90    #Cardiovascular  - Cardiogenic Shock  -Patient recently diagnosed with NICM, Possible genetic component, (history of alcohol- not every day, in the past, and possible IVDU?)  - low flow state w elevated lactate, transaminases and JVD   - on  5 and levo 0.05, got swan and balloon pump  - TTE showing Severe global left ventricular systolic dysfunction with EF 10-15%, moderate-severe mitral regurgitation and moderate-severe tricuspid regurgitation.  - HF following.  - RHC showing RA18, RV 48/5/16,  PA 49/23/37 Wedge 26, PA sat 62.5%, CO 2.7, CI 1.87 on levo 0.05,  5, hbg 18.  - weaning levo as tolerated,   - Continue lasix drip at 10 for high wedge. Repeat TTE following diuresis.   - heparin gtt for balloon pump. Follow up CXR ordered to check balloon pump placement.    -History of Hypertension; Plan - holding all meds for now.   -Strict I/Os, daily wts, keep I < O, trend BMP, supplement lytes prn     #GI  - No active issues.   -Continue Protonix 40mg for GI ppx.    #Renal/  - LARISSA.  - trend Cr, trend lytes, supplement PRN    #ID  - Afebrile, no active issues.  -Administer influenza vaccine.   -Check UA    #Endo  - Follow up HgbA1c.    #Skin  -No active issues.    #DVT  - Heparin gtt.

## 2018-11-29 NOTE — H&P ADULT - NSHPSOCIALHISTORY_GEN_ALL_CORE
Patient is a former smoker (2005 - 2011).  Patient previously drank alcohol (wine), no longer drinks alcohol.  Patient denies use of illicit drugs.

## 2018-11-30 DIAGNOSIS — I42.0 DILATED CARDIOMYOPATHY: ICD-10-CM

## 2018-11-30 LAB
ALBUMIN SERPL ELPH-MCNC: 3.5 G/DL — SIGNIFICANT CHANGE UP (ref 3.3–5)
ALBUMIN SERPL ELPH-MCNC: 3.7 G/DL — SIGNIFICANT CHANGE UP (ref 3.3–5)
ALBUMIN SERPL ELPH-MCNC: 4.1 G/DL — SIGNIFICANT CHANGE UP (ref 3.3–5)
ALBUMIN SERPL ELPH-MCNC: 4.2 G/DL — SIGNIFICANT CHANGE UP (ref 3.3–5)
ALP SERPL-CCNC: 57 U/L — SIGNIFICANT CHANGE UP (ref 40–120)
ALP SERPL-CCNC: 64 U/L — SIGNIFICANT CHANGE UP (ref 40–120)
ALP SERPL-CCNC: 65 U/L — SIGNIFICANT CHANGE UP (ref 40–120)
ALP SERPL-CCNC: 66 U/L — SIGNIFICANT CHANGE UP (ref 40–120)
ALT FLD-CCNC: 679 U/L — HIGH (ref 10–45)
ALT FLD-CCNC: 806 U/L — HIGH (ref 10–45)
ALT FLD-CCNC: 812 U/L — HIGH (ref 10–45)
ALT FLD-CCNC: 844 U/L — HIGH (ref 10–45)
ANION GAP SERPL CALC-SCNC: 14 MMOL/L — SIGNIFICANT CHANGE UP (ref 5–17)
ANION GAP SERPL CALC-SCNC: 15 MMOL/L — SIGNIFICANT CHANGE UP (ref 5–17)
ANION GAP SERPL CALC-SCNC: 15 MMOL/L — SIGNIFICANT CHANGE UP (ref 5–17)
ANION GAP SERPL CALC-SCNC: 19 MMOL/L — HIGH (ref 5–17)
APTT BLD: 76 SEC — HIGH (ref 27.5–36.3)
APTT BLD: 90 SEC — HIGH (ref 27.5–36.3)
AST SERPL-CCNC: 1063 U/L — HIGH (ref 10–40)
AST SERPL-CCNC: 1091 U/L — HIGH (ref 10–40)
AST SERPL-CCNC: 879 U/L — HIGH (ref 10–40)
AST SERPL-CCNC: 943 U/L — HIGH (ref 10–40)
BASE EXCESS BLDMV CALC-SCNC: 4.1 MMOL/L — HIGH (ref -3–3)
BASE EXCESS BLDMV CALC-SCNC: 5 MMOL/L — HIGH (ref -3–3)
BASE EXCESS BLDMV CALC-SCNC: 5.7 MMOL/L — HIGH (ref -3–3)
BASE EXCESS BLDMV CALC-SCNC: 5.8 MMOL/L — HIGH (ref -3–3)
BASE EXCESS BLDMV CALC-SCNC: 6.1 MMOL/L — HIGH (ref -3–3)
BASOPHILS # BLD AUTO: 0 K/UL — SIGNIFICANT CHANGE UP (ref 0–0.2)
BASOPHILS # BLD AUTO: 0.1 K/UL — SIGNIFICANT CHANGE UP (ref 0–0.2)
BASOPHILS NFR BLD AUTO: 0.5 % — SIGNIFICANT CHANGE UP (ref 0–2)
BASOPHILS NFR BLD AUTO: 0.5 % — SIGNIFICANT CHANGE UP (ref 0–2)
BASOPHILS NFR BLD AUTO: 0.6 % — SIGNIFICANT CHANGE UP (ref 0–2)
BILIRUB SERPL-MCNC: 2.1 MG/DL — HIGH (ref 0.2–1.2)
BILIRUB SERPL-MCNC: 2.7 MG/DL — HIGH (ref 0.2–1.2)
BILIRUB SERPL-MCNC: 2.7 MG/DL — HIGH (ref 0.2–1.2)
BILIRUB SERPL-MCNC: 3.2 MG/DL — HIGH (ref 0.2–1.2)
BUN SERPL-MCNC: 25 MG/DL — HIGH (ref 7–23)
BUN SERPL-MCNC: 27 MG/DL — HIGH (ref 7–23)
BUN SERPL-MCNC: 32 MG/DL — HIGH (ref 7–23)
BUN SERPL-MCNC: 37 MG/DL — HIGH (ref 7–23)
CALCIUM SERPL-MCNC: 8.4 MG/DL — SIGNIFICANT CHANGE UP (ref 8.4–10.5)
CALCIUM SERPL-MCNC: 8.5 MG/DL — SIGNIFICANT CHANGE UP (ref 8.4–10.5)
CALCIUM SERPL-MCNC: 8.8 MG/DL — SIGNIFICANT CHANGE UP (ref 8.4–10.5)
CALCIUM SERPL-MCNC: 8.8 MG/DL — SIGNIFICANT CHANGE UP (ref 8.4–10.5)
CHLORIDE SERPL-SCNC: 95 MMOL/L — LOW (ref 96–108)
CHLORIDE SERPL-SCNC: 95 MMOL/L — LOW (ref 96–108)
CHLORIDE SERPL-SCNC: 97 MMOL/L — SIGNIFICANT CHANGE UP (ref 96–108)
CHLORIDE SERPL-SCNC: 97 MMOL/L — SIGNIFICANT CHANGE UP (ref 96–108)
CO2 BLDMV-SCNC: 30 MMOL/L — HIGH (ref 21–29)
CO2 BLDMV-SCNC: 31 MMOL/L — HIGH (ref 21–29)
CO2 SERPL-SCNC: 18 MMOL/L — LOW (ref 22–31)
CO2 SERPL-SCNC: 24 MMOL/L — SIGNIFICANT CHANGE UP (ref 22–31)
CO2 SERPL-SCNC: 25 MMOL/L — SIGNIFICANT CHANGE UP (ref 22–31)
CO2 SERPL-SCNC: 26 MMOL/L — SIGNIFICANT CHANGE UP (ref 22–31)
CREAT SERPL-MCNC: 1.13 MG/DL — SIGNIFICANT CHANGE UP (ref 0.5–1.3)
CREAT SERPL-MCNC: 1.23 MG/DL — SIGNIFICANT CHANGE UP (ref 0.5–1.3)
CREAT SERPL-MCNC: 1.23 MG/DL — SIGNIFICANT CHANGE UP (ref 0.5–1.3)
CREAT SERPL-MCNC: 1.65 MG/DL — HIGH (ref 0.5–1.3)
EOSINOPHIL # BLD AUTO: 0.1 K/UL — SIGNIFICANT CHANGE UP (ref 0–0.5)
EOSINOPHIL # BLD AUTO: 0.2 K/UL — SIGNIFICANT CHANGE UP (ref 0–0.5)
EOSINOPHIL NFR BLD AUTO: 0.5 % — SIGNIFICANT CHANGE UP (ref 0–6)
EOSINOPHIL NFR BLD AUTO: 0.9 % — SIGNIFICANT CHANGE UP (ref 0–6)
EOSINOPHIL NFR BLD AUTO: 1.4 % — SIGNIFICANT CHANGE UP (ref 0–6)
GAS PNL BLDMV: SIGNIFICANT CHANGE UP
GLUCOSE SERPL-MCNC: 102 MG/DL — HIGH (ref 70–99)
GLUCOSE SERPL-MCNC: 119 MG/DL — HIGH (ref 70–99)
GLUCOSE SERPL-MCNC: 140 MG/DL — HIGH (ref 70–99)
GLUCOSE SERPL-MCNC: 168 MG/DL — HIGH (ref 70–99)
HCO3 BLDMV-SCNC: 28 MMOL/L — SIGNIFICANT CHANGE UP (ref 20–28)
HCO3 BLDMV-SCNC: 29 MMOL/L — HIGH (ref 20–28)
HCO3 BLDMV-SCNC: 30 MMOL/L — HIGH (ref 20–28)
HCT VFR BLD CALC: 47.5 % — SIGNIFICANT CHANGE UP (ref 39–50)
HCT VFR BLD CALC: 49.4 % — SIGNIFICANT CHANGE UP (ref 39–50)
HCT VFR BLD CALC: 50.1 % — HIGH (ref 39–50)
HCT VFR BLD CALC: 51.3 % — HIGH (ref 39–50)
HGB BLD-MCNC: 15.8 G/DL — SIGNIFICANT CHANGE UP (ref 13–17)
HGB BLD-MCNC: 16 G/DL — SIGNIFICANT CHANGE UP (ref 13–17)
HGB BLD-MCNC: 16.6 G/DL — SIGNIFICANT CHANGE UP (ref 13–17)
HGB BLD-MCNC: 16.8 G/DL — SIGNIFICANT CHANGE UP (ref 13–17)
HIV 1+2 AB+HIV1 P24 AG SERPL QL IA: SIGNIFICANT CHANGE UP
HOROWITZ INDEX BLDMV+IHG-RTO: 21 — SIGNIFICANT CHANGE UP
HOROWITZ INDEX BLDMV+IHG-RTO: 21 — SIGNIFICANT CHANGE UP
HOROWITZ INDEX BLDMV+IHG-RTO: 28 — SIGNIFICANT CHANGE UP
HOROWITZ INDEX BLDMV+IHG-RTO: SIGNIFICANT CHANGE UP
INR BLD: 1.56 RATIO — HIGH (ref 0.88–1.16)
INR BLD: 1.64 RATIO — HIGH (ref 0.88–1.16)
LACTATE SERPL-SCNC: 1.1 MMOL/L — SIGNIFICANT CHANGE UP (ref 0.7–2)
LACTATE SERPL-SCNC: 2.8 MMOL/L — HIGH (ref 0.7–2)
LIDOCAIN IGE QN: 22 U/L — SIGNIFICANT CHANGE UP (ref 7–60)
LYMPHOCYTES # BLD AUTO: 0.7 K/UL — LOW (ref 1–3.3)
LYMPHOCYTES # BLD AUTO: 1.2 K/UL — SIGNIFICANT CHANGE UP (ref 1–3.3)
LYMPHOCYTES # BLD AUTO: 1.2 K/UL — SIGNIFICANT CHANGE UP (ref 1–3.3)
LYMPHOCYTES # BLD AUTO: 10 % — LOW (ref 13–44)
LYMPHOCYTES # BLD AUTO: 11.1 % — LOW (ref 13–44)
LYMPHOCYTES # BLD AUTO: 12.6 % — LOW (ref 13–44)
LYMPHOCYTES # BLD AUTO: 18.9 % — SIGNIFICANT CHANGE UP (ref 13–44)
LYMPHOCYTES # BLD AUTO: 2.4 K/UL — SIGNIFICANT CHANGE UP (ref 1–3.3)
MAGNESIUM SERPL-MCNC: 1.8 MG/DL — SIGNIFICANT CHANGE UP (ref 1.6–2.6)
MAGNESIUM SERPL-MCNC: 1.9 MG/DL — SIGNIFICANT CHANGE UP (ref 1.6–2.6)
MAGNESIUM SERPL-MCNC: 2 MG/DL — SIGNIFICANT CHANGE UP (ref 1.6–2.6)
MAGNESIUM SERPL-MCNC: 2.2 MG/DL — SIGNIFICANT CHANGE UP (ref 1.6–2.6)
MCHC RBC-ENTMCNC: 31.2 PG — SIGNIFICANT CHANGE UP (ref 27–34)
MCHC RBC-ENTMCNC: 31.6 PG — SIGNIFICANT CHANGE UP (ref 27–34)
MCHC RBC-ENTMCNC: 32.1 GM/DL — SIGNIFICANT CHANGE UP (ref 32–36)
MCHC RBC-ENTMCNC: 32.1 PG — SIGNIFICANT CHANGE UP (ref 27–34)
MCHC RBC-ENTMCNC: 32.4 PG — SIGNIFICANT CHANGE UP (ref 27–34)
MCHC RBC-ENTMCNC: 32.8 GM/DL — SIGNIFICANT CHANGE UP (ref 32–36)
MCHC RBC-ENTMCNC: 33.2 GM/DL — SIGNIFICANT CHANGE UP (ref 32–36)
MCHC RBC-ENTMCNC: 33.6 GM/DL — SIGNIFICANT CHANGE UP (ref 32–36)
MCV RBC AUTO: 96.3 FL — SIGNIFICANT CHANGE UP (ref 80–100)
MCV RBC AUTO: 96.6 FL — SIGNIFICANT CHANGE UP (ref 80–100)
MCV RBC AUTO: 96.7 FL — SIGNIFICANT CHANGE UP (ref 80–100)
MCV RBC AUTO: 97.3 FL — SIGNIFICANT CHANGE UP (ref 80–100)
MONOCYTES # BLD AUTO: 1.1 K/UL — HIGH (ref 0–0.9)
MONOCYTES # BLD AUTO: 1.2 K/UL — HIGH (ref 0–0.9)
MONOCYTES # BLD AUTO: 1.2 K/UL — HIGH (ref 0–0.9)
MONOCYTES # BLD AUTO: 1.5 K/UL — HIGH (ref 0–0.9)
MONOCYTES NFR BLD AUTO: 11.2 % — SIGNIFICANT CHANGE UP (ref 2–14)
MONOCYTES NFR BLD AUTO: 11.5 % — SIGNIFICANT CHANGE UP (ref 2–14)
MONOCYTES NFR BLD AUTO: 11.9 % — SIGNIFICANT CHANGE UP (ref 2–14)
MONOCYTES NFR BLD AUTO: 6 % — SIGNIFICANT CHANGE UP (ref 2–14)
NEUTROPHILS # BLD AUTO: 6.9 K/UL — SIGNIFICANT CHANGE UP (ref 1.8–7.4)
NEUTROPHILS # BLD AUTO: 8.5 K/UL — HIGH (ref 1.8–7.4)
NEUTROPHILS # BLD AUTO: 8.7 K/UL — HIGH (ref 1.8–7.4)
NEUTROPHILS # BLD AUTO: 9.6 K/UL — HIGH (ref 1.8–7.4)
NEUTROPHILS NFR BLD AUTO: 67.7 % — SIGNIFICANT CHANGE UP (ref 43–77)
NEUTROPHILS NFR BLD AUTO: 74.1 % — SIGNIFICANT CHANGE UP (ref 43–77)
NEUTROPHILS NFR BLD AUTO: 76.7 % — SIGNIFICANT CHANGE UP (ref 43–77)
NEUTROPHILS NFR BLD AUTO: 80 % — HIGH (ref 43–77)
NEUTS BAND # BLD: 4 % — SIGNIFICANT CHANGE UP (ref 0–8)
O2 CT VFR BLD CALC: 38 MMHG — SIGNIFICANT CHANGE UP (ref 30–65)
O2 CT VFR BLD CALC: 40 MMHG — SIGNIFICANT CHANGE UP (ref 30–65)
O2 CT VFR BLD CALC: 42 MMHG — SIGNIFICANT CHANGE UP (ref 30–65)
O2 CT VFR BLD CALC: 44 MMHG — SIGNIFICANT CHANGE UP (ref 30–65)
O2 CT VFR BLD CALC: 46 MMHG — SIGNIFICANT CHANGE UP (ref 30–65)
PCO2 BLDMV: 41 MMHG — SIGNIFICANT CHANGE UP (ref 30–65)
PCO2 BLDMV: 41 MMHG — SIGNIFICANT CHANGE UP (ref 30–65)
PCO2 BLDMV: 42 MMHG — SIGNIFICANT CHANGE UP (ref 30–65)
PCO2 BLDMV: 43 MMHG — SIGNIFICANT CHANGE UP (ref 30–65)
PCO2 BLDMV: 43 MMHG — SIGNIFICANT CHANGE UP (ref 30–65)
PH BLDMV: 7.44 — SIGNIFICANT CHANGE UP (ref 7.32–7.45)
PH BLDMV: 7.44 — SIGNIFICANT CHANGE UP (ref 7.32–7.45)
PH BLDMV: 7.46 — HIGH (ref 7.32–7.45)
PH BLDMV: 7.48 — HIGH (ref 7.32–7.45)
PH BLDMV: 7.48 — HIGH (ref 7.32–7.45)
PHOSPHATE SERPL-MCNC: 2.2 MG/DL — LOW (ref 2.5–4.5)
PHOSPHATE SERPL-MCNC: 2.8 MG/DL — SIGNIFICANT CHANGE UP (ref 2.5–4.5)
PHOSPHATE SERPL-MCNC: 3.5 MG/DL — SIGNIFICANT CHANGE UP (ref 2.5–4.5)
PHOSPHATE SERPL-MCNC: 4.1 MG/DL — SIGNIFICANT CHANGE UP (ref 2.5–4.5)
PLAT MORPH BLD: NORMAL — SIGNIFICANT CHANGE UP
PLATELET # BLD AUTO: 119 K/UL — LOW (ref 150–400)
PLATELET # BLD AUTO: 133 K/UL — LOW (ref 150–400)
PLATELET # BLD AUTO: 133 K/UL — LOW (ref 150–400)
PLATELET # BLD AUTO: 167 K/UL — SIGNIFICANT CHANGE UP (ref 150–400)
POTASSIUM SERPL-MCNC: 3.4 MMOL/L — LOW (ref 3.5–5.3)
POTASSIUM SERPL-MCNC: 4.2 MMOL/L — SIGNIFICANT CHANGE UP (ref 3.5–5.3)
POTASSIUM SERPL-MCNC: 4.3 MMOL/L — SIGNIFICANT CHANGE UP (ref 3.5–5.3)
POTASSIUM SERPL-MCNC: 4.6 MMOL/L — SIGNIFICANT CHANGE UP (ref 3.5–5.3)
POTASSIUM SERPL-SCNC: 3.4 MMOL/L — LOW (ref 3.5–5.3)
POTASSIUM SERPL-SCNC: 4.2 MMOL/L — SIGNIFICANT CHANGE UP (ref 3.5–5.3)
POTASSIUM SERPL-SCNC: 4.3 MMOL/L — SIGNIFICANT CHANGE UP (ref 3.5–5.3)
POTASSIUM SERPL-SCNC: 4.6 MMOL/L — SIGNIFICANT CHANGE UP (ref 3.5–5.3)
PROT SERPL-MCNC: 6.1 G/DL — SIGNIFICANT CHANGE UP (ref 6–8.3)
PROT SERPL-MCNC: 6.4 G/DL — SIGNIFICANT CHANGE UP (ref 6–8.3)
PROT SERPL-MCNC: 6.8 G/DL — SIGNIFICANT CHANGE UP (ref 6–8.3)
PROT SERPL-MCNC: 7 G/DL — SIGNIFICANT CHANGE UP (ref 6–8.3)
PROTHROM AB SERPL-ACNC: 18 SEC — HIGH (ref 10–12.9)
PROTHROM AB SERPL-ACNC: 19 SEC — HIGH (ref 10–12.9)
RBC # BLD: 4.92 M/UL — SIGNIFICANT CHANGE UP (ref 4.2–5.8)
RBC # BLD: 5.11 M/UL — SIGNIFICANT CHANGE UP (ref 4.2–5.8)
RBC # BLD: 5.14 M/UL — SIGNIFICANT CHANGE UP (ref 4.2–5.8)
RBC # BLD: 5.33 M/UL — SIGNIFICANT CHANGE UP (ref 4.2–5.8)
RBC # FLD: 11.7 % — SIGNIFICANT CHANGE UP (ref 10.3–14.5)
RBC # FLD: 11.8 % — SIGNIFICANT CHANGE UP (ref 10.3–14.5)
RBC # FLD: 12.1 % — SIGNIFICANT CHANGE UP (ref 10.3–14.5)
RBC # FLD: 12.3 % — SIGNIFICANT CHANGE UP (ref 10.3–14.5)
RBC BLD AUTO: SIGNIFICANT CHANGE UP
SAO2 % BLDMV: 66 % — SIGNIFICANT CHANGE UP (ref 60–90)
SAO2 % BLDMV: 68 % — SIGNIFICANT CHANGE UP (ref 60–90)
SAO2 % BLDMV: 71 % — SIGNIFICANT CHANGE UP (ref 60–90)
SAO2 % BLDMV: 74 % — SIGNIFICANT CHANGE UP (ref 60–90)
SAO2 % BLDMV: 77 % — SIGNIFICANT CHANGE UP (ref 60–90)
SODIUM SERPL-SCNC: 134 MMOL/L — LOW (ref 135–145)
SODIUM SERPL-SCNC: 134 MMOL/L — LOW (ref 135–145)
SODIUM SERPL-SCNC: 135 MMOL/L — SIGNIFICANT CHANGE UP (ref 135–145)
SODIUM SERPL-SCNC: 137 MMOL/L — SIGNIFICANT CHANGE UP (ref 135–145)
SPECIMEN SOURCE: SIGNIFICANT CHANGE UP
SPECIMEN SOURCE: SIGNIFICANT CHANGE UP
WBC # BLD: 11.1 K/UL — HIGH (ref 3.8–10.5)
WBC # BLD: 11.5 K/UL — HIGH (ref 3.8–10.5)
WBC # BLD: 12.8 K/UL — HIGH (ref 3.8–10.5)
WBC # BLD: 9.3 K/UL — SIGNIFICANT CHANGE UP (ref 3.8–10.5)
WBC # FLD AUTO: 11.1 K/UL — HIGH (ref 3.8–10.5)
WBC # FLD AUTO: 11.5 K/UL — HIGH (ref 3.8–10.5)
WBC # FLD AUTO: 12.8 K/UL — HIGH (ref 3.8–10.5)
WBC # FLD AUTO: 9.3 K/UL — SIGNIFICANT CHANGE UP (ref 3.8–10.5)

## 2018-11-30 PROCEDURE — 99291 CRITICAL CARE FIRST HOUR: CPT

## 2018-11-30 PROCEDURE — 71045 X-RAY EXAM CHEST 1 VIEW: CPT | Mod: 26,77

## 2018-11-30 PROCEDURE — 93010 ELECTROCARDIOGRAM REPORT: CPT

## 2018-11-30 PROCEDURE — 71045 X-RAY EXAM CHEST 1 VIEW: CPT | Mod: 26,76

## 2018-11-30 RX ORDER — MAGNESIUM SULFATE 500 MG/ML
1 VIAL (ML) INJECTION ONCE
Qty: 0 | Refills: 0 | Status: COMPLETED | OUTPATIENT
Start: 2018-11-30 | End: 2018-11-30

## 2018-11-30 RX ORDER — POTASSIUM CHLORIDE 20 MEQ
40 PACKET (EA) ORAL ONCE
Qty: 0 | Refills: 0 | Status: COMPLETED | OUTPATIENT
Start: 2018-11-30 | End: 2018-11-30

## 2018-11-30 RX ORDER — FUROSEMIDE 40 MG
40 TABLET ORAL ONCE
Qty: 0 | Refills: 0 | Status: COMPLETED | OUTPATIENT
Start: 2018-11-30 | End: 2018-11-30

## 2018-11-30 RX ORDER — DOBUTAMINE HCL 250MG/20ML
2.5 VIAL (ML) INTRAVENOUS
Qty: 500 | Refills: 0 | Status: DISCONTINUED | OUTPATIENT
Start: 2018-11-30 | End: 2018-12-03

## 2018-11-30 RX ORDER — PANTOPRAZOLE SODIUM 20 MG/1
40 TABLET, DELAYED RELEASE ORAL
Qty: 0 | Refills: 0 | Status: DISCONTINUED | OUTPATIENT
Start: 2018-11-30 | End: 2018-12-09

## 2018-11-30 RX ORDER — FUROSEMIDE 40 MG
40 TABLET ORAL
Qty: 0 | Refills: 0 | Status: DISCONTINUED | OUTPATIENT
Start: 2018-12-01 | End: 2018-12-03

## 2018-11-30 RX ORDER — FUROSEMIDE 40 MG
5 TABLET ORAL
Qty: 500 | Refills: 0 | Status: DISCONTINUED | OUTPATIENT
Start: 2018-11-30 | End: 2018-11-30

## 2018-11-30 RX ORDER — HEPARIN SODIUM 5000 [USP'U]/ML
2500 INJECTION INTRAVENOUS; SUBCUTANEOUS EVERY 6 HOURS
Qty: 0 | Refills: 0 | Status: DISCONTINUED | OUTPATIENT
Start: 2018-11-30 | End: 2018-12-01

## 2018-11-30 RX ORDER — CHLORHEXIDINE GLUCONATE 213 G/1000ML
1 SOLUTION TOPICAL
Qty: 0 | Refills: 0 | Status: DISCONTINUED | OUTPATIENT
Start: 2018-11-30 | End: 2018-12-05

## 2018-11-30 RX ORDER — CAPTOPRIL 12.5 MG/1
6.25 TABLET ORAL EVERY 8 HOURS
Qty: 0 | Refills: 0 | Status: DISCONTINUED | OUTPATIENT
Start: 2018-11-30 | End: 2018-12-01

## 2018-11-30 RX ORDER — HEPARIN SODIUM 5000 [USP'U]/ML
5500 INJECTION INTRAVENOUS; SUBCUTANEOUS EVERY 6 HOURS
Qty: 0 | Refills: 0 | Status: DISCONTINUED | OUTPATIENT
Start: 2018-11-30 | End: 2018-12-01

## 2018-11-30 RX ORDER — NOREPINEPHRINE BITARTRATE/D5W 8 MG/250ML
0.05 PLASTIC BAG, INJECTION (ML) INTRAVENOUS
Qty: 8 | Refills: 0 | Status: DISCONTINUED | OUTPATIENT
Start: 2018-11-30 | End: 2018-11-30

## 2018-11-30 RX ORDER — HEPARIN SODIUM 5000 [USP'U]/ML
5500 INJECTION INTRAVENOUS; SUBCUTANEOUS ONCE
Qty: 0 | Refills: 0 | Status: COMPLETED | OUTPATIENT
Start: 2018-11-30 | End: 2018-11-30

## 2018-11-30 RX ORDER — HEPARIN SODIUM 5000 [USP'U]/ML
1000 INJECTION INTRAVENOUS; SUBCUTANEOUS
Qty: 25000 | Refills: 0 | Status: DISCONTINUED | OUTPATIENT
Start: 2018-11-30 | End: 2018-12-01

## 2018-11-30 RX ADMIN — Medication 40 MILLIGRAM(S): at 17:16

## 2018-11-30 RX ADMIN — Medication 40 MILLIEQUIVALENT(S): at 07:10

## 2018-11-30 RX ADMIN — CHLORHEXIDINE GLUCONATE 1 APPLICATION(S): 213 SOLUTION TOPICAL at 05:29

## 2018-11-30 RX ADMIN — PANTOPRAZOLE SODIUM 40 MILLIGRAM(S): 20 TABLET, DELAYED RELEASE ORAL at 05:29

## 2018-11-30 RX ADMIN — Medication 6.33 MICROGRAM(S)/KG/MIN: at 01:11

## 2018-11-30 RX ADMIN — Medication 5.06 MICROGRAM(S)/KG/MIN: at 20:52

## 2018-11-30 RX ADMIN — Medication 100 GRAM(S): at 07:10

## 2018-11-30 RX ADMIN — Medication 10.12 MICROGRAM(S)/KG/MIN: at 01:11

## 2018-11-30 RX ADMIN — CAPTOPRIL 6.25 MILLIGRAM(S): 12.5 TABLET ORAL at 09:33

## 2018-11-30 RX ADMIN — Medication 2.5 MG/HR: at 07:18

## 2018-11-30 RX ADMIN — HEPARIN SODIUM 1000 UNIT(S)/HR: 5000 INJECTION INTRAVENOUS; SUBCUTANEOUS at 01:11

## 2018-11-30 RX ADMIN — Medication 5.06 MICROGRAM(S)/KG/MIN: at 07:16

## 2018-11-30 RX ADMIN — Medication 5.06 MICROGRAM(S)/KG/MIN: at 17:16

## 2018-11-30 RX ADMIN — Medication 3.75 MG/HR: at 05:14

## 2018-11-30 RX ADMIN — HEPARIN SODIUM 1000 UNIT(S)/HR: 5000 INJECTION INTRAVENOUS; SUBCUTANEOUS at 07:47

## 2018-11-30 RX ADMIN — CAPTOPRIL 6.25 MILLIGRAM(S): 12.5 TABLET ORAL at 17:16

## 2018-11-30 NOTE — PROGRESS NOTE ADULT - SUBJECTIVE AND OBJECTIVE BOX
LVAD COORDINATOR:  I met with the patient and his wife at his bedside in the CCU. I reviewed the LVAD Consent, the evaluation.  I showed them the pump, the batteries. They were both able to remove the battery from the battery clip and were able to place the battery on the power cable.  They stated they have 3 pronged outlets in their house. The wife is a ER Nurse so the sterile dressing change will not be an issue.   I left them with a HM3 patient Brochure from [employer]. I gave them the [employer] website to look at.  I answered all their questions.  I spent 30 min with the patient all of which was education.  You can reach me at [phone]

## 2018-11-30 NOTE — CONSULT NOTE ADULT - SUBJECTIVE AND OBJECTIVE BOX
Patient seen and evaluated at bedside    Chief Complaint:    HPI:  46 yo Burmese M, w/ PMH of nICMP (EF 10% 11/2018 - recently diagnosed in August 2018), presenting as transfer from [facility] CCU d/t cardiogenic shock, possible LVAD workup. Patient initially presented to [facility] ED morning of 11/29/18 d/t one day of shortness of breath, also with abdominal pain, worst in the epigastrium, and with one episode of nausea/vomiting the previous day. Patient denies chest pain, palpitations, fevers, chills, or other complaints.     Patient was first diagnosed with non-ischemic cardiomyopathy in August, 2018, after he presented to ED d/t Lower Extremity edema and shortness of breath. He had a L heart catheterization, which showed clean coronaries, but significant for nICMP. Patient also had a Cardiac MRI which showed dilated cardiomyopathy but no infiltrative diseases. Following this hospitalization, patient followed up with Dr. Barnes as outpatient, but has not seen him since September 2018. Patient was also supposed to follow up with the HF team, but never did so.    Patient presented to [facility] ED one week ago d/t similar presentation of SOB and abd pain. Patient was seen by Dr. Flaherty and recommended to transfer to [facility] for further treatment. However, pt did not want to be transferred and was discharged for follow up.    Previous note from [facility] states that per wife, pt is not compliant with medications. Stopped taking the HF medications he was discharged with except for lasix. She was also concerned about IVDU 3-4 years ago. Also notes significant fam hx in father and brother of sudden death in 40s. Patient acknowledges that he only takes spironolactone, Lasix, and potassium, and that he only wears his Life Vest some of the time. States that he has never used intravenous drugs. Used marijuana for very brief period in college over 20 years ago. Denies any other drug use, and states no longer drinks alcohol.     In the [facility] ER, patient BP low with elevated lactate, poor oxygenation. Also with hyperkalemia.  Dobutamine gtt started and levophed gtt initiated. Patient emergently transferred to cardiac cath lab for a RHC. A balloon pump was inserted, levophed gtt decreased to 0.3mcg/kg/min. Dobutamine gtt titrated to 5mck/kg/min. Patient titrated off BIPAP and onto High Flow  oxygen. Augmenting 120s on ballon pump.     In the [facility] CCU, patient continues to endorse diffuse, mild abdominal pain, worst in the epigastrium, but states that he no longer is experiencing shortness of breath, and is breathing comfortably on nasal canula in the room. Patient denies other complaints. Denies headache, neck stiffness, fever/chills, vision change, chest pain, current shortness of breath or difficulty breathing, palpitations, lightheadedness, weakness, dizziness, numbness, tingling, current nausea, vomiting, diarrhea, dysuria, hematuria, syncope. (29 Nov 2018 23:31)      PMHx:   HTN (hypertension)  Cardiomyopathy  No pertinent past medical history      PSHx:   No significant past surgical history      Allergies:  aspirin (Swelling)  Motrin (Hives)      Home Meds:    Current Medications:   aluminum hydroxide/magnesium hydroxide/simethicone Suspension 30 milliLiter(s) Oral every 6 hours PRN  captopril 6.25 milliGRAM(s) Oral every 8 hours  chlorhexidine 4% Liquid 1 Application(s) Topical <User Schedule>  DOBUTamine Infusion 2.5 MICROgram(s)/kG/Min IV Continuous <Continuous>  furosemide   Injectable 40 milliGRAM(s) IV Push once  heparin  Infusion. 1000 Unit(s)/Hr IV Continuous <Continuous>  heparin  Injectable 5500 Unit(s) IV Push every 6 hours PRN  heparin  Injectable 2500 Unit(s) IV Push every 6 hours PRN  pantoprazole    Tablet 40 milliGRAM(s) Oral before breakfast      FAMILY HISTORY:  Family history of lung disease (Grandparent): maternal grandmother (no history of smoking)  Family history of hypertension: father  Family history of heart disease: mother      Social History:  Smoking History:  Alcohol Use:  Drug Use:    REVIEW OF SYSTEMS:  CONSTITUTIONAL: No weakness, fevers or chills  EYES/ENT: No visual changes;  No dysphagia  NECK: No pain or stiffness  RESPIRATORY: No cough, wheezing, hemoptysis; No shortness of breath  CARDIOVASCULAR: No chest pain or palpitations; No lower extremity edema  GASTROINTESTINAL: No abdominal or epigastric pain. No nausea, vomiting, or hematemesis; No diarrhea or constipation. No melena or hematochezia.  BACK: No back pain  GENITOURINARY: No dysuria, frequency or hematuria  NEUROLOGICAL: No numbness or weakness  SKIN: No itching, burning, rashes, or lesions   All other review of systems is negative unless indicated above.    Physical Exam:  T(F): 97.5 (11-30), Max: 99 (11-29)  HR: 90 (11-30) (80 - 94)  BP: --  RR: 33 (11-30)  SpO2: 98% (11-30)  GENERAL: No acute distress, well-developed  HEAD:  Atraumatic, Normocephalic  ENT: EOMI, PERRLA, conjunctiva and sclera clear, Neck supple, No JVD, moist mucosa  CHEST/LUNG: Clear to auscultation bilaterally; No wheeze, equal breath sounds bilaterally   BACK: No spinal tenderness  HEART: Regular rate and rhythm; No murmurs, rubs, or gallops  ABDOMEN: Soft, Nontender, Nondistended; Bowel sounds present  EXTREMITIES:  No clubbing, cyanosis, or edema  PSYCH: Nl behavior, nl affect  NEUROLOGY: AAOx3, non-focal, cranial nerves intact  SKIN: Normal color, No rashes or lesions  LINES:    Cardiovascular Diagnostic Testing:    ECG: Personally reviewed:    Echo: Personally reviewed:    Stress Testing:    Cath:    Imaging:    CXR: Personally reviewed    Labs: Personally reviewed                        16.6   11.5  )-----------( 133      ( 30 Nov 2018 11:31 )             49.4     11-30    135  |  95<L>  |  27<H>  ----------------------------<  140<H>  4.3   |  26  |  1.23    Ca    8.8      30 Nov 2018 11:31  Phos  2.8     11-30  Mg     2.2     11-30    TPro  6.8  /  Alb  4.1  /  TBili  2.7<H>  /  DBili  x   /  AST  943<H>  /  ALT  812<H>  /  AlkPhos  65  11-30    PT/INR - ( 30 Nov 2018 06:41 )   PT: 18.0 sec;   INR: 1.56 ratio         PTT - ( 30 Nov 2018 06:41 )  PTT:90.0 sec    CARDIAC MARKERS ( 29 Nov 2018 08:56 )  x     / x     / x     / 242 u/L / 3.21 ng/mL / x      CARDIAC MARKERS ( 24 Nov 2018 04:00 )  x     / <0.015 ng/mL / x     / 153 U/L / x     / 1.8 ng/mL  CARDIAC MARKERS ( 23 Nov 2018 23:03 )  x     / 0.021 ng/mL / x     / x     / x     / x      CARDIAC MARKERS ( 23 Nov 2018 20:59 )  x     / 0.019 ng/mL / x     / x     / x     / 2.4 ng/mL        Serum Pro-Brain Natriuretic Peptide: 4034 pg/mL (11-29 @ 08:56)  Serum Pro-Brain Natriuretic Peptide: 3844 pg/mL (11-23 @ 23:03)      Hemoglobin A1C, Whole Blood: 6.0 % (11-24 @ 09:24)    Thyroid Stimulating Hormone, Serum: 1.11 uU/mL (11-24 @ 04:00) Patient seen and evaluated at bedside in CCU Bed 10    Chief Complaint:    HPI: 47M w/ PMHx of chronic HFrEF (2/2 NICM, Dx 8/2018, had LifeVest as outpatient) _______________________________ yo Shashank M, w/ PMH of nICMP (EF 10% 11/2018 - recently diagnosed in August 2018), presenting as transfer from [facility] CCU d/t cardiogenic shock, possible LVAD workup. Patient initially presented to [facility] ED morning of 11/29/18 d/t one day of shortness of breath, also with abdominal pain, worst in the epigastrium, and with one episode of nausea/vomiting the previous day. Patient denies chest pain, palpitations, fevers, chills, or other complaints.     Patient was first diagnosed with non-ischemic cardiomyopathy in August, 2018, after he presented to ED d/t Lower Extremity edema and shortness of breath. He had a L heart catheterization, which showed clean coronaries, but significant for nICMP. Patient also had a Cardiac MRI which showed dilated cardiomyopathy but no infiltrative diseases. Following this hospitalization, patient followed up with Dr. Barnes as outpatient, but has not seen him since September 2018. Patient was also supposed to follow up with the HF team, but never did so.    Patient presented to [facility] ED one week ago d/t similar presentation of SOB and abd pain. Patient was seen by Dr. Flaherty and recommended to transfer to [facility] for further treatment. However, pt did not want to be transferred and was discharged for follow up.    Previous note from [facility] states that per wife, pt is not compliant with medications. Stopped taking the HF medications he was discharged with except for lasix. She was also concerned about IVDU 3-4 years ago. Also notes significant fam hx in father and brother of sudden death in 40s. Patient acknowledges that he only takes spironolactone, Lasix, and potassium, and that he only wears his Life Vest some of the time. States that he has never used intravenous drugs. Used marijuana for very brief period in college over 20 years ago. Denies any other drug use, and states no longer drinks alcohol.     In the [facility] ER, patient BP low with elevated lactate, poor oxygenation. Also with hyperkalemia.  Dobutamine gtt started and levophed gtt initiated. Patient emergently transferred to cardiac cath lab for a RHC. A balloon pump was inserted, levophed gtt decreased to 0.3mcg/kg/min. Dobutamine gtt titrated to 5mck/kg/min. Patient titrated off BIPAP and onto High Flow  oxygen. Augmenting 120s on ballon pump.     In the [facility] CCU, patient continues to endorse diffuse, mild abdominal pain, worst in the epigastrium, but states that he no longer is experiencing shortness of breath, and is breathing comfortably on nasal canula in the room. Patient denies other complaints. Denies headache, neck stiffness, fever/chills, vision change, chest pain, current shortness of breath or difficulty breathing, palpitations, lightheadedness, weakness, dizziness, numbness, tingling, current nausea, vomiting, diarrhea, dysuria, hematuria, syncope. (29 Nov 2018 23:31)      PMHx:   HTN   chronic HFrEF     PSHx: No significant past surgical history    Allergies:  aspirin (Swelling)  Motrin (Hives)    Home Medications:    Current Medications:   aluminum hydroxide/magnesium hydroxide/simethicone Suspension 30 milliLiter(s) Oral every 6 hours PRN  captopril 6.25 milliGRAM(s) Oral every 8 hours  chlorhexidine 4% Liquid 1 Application(s) Topical <User Schedule>  DOBUTamine Infusion 2.5 MICROgram(s)/kG/Min IV Continuous <Continuous>  furosemide   Injectable 40 milliGRAM(s) IV Push once  heparin  Infusion. 1000 Unit(s)/Hr IV Continuous <Continuous>  heparin  Injectable 5500 Unit(s) IV Push every 6 hours PRN  heparin  Injectable 2500 Unit(s) IV Push every 6 hours PRN  pantoprazole    Tablet 40 milliGRAM(s) Oral before breakfast      FAMILY HISTORY:  Family history of lung disease (Grandparent): maternal grandmother (no history of smoking)  Family history of hypertension: father  Family history of heart disease: mother    Social History:  Smoking History:  Alcohol Use:  Drug Use:    REVIEW OF SYSTEMS:  CONSTITUTIONAL: No weakness, fevers or chills  EYES/ENT: No visual changes;  No dysphagia  NECK: No pain or stiffness  RESPIRATORY: No cough, wheezing, hemoptysis; No shortness of breath  CARDIOVASCULAR: No chest pain or palpitations; No lower extremity edema  GASTROINTESTINAL: No abdominal or epigastric pain. No nausea, vomiting, or hematemesis; No diarrhea or constipation. No melena or hematochezia.  BACK: No back pain  GENITOURINARY: No dysuria, frequency or hematuria  NEUROLOGICAL: No numbness or weakness  SKIN: No itching, burning, rashes, or lesions   All other review of systems is negative unless indicated above.    Physical Exam:  T(F): 97.5 (11-30), Max: 99 (11-29)  HR: 90 (11-30) (80 - 94)  BP: Augmented Diastolic 99, mean 85 - w/ IABP on 1:1  RR: 33 (11-30)  SpO2: 98% on ________    GENERAL: No acute distress, well-developed  HEAD:  Atraumatic, Normocephalic  ENT: EOMI, PERRLA, conjunctiva and sclera clear, Neck supple, No JVD, moist mucosa  CHEST/LUNG: Clear to auscultation bilaterally; No wheeze, equal breath sounds bilaterally   BACK: No spinal tenderness  HEART: Regular rate and rhythm; No murmurs, rubs, or gallops  ABDOMEN: Soft, Nontender, Nondistended; Bowel sounds present  EXTREMITIES:  No clubbing, cyanosis, or edema  PSYCH: Nl behavior, nl affect  NEUROLOGY: AAOx3, non-focal, cranial nerves intact  SKIN: Normal color, No rashes or lesions  LINES: IABP    Cardiovascular Diagnostic Testing:    Tele: sinus 80s-90s, PVCs    ECG: Personally reviewed: from 11/29: sinus @ 87, nl axis, nl intervals, no q waves, + LVH, TWIs in V4-V6    Echo: Personally reviewed: < from: Transthoracic Echocardiogram (11.24.18 @ 07:00) >  DIMENSIONS:  Dimensions:     Normal Values:  LA:     4.3 cm    2.0 - 4.0 cm  Ao:     3.1 cm    2.0 - 3.8 cm  SEPTUM: 0.8 cm    0.6 - 1.2 cm  PWT:    0.8 cm    0.6 - 1.1 cm  LVIDd:  6.8 cm    3.0 - 5.6 cm  LVIDs:  6.5 cm    1.8 - 4.0 cm  Derived Variables:  LVMI: 138 g/m2  RWT: 0.23  Ejection Fraction Visual Estimate: 10 %  OBSERVATIONS: Mitral Valve: Mitral annular calcification. Moderate mitral regurgitation. Aortic Root: Aortic Root: 3.1 cm. Aortic Valve: Normal trileaflet aortic valve. Left Atrium: Moderate left atrial enlargement. Left Ventricle: Severe global left ventricular systolic dysfunction. Moderate left ventricular enlargement. Grade III diastolic dysfunction. Right Heart: Moderate right atrial enlargement. Normal right ventricular size and function. There is moderate tricuspid regurgitation. There is mild-moderate pulmonic regurgitation. Pericardium/PleuraNormal pericardium with no pericardial effusion. Hemodynamic: RA Pressure is 10 mm Hg. RV systolic pressure is 44 mm Hg. Moderate pulmonary hypertension.  < end of copied text >    Cath: < from: Cardiac Cath Lab - Adult (08.06.18 @ 11:48) > VENTRICLES: No LV gram was performed; however, a recent echocardiogram demonstrated an EF of 15 %.  CORONARY VESSELS: The coronary circulation is right dominant. LM:   --  LM: Normal. LAD:   --  LAD: Normal. CX:   --  Circumflex: Normal. RCA:   --  RCA: Normal. COMPLICATIONS: There were no complications. < end of copied text >    Imaging:    cMRI < from: MR Cardiac w/wo IV Cont (08.07.18 @ 10:43) > IMPRESSION: Severe dilatation in LV size with indexed LV end diastolic volume of 182   ml/m2. Severely depressed global systolic function with LVEF = 13%. No  delayed enhancement of the LV myocardium is seen. Findings consistent  with nonischemic dilated cardiomyopathy  < end of copied text >    CXR: Personally reviewed from this am: [place] deep in PA, IABP, enlarged heart, clear lungs    Labs: Personally reviewed                        16.6   11.5  )-----------( 133      ( 30 Nov 2018 11:31 )             49.4     11-30    135  |  95<L>  |  27<H>  ----------------------------<  140<H>  4.3   |  26  |  1.23    Ca    8.8      30 Nov 2018 11:31  Phos  2.8     11-30  Mg     2.2     11-30    TPro  6.8  /  Alb  4.1  /  TBili  2.7<H>  /  DBili  x   /  AST  943<H>  /  ALT  812<H>  /  AlkPhos  65  11-30    PT/INR - ( 30 Nov 2018 06:41 )   PT: 18.0 sec;   INR: 1.56 ratio    PTT - ( 30 Nov 2018 06:41 )  PTT:90.0 sec - pt on Heparin gtt    CARDIAC MARKERS ( 29 Nov 2018 08:56 )  x     / x     / x     / 242 u/L / 3.21 ng/mL / x      CARDIAC MARKERS ( 24 Nov 2018 04:00 )  x     / <0.015 ng/mL / x     / 153 U/L / x     / 1.8 ng/mL  CARDIAC MARKERS ( 23 Nov 2018 23:03 )  x     / 0.021 ng/mL / x     / x     / x     / x      CARDIAC MARKERS ( 23 Nov 2018 20:59 )  x     / 0.019 ng/mL / x     / x     / x     / 2.4 ng/mL    Serum Pro-Brain Natriuretic Peptide: 4034 pg/mL (11-29 @ 08:56)  Serum Pro-Brain Natriuretic Peptide: 3844 pg/mL (11-23 @ 23:03)    Hemoglobin A1C, Whole Blood: 6.0 % (11-24 @ 09:24) - pre-DM    Thyroid Stimulating Hormone, Serum: 1.11 uU/mL (11-24 @ 04:00) Patient seen and evaluated at bedside in CCU Bed 10    Outpatient Cardiologist: Dr. Malcolm Barnes    Chief Complaint: SOB x days    HPI: 47M w/ PMHx of chronic HFrEF (2/ NICM, Dx 2018, had LifeVest as outpatient) who was transferred from [facility] to [facility] CCU due to cardiogenic shock.  After being diagnosed with HF in 2018 the patient was D/C'ed on ACEI, B-blocker, Aldactone and was also discharged with a LifeVest - the pt reports he was not compliant with medications or his LifeVest. He returned to [facility] w/ SOB, Abd pain, CP, cough and palpitations. He is now in the CCU on Dobutamine with and IABP. EP called to evaluate the patient for an AICD. HF following and have initiated a LVAD/Heart Transplant w/u. Of note the patient's father  suddenly at the age of 49, his brother  at the age of 42 from HF and a maternal cousin  at the age of 18 suddenly. The patient smoked 1 PPD for 16yrs (quit 5 yrs ago) and drank, "heavily" 3 times per week up until 13 years ago.     PMHx:   HTN   chronic HFrEF     PSHx: No significant past surgical history    Allergies:  aspirin (Swelling)  Motrin (Hives)    Home Medications: Lasix 20mg p.o. daily, Aldactone 25 daily, Lisinopril 5, Lopressor 25 bid, MVI, Melatonin, Simethicone    Current Medications:   aluminum hydroxide/magnesium hydroxide/simethicone Suspension 30 milliLiter(s) Oral every 6 hours PRN  captopril 6.25 milliGRAM(s) Oral every 8 hours  chlorhexidine 4% Liquid 1 Application(s) Topical <User Schedule>  DOBUTamine Infusion 2.5 MICROgram(s)/kG/Min IV Continuous <Continuous>  furosemide   Injectable 40 milliGRAM(s) IV Push once  heparin  Infusion. 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 No nausea, vomiting, or hematemesis; No diarrhea or constipation. No melena or hematochezia.  BACK: No back pain  GENITOURINARY: No dysuria, frequency or hematuria  NEUROLOGICAL: No numbness or weakness  SKIN: No itching, burning, rashes, or lesions  PSYCH: + depression   All other review of systems is negative unless indicated above.    Physical Exam:  T(F): 97.5 (-30), Max: 99 (-29)  HR: 90 (-30) (80 - 94)  BP: Augmented Diastolic 99, mean 85 - w/ IABP on 1:1 RAP:1, PA:  (18)  RR: 33 (11-30)  SpO2: 98% on RA    GENERAL: No acute distress, well-developed, laying flat, breathing comfortably on RA  HEAD:  Atraumatic, Normocephalic  ENT: EOMI, PERRLA, conjunctiva and sclera clear, Neck supple, RIJ swan precluded clear assessment of JVP, moist mucosa  CHEST/LUNG: Clear to auscultation bilaterally - exam limited to anterior   BACK: No spinal tenderness  HEART: Regular rate and rhythm; No murmurs, rubs, or gallops, examined with IABP on standby, LE warm to touch  ABDOMEN: Soft, Nontender, Nondistended; Bowel sounds present  EXTREMITIES:  No clubbing, cyanosis, or edema  PSYCH: Nl behavior, nl affect  NEUROLOGY: AAOx3, non-focal, cranial nerves intact  SKIN: Normal color, No rashes or lesions  LINES: IABP, R IJ [place], PIV    Cardiovascular Diagnostic Testing:    Tele: sinus 80s-90s, PVCs    ECG: Personally reviewed: from : sinus @ 87, nl axis, nl intervals, no q waves, + LVH, TWIs in V4-V6    Echo: Personally reviewed: < from: Transthoracic Echocardiogram (18 @ 07:00) >  DIMENSIONS:  Dimensions:     Normal Values:  LA:     4.3 cm    2.0 - 4.0 cm  Ao:     3.1 cm    2.0 - 3.8 cm  SEPTUM: 0.8 cm    0.6 - 1.2 cm  PWT:    0.8 cm    0.6 - 1.1 cm  LVIDd:  6.8 cm    3.0 - 5.6 cm  LVIDs:  6.5 cm    1.8 - 4.0 cm  Derived Variables:  LVMI: 138 g/m2  RWT: 0.23  Ejection Fraction Visual Estimate: 10 %  OBSERVATIONS: Mitral Valve: Mitral annular calcification. Moderate mitral regurgitation. Aortic Root: Aortic Root: 3.1 cm. Aortic Valve: Normal trileaflet aortic valve. Left Atrium: Moderate left atrial enlargement. Left Ventricle: Severe global left ventricular systolic dysfunction. Moderate left ventricular enlargement. Grade III diastolic dysfunction. Right Heart: Moderate right atrial enlargement. Normal right ventricular size and function. There is moderate tricuspid regurgitation. There is mild-moderate pulmonic regurgitation. Pericardium / Pleura Normal pericardium with no pericardial effusion. Hemodynamic: RA Pressure is 10 mm Hg. RV systolic pressure is 44 mm Hg. Moderate pulmonary hypertension.  < end of copied text >    Cath: < from: Cardiac Cath Lab - Adult (18 @ 11:48) > VENTRICLES: No LV gram was performed; however, a recent echocardiogram demonstrated an EF of 15 %. CORONARY VESSELS: The coronary circulation is right dominant. LM:   --  LM: Normal. LAD:   --  LAD: Normal. CX:   --  Circumflex: Normal. RCA:   --  RCA: Normal. COMPLICATIONS: There were no complications. < end of copied text >    Imaging:    cMRI < from: MR Cardiac w/wo IV Cont (18 @ 10:43) > IMPRESSION: Severe dilatation in LV size with indexed LV end diastolic volume of 182   ml/m2. Severely depressed global systolic function with LVEF = 13%. No  delayed enhancement of the LV myocardium is seen. Findings consistent  with nonischemic dilated cardiomyopathy  < end of copied text >    CXR: Personally reviewed from this am: [place] deep in PA, IABP, enlarged heart, clear lungs    Labs: Personally reviewed                        16.6   11.5  )-----------( 133      ( 2018 11:31 )             49.4     11-30    135  |  95<L>  |  27<H>  ----------------------------<  140<H>  4.3   |  26  |  1.23    Ca    8.8      2018 11:31  Phos  2.8       Mg     2.2         TPro  6.8  /  Alb  4.1  /  TBili  2.7<H>  /  DBili  x   /  AST  943<H>  /  ALT  812<H>  /  AlkPhos  65      PT/INR - ( 2018 06:41 )   PT: 18.0 sec;   INR: 1.56 ratio    PTT - ( 2018 06:41 )  PTT:90.0 sec - pt on Heparin gtt    CARDIAC MARKERS ( 2018 08:56 )  x     / x     / x     / 242 u/L / 3.21 ng/mL / x      CARDIAC MARKERS ( 2018 04:00 )  x     / <0.015 ng/mL / x     / 153 U/L / x     / 1.8 ng/mL  CARDIAC MARKERS ( 2018 23:03 )  x     / 0.021 ng/mL / x     / x     / x     / x      CARDIAC MARKERS ( 2018 20:59 )  x     / 0.019 ng/mL / x     / x     / x     / 2.4 ng/mL    Serum Pro-Brain Natriuretic Peptide: 4034 pg/mL ( @ 08:56)  Serum Pro-Brain Natriuretic Peptide: 3844 pg/mL ( @ 23:03)    Hemoglobin A1C, Whole Blood: 6.0 % ( @ 09:24) - pre-DM    Thyroid Stimulating Hormone, Serum: 1.11 uU/mL ( @ 04:00)

## 2018-11-30 NOTE — CONSULT NOTE ADULT - PROBLEM SELECTOR RECOMMENDATION 9
-- discontinue lasix gtt  -- give captopril 3.125 mg TID, uptitrate as tolerated  -- IV lasix 40 mg BID  -- likely remove IABP tomorrow  -- trend LFTs  -- monitor Cr  -- monitor UOP  -- I/Os  -- daily weights  -- K>4, Mg>2

## 2018-11-30 NOTE — PROGRESS NOTE ADULT - ASSESSMENT
46 yo M w/ recent diagnosis in 2018 of nonischemic cardiomyopathy (EF 10%), initially presented to [facility] d/t SOB and Epigastric pain, now s/p R heart cardiac cath, showing clean coronaries, but with high wedge pressures and need for blood pressure support with pressors,transferred to [facility] CCU for cardiogenic shock.     #Neuro  -AAO x 4. No active issues.    #Pulm  - On nasal canula.   -Continue to wean supplemental oxygen.  -Respiratory distress likely secondary to acute decompensated heart failure.   -Continue IV Lasix gtt, hold for systolic BP <90    #Cardiovascular  - Cardiogenic Shock  -Patient recently diagnosed with NICM, Possible genetic component, (history of alcohol- not every day, in the past, and possible IVDU?)  - low flow state w elevated lactate, transaminases and JVD   - on  5 and levo 0.05, got swan and balloon pump  - TTE showing Severe global left ventricular systolic dysfunction with EF 10-15%, moderate-severe mitral regurgitation and moderate-severe tricuspid regurgitation.  - HF following.  - RHC showing RA18, RV 48/5/16,  PA 49/23/37 Wedge 26, PA sat 62.5%, CO 2.7, CI 1.87 on levo 0.05,  5, hbg 18.  - weaning levo as tolerated,   - Continue lasix drip at 10 for high wedge. Repeat TTE following diuresis.   - heparin gtt for balloon pump. Follow up CXR ordered to check balloon pump placement.    -History of Hypertension; Plan - holding all meds for now.   -Strict I/Os, daily wts, keep I < O, trend BMP, supplement lytes prn     #GI  - No active issues.   -Continue Protonix 40mg for GI ppx.    #Renal/  - LARISSA.  - trend Cr, trend lytes, supplement PRN    #ID  - Afebrile, no active issues.  -Administer influenza vaccine.   -Check UA    #Endo  - Follow up HgbA1c.    #Skin  -No active issues.    #DVT  - Heparin gtt. 46 yo M w/ recent diagnosis in 2018 of nonischemic cardiomyopathy (EF 10%), initially presented to [facility] d/t SOB and Epigastric pain, now s/p R heart cardiac cath, showing clean coronaries, but with high wedge pressures and need for blood pressure support with pressors, transferred to [facility] CCU for cardiogenic shock.     #Neuro  -No active issues.    #Pulm  -O2 sat well on RA  -Respiratory distress likely secondary to acute decompensated heart failure.   -S/p Lasix drip.   -Heart failure following, recs appreciated. Will start on Lasix 40 IV BID    #Cardiovascular  - Cardiogenic Shock  - Patient recently diagnosed with NICM  - S/p levo drip. Still on  3.5. [place] and balloon pump placed.  - TTE showing severe global left ventricular systolic dysfunction with EF 10-15%, moderate-severe mitral regurgitation and moderate-severe tricuspid regurgitation.  - HF following.  - RHC showing RA18, RV 48/5/16,  PA 49/23/37 Wedge 26, PA sat 62.5%, CO 2.7, CI 1.87 on levo 0.05,  5, hbg 18.  - S/p lasix drip. Will start on Lasix 40 IV BID. C/w Captopril 6.25 TID.   - heparin gtt for balloon pump. Follow up CXR ordered to check balloon pump placement.  - EP consulted for AICD placement, recs appreciated. Patient also reports he wore life vest before he came to the hospital, so interrogation of life vest might be helpful.     -History of Hypertension; Plan - holding all meds for now.   -Strict I/Os, daily wts, keep I < O, trend BMP, supplement lytes prn  -Given SVR 1600, c/w Captopril 6.25 TID for afterload reduction    #GI  - No active issues.   -Continue Protonix 40mg for GI ppx.    #Renal/  - LARISSA resolved, currently Cr 1.23  - trend Cr, trend lytes, supplement PRN    #ID  - Afebrile, no active issues.  -Administer influenza vaccine.     #Endo  - HgbA1c 6.0 on   - Continue to monitor     #Skin  -No active issues.    #DVT  - Heparin gtt. 46 yo M w/ recent diagnosis in 2018 of nonischemic cardiomyopathy (EF 10%), initially presented to [facility] d/t SOB and Epigastric pain, now s/p R heart cardiac cath, showing clean coronaries, but with high wedge pressures and need for blood pressure support with pressors, transferred to [facility] CCU for cardiogenic shock.     #Neuro  -No active issues.    #Pulm  -O2 sat well on RA  -Respiratory distress likely secondary to acute decompensated heart failure.   -S/p Lasix drip.   -Heart failure following, recs appreciated. Will start on Lasix 40 IV BID    #Cardiovascular  - Cardiogenic Shock  - Patient recently diagnosed with NICM  - S/p levo drip.  5 -> 2.5. [place] and balloon pump placed.  - TTE showing severe global left ventricular systolic dysfunction with EF 10-15%, moderate-severe mitral regurgitation and moderate-severe tricuspid regurgitation.  - HF following.  - RHC showing RA18, RV 48/5/16,  PA 49/23/37 Wedge 26, PA sat 62.5%, CO 2.7, CI 1.87 on levo 0.05,  5, hbg 18.  - S/p lasix drip. Will start on Lasix 40 IV BID. C/w Captopril 6.25 TID. SVR remains ~1600 currently.   - heparin gtt for balloon pump. Follow up CXR ordered to check balloon pump placement.  - EP consulted for AICD placement, recs appreciated. Patient also reports he wore life vest before he came to the hospital, so interrogation of life vest might be helpful.     -History of Hypertension; Plan - holding all meds for now.   -Strict I/Os, daily wts, keep I < O, trend BMP, supplement lytes prn  -Given SVR 1600, c/w Captopril 6.25 TID for afterload reduction    #GI  - No active issues.   -Continue Protonix 40mg for GI ppx.    #Renal/  - LARISSA resolved, currently Cr 1.23  - trend Cr, trend lytes, supplement PRN    #ID  - Afebrile, no active issues.  -Administer influenza vaccine.     #Endo  - HgbA1c 6.0 on   - Continue to monitor     #Skin  -No active issues.    #DVT  - Heparin gtt. 48 yo M w/ recent diagnosis in 2018 of nonischemic cardiomyopathy (EF 10%), initially presented to [facility] d/t SOB and Epigastric pain, now s/p R heart cardiac cath, showing clean coronaries, but with high wedge pressures and need for blood pressure support with pressors, transferred to [facility] CCU for cardiogenic shock.     #Neuro  -No active issues.    #Pulm  -O2 sat well on RA  -Respiratory distress likely secondary to acute decompensated heart failure.   -S/p Lasix drip.   -Heart failure following, recs appreciated. Will start on Lasix 40 IV BID    #Cardiovascular  - Cardiogenic Shock  - Patient recently diagnosed with NICM  - S/p levo drip.  5 -> 2.5. [place] and balloon pump placed.  - TTE showing severe global left ventricular systolic dysfunction with EF 10-15%, moderate-severe mitral regurgitation and moderate-severe tricuspid regurgitation.  - HF following.  - RHC showing RA18, RV 48/5/16,  PA 49/23/37 Wedge 26, PA sat 62.5%, CO 2.7, CI 1.87 on levo 0.05,  5, hbg 18.  - S/p lasix drip. Will start on Lasix 40 IV BID. C/w Captopril 6.25 TID. SVR remains ~1600 currently. Will follow up numbers tomorrow. When able, will also add spironolactone (home med, dose is 25 daily).   - heparin gtt for balloon pump. Follow up CXR ordered to check balloon pump placement.   - Hold heparin at 6 am tomorrow to remove balloon pump. And then wean off dobutamine as tolerated. Once dobutamine is off, will obtain TTE.   - EP consulted for AICD placement, recs appreciated. Patient also reports he wore life vest before he came to the hospital, so interrogation of life vest might be helpful.     -History of Hypertension; Plan - holding all meds for now.   -Strict I/Os, daily wts, keep I < O, trend BMP, supplement lytes prn  -Given SVR 1600, c/w Captopril 6.25 TID for afterload reduction    #GI  - No active issues.   -Continue Protonix 40mg for GI ppx.    #Renal/  - LARISSA resolved, currently Cr 1.23  - trend Cr, trend lytes, supplement PRN    #ID  - Afebrile, no active issues.  -Administer influenza vaccine.     #Endo  - HgbA1c 6.0 on   - Continue to monitor     #Skin  -No active issues.    #DVT  - Heparin gtt.

## 2018-11-30 NOTE — CHART NOTE - NSCHARTNOTEFT_GEN_A_CORE
====================  CCU MIDNIGHT ROUNDS  ====================    RIZWAN VILLALPANDO  [id number]  Patient is a 47y old  Male who presents with a chief complaint of Cardiogenic Shock (30 Nov 2018 17:04)  ====================  SUMMARY:  ====================  46 yo Spanish M, w/ PMH of [facility] (EF 10% 11/2018 - recently diagnosed in August 2018), presenting as transfer from [facility] CCU d/t cardiogenic shock, possible LVAD workup. Patient initially presented to [facility] ED morning of 11/29/18 d/t one day of shortness of breath, also with abdominal pain, worst in the epigastrium, and with one episode of nausea/vomiting the previous day.  IABP inserted, inotrope-assisted diuresis started and transferred to [facility].    ====================  NEW EVENTS:  ====================    MEDICATIONS  (STANDING):  captopril 6.25 milliGRAM(s) Oral every 8 hours  chlorhexidine 4% Liquid 1 Application(s) Topical <User Schedule>  DOBUTamine Infusion 2.5 MICROgram(s)/kG/Min (5.063 mL/Hr) IV Continuous <Continuous>  heparin  Infusion. 1000 Unit(s)/Hr (10 mL/Hr) IV Continuous <Continuous>  pantoprazole    Tablet 40 milliGRAM(s) Oral before breakfast    MEDICATIONS  (PRN):  aluminum hydroxide/magnesium hydroxide/simethicone Suspension 30 milliLiter(s) Oral every 6 hours PRN Dyspepsia  heparin  Injectable 5500 Unit(s) IV Push every 6 hours PRN For aPTT less than 40  heparin  Injectable 2500 Unit(s) IV Push every 6 hours PRN For aPTT between 40 - 57    ====================  VITALS (Last 12 hrs):  ====================    T(C): 37.2 (11-30-18 @ 20:00), Max: 37.2 (11-30-18 @ 20:00)  T(F): 99 (11-30-18 @ 20:00), Max: 99 (11-30-18 @ 20:00)  HR: 98 (11-30-18 @ 21:00) (84 - 100)  BP: --  BP(mean): --  ABP: --  ABP(mean): --  RR: 17 (11-30-18 @ 21:00) (13 - 47)  SpO2: 94% (11-30-18 @ 21:00) (91% - 99%)  Wt(kg): --  CVP(mm Hg): 5 (11-30-18 @ 21:00) (-2 - 7)  CVP(cm H2O): --  CO: --  CI: --  PA: 29/15 (11-30-18 @ 21:00) (22/9 - 31/15)  PA(mean): 21 (11-30-18 @ 21:00) (14 - 21)  PCWP: --  SVR: --  PVR: --    I&O's Summary    29 Nov 2018 07:01  -  30 Nov 2018 07:00  --------------------------------------------------------  IN: 356.4 mL / OUT: 3080 mL / NET: -2723.6 mL    30 Nov 2018 07:01  -  30 Nov 2018 21:37  --------------------------------------------------------  IN: 520 mL / OUT: 2030 mL / NET: -1510 mL    ====================  NEW LABS:  ====================  11-30    134<L>  |  95<L>  |  25<H>  ----------------------------<  102<H>  4.2   |  24  |  1.13    Ca    8.5      30 Nov 2018 18:06  Phos  2.2     11-30  Mg     2.0     11-30    TPro  6.4  /  Alb  3.7  /  TBili  3.2<H>  /  DBili  x   /  AST  879<H>  /  ALT  844<H>  /  AlkPhos  64  11-30    PT/INR - ( 30 Nov 2018 06:41 )   PT: 18.0 sec;   INR: 1.56 ratio      PTT - ( 30 Nov 2018 06:41 )  PTT:90.0 sec  ABG - ( 29 Nov 2018 09:00 )  pH, Arterial: 7.41  pH, Blood: x     /  pCO2: 18    /  pO2: 456   / HCO3: 16    / Base Excess: -13.1 /  SaO2: 100.0     ====================  PLAN:  ====================  -       Tracey Welch CCU NP  Beeper #[id number]  Spectra # [phone] ====================  CCU MIDNIGHT ROUNDS  ====================    RIZWAN VILLALPANDO  [id number]  Patient is a 47y old  Male who presents with a chief complaint of Cardiogenic Shock (30 Nov 2018 17:04)  ====================  SUMMARY:  ====================  48 yo [place] M, w/ PMH of nICMP (EF 10% 11/2018 - recently diagnosed in August 2018), presenting as transfer from [facility] CCU d/t cardiogenic shock, possible LVAD workup. Patient initially presented to [facility] ED morning of 11/29/18 d/t one day of shortness of breath, also with abdominal pain, worst in the epigastrium, and with one episode of nausea/vomiting the previous day.  IABP inserted, inotrope-assisted diuresis started and transferred to [facility].    ====================  NEW EVENTS:  ====================  Denies SOB or DRUMMOND, but c/o orthopnea.  Tolerating RA on 30 degree angle.  Off Lasix drip, now on IVP q12H, strill diuresing well.  Net negative 1.3L  MEDICATIONS  (STANDING):  captopril 6.25 milliGRAM(s) Oral every 8 hours  chlorhexidine 4% Liquid 1 Application(s) Topical <User Schedule>  DOBUTamine Infusion 2.5 MICROgram(s)/kG/Min (5.063 mL/Hr) IV Continuous <Continuous>  heparin  Infusion. 1000 Unit(s)/Hr (10 mL/Hr) IV Continuous <Continuous>  pantoprazole    Tablet 40 milliGRAM(s) Oral before breakfast    MEDICATIONS  (PRN):  aluminum hydroxide/magnesium hydroxide/simethicone Suspension 30 milliLiter(s) Oral every 6 hours PRN Dyspepsia  heparin  Injectable 5500 Unit(s) IV Push every 6 hours PRN For aPTT less than 40  heparin  Injectable 2500 Unit(s) IV Push every 6 hours PRN For aPTT between 40 - 57    ====================  VITALS (Last 12 hrs):  ====================    T(C): 37.2 (11-30-18 @ 20:00), Max: 37.2 (11-30-18 @ 20:00)  T(F): 99 (11-30-18 @ 20:00), Max: 99 (11-30-18 @ 20:00)  HR: 98 (11-30-18 @ 21:00) (84 - 100)  BP: --  BP(mean): --  ABP: --  ABP(mean): --  RR: 17 (11-30-18 @ 21:00) (13 - 47)  SpO2: 94% (11-30-18 @ 21:00) (91% - 99%)  Wt(kg): --  CVP(mm Hg): 5 (11-30-18 @ 21:00) (-2 - 7)  CVP(cm H2O): --  CO: --  CI: --  PA: 29/15 (11-30-18 @ 21:00) (22/9 - 31/15)  PA(mean): 21 (11-30-18 @ 21:00) (14 - 21)  PCWP: --  SVR: --  PVR: --    I&O's Summary    29 Nov 2018 07:01  -  30 Nov 2018 07:00  --------------------------------------------------------  IN: 356.4 mL / OUT: 3080 mL / NET: -2723.6 mL    30 Nov 2018 07:01  -  30 Nov 2018 21:37  --------------------------------------------------------  IN: 520 mL / OUT: 2030 mL / NET: -1510 mL    ====================  NEW LABS:  ====================  11-30    134<L>  |  95<L>  |  25<H>  ----------------------------<  102<H>  4.2   |  24  |  1.13    Ca    8.5      30 Nov 2018 18:06  Phos  2.2     11-30  Mg     2.0     11-30    TPro  6.4  /  Alb  3.7  /  TBili  3.2<H>  /  DBili  x   /  AST  879<H>  /  ALT  844<H>  /  AlkPhos  64  11-30    PT/INR - ( 30 Nov 2018 06:41 )   PT: 18.0 sec;   INR: 1.56 ratio      PTT - ( 30 Nov 2018 06:41 )  PTT:90.0 sec  ABG - ( 29 Nov 2018 09:00 )  pH, Arterial: 7.41  pH, Blood: x     /  pCO2: 18    /  pO2: 456   / HCO3: 16    / Base Excess: -13.1 /  SaO2: 100.0     ====================  PLAN:  ====================  -       Baldwin Placetyler Welch CCU NP  Beeper #[id number]  Spectra # [phone] ====================  CCU MIDNIGHT ROUNDS  ====================    RIZWAN VILLALPANDO  [id number]  Patient is a 47y old  Male who presents with a chief complaint of Cardiogenic Shock (30 Nov 2018 17:04)  ====================  SUMMARY:  ====================  46 yo Israeli M, w/ PMH of nICMP (EF 10% 11/2018 - recently diagnosed in August 2018), presenting as transfer from [facility] CCU d/t cardiogenic shock, possible LVAD workup. Patient initially presented to [facility] ED morning of 11/29/18 d/t one day of shortness of breath, also with abdominal pain, worst in the epigastrium, and with one episode of nausea/vomiting the previous day.  IABP inserted, inotrope-assisted diuresis started and transferred to [facility].    ====================  NEW EVENTS:  ====================  Denies SOB or DRUMMOND, but c/o orthopnea.  Tolerating RA on 30 degree angle.  Off Lasix drip, now on IVP q12H, still diuresing well.  Net negative 1.3L.  No c/o pain on IABP.  Site remains benign    MEDICATIONS  (STANDING):  captopril 6.25 milliGRAM(s) Oral every 8 hours  chlorhexidine 4% Liquid 1 Application(s) Topical <User Schedule>  DOBUTamine Infusion 2.5 MICROgram(s)/kG/Min (5.063 mL/Hr) IV Continuous <Continuous>  heparin  Infusion. 1000 Unit(s)/Hr (10 mL/Hr) IV Continuous <Continuous>  pantoprazole    Tablet 40 milliGRAM(s) Oral before breakfast    MEDICATIONS  (PRN):  aluminum hydroxide/magnesium hydroxide/simethicone Suspension 30 milliLiter(s) Oral every 6 hours PRN Dyspepsia  heparin  Injectable 5500 Unit(s) IV Push every 6 hours PRN For aPTT less than 40  heparin  Injectable 2500 Unit(s) IV Push every 6 hours PRN For aPTT between 40 - 57    ====================  VITALS (Last 12 hrs):  ====================    T(C): 37.2 (11-30-18 @ 20:00), Max: 37.2 (11-30-18 @ 20:00)  T(F): 99 (11-30-18 @ 20:00), Max: 99 (11-30-18 @ 20:00)  HR: 98 (11-30-18 @ 21:00) (84 - 100)  BP: --  BP(mean): --  ABP: --  ABP(mean): --  RR: 17 (11-30-18 @ 21:00) (13 - 47)  SpO2: 94% (11-30-18 @ 21:00) (91% - 99%)  Wt(kg): --  CVP(mm Hg): 5 (11-30-18 @ 21:00) (-2 - 7)  CVP(cm H2O): --  CO: --  CI: --  PA: 29/15 (11-30-18 @ 21:00) (22/9 - 31/15)  PA(mean): 21 (11-30-18 @ 21:00) (14 - 21)  PCWP: --  SVR: --  PVR: --    I&O's Summary    29 Nov 2018 07:01 - 30 Nov 2018 07:00  --------------------------------------------------------  IN: 356.4 mL / OUT: 3080 mL / NET: -2723.6 mL    30 Nov 2018 07:01  -  30 Nov 2018 21:37  --------------------------------------------------------  IN: 520 mL / OUT: 2030 mL / NET: -1510 mL    ====================  NEW LABS:  ====================  11-30    134<L>  |  95<L>  |  25<H>  ----------------------------<  102<H>  4.2   |  24  |  1.13    Ca    8.5      30 Nov 2018 18:06  Phos  2.2     11-30  Mg     2.0     11-30    TPro  6.4  /  Alb  3.7  /  TBili  3.2<H>  /  DBili  x   /  AST  879<H>  /  ALT  844<H>  /  AlkPhos  64  11-30    PT/INR - ( 30 Nov 2018 06:41 )   PT: 18.0 sec;   INR: 1.56 ratio      PTT - ( 30 Nov 2018 06:41 )  PTT:90.0 sec  ABG - ( 29 Nov 2018 09:00 )  pH, Arterial: 7.41  pH, Blood: x     /  pCO2: 18    /  pO2: 456   / HCO3: 16    / Base Excess: -13.1 /  SaO2: 100.0     ====================  PLAN:  ====================  - 46 y/o male with recent diagnosis of NICM (EF 10%) with lifevest but non-compliant on both lifevest and medications transferred from [facility] on cardiogenic shock requiring IABP and inotrope-assisted diuresis.  - Off Lasix drip, now on IV bid.  Net negative 1.5L  - Continue to monitor strict I & O's.  Monitor daily weights  - Monitor electrolytes, replete to keep K>4 and Mag>2  - Continue Dobutrex @ 2.5 mcg/kg/min.  Monitor hemodynamics.  CO=3.6 (4.7)  CI=2.08 (2.7)  CVP - 4-7  PAP- 27-31/11-14.  MVO2-66 (71)  - Monitor IABP site for bleeding.  Continue Heparin drip and discontinue @ 0600 for IABP removal  - Continue ACEI for afterload reduction.  Increase as tolerated.    Tracey Welch CCU NP  Beeper #[id number]  Spectra # [phone] ====================  CCU MIDNIGHT ROUNDS  ====================    RIZWAN VILLALPANDO  [id number]  Patient is a 47y old  Male who presents with a chief complaint of Cardiogenic Shock (30 Nov 2018 17:04)  ====================  SUMMARY:  ====================  46 yo [place] M, w/ PMH of nICMP (EF 10% 11/2018 - recently diagnosed in August 2018), presenting as transfer from [facility] CCU d/t cardiogenic shock, possible LVAD workup. Patient initially presented to [facility] ED morning of 11/29/18 d/t one day of shortness of breath, also with abdominal pain, worst in the epigastrium, and with one episode of nausea/vomiting the previous day.  IABP inserted, inotrope-assisted diuresis started and transferred to [facility].    ====================  NEW EVENTS:  ====================  Denies SOB or DRUMMOND, but c/o orthopnea.  Tolerating RA on 30 degree angle.  Off Lasix drip, now on IVP q12H, still diuresing well.  Net negative 1.3L.  No c/o pain on IABP.  Site remains benign    MEDICATIONS  (STANDING):  captopril 6.25 milliGRAM(s) Oral every 8 hours  chlorhexidine 4% Liquid 1 Application(s) Topical <User Schedule>  DOBUTamine Infusion 2.5 MICROgram(s)/kG/Min (5.063 mL/Hr) IV Continuous <Continuous>  heparin  Infusion. 1000 Unit(s)/Hr (10 mL/Hr) IV Continuous <Continuous>  pantoprazole    Tablet 40 milliGRAM(s) Oral before breakfast    MEDICATIONS  (PRN):  aluminum hydroxide/magnesium hydroxide/simethicone Suspension 30 milliLiter(s) Oral every 6 hours PRN Dyspepsia  heparin  Injectable 5500 Unit(s) IV Push every 6 hours PRN For aPTT less than 40  heparin  Injectable 2500 Unit(s) IV Push every 6 hours PRN For aPTT between 40 - 57    ====================  VITALS (Last 12 hrs):  ====================    T(C): 37.2 (11-30-18 @ 20:00), Max: 37.2 (11-30-18 @ 20:00)  T(F): 99 (11-30-18 @ 20:00), Max: 99 (11-30-18 @ 20:00)  HR: 98 (11-30-18 @ 21:00) (84 - 100)  BP: --  BP(mean): --  ABP: --  ABP(mean): --  RR: 17 (11-30-18 @ 21:00) (13 - 47)  SpO2: 94% (11-30-18 @ 21:00) (91% - 99%)  Wt(kg): --  CVP(mm Hg): 5 (11-30-18 @ 21:00) (-2 - 7)  CVP(cm H2O): --  CO: --  CI: --  PA: 29/15 (11-30-18 @ 21:00) (22/9 - 31/15)  PA(mean): 21 (11-30-18 @ 21:00) (14 - 21)  PCWP: --  SVR: --  PVR: --    I&O's Summary    29 Nov 2018 07:01 - 30 Nov 2018 07:00  --------------------------------------------------------  IN: 356.4 mL / OUT: 3080 mL / NET: -2723.6 mL    30 Nov 2018 07:01  -  30 Nov 2018 21:37  --------------------------------------------------------  IN: 520 mL / OUT: 2030 mL / NET: -1510 mL    ====================  NEW LABS:  ====================  11-30    134<L>  |  95<L>  |  25<H>  ----------------------------<  102<H>  4.2   |  24  |  1.13    Ca    8.5      30 Nov 2018 18:06  Phos  2.2     11-30  Mg     2.0     11-30    TPro  6.4  /  Alb  3.7  /  TBili  3.2<H>  /  DBili  x   /  AST  879<H>  /  ALT  844<H>  /  AlkPhos  64  11-30    PT/INR - ( 30 Nov 2018 06:41 )   PT: 18.0 sec;   INR: 1.56 ratio      PTT - ( 30 Nov 2018 06:41 )  PTT:90.0 sec  ABG - ( 29 Nov 2018 09:00 )  pH, Arterial: 7.41  pH, Blood: x     /  pCO2: 18    /  pO2: 456   / HCO3: 16    / Base Excess: -13.1 /  SaO2: 100.0     ====================  PLAN:  ====================  - 48 y/o male with recent diagnosis of NICM (EF 10%) with lifevest but non-compliant with both lifevest and medications transferred from [facility] on cardiogenic shock requiring IABP and inotrope-assisted diuresis.  - Off Lasix drip, now on IV bid.  Net negative 1.5L  - Continue to monitor strict I & O's.  Monitor daily weights  - Monitor electrolytes, replete to keep K>4 and Mag>2  - Continue Dobutrex @ 2.5 mcg/kg/min.  Monitor hemodynamics.  CO=3.6 (4.7)  CI=2.08 (2.7)  CVP - 4-7  PAP- 27-31/11-14.  MVO2-66 (71)  - Monitor IABP site for bleeding.  Continue Heparin drip and discontinue @ 0600 for IABP removal  - Continue ACEI for afterload reduction.  Increase as tolerated.  - Start Aldactone in am if BP tolerates    Tracey Welch CCU NP  Beeper #[id number]  Spectra # [phone] ====================  CCU MIDNIGHT ROUNDS  ====================    RIZWAN VILLALPANDO  [id number]  Patient is a 47y old  Male who presents with a chief complaint of Cardiogenic Shock (30 Nov 2018 17:04)  ====================  SUMMARY:  ====================  46 yo [place]err[place] M, w/ PMH of nICMP (EF 10% 11/2018 - recently diagnosed in August 2018), presenting as transfer from [facility] CCU d/t cardiogenic shock, possible LVAD workup. Patient initially presented to [facility] ED morning of 11/29/18 d/t one day of shortness of breath, also with abdominal pain, worst in the epigastrium, and with one episode of nausea/vomiting the previous day.  IABP inserted, inotrope-assisted diuresis started and transferred to [facility].    ====================  NEW EVENTS:  ====================  Denies SOB or DRUMMOND, but c/o orthopnea.  Tolerating RA on 30 degree angle.  Off Lasix drip, now on IVP q12H, still diuresing well.  Net negative 1.3L.  No c/o pain on IABP.  Site remains benign    MEDICATIONS  (STANDING):  captopril 6.25 milliGRAM(s) Oral every 8 hours  chlorhexidine 4% Liquid 1 Application(s) Topical <User Schedule>  DOBUTamine Infusion 2.5 MICROgram(s)/kG/Min (5.063 mL/Hr) IV Continuous <Continuous>  heparin  Infusion. 1000 Unit(s)/Hr (10 mL/Hr) IV Continuous <Continuous>  pantoprazole    Tablet 40 milliGRAM(s) Oral before breakfast    MEDICATIONS  (PRN):  aluminum hydroxide/magnesium hydroxide/simethicone Suspension 30 milliLiter(s) Oral every 6 hours PRN Dyspepsia  heparin  Injectable 5500 Unit(s) IV Push every 6 hours PRN For aPTT less than 40  heparin  Injectable 2500 Unit(s) IV Push every 6 hours PRN For aPTT between 40 - 57    ====================  VITALS (Last 12 hrs):  ====================    T(C): 37.2 (11-30-18 @ 20:00), Max: 37.2 (11-30-18 @ 20:00)  T(F): 99 (11-30-18 @ 20:00), Max: 99 (11-30-18 @ 20:00)  HR: 98 (11-30-18 @ 21:00) (84 - 100)  BP: --  BP(mean): --  ABP: --  ABP(mean): --  RR: 17 (11-30-18 @ 21:00) (13 - 47)  SpO2: 94% (11-30-18 @ 21:00) (91% - 99%)  Wt(kg): --  CVP(mm Hg): 5 (11-30-18 @ 21:00) (-2 - 7)  CVP(cm H2O): --  CO: --  CI: --  PA: 29/15 (11-30-18 @ 21:00) (22/9 - 31/15)  PA(mean): 21 (11-30-18 @ 21:00) (14 - 21)  PCWP: --  SVR: --  PVR: --    I&O's Summary    29 Nov 2018 07:01 - 30 Nov 2018 07:00  --------------------------------------------------------  IN: 356.4 mL / OUT: 3080 mL / NET: -2723.6 mL    30 Nov 2018 07:01  -  30 Nov 2018 21:37  --------------------------------------------------------  IN: 520 mL / OUT: 2030 mL / NET: -1510 mL    ====================  NEW LABS:  ====================  11-30    134<L>  |  95<L>  |  25<H>  ----------------------------<  102<H>  4.2   |  24  |  1.13    Ca    8.5      30 Nov 2018 18:06  Phos  2.2     11-30  Mg     2.0     11-30    TPro  6.4  /  Alb  3.7  /  TBili  3.2<H>  /  DBili  x   /  AST  879<H>  /  ALT  844<H>  /  AlkPhos  64  11-30    PT/INR - ( 30 Nov 2018 06:41 )   PT: 18.0 sec;   INR: 1.56 ratio      PTT - ( 30 Nov 2018 06:41 )  PTT:90.0 sec  ABG - ( 29 Nov 2018 09:00 )  pH, Arterial: 7.41  pH, Blood: x     /  pCO2: 18    /  pO2: 456   / HCO3: 16    / Base Excess: -13.1 /  SaO2: 100.0     ====================  PLAN:  ====================  - 48 y/o male with recent diagnosis of NICM (EF 10%) with lifevest but non-compliant with both lifevest and medications transferred from [facility] on cardiogenic shock requiring IABP and inotrope-assisted diuresis.  - Off Lasix drip, now on IV bid.  Net negative 1.5L  - Continue to monitor strict I & O's.  Monitor daily weights  - Monitor electrolytes, replete to keep K>4 and Mag>2  - Continue Dobutrex @ 2.5 mcg/kg/min.  Monitor hemodynamics.  CO=3.6 (4.7)  CI=2.08 (2.7)  CVP - 4-7  PAP- 27-31/11-14.  MVO2-66 (71)  - Monitor IABP site for bleeding.  Continue Heparin drip and discontinue @ 0600 for IABP removal  - Continue ACEI for afterload reduction.  Increase as tolerated.  - Start Aldactone in am if BP tolerates  - Ongoing LVAD buster Welch CCU NP  Beeper #[id number]  Spectra # [phone]

## 2018-11-30 NOTE — CONSULT NOTE ADULT - ASSESSMENT
46 yo [place] M, w/ PMH of NICM LVIDd 7 cm (EF 10% 11/2018 - recently diagnosed in August 2018) a/w cardiogenic shock requiring IABP and inotropes.    RHC on 11/29/2018 RA 18, RV 48/5/16 PA 49/23/37 PCWP 26 PA 62% CO 2.7 CI 1.87 SVR 2300 PVR 4    11/30 CVP 4, PA 33/16/23 MvO2 77% CO 4.97 CI 2.37 SVR 1200    Currently on dobutamine 2.5 mcg/kg/min, lasix 7.5 mg/hr, off levo

## 2018-11-30 NOTE — PROGRESS NOTE ADULT - SUBJECTIVE AND OBJECTIVE BOX
Met with patient Mr.Orly Charles and his wife for approximately 30 minutes to discuss consent for heart transplant evaluation.  Reviewed evaluation process including testing, labs, appointments, and consults with the transplant team.  Discussed surgical, medical, and psycho-social risks and benefits, selection process, [facility] waitlist information, and follow-up care.  Patient and caregivers questions were answered.  Consent for increased risk donor was reviewed.  Patient signed both consents and was supplied copies of consents, patient handbook, [facility] " Questions and Answers for transplant candidates regarding multiple listing and wait time transfer", along with contact phone number if they have any additional questions or needs.  Patient is aware we are available should they or their family have any additional questions or concerns.    Jeri Georges NP  Transplant Coordinator  [phone]

## 2018-11-30 NOTE — CONSULT NOTE ADULT - SUBJECTIVE AND OBJECTIVE BOX
Patient seen and evaluated @ 8 AM  Chief Complaint: SOB    HPI:  48 yo Shashank M, w/ PMH of nICMP (EF 10% 2018 - recently diagnosed in 2018), presenting as transfer from [facility] CCU d/t cardiogenic shock, possible LVAD workup. Patient initially presented to [facility] ED morning of 18 d/t one day of shortness of breath, also with abdominal pain, worst in the epigastrium, and with one episode of nausea/vomiting the previous day. Patient denies chest pain, palpitations, fevers, chills, or other complaints.     Patient was first diagnosed with non-ischemic cardiomyopathy in , after he presented to ED d/t Lower Extremity edema and shortness of breath. He had a L heart catheterization, which showed clean coronaries, but significant for nICMP. Patient also had a Cardiac MRI which showed dilated cardiomyopathy but no infiltrative diseases. Following this hospitalization, patient followed up with Dr. Barnes as outpatient, but has not seen him since 2018. Patient was also supposed to follow up with the HF team, but never did so.    Patient presented to [facility] ED one week ago d/t similar presentation of SOB and abd pain. Patient was seen by Dr. Flaherty and recommended to transfer to [facility] for further treatment. However, pt did not want to be transferred and was discharged for follow up.    Previous note from [facility] states that per wife, pt is not compliant with medications. Stopped taking the HF medications he was discharged with except for lasix. She was also concerned about IVDU 3-4 years ago. Also notes significant fam hx in father and brother of sudden death in 40s. Patient acknowledges that he only takes spironolactone, Lasix, and potassium, and that he only wears his Life Vest some of the time. States that he has never used intravenous drugs. Used marijuana for very brief period in college over 20 years ago. Denies any other drug use, and states no longer drinks alcohol.     In the [facility] ER, patient BP low with elevated lactate, poor oxygenation. Also with hyperkalemia.  Dobutamine gtt started and levophed gtt initiated. Patient emergently transferred to cardiac cath lab for a RHC. A balloon pump was inserted, levophed gtt decreased to 0.3mcg/kg/min. Dobutamine gtt titrated to 5mck/kg/min. Patient titrated off BIPAP and onto High Flow  oxygen. Augmenting 120s on ballon pump.     In the [facility] CCU, patient continues to endorse diffuse, mild abdominal pain, worst in the epigastrium, but states that he no longer is experiencing shortness of breath, and is breathing comfortably on nasal canula in the room. Patient denies other complaints. Denies headache, neck stiffness, fever/chills, vision change, chest pain, current shortness of breath or difficulty breathing, palpitations, lightheadedness, weakness, dizziness, numbness, tingling, current nausea, vomiting, diarrhea, dysuria, hematuria, syncope. (2018 23:31)    PMH:   HTN (hypertension)  Cardiomyopathy  No pertinent past medical history    PSH:   No significant past surgical history    Medications:   aluminum hydroxide/magnesium hydroxide/simethicone Suspension 30 milliLiter(s) Oral every 6 hours PRN  captopril 6.25 milliGRAM(s) Oral every 8 hours  chlorhexidine 4% Liquid 1 Application(s) Topical <User Schedule>  DOBUTamine Infusion 2.5 MICROgram(s)/kG/Min IV Continuous <Continuous>  furosemide   Injectable 40 milliGRAM(s) IV Push once  heparin  Infusion. 1000 Unit(s)/Hr IV Continuous <Continuous>  heparin  Injectable 5500 Unit(s) IV Push every 6 hours PRN  heparin  Injectable 2500 Unit(s) IV Push every 6 hours PRN  pantoprazole    Tablet 40 milliGRAM(s) Oral before breakfast    Allergies:  aspirin (Swelling)  Motrin (Hives)    FAMILY HISTORY:  Family history of lung disease (Grandparent): maternal grandmother (no history of smoking)  Family history of hypertension: father  Family history of heart disease: mother    Social History:  Smoking:  Alcohol:  Drugs:    Review of Systems:  Constitutional: [ ] Fever [ ] Chills [ ] Fatigue [ ] Weight change   HEENT: [ ] Blurred vision [ ] Eye Pain [ ] Headache [ ] Runny nose [ ] Sore Throat   Respiratory: [ ] Cough [ ] Wheezing [ ] Shortness of breath  Cardiovascular: [ ] Chest Pain [ ] Palpitations [ ] DRUMMOND [ ] PND [ ] Orthopnea  Gastrointestinal: [ ] Abdominal Pain [ ] Diarrhea [ ] Constipation [ ] Hemorrhoids [ ] Nausea [ ] Vomiting  Genitourinary: [ ] Nocturia [ ] Dysuria [ ] Incontinence  Extremities: [ ] Swelling [ ] Joint Pain  Neurologic: [ ] Focal deficit [ ] Paresthesias [ ] Syncope  Lymphatic: [ ] Swelling [ ] Lymphadenopathy   Skin: [ ] Rash [ ] Ecchymoses [ ] Wounds [ ] Lesions  Psychiatry: [ ] Depression [ ] Suicidal/Homicidal Ideation [ ] Anxiety [ ] Sleep Disturbances  [ ] 10 point review of systems is otherwise negative except as mentioned above            [ ]Unable to obtain    Physical Exam:  T(C): 36.4 (18 @ 10:00), Max: 37.2 (18 @ 23:00)  HR: 84 (18 @ 13:00) (80 - 92)  BP: --  RR: 20 (18 @ 11:00) (12 - 35)  SpO2: 95% (18 @ 13:00) (94% - 100%)  Wt(kg): --     @ 07:01  -   @ 07:00  --------------------------------------------------------  IN: 356.4 mL / OUT: 3080 mL / NET: -2723.6 mL     @ 07:01  -   @ 13:57  --------------------------------------------------------  IN: 300 mL / OUT: 1000 mL / NET: -700 mL      Daily Height in cm: 162.56 (2018 23:15)    Daily     Appearance: NAD  Eyes: PERRL, EOMI  HENT: Normal oral muscosa, NC/AT  Cardiovascular: normal S1 and S2, RRR, no m/r/g, no edema, normal JVP  Procedural Access Site:  No hematoma, non-tender to palpation, 2+ pulses distally, no bruit, no ecchymosis  Respiratory: Clear to auscultation bilaterally  Gastrointestinal: Soft, non-tender, non-distended, BS+  Musculoskeletal: No clubbing, no joint deformity   Neurologic: Non-focal  Lymphatic: No lymphadenopathy  Psychiatry: AAOx3, mood & affect appropriate  Skin: No rashes, no ecchymoses, no cyanosis    Cardiovascular Diagnostic Testing:  ECG: sinus tachycardia    Echo:  TTE 18  CONCLUSIONS:  1. Tethered, thickened mitral valve leaflets with normal  opening. Moderate-severe mitral regurgitation which could  be underestimated.  2. Calcified trileaflet aortic valve with normal opening.  3. Severe left ventricular enlargement.  4. Endocardial visualization enhanced with intravenous  injection of echo contrast (Definity).   Severe global left  ventricular systolic dysfunction.   No left ventricular  thrombus.  5. Normal right atrium.  6. Right ventricular enlargement with decreased right  ventricular systolic function.  7. Normal tricuspid valve. Moderate-severe tricuspid  regurgitation.    Stress Testing:  none    Cath:  2018  HEMODYNAMICS: There is moderate pulmonary hypertension.  COMPLICATIONS: There were no complications.  DIAGNOSTIC RECOMMENDATIONS: Medical management is recommended. Optimize  management for acute on chronic systolic heart failure. Continue  dobutamine and IABP. Wean norepinephrine as tolerated.  Prepared and signed by  Arthur Garza M.D.  Signed 2018 14:29:30  HEMODYNAMIC TABLES  Pressures:  Baseline  Pressures:  - HR: 95  Pressures:  - Rhythm:  Pressures:  -- Aortic Pressure (S/D/M): 122/86/101  Pressures:  -- Pulmonary Artery (S/D/M): 49/23/37  Pressures:  -- Pulmonary Capillary Wedge: 31/36/26  Pressures:  -- Right Atrium (a/v/M): 21/21/18  Pressures:  -- Right Ventricle (s/edp): 48/16/--  O2 Sats:  Baseline  O2 Sats:  - HR: 95  O2 Sats:  - Rhythm:  O2 Sats:  -- AO: 15.5/100/21.08  O2 Sats:  -- PA: 15.5/62.5/13.18  Outputs:  Baseline  Outputs:  -- CALCULATIONS[age]  Outputs:  -- CALCULATIONS: Body Surface Area: 1.75  Outputs:  -- CALCULATIONS: Height in cm: 170.00  Outputs:  -- CALCULATIONS: Sex: Male  Outputs:  -- CALCULATIONS: Weight in k.00  Outputs:  -- OUTPUTS: CO by Manny: 2.77  Outputs:  -- OUTPUTS: Manny cardiac index: 1.58  Outputs:  -- OUTPUTS: O2 consumption: 219.20  Outputs:  -- OUTPUTS: Vo2 Indexed: 125.00  Outputs:  -- RESISTANCES: Left ventricular stroke work: 29.46  Outputs:  -- RESISTANCES: Left Ventricular Stroke Work index: 16.80  Outputs:  -- RESISTANCES: Pulmonary vascular index (dsc): 556.37  Outputs:  -- RESISTANCES: Pulmonary vascular index (Wood Units): 6.96  Outputs:  -- RESISTANCES: Pulmonary vascular resistance (dsc): 317.28  Outputs:  -- RESISTANCES: Pulmonary vascular resistance (Wood Units): 3.97  Outputs:  -- RESISTANCES: PVR_SVR Ratio: 0.13  Outputs:  -- RESISTANCES: Right ventricular stroke work: 7.46  Outputs:  -- RESISTANCES: Right ventricular stroke work index: 4.26  Outputs:  -- RESISTANCES: Systemicvascular index (dsc): 4198.09  Outputs:  -- RESISTANCES: Systemic vascular index (Wood Units): 52.49  Outputs:  -- RESISTANCES: Systemic vascular resistance (dsc): 2393.98  Outputs:  -- RESISTANCES: Systemic vascular resistance (Wood Units): 29.93  Outputs:  -- RESISTANCES: Total pulmonary index (dsc): 1871.44  Outputs:  -- RESISTANCES: Total pulmonary index (Wood Units): 23.40  Outputs:  -- RESISTANCES: Total pulmonary resistance (dsc): 1067.20  Outputs:  -- RESISTANCES: Total pulmonary resistance (Wood Units): 13.34  Outputs:  -- RESISTANCES: Total vascular index (Wood Units): 63.87  Outputs:  -- RESISTANCES: Total vascular resistance (dsc): 2913.16  Outputs:  -- RESISTANCES: Total vascular resistance (Wood Units): 36.42  Outputs:  -- RESISTANCES: Total vascular resistance index (dsc): 5108.51  Outputs:  -- RESISTANCES: TPR_TVR Ratio: 0.37  Outputs:  -- SHUNTS: Pulmonary flow: 2.77  Outputs:  -- SHUNTS: Qp Indexed: 1.58  Outputs:  -- SHUNTS: Qs Indexed: 1.58  Outputs:  -- SHUNTS: Systemic flow: 2.77    Imaging:    Labs:                        16.6   11.5  )-----------( 133      ( 2018 11:31 )             49.4         135  |  95<L>  |  27<H>  ----------------------------<  140<H>  4.3   |  26  |  1.23    Ca    8.8      2018 11:31  Phos  2.8       Mg     2.2         TPro  6.8  /  Alb  4.1  /  TBili  2.7<H>  /  DBili  x   /  AST  943<H>  /  ALT  812<H>  /  AlkPhos  65      PT/INR - ( 2018 06:41 )   PT: 18.0 sec;   INR: 1.56 ratio         PTT - ( 2018 06:41 )  PTT:90.0 sec  CARDIAC MARKERS ( 2018 08:56 )  x     / x     / 242 u/L / 3.21 ng/mL / x          Serum Pro-Brain Natriuretic Peptide: 4034 pg/mL ( @ 08:56)  Serum Pro-Brain Natriuretic Peptide: 3844 pg/mL ( @ 23:03)      Hemoglobin A1C, Whole Blood: 6.0 % ( @ 09:24)    Thyroid Stimulating Hormone, Serum: 1.11 uU/mL ( @ 04:00)

## 2018-11-30 NOTE — CONSULT NOTE ADULT - ASSESSMENT
47M w/ PMHx of HTN, pre-DM and chronic HFrEF (2/2 NICM, Dx 8/2018, had LifeVest as outpatient) who was transferred from [facility] to [facility] CCU due to cardiogenic shock.  The patient was poorly compliant with medications and his LifeVest prior to this admission and has not been on OMT    Plan    1. chronic HFrEF (2/2 NICM) with current cardiogenic shock  -Given that the patient is currently undergoing an LVAD/Transplant w/u would prefer to implant an AICD after decision on advanced therapies has been made  -The patient's poor compliance with OMT and his LifeVest is concerning    EP will follow along    SHERRELL Pham  Cardiology Fellow  (p): [phone]

## 2018-11-30 NOTE — PHARMACOTHERAPY INTERVENTION NOTE - COMMENTS
46 y/o is being evaluated for heart transplant.     Outpatient medication reconciliation reviewed and will be re-started appropriately.  Plan discussed with heart transplant team.     The patient has no contraindications from pharmacy standpoint and is cleared to receive a heart transplant.

## 2018-11-30 NOTE — PROGRESS NOTE ADULT - SUBJECTIVE AND OBJECTIVE BOX
INTERVAL HISTORY:    REVIEW OF SYSTEMS: As above; all others negative    MEDICATIONS  (STANDING):  chlorhexidine 4% Liquid 1 Application(s) Topical <User Schedule>  DOBUTamine Infusion 2.5 MICROgram(s)/kG/Min (5.063 mL/Hr) IV Continuous <Continuous>  furosemide Infusion 5 mG/Hr (2.5 mL/Hr) IV Continuous <Continuous>  heparin  Infusion. 1000 Unit(s)/Hr (10 mL/Hr) IV Continuous <Continuous>  pantoprazole    Tablet 40 milliGRAM(s) Oral before breakfast    MEDICATIONS  (PRN):  aluminum hydroxide/magnesium hydroxide/simethicone Suspension 30 milliLiter(s) Oral every 6 hours PRN Dyspepsia  heparin  Injectable 5500 Unit(s) IV Push every 6 hours PRN For aPTT less than 40  heparin  Injectable 2500 Unit(s) IV Push every 6 hours PRN For aPTT between 40 - 57      Objective:  ICU Vital Signs Last 24 Hrs  T(C): 36.4 (30 Nov 2018 06:00), Max: 37.2 (29 Nov 2018 23:00)  T(F): 97.5 (30 Nov 2018 06:00), Max: 99 (29 Nov 2018 23:00)  HR: 82 (30 Nov 2018 07:30) (80 - 94)  BP: 114/80 (29 Nov 2018 11:55) (60/43 - 121/73)  BP(mean): 88 (29 Nov 2018 11:55) (74 - 93)  ABP: --  ABP(mean): --  RR: 17 (30 Nov 2018 07:30) (12 - 35)  SpO2: 98% (30 Nov 2018 07:30) (83% - 100%)      Adult Advanced Hemodynamics Last 24 Hrs  CVP(mm Hg): 6 (30 Nov 2018 07:30) (2 - 19)  CVP(cm H2O): --  CO: 3 (29 Nov 2018 20:26) (3 - 3)  CI: 1.7 (29 Nov 2018 20:26) (1.7 - 1.7)  PA: 29/16 (30 Nov 2018 07:30) (21/18 - 40/23)  PA(mean): 21 (30 Nov 2018 07:30) (17 - 29)  PCWP: 20 (29 Nov 2018 20:26) (20 - 20)  SVR: 1001 (29 Nov 2018 20:26) (1001 - 1001)  SVRI: 3270 (29 Nov 2018 20:26) (3270 - 3270)  PVR: 133 (29 Nov 2018 20:26) (133 - 133)  PVRI: 235 (29 Nov 2018 20:26) (235 - 235)      11-29 @ 07:01  -  11-30 @ 07:00  --------------------------------------------------------  IN: 338.9 mL / OUT: 2730 mL / NET: -2391.1 mL      Daily Height in cm: 162.56 (29 Nov 2018 23:15)    Daily     PHYSICAL EXAM:      Constitutional:    HEENT:    Respiratory:    Cardiovascular:    Gastrointestinal:    Genitourinary:    Extremities:    Vascular:    Neurological:    Skin:      TELEMETRY:     EKG:     IMAGING:    Labs:  ABG - ( 29 Nov 2018 09:00 )  pH, Arterial: 7.41  pH, Blood: x     /  pCO2: 18    /  pO2: 456   / HCO3: 16    / Base Excess: -13.1 /  SaO2: 100.0                                   16.8   9.3   )-----------( 133      ( 30 Nov 2018 05:24 )             51.3     11-30    137  |  97  |  32<H>  ----------------------------<  168<H>  3.4<L>   |  25  |  1.23    Ca    8.8      30 Nov 2018 05:24  Phos  3.5     11-30  Mg     1.9     11-30    TPro  7.0  /  Alb  4.2  /  TBili  2.7<H>  /  DBili  x   /  AST  1091<H>  /  ALT  806<H>  /  AlkPhos  66  11-30    LIVER FUNCTIONS - ( 30 Nov 2018 05:24 )  Alb: 4.2 g/dL / Pro: 7.0 g/dL / ALK PHOS: 66 U/L / ALT: 806 U/L / AST: 1091 U/L / GGT: x           PT/INR - ( 30 Nov 2018 06:41 )   PT: 18.0 sec;   INR: 1.56 ratio         PTT - ( 30 Nov 2018 06:41 )  PTT:90.0 sec  CKMB: 3.21 ng/mL (11-29 @ 08:56)  Creatine Kinase, Serum: 242 u/L (11-29 @ 08:56)  CKMB Relative Index: 1.3 (11-29 @ 08:56)        HEALTH ISSUES - PROBLEM Dx: INTERVAL HISTORY: He reports he is doing well, denies chest pain or SOB.     MEDICATIONS  (STANDING):  chlorhexidine 4% Liquid 1 Application(s) Topical <User Schedule>  DOBUTamine Infusion 2.5 MICROgram(s)/kG/Min (5.063 mL/Hr) IV Continuous <Continuous>  furosemide Infusion 5 mG/Hr (2.5 mL/Hr) IV Continuous <Continuous>  heparin  Infusion. 1000 Unit(s)/Hr (10 mL/Hr) IV Continuous <Continuous>  pantoprazole    Tablet 40 milliGRAM(s) Oral before breakfast    MEDICATIONS  (PRN):  aluminum hydroxide/magnesium hydroxide/simethicone Suspension 30 milliLiter(s) Oral every 6 hours PRN Dyspepsia  heparin  Injectable 5500 Unit(s) IV Push every 6 hours PRN For aPTT less than 40  heparin  Injectable 2500 Unit(s) IV Push every 6 hours PRN For aPTT between 40 - 57      Objective:  ICU Vital Signs Last 24 Hrs  T(C): 36.4 (30 Nov 2018 06:00), Max: 37.2 (29 Nov 2018 23:00)  T(F): 97.5 (30 Nov 2018 06:00), Max: 99 (29 Nov 2018 23:00)  HR: 82 (30 Nov 2018 07:30) (80 - 94)  BP: 114/80 (29 Nov 2018 11:55) (60/43 - 121/73)  BP(mean): 88 (29 Nov 2018 11:55) (74 - 93)  ABP: --  ABP(mean): --  RR: 17 (30 Nov 2018 07:30) (12 - 35)  SpO2: 98% (30 Nov 2018 07:30) (83% - 100%)      Adult Advanced Hemodynamics Last 24 Hrs  CVP(mm Hg): 6 (30 Nov 2018 07:30) (2 - 19)  CVP(cm H2O): --  CO: 3 (29 Nov 2018 20:26) (3 - 3)  CI: 1.7 (29 Nov 2018 20:26) (1.7 - 1.7)  PA: 29/16 (30 Nov 2018 07:30) (21/18 - 40/23)  PA(mean): 21 (30 Nov 2018 07:30) (17 - 29)  PCWP: 20 (29 Nov 2018 20:26) (20 - 20)  SVR: 1001 (29 Nov 2018 20:26) (1001 - 1001)  SVRI: 3270 (29 Nov 2018 20:26) (3270 - 3270)  PVR: 133 (29 Nov 2018 20:26) (133 - 133)  PVRI: 235 (29 Nov 2018 20:26) (235 - 235)      11-29 @ 07:01  -  11-30 @ 07:00  --------------------------------------------------------  IN: 338.9 mL / OUT: 2730 mL / NET: -2391.1 mL      Daily Height in cm: 162.56 (29 Nov 2018 23:15)    Daily     PHYSICAL EXAM:  General appearance: NAD, conversant, afebrile  HEENT: NC/AT, EOMI, sclera clear bilaterally  Neck: supple  Pulm: CTAB, nonlabored breathing  CV: RRR, S1 and S2, no murmurs  Abd: soft NT ND +BS   Ext: Trace peripheral edema  Skin: no rash  Psych: appropriate affect, cooperative    TELEMETRY:     EKG:     IMAGING:    Labs:  ABG - ( 29 Nov 2018 09:00 )  pH, Arterial: 7.41  pH, Blood: x     /  pCO2: 18    /  pO2: 456   / HCO3: 16    / Base Excess: -13.1 /  SaO2: 100.0                         16.8   9.3   )-----------( 133      ( 30 Nov 2018 05:24 )             51.3     11-30    137  |  97  |  32<H>  ----------------------------<  168<H>  3.4<L>   |  25  |  1.23    Ca    8.8      30 Nov 2018 05:24  Phos  3.5     11-30  Mg     1.9     11-30    TPro  7.0  /  Alb  4.2  /  TBili  2.7<H>  /  DBili  x   /  AST  1091<H>  /  ALT  806<H>  /  AlkPhos  66  11-30    LIVER FUNCTIONS - ( 30 Nov 2018 05:24 )  Alb: 4.2 g/dL / Pro: 7.0 g/dL / ALK PHOS: 66 U/L / ALT: 806 U/L / AST: 1091 U/L / GGT: x           PT/INR - ( 30 Nov 2018 06:41 )   PT: 18.0 sec;   INR: 1.56 ratio         PTT - ( 30 Nov 2018 06:41 )  PTT:90.0 sec  CKMB: 3.21 ng/mL (11-29 @ 08:56)  Creatine Kinase, Serum: 242 u/L (11-29 @ 08:56)  CKMB Relative Index: 1.3 (11-29 @ 08:56)        HEALTH ISSUES - PROBLEM Dx:

## 2018-12-01 LAB
ALBUMIN SERPL ELPH-MCNC: 3.5 G/DL — SIGNIFICANT CHANGE UP (ref 3.3–5)
ALBUMIN SERPL ELPH-MCNC: 3.9 G/DL — SIGNIFICANT CHANGE UP (ref 3.3–5)
ALP SERPL-CCNC: 61 U/L — SIGNIFICANT CHANGE UP (ref 40–120)
ALP SERPL-CCNC: 70 U/L — SIGNIFICANT CHANGE UP (ref 40–120)
ALT FLD-CCNC: 668 U/L — HIGH (ref 10–45)
ALT FLD-CCNC: 752 U/L — HIGH (ref 10–45)
AMYLASE P1 CFR SERPL: 95 U/L — SIGNIFICANT CHANGE UP (ref 25–125)
ANION GAP SERPL CALC-SCNC: 12 MMOL/L — SIGNIFICANT CHANGE UP (ref 5–17)
ANION GAP SERPL CALC-SCNC: 14 MMOL/L — SIGNIFICANT CHANGE UP (ref 5–17)
APPEARANCE UR: CLEAR — SIGNIFICANT CHANGE UP
APTT BLD: 31.3 SEC — SIGNIFICANT CHANGE UP (ref 27.5–36.3)
APTT BLD: >200 SEC — CRITICAL HIGH (ref 27.5–36.3)
AST SERPL-CCNC: 540 U/L — HIGH (ref 10–40)
AST SERPL-CCNC: 550 U/L — HIGH (ref 10–40)
BASE EXCESS BLDMV CALC-SCNC: 3.6 MMOL/L — HIGH (ref -3–3)
BASE EXCESS BLDMV CALC-SCNC: 4.2 MMOL/L — HIGH (ref -3–3)
BASE EXCESS BLDMV CALC-SCNC: 4.7 MMOL/L — HIGH (ref -3–3)
BASOPHILS # BLD AUTO: 0 K/UL — SIGNIFICANT CHANGE UP (ref 0–0.2)
BASOPHILS # BLD AUTO: 0 K/UL — SIGNIFICANT CHANGE UP (ref 0–0.2)
BASOPHILS # BLD AUTO: 0.1 K/UL — SIGNIFICANT CHANGE UP (ref 0–0.2)
BASOPHILS NFR BLD AUTO: 0.1 % — SIGNIFICANT CHANGE UP (ref 0–2)
BASOPHILS NFR BLD AUTO: 0.2 % — SIGNIFICANT CHANGE UP (ref 0–2)
BASOPHILS NFR BLD AUTO: 0.6 % — SIGNIFICANT CHANGE UP (ref 0–2)
BILIRUB SERPL-MCNC: 3.5 MG/DL — HIGH (ref 0.2–1.2)
BILIRUB SERPL-MCNC: 3.6 MG/DL — HIGH (ref 0.2–1.2)
BILIRUB UR-MCNC: NEGATIVE — SIGNIFICANT CHANGE UP
BUN SERPL-MCNC: 20 MG/DL — SIGNIFICANT CHANGE UP (ref 7–23)
BUN SERPL-MCNC: 20 MG/DL — SIGNIFICANT CHANGE UP (ref 7–23)
CALCIUM SERPL-MCNC: 8.3 MG/DL — LOW (ref 8.4–10.5)
CALCIUM SERPL-MCNC: 8.8 MG/DL — SIGNIFICANT CHANGE UP (ref 8.4–10.5)
CHLORIDE SERPL-SCNC: 91 MMOL/L — LOW (ref 96–108)
CHLORIDE SERPL-SCNC: 96 MMOL/L — SIGNIFICANT CHANGE UP (ref 96–108)
CHOLEST SERPL-MCNC: 109 MG/DL — SIGNIFICANT CHANGE UP (ref 10–199)
CO2 BLDMV-SCNC: 29 MMOL/L — SIGNIFICANT CHANGE UP (ref 21–29)
CO2 BLDMV-SCNC: 29 MMOL/L — SIGNIFICANT CHANGE UP (ref 21–29)
CO2 BLDMV-SCNC: 30 MMOL/L — HIGH (ref 21–29)
CO2 SERPL-SCNC: 24 MMOL/L — SIGNIFICANT CHANGE UP (ref 22–31)
CO2 SERPL-SCNC: 27 MMOL/L — SIGNIFICANT CHANGE UP (ref 22–31)
COLOR SPEC: SIGNIFICANT CHANGE UP
CREAT ?TM UR-MCNC: 91 MG/DL — SIGNIFICANT CHANGE UP
CREAT SERPL-MCNC: 0.39 MG/DL — LOW (ref 0.5–1.3)
CREAT SERPL-MCNC: 0.93 MG/DL — SIGNIFICANT CHANGE UP (ref 0.5–1.3)
CREAT SERPL-MCNC: 1 MG/DL — SIGNIFICANT CHANGE UP (ref 0.5–1.3)
DIFF PNL FLD: ABNORMAL
EOSINOPHIL # BLD AUTO: 0.1 K/UL — SIGNIFICANT CHANGE UP (ref 0–0.5)
EOSINOPHIL NFR BLD AUTO: 1.3 % — SIGNIFICANT CHANGE UP (ref 0–6)
EOSINOPHIL NFR BLD AUTO: 1.3 % — SIGNIFICANT CHANGE UP (ref 0–6)
EOSINOPHIL NFR BLD AUTO: 1.4 % — SIGNIFICANT CHANGE UP (ref 0–6)
ERYTHROCYTE [SEDIMENTATION RATE] IN BLOOD: 5 MM/HR — SIGNIFICANT CHANGE UP (ref 0–15)
FERRITIN SERPL-MCNC: 917 NG/ML — HIGH (ref 30–400)
GAS PNL BLDMV: SIGNIFICANT CHANGE UP
GGT SERPL-CCNC: 74 U/L — HIGH (ref 9–50)
GLUCOSE SERPL-MCNC: 121 MG/DL — HIGH (ref 70–99)
GLUCOSE SERPL-MCNC: 305 MG/DL — HIGH (ref 70–99)
GLUCOSE UR QL: NEGATIVE — SIGNIFICANT CHANGE UP
HAV IGM SER-ACNC: SIGNIFICANT CHANGE UP
HBV CORE IGM SER-ACNC: SIGNIFICANT CHANGE UP
HBV SURFACE AG SER-ACNC: SIGNIFICANT CHANGE UP
HCO3 BLDMV-SCNC: 28 MMOL/L — SIGNIFICANT CHANGE UP (ref 20–28)
HCO3 BLDMV-SCNC: 28 MMOL/L — SIGNIFICANT CHANGE UP (ref 20–28)
HCO3 BLDMV-SCNC: 29 MMOL/L — HIGH (ref 20–28)
HCT VFR BLD CALC: 48.2 % — SIGNIFICANT CHANGE UP (ref 39–50)
HCT VFR BLD CALC: 50.5 % — HIGH (ref 39–50)
HCT VFR BLD CALC: 53.5 % — HIGH (ref 39–50)
HCV AB S/CO SERPL IA: 0.13 S/CO — SIGNIFICANT CHANGE UP
HCV AB SERPL-IMP: SIGNIFICANT CHANGE UP
HDLC SERPL-MCNC: 23 MG/DL — LOW
HGB BLD-MCNC: 16 G/DL — SIGNIFICANT CHANGE UP (ref 13–17)
HGB BLD-MCNC: 16.5 G/DL — SIGNIFICANT CHANGE UP (ref 13–17)
HGB BLD-MCNC: 17.5 G/DL — HIGH (ref 13–17)
HOROWITZ INDEX BLDMV+IHG-RTO: 21 — SIGNIFICANT CHANGE UP
HOROWITZ INDEX BLDMV+IHG-RTO: 21 — SIGNIFICANT CHANGE UP
INR BLD: 1.31 RATIO — HIGH (ref 0.88–1.16)
INR BLD: 1.41 RATIO — HIGH (ref 0.88–1.16)
IRON SATN MFR SERPL: 105 UG/DL — SIGNIFICANT CHANGE UP (ref 45–165)
IRON SATN MFR SERPL: 32 % — SIGNIFICANT CHANGE UP (ref 16–55)
KETONES UR-MCNC: NEGATIVE — SIGNIFICANT CHANGE UP
LACTATE SERPL-SCNC: 1.2 MMOL/L — SIGNIFICANT CHANGE UP (ref 0.7–2)
LDH SERPL L TO P-CCNC: 610 U/L — HIGH (ref 50–242)
LEUKOCYTE ESTERASE UR-ACNC: NEGATIVE — SIGNIFICANT CHANGE UP
LIDOCAIN IGE QN: 45 U/L — SIGNIFICANT CHANGE UP (ref 7–60)
LIPID PNL WITH DIRECT LDL SERPL: 71 MG/DL — SIGNIFICANT CHANGE UP
LYMPHOCYTES # BLD AUTO: 1 K/UL — SIGNIFICANT CHANGE UP (ref 1–3.3)
LYMPHOCYTES # BLD AUTO: 1.3 K/UL — SIGNIFICANT CHANGE UP (ref 1–3.3)
LYMPHOCYTES # BLD AUTO: 1.5 K/UL — SIGNIFICANT CHANGE UP (ref 1–3.3)
LYMPHOCYTES # BLD AUTO: 11.4 % — LOW (ref 13–44)
LYMPHOCYTES # BLD AUTO: 14.5 % — SIGNIFICANT CHANGE UP (ref 13–44)
LYMPHOCYTES # BLD AUTO: 15.6 % — SIGNIFICANT CHANGE UP (ref 13–44)
MAGNESIUM SERPL-MCNC: 2 MG/DL — SIGNIFICANT CHANGE UP (ref 1.6–2.6)
MAGNESIUM SERPL-MCNC: 2 MG/DL — SIGNIFICANT CHANGE UP (ref 1.6–2.6)
MCHC RBC-ENTMCNC: 32 PG — SIGNIFICANT CHANGE UP (ref 27–34)
MCHC RBC-ENTMCNC: 32.4 PG — SIGNIFICANT CHANGE UP (ref 27–34)
MCHC RBC-ENTMCNC: 32.4 PG — SIGNIFICANT CHANGE UP (ref 27–34)
MCHC RBC-ENTMCNC: 32.8 GM/DL — SIGNIFICANT CHANGE UP (ref 32–36)
MCHC RBC-ENTMCNC: 32.8 GM/DL — SIGNIFICANT CHANGE UP (ref 32–36)
MCHC RBC-ENTMCNC: 33.3 GM/DL — SIGNIFICANT CHANGE UP (ref 32–36)
MCV RBC AUTO: 97.5 FL — SIGNIFICANT CHANGE UP (ref 80–100)
MCV RBC AUTO: 97.6 FL — SIGNIFICANT CHANGE UP (ref 80–100)
MCV RBC AUTO: 98.6 FL — SIGNIFICANT CHANGE UP (ref 80–100)
MONOCYTES # BLD AUTO: 1.1 K/UL — HIGH (ref 0–0.9)
MONOCYTES # BLD AUTO: 1.2 K/UL — HIGH (ref 0–0.9)
MONOCYTES # BLD AUTO: 1.2 K/UL — HIGH (ref 0–0.9)
MONOCYTES NFR BLD AUTO: 12.1 % — SIGNIFICANT CHANGE UP (ref 2–14)
MONOCYTES NFR BLD AUTO: 12.7 % — SIGNIFICANT CHANGE UP (ref 2–14)
MONOCYTES NFR BLD AUTO: 13.7 % — SIGNIFICANT CHANGE UP (ref 2–14)
MRSA PCR RESULT.: SIGNIFICANT CHANGE UP
NEUTROPHILS # BLD AUTO: 6.4 K/UL — SIGNIFICANT CHANGE UP (ref 1.8–7.4)
NEUTROPHILS # BLD AUTO: 6.6 K/UL — SIGNIFICANT CHANGE UP (ref 1.8–7.4)
NEUTROPHILS # BLD AUTO: 6.9 K/UL — SIGNIFICANT CHANGE UP (ref 1.8–7.4)
NEUTROPHILS NFR BLD AUTO: 70.2 % — SIGNIFICANT CHANGE UP (ref 43–77)
NEUTROPHILS NFR BLD AUTO: 70.4 % — SIGNIFICANT CHANGE UP (ref 43–77)
NEUTROPHILS NFR BLD AUTO: 75.7 % — SIGNIFICANT CHANGE UP (ref 43–77)
NITRITE UR-MCNC: NEGATIVE — SIGNIFICANT CHANGE UP
O2 CT VFR BLD CALC: 38 MMHG — SIGNIFICANT CHANGE UP (ref 30–65)
O2 CT VFR BLD CALC: 38 MMHG — SIGNIFICANT CHANGE UP (ref 30–65)
O2 CT VFR BLD CALC: 43 MMHG — SIGNIFICANT CHANGE UP (ref 30–65)
PCO2 BLDMV: 41 MMHG — SIGNIFICANT CHANGE UP (ref 30–65)
PCO2 BLDMV: 41 MMHG — SIGNIFICANT CHANGE UP (ref 30–65)
PCO2 BLDMV: 44 MMHG — SIGNIFICANT CHANGE UP (ref 30–65)
PH BLDMV: 7.44 — SIGNIFICANT CHANGE UP (ref 7.32–7.45)
PH BLDMV: 7.44 — SIGNIFICANT CHANGE UP (ref 7.32–7.45)
PH BLDMV: 7.45 — SIGNIFICANT CHANGE UP (ref 7.32–7.45)
PH UR: 7.5 — SIGNIFICANT CHANGE UP (ref 5–8)
PHOSPHATE SERPL-MCNC: 2.4 MG/DL — LOW (ref 2.5–4.5)
PHOSPHATE SERPL-MCNC: 2.4 MG/DL — LOW (ref 2.5–4.5)
PLATELET # BLD AUTO: 101 K/UL — LOW (ref 150–400)
PLATELET # BLD AUTO: 106 K/UL — LOW (ref 150–400)
PLATELET # BLD AUTO: 114 K/UL — LOW (ref 150–400)
POTASSIUM SERPL-MCNC: 3.8 MMOL/L — SIGNIFICANT CHANGE UP (ref 3.5–5.3)
POTASSIUM SERPL-MCNC: 3.9 MMOL/L — SIGNIFICANT CHANGE UP (ref 3.5–5.3)
POTASSIUM SERPL-SCNC: 3.8 MMOL/L — SIGNIFICANT CHANGE UP (ref 3.5–5.3)
POTASSIUM SERPL-SCNC: 3.9 MMOL/L — SIGNIFICANT CHANGE UP (ref 3.5–5.3)
PREALB SERPL-MCNC: 15 MG/DL — LOW (ref 20–40)
PROT ?TM UR-MCNC: 18 MG/DL — HIGH (ref 0–12)
PROT SERPL-MCNC: 6.3 G/DL — SIGNIFICANT CHANGE UP (ref 6–8.3)
PROT SERPL-MCNC: 7.1 G/DL — SIGNIFICANT CHANGE UP (ref 6–8.3)
PROT UR-MCNC: NEGATIVE — SIGNIFICANT CHANGE UP
PROT/CREAT UR-RTO: 0.2 RATIO — SIGNIFICANT CHANGE UP (ref 0–0.2)
PROTHROM AB SERPL-ACNC: 15 SEC — HIGH (ref 10–12.9)
PROTHROM AB SERPL-ACNC: 16.4 SEC — HIGH (ref 10–12.9)
PSA FREE MFR FLD: 3.05 NG/ML — SIGNIFICANT CHANGE UP (ref 0–4)
RBC # BLD: 4.95 M/UL — SIGNIFICANT CHANGE UP (ref 4.2–5.8)
RBC # BLD: 5.17 M/UL — SIGNIFICANT CHANGE UP (ref 4.2–5.8)
RBC # BLD: 5.42 M/UL — SIGNIFICANT CHANGE UP (ref 4.2–5.8)
RBC # FLD: 11.6 % — SIGNIFICANT CHANGE UP (ref 10.3–14.5)
RBC # FLD: 12.4 % — SIGNIFICANT CHANGE UP (ref 10.3–14.5)
RBC # FLD: 12.6 % — SIGNIFICANT CHANGE UP (ref 10.3–14.5)
S AUREUS DNA NOSE QL NAA+PROBE: DETECTED
SAO2 % BLDMV: 68 % — SIGNIFICANT CHANGE UP (ref 60–90)
SAO2 % BLDMV: 70 % — SIGNIFICANT CHANGE UP (ref 60–90)
SAO2 % BLDMV: 76 % — SIGNIFICANT CHANGE UP (ref 60–90)
SODIUM SERPL-SCNC: 129 MMOL/L — LOW (ref 135–145)
SODIUM SERPL-SCNC: 135 MMOL/L — SIGNIFICANT CHANGE UP (ref 135–145)
SP GR SPEC: 1.01 — SIGNIFICANT CHANGE UP (ref 1.01–1.02)
T3FREE SERPL-MCNC: 2.24 PG/ML — SIGNIFICANT CHANGE UP (ref 1.8–4.6)
T4 FREE SERPL-MCNC: 1.6 NG/DL — SIGNIFICANT CHANGE UP (ref 0.9–1.8)
TIBC SERPL-MCNC: 328 UG/DL — SIGNIFICANT CHANGE UP (ref 220–430)
TOTAL CHOLESTEROL/HDL RATIO MEASUREMENT: 4.7 RATIO — SIGNIFICANT CHANGE UP (ref 3.4–9.6)
TRIGL SERPL-MCNC: 77 MG/DL — SIGNIFICANT CHANGE UP (ref 10–149)
TSH SERPL-MCNC: 1.77 UIU/ML — SIGNIFICANT CHANGE UP (ref 0.27–4.2)
UIBC SERPL-MCNC: 223 UG/DL — SIGNIFICANT CHANGE UP (ref 110–370)
URATE SERPL-MCNC: 8.4 MG/DL — SIGNIFICANT CHANGE UP (ref 3.4–8.8)
UROBILINOGEN FLD QL: NEGATIVE — SIGNIFICANT CHANGE UP
WBC # BLD: 8.8 K/UL — SIGNIFICANT CHANGE UP (ref 3.8–10.5)
WBC # BLD: 9.1 K/UL — SIGNIFICANT CHANGE UP (ref 3.8–10.5)
WBC # BLD: 9.8 K/UL — SIGNIFICANT CHANGE UP (ref 3.8–10.5)
WBC # FLD AUTO: 8.8 K/UL — SIGNIFICANT CHANGE UP (ref 3.8–10.5)
WBC # FLD AUTO: 9.1 K/UL — SIGNIFICANT CHANGE UP (ref 3.8–10.5)
WBC # FLD AUTO: 9.8 K/UL — SIGNIFICANT CHANGE UP (ref 3.8–10.5)

## 2018-12-01 PROCEDURE — 93010 ELECTROCARDIOGRAM REPORT: CPT

## 2018-12-01 PROCEDURE — 99291 CRITICAL CARE FIRST HOUR: CPT

## 2018-12-01 PROCEDURE — 71045 X-RAY EXAM CHEST 1 VIEW: CPT | Mod: 26

## 2018-12-01 RX ORDER — SPIRONOLACTONE 25 MG/1
12.5 TABLET, FILM COATED ORAL DAILY
Qty: 0 | Refills: 0 | Status: DISCONTINUED | OUTPATIENT
Start: 2018-12-01 | End: 2018-12-01

## 2018-12-01 RX ORDER — SPIRONOLACTONE 25 MG/1
12.5 TABLET, FILM COATED ORAL DAILY
Qty: 0 | Refills: 0 | Status: DISCONTINUED | OUTPATIENT
Start: 2018-12-01 | End: 2018-12-02

## 2018-12-01 RX ORDER — ACETAMINOPHEN 500 MG
650 TABLET ORAL ONCE
Qty: 0 | Refills: 0 | Status: COMPLETED | OUTPATIENT
Start: 2018-12-01 | End: 2018-12-01

## 2018-12-01 RX ORDER — CAPTOPRIL 12.5 MG/1
12.5 TABLET ORAL THREE TIMES A DAY
Qty: 0 | Refills: 0 | Status: DISCONTINUED | OUTPATIENT
Start: 2018-12-01 | End: 2018-12-02

## 2018-12-01 RX ORDER — CAPTOPRIL 12.5 MG/1
12.5 TABLET ORAL ONCE
Qty: 0 | Refills: 0 | Status: COMPLETED | OUTPATIENT
Start: 2018-12-01 | End: 2018-12-01

## 2018-12-01 RX ADMIN — CAPTOPRIL 12.5 MILLIGRAM(S): 12.5 TABLET ORAL at 10:29

## 2018-12-01 RX ADMIN — CAPTOPRIL 6.25 MILLIGRAM(S): 12.5 TABLET ORAL at 09:21

## 2018-12-01 RX ADMIN — CAPTOPRIL 12.5 MILLIGRAM(S): 12.5 TABLET ORAL at 22:14

## 2018-12-01 RX ADMIN — CAPTOPRIL 12.5 MILLIGRAM(S): 12.5 TABLET ORAL at 14:52

## 2018-12-01 RX ADMIN — SPIRONOLACTONE 12.5 MILLIGRAM(S): 25 TABLET, FILM COATED ORAL at 12:59

## 2018-12-01 RX ADMIN — Medication 40 MILLIGRAM(S): at 17:12

## 2018-12-01 RX ADMIN — CHLORHEXIDINE GLUCONATE 1 APPLICATION(S): 213 SOLUTION TOPICAL at 06:27

## 2018-12-01 RX ADMIN — CAPTOPRIL 6.25 MILLIGRAM(S): 12.5 TABLET ORAL at 01:06

## 2018-12-01 RX ADMIN — Medication 650 MILLIGRAM(S): at 23:00

## 2018-12-01 RX ADMIN — Medication 650 MILLIGRAM(S): at 22:31

## 2018-12-01 RX ADMIN — PANTOPRAZOLE SODIUM 40 MILLIGRAM(S): 20 TABLET, DELAYED RELEASE ORAL at 06:25

## 2018-12-01 RX ADMIN — Medication 40 MILLIGRAM(S): at 06:25

## 2018-12-01 NOTE — CHART NOTE - NSCHARTNOTEFT_GEN_A_CORE
====================  CCU MIDNIGHT ROUNDS  ====================    RIZWAN VILLALPANDO  [id number]  Patient is a 47y old  Male who presents with a chief complaint of Cardiogenic Shock (01 Dec 2018 09:13)  ====================  SUMMARY:  ====================  46 yo [place] M, w/ PMH of nIC (EF 10% 11/2018 - recently diagnosed in August 2018), presenting as transfer from [facility] CCU d/t cardiogenic shock, possible LVAD workup. Patient initially presented to [facility] ED morning of 11/29/18 d/t one day of shortness of breath, also with abdominal pain, worst in the epigastrium, and with one episode of nausea/vomiting the previous day.  IABP inserted, inotrope-assisted diuresis started and transferred to [facility].    ====================  NEW EVENTS:  ====================  s/p IABP removal, site benign.  No c/o SOB, DRUMMOND, or orthopnea.  Dobutamine still in progress.    MEDICATIONS  (STANDING):  captopril 12.5 milliGRAM(s) Oral three times a day  chlorhexidine 4% Liquid 1 Application(s) Topical <User Schedule>  DOBUTamine Infusion 2.5 MICROgram(s)/kG/Min (5.063 mL/Hr) IV Continuous <Continuous>  furosemide   Injectable 40 milliGRAM(s) IV Push two times a day  pantoprazole    Tablet 40 milliGRAM(s) Oral before breakfast  spironolactone 12.5 milliGRAM(s) Oral daily    MEDICATIONS  (PRN):  aluminum hydroxide/magnesium hydroxide/simethicone Suspension 30 milliLiter(s) Oral every 6 hours PRN Dyspepsia    ====================  VITALS (Last 12 hrs):  ====================    T(C): 36.9 (12-01-18 @ 19:00), Max: 36.9 (12-01-18 @ 19:00)  T(F): 98.4 (12-01-18 @ 19:00), Max: 98.4 (12-01-18 @ 19:00)  HR: 100 (12-01-18 @ 22:00) (88 - 116)  BP: 121/79 (12-01-18 @ 22:00) (83/63 - 121/79)  BP(mean): 93 (12-01-18 @ 22:00) (73 - 102)  ABP: --  ABP(mean): --  RR: 16 (12-01-18 @ 22:00) (14 - 24)  SpO2: 97% (12-01-18 @ 22:00) (95% - 98%)  Wt(kg): --  CVP(mm Hg): 5 (12-01-18 @ 22:00) (1 - 5)  CVP(cm H2O): --  CO: 5 (12-01-18 @ 13:30) (5 - 5)  CI: 2.9 (12-01-18 @ 13:30) (2.9 - 2.9)  PA: 45/24 (12-01-18 @ 22:00) (23/7 - 45/24)  PA(mean): 33 (12-01-18 @ 22:00) (13 - 33)  PCWP: --  SVR: --  PVR: --    I&O's Summary    30 Nov 2018 07:01  -  01 Dec 2018 07:00  --------------------------------------------------------  IN: 760 mL / OUT: 2400 mL / NET: -1640 mL    01 Dec 2018 07:01  -  01 Dec 2018 22:43  --------------------------------------------------------  IN: 420 mL / OUT: 1440 mL / NET: -1020 mL    ====================  NEW LABS:  ====================    12-01    135  |  96  |  20  ----------------------------<  121<H>  3.8   |  27  |  1.00    Ca    8.8      01 Dec 2018 10:43  Phos  2.4     12-01  Mg     2.0     12-01    TPro  7.1  /  Alb  3.9  /  TBili  3.6<H>  /  DBili  x   /  AST  540<H>  /  ALT  752<H>  /  AlkPhos  70  12-01    PT/INR - ( 01 Dec 2018 10:43 )   PT: 15.0 sec;   INR: 1.31 ratio      PTT - ( 01 Dec 2018 10:43 )  PTT:31.3 sec  ====================  PLAN:  ====================  - 46 y/o male with recent diagnosis of NICM (EF 10%) with lifevest but non-compliant with both lifevest and medications transferred from [facility] on cardiogenic shock requiring IABP and inotrope-assisted diuresis.  - Continue LAsix 40 mg IV bid.  Net negative 1L  - Continue to monitor strict I & O's.  Monitor daily weights  - Monitor electrolytes, replete to keep K>4 and Mag>2  - Continue Dobutrex @ 2.5 mcg/kg/min.  Monitor hemodynamics.  CO= CI=  CVP - 2-5  PAP- 23-45/7-24.  MVO2-76.  Will possibly d/c in am  - Monitor IABP site for bleeding.  Continue Heparin drip and discontinue @ 0600 for IABP removal  - Continue ACEI for afterload reduction.  Increase as tolerated.  - Continue Aldactone.  - Ongoing LVAD buster [facility] [place] [facility] NP  Beeper #[id number]  Spectra # [phone] ====================  CCU MIDNIGHT ROUNDS  ====================    RIZWAN VILLALPANDO  [id number]  Patient is a 47y old  Male who presents with a chief complaint of Cardiogenic Shock (01 Dec 2018 09:13)  ====================  SUMMARY:  ====================  46 yo [place] M, w/ PMH of nIC (EF 10% 11/2018 - recently diagnosed in August 2018), presenting as transfer from [facility] CCU d/t cardiogenic shock, possible LVAD workup. Patient initially presented to [facility] ED morning of 11/29/18 d/t one day of shortness of breath, also with abdominal pain, worst in the epigastrium, and with one episode of nausea/vomiting the previous day.  IABP inserted, inotrope-assisted diuresis started and transferred to [facility].    ====================  NEW EVENTS:  ====================  s/p IABP removal, site benign.  No c/o SOB, DRUMMOND, or orthopnea.  Dobutamine still in progress.    MEDICATIONS  (STANDING):  captopril 12.5 milliGRAM(s) Oral three times a day  chlorhexidine 4% Liquid 1 Application(s) Topical <User Schedule>  DOBUTamine Infusion 2.5 MICROgram(s)/kG/Min (5.063 mL/Hr) IV Continuous <Continuous>  furosemide   Injectable 40 milliGRAM(s) IV Push two times a day  pantoprazole    Tablet 40 milliGRAM(s) Oral before breakfast  spironolactone 12.5 milliGRAM(s) Oral daily    MEDICATIONS  (PRN):  aluminum hydroxide/magnesium hydroxide/simethicone Suspension 30 milliLiter(s) Oral every 6 hours PRN Dyspepsia    ====================  VITALS (Last 12 hrs):  ====================    T(C): 36.9 (12-01-18 @ 19:00), Max: 36.9 (12-01-18 @ 19:00)  T(F): 98.4 (12-01-18 @ 19:00), Max: 98.4 (12-01-18 @ 19:00)  HR: 100 (12-01-18 @ 22:00) (88 - 116)  BP: 121/79 (12-01-18 @ 22:00) (83/63 - 121/79)  BP(mean): 93 (12-01-18 @ 22:00) (73 - 102)  ABP: --  ABP(mean): --  RR: 16 (12-01-18 @ 22:00) (14 - 24)  SpO2: 97% (12-01-18 @ 22:00) (95% - 98%)  Wt(kg): --  CVP(mm Hg): 5 (12-01-18 @ 22:00) (1 - 5)  CVP(cm H2O): --  CO: 5 (12-01-18 @ 13:30) (5 - 5)  CI: 2.9 (12-01-18 @ 13:30) (2.9 - 2.9)  PA: 45/24 (12-01-18 @ 22:00) (23/7 - 45/24)  PA(mean): 33 (12-01-18 @ 22:00) (13 - 33)  PCWP: --  SVR: --  PVR: --    I&O's Summary    30 Nov 2018 07:01  -  01 Dec 2018 07:00  --------------------------------------------------------  IN: 760 mL / OUT: 2400 mL / NET: -1640 mL    01 Dec 2018 07:01  -  01 Dec 2018 22:43  --------------------------------------------------------  IN: 420 mL / OUT: 1440 mL / NET: -1020 mL    ====================  NEW LABS:  ====================    12-01    135  |  96  |  20  ----------------------------<  121<H>  3.8   |  27  |  1.00    Ca    8.8      01 Dec 2018 10:43  Phos  2.4     12-01  Mg     2.0     12-01    TPro  7.1  /  Alb  3.9  /  TBili  3.6<H>  /  DBili  x   /  AST  540<H>  /  ALT  752<H>  /  AlkPhos  70  12-01    PT/INR - ( 01 Dec 2018 10:43 )   PT: 15.0 sec;   INR: 1.31 ratio      PTT - ( 01 Dec 2018 10:43 )  PTT:31.3 sec  ====================  PLAN:  ====================  - 46 y/o male with recent diagnosis of NICM (EF 10%) with lifevest but non-compliant with both lifevest and medications transferred from [facility] on cardiogenic shock requiring IABP and inotrope-assisted diuresis.  - Continue LAsix 40 mg IV bid.  Net negative 1L  - Continue to monitor strict I & O's.  Monitor daily weights  - Monitor electrolytes, replete to keep K>4 and Mag>2  - Continue Dobutrex @ 2.5 mcg/kg/min.  Monitor hemodynamics.  CO= 3.7 CI=2.1  CVP - 2-5  PAP- 23-45/7-24.  MVO2-76.  Will possibly d/c in am  - Monitor IABP site for bleeding.  Continue Heparin drip and discontinue @ 0600 for IABP removal  - Continue ACEI for afterload reduction.  Increase as tolerated.  - Continue Aldactone.  - Ongoing LVAD eval    [facility] [place] [facility] NP  Beeper #[id number]  Spectra # [phone]

## 2018-12-01 NOTE — PROGRESS NOTE ADULT - PROBLEM SELECTOR PLAN 1
discontinue lasix gtt  -- give captopril 3.125 mg TID, uptitrate as tolerated  -- IV lasix 40 mg BID  -- likely remove IABP tomorrow  -- trend LFTs  -- monitor Cr  -- monitor UOP  -- I/Os  -- daily weights  -- K>4, Mg>2. captopril 12.5 mg TID, uptitrate as tolerated  - repeat hemos following IABP removal, maybe able to wean  tomorrow   -- IV lasix 40 mg BID  -- trend LFTs  -- monitor Cr  -- monitor UOP  -- I/Os  -- daily weights  -- K>4, Mg>2. captopril 12.5 mg TID, uptitrate as tolerated  - repeat hemos following IABP removal, maybe able to wean  tomorrow   -- IV Lasix 40 mg BID  -- trend LFTs  -- monitor Cr  -- monitor UOP  -- I/Os  -- daily weights  -- K>4, Mg>2.

## 2018-12-01 NOTE — PROGRESS NOTE ADULT - ASSESSMENT
46 yo [place] M, w/ PMH of NICM LVIDd 7 cm (EF 10% 11/2018 - recently diagnosed in August 2018) a/w cardiogenic shock requiring IABP and inotropes.    RHC on 11/29/2018 RA 18, RV 48/5/16 PA 49/23/37 PCWP 26 PA 62% CO 2.7 CI 1.87 SVR 2300 PVR 4    11/30 CVP 4, PA 33/16/23 MvO2 77% CO 4.97 CI 2.37 SVR 1200    Currently on dobutamine 2.5 mcg/kg/min, lasix 7.5 mg/hr, off levo 48 yo [place] M, w/ PMH of NICM LVIDd 7 cm (EF 10% 11/2018 - recently diagnosed in August 2018) a/w cardiogenic shock requiring IABP and inotropes.    RHC on 11/29/2018 RA 18, RV 48/5/16 PA 49/23/37 PCWP 26 PA 62% CO 2.7 CI 1.87 SVR 2300 PVR 4    11/30 CVP 4, PA 33/16/23 MvO2 77% CO 4.97 CI 2.37 SVR 1200    Currently on dobutamine 2.5 mcg/kg/min

## 2018-12-01 NOTE — PROGRESS NOTE ADULT - SUBJECTIVE AND OBJECTIVE BOX
INTERVAL HISTORY:    REVIEW OF SYSTEMS: As above; all others negative    MEDICATIONS  (STANDING):  captopril 6.25 milliGRAM(s) Oral every 8 hours  chlorhexidine 4% Liquid 1 Application(s) Topical <User Schedule>  DOBUTamine Infusion 2.5 MICROgram(s)/kG/Min (5.063 mL/Hr) IV Continuous <Continuous>  furosemide   Injectable 40 milliGRAM(s) IV Push two times a day  heparin  Infusion. 1000 Unit(s)/Hr (10 mL/Hr) IV Continuous <Continuous>  pantoprazole    Tablet 40 milliGRAM(s) Oral before breakfast    MEDICATIONS  (PRN):  aluminum hydroxide/magnesium hydroxide/simethicone Suspension 30 milliLiter(s) Oral every 6 hours PRN Dyspepsia  heparin  Injectable 5500 Unit(s) IV Push every 6 hours PRN For aPTT less than 40  heparin  Injectable 2500 Unit(s) IV Push every 6 hours PRN For aPTT between 40 - 57      Objective:  ICU Vital Signs Last 24 Hrs  T(C): 36.4 (01 Dec 2018 06:00), Max: 37.2 (30 Nov 2018 20:00)  T(F): 97.5 (01 Dec 2018 06:00), Max: 99 (30 Nov 2018 20:00)  HR: 82 (01 Dec 2018 06:00) (82 - 100)  BP: --  BP(mean): --  ABP: --  ABP(mean): --  RR: 17 (01 Dec 2018 06:00) (13 - 47)  SpO2: 97% (01 Dec 2018 06:00) (91% - 99%)      Adult Advanced Hemodynamics Last 24 Hrs  CVP(mm Hg): 9 (01 Dec 2018 06:00) (-2 - 9)  CVP(cm H2O): --  CO: --  CI: --  PA: 30/11 (01 Dec 2018 06:00) (22/9 - 35/16)  PA(mean): 18 (01 Dec 2018 06:00) (14 - 23)  PCWP: --  SVR: --  SVRI: --  PVR: --  PVRI: --      11-29 @ 07:01  -  11-30 @ 07:00  --------------------------------------------------------  IN: 356.4 mL / OUT: 3080 mL / NET: -2723.6 mL    11-30 @ 07:01  -  12-01 @ 06:14  --------------------------------------------------------  IN: 655 mL / OUT: 2370 mL / NET: -1715 mL      Daily     Daily     PHYSICAL EXAM:  General appearance: NAD, conversant, afebrile  HEENT: NC/AT, EOMI, sclera clear bilaterally  Neck: supple  Pulm: CTAB, nonlabored breathing  CV: RRR, S1 and S2, no murmurs  Abd: soft NT ND +BS   Ext: Trace peripheral edema  Skin: no rash  Psych: appropriate affect, cooperative    TELEMETRY:     EKG:     IMAGING:    Labs:  ABG - ( 29 Nov 2018 09:00 )  pH, Arterial: 7.41  pH, Blood: x     /  pCO2: 18    /  pO2: 456   / HCO3: 16    / Base Excess: -13.1 /  SaO2: 100.0                                   16.0   11.1  )-----------( 119      ( 30 Nov 2018 18:06 )             50.1     11-30    134<L>  |  95<L>  |  25<H>  ----------------------------<  102<H>  4.2   |  24  |  1.13    Ca    8.5      30 Nov 2018 18:06  Phos  2.2     11-30  Mg     2.0     11-30    TPro  6.4  /  Alb  3.7  /  TBili  3.2<H>  /  DBili  x   /  AST  879<H>  /  ALT  844<H>  /  AlkPhos  64  11-30    LIVER FUNCTIONS - ( 30 Nov 2018 18:06 )  Alb: 3.7 g/dL / Pro: 6.4 g/dL / ALK PHOS: 64 U/L / ALT: 844 U/L / AST: 879 U/L / GGT: x           PT/INR - ( 30 Nov 2018 06:41 )   PT: 18.0 sec;   INR: 1.56 ratio         PTT - ( 30 Nov 2018 06:41 )  PTT:90.0 sec        HEALTH ISSUES - PROBLEM Dx:  Dilated cardiomyopathy: Dilated cardiomyopathy INTERVAL HISTORY: Denies chest pain or SOB.     REVIEW OF SYSTEMS: As above; all others negative    MEDICATIONS  (STANDING):  captopril 6.25 milliGRAM(s) Oral every 8 hours  chlorhexidine 4% Liquid 1 Application(s) Topical <User Schedule>  DOBUTamine Infusion 2.5 MICROgram(s)/kG/Min (5.063 mL/Hr) IV Continuous <Continuous>  furosemide   Injectable 40 milliGRAM(s) IV Push two times a day  heparin  Infusion. 1000 Unit(s)/Hr (10 mL/Hr) IV Continuous <Continuous>  pantoprazole    Tablet 40 milliGRAM(s) Oral before breakfast    MEDICATIONS  (PRN):  aluminum hydroxide/magnesium hydroxide/simethicone Suspension 30 milliLiter(s) Oral every 6 hours PRN Dyspepsia  heparin  Injectable 5500 Unit(s) IV Push every 6 hours PRN For aPTT less than 40  heparin  Injectable 2500 Unit(s) IV Push every 6 hours PRN For aPTT between 40 - 57      Objective:  ICU Vital Signs Last 24 Hrs  T(C): 36.4 (01 Dec 2018 06:00), Max: 37.2 (30 Nov 2018 20:00)  T(F): 97.5 (01 Dec 2018 06:00), Max: 99 (30 Nov 2018 20:00)  HR: 82 (01 Dec 2018 06:00) (82 - 100)  BP: --  BP(mean): --  ABP: --  ABP(mean): --  RR: 17 (01 Dec 2018 06:00) (13 - 47)  SpO2: 97% (01 Dec 2018 06:00) (91% - 99%)      Adult Advanced Hemodynamics Last 24 Hrs  CVP(mm Hg): 9 (01 Dec 2018 06:00) (-2 - 9)  CVP(cm H2O): --  CO: --  CI: --  PA: 30/11 (01 Dec 2018 06:00) (22/9 - 35/16)  PA(mean): 18 (01 Dec 2018 06:00) (14 - 23)  PCWP: --  SVR: --  SVRI: --  PVR: --  PVRI: --      11-29 @ 07:01  -  11-30 @ 07:00  --------------------------------------------------------  IN: 356.4 mL / OUT: 3080 mL / NET: -2723.6 mL    11-30 @ 07:01  -  12-01 @ 06:14  --------------------------------------------------------  IN: 655 mL / OUT: 2370 mL / NET: -1715 mL      Daily     Daily     PHYSICAL EXAM:  General appearance: NAD, conversant, afebrile  HEENT: NC/AT, EOMI, sclera clear bilaterally  Neck: supple  Pulm: CTAB anteriorly, nonlabored breathing  CV: RRR, S1 and S2, no murmurs  Abd: soft NT ND +BS   Ext: Trace peripheral edema  Skin: no rash  Psych: appropriate affect, cooperative    TELEMETRY:     EKG:     IMAGING:    Labs:  ABG - ( 29 Nov 2018 09:00 )  pH, Arterial: 7.41  pH, Blood: x     /  pCO2: 18    /  pO2: 456   / HCO3: 16    / Base Excess: -13.1 /  SaO2: 100.0                                   16.0   11.1  )-----------( 119      ( 30 Nov 2018 18:06 )             50.1     11-30    134<L>  |  95<L>  |  25<H>  ----------------------------<  102<H>  4.2   |  24  |  1.13    Ca    8.5      30 Nov 2018 18:06  Phos  2.2     11-30  Mg     2.0     11-30    TPro  6.4  /  Alb  3.7  /  TBili  3.2<H>  /  DBili  x   /  AST  879<H>  /  ALT  844<H>  /  AlkPhos  64  11-30    LIVER FUNCTIONS - ( 30 Nov 2018 18:06 )  Alb: 3.7 g/dL / Pro: 6.4 g/dL / ALK PHOS: 64 U/L / ALT: 844 U/L / AST: 879 U/L / GGT: x           PT/INR - ( 30 Nov 2018 06:41 )   PT: 18.0 sec;   INR: 1.56 ratio         PTT - ( 30 Nov 2018 06:41 )  PTT:90.0 sec        HEALTH ISSUES - PROBLEM Dx:  Dilated cardiomyopathy: Dilated cardiomyopathy

## 2018-12-01 NOTE — PROGRESS NOTE ADULT - SUBJECTIVE AND OBJECTIVE BOX
Subjective:    Medications:  aluminum hydroxide/magnesium hydroxide/simethicone Suspension 30 milliLiter(s) Oral every 6 hours PRN  captopril 6.25 milliGRAM(s) Oral every 8 hours  chlorhexidine 4% Liquid 1 Application(s) Topical <User Schedule>  DOBUTamine Infusion 2.5 MICROgram(s)/kG/Min IV Continuous <Continuous>  furosemide   Injectable 40 milliGRAM(s) IV Push two times a day  heparin  Infusion. 1000 Unit(s)/Hr IV Continuous <Continuous>  heparin  Injectable 5500 Unit(s) IV Push every 6 hours PRN  heparin  Injectable 2500 Unit(s) IV Push every 6 hours PRN  pantoprazole    Tablet 40 milliGRAM(s) Oral before breakfast    Vitals:  T(C): 36.4 (18 @ 07:00), Max: 37.2 (18 @ 20:00)  HR: 92 (18 @ 09:00) (82 - 100)  RR: 18 (18 @ 09:00) (13 - 47)  SpO2: 96% (18 @ 09:00) (91% - 99%)  PA: 25/9 (18 @ 09:00) (22/9 - 35/16)  PA(mean): 14 (18 @ 09:00) (14 - 23)      Daily     Daily Weight in k.2 (01 Dec 2018 06:00)    Weight (kg): 67.5 ( @ 23:15), 66.7 ( @ 14:15)    I&O's Summary      -  01 Dec 2018 07:00  --------------------------------------------------------  IN: 760 mL / OUT: 2400 mL / NET: -1640 mL    01 Dec 2018 07:  -  01 Dec 2018 09:14  --------------------------------------------------------  IN: 130 mL / OUT: 250 mL / NET: -120 mL        Physical Exam:  Appearance: No Acute Distress  HEENT: JVP ___ cm H2O, no HJR  Cardiovascular: RRR, Normal S1 S2, No murmurs/rubs/gallops  Respiratory: Clear to auscultation bilaterally  Gastrointestinal: Soft, Non-tender, non-distended	  Skin: no skin lesions  Neurologic: Non-focal  Extremities: No LE edema, warm and well perfused  Psychiatry: A & O x 3, Mood & affect appropriate      Labs:                        16.0   9.8   )-----------( 101      ( 01 Dec 2018 06:51 )             48.2         129<L>  |  91<L>  |  20  ----------------------------<  305<H>  3.9   |  24  |  0.39<L>    Ca    8.3<L>      01 Dec 2018 06:51  Phos  2.4       Mg     2.0         TPro  6.3  /  Alb  3.5  /  TBili  3.5<H>  /  DBili  x   /  AST  550<H>  /  ALT  668<H>  /  AlkPhos  61        PT/INR - ( 01 Dec 2018 06:51 )   PT: 16.4 sec;   INR: 1.41 ratio         PTT - ( 01 Dec 2018 06:51 )  PTT:>200.0 sec      Serum Pro-Brain Natriuretic Peptide: 4034 pg/mL ( @ 08:56)    Oxygen Saturation, Mixed: 68 % ( @ 06:31)  Oxygen Saturation, Mixed: 66 % ( @ 21:47)  Oxygen Saturation, Mixed: 71 % ( @ 17:42)  Oxygen Saturation, Mixed: 68 % ( @ 11:28)  Oxygen Saturation, Mixed: 77 % ( @ 06:36)  Oxygen Saturation, Mixed: 74 % ( @ 03:43)    Lactate Dehydrogenase, Serum: 610 U/L ( @ 06:51)    Lactate, Blood: 2.8 mmol/L ( @ 11:31)  Lactate, Blood: 1.1 mmol/L ( @ 03:52)  Lactate, Blood: 1.7 mmol/L ( @ 19:20)        TELEMETRY:    [ ] Echocardiogram: Subjective: No acute events overnight. IABP removed today.     Medications:  aluminum hydroxide/magnesium hydroxide/simethicone Suspension 30 milliLiter(s) Oral every 6 hours PRN  captopril 6.25 milliGRAM(s) Oral every 8 hours  chlorhexidine 4% Liquid 1 Application(s) Topical <User Schedule>  DOBUTamine Infusion 2.5 MICROgram(s)/kG/Min IV Continuous <Continuous>  furosemide   Injectable 40 milliGRAM(s) IV Push two times a day  heparin  Infusion. 1000 Unit(s)/Hr IV Continuous <Continuous>  heparin  Injectable 5500 Unit(s) IV Push every 6 hours PRN  heparin  Injectable 2500 Unit(s) IV Push every 6 hours PRN  pantoprazole    Tablet 40 milliGRAM(s) Oral before breakfast    Vitals:  T(C): 36.4 (18 @ 07:00), Max: 37.2 (18 @ 20:00)  HR: 92 (18 @ 09:00) (82 - 100)  RR: 18 (18 @ 09:00) (13 - 47)  SpO2: 96% (18 @ 09:00) (91% - 99%)  PA: 25/9 (18 @ 09:00) (22/9 - 35/16)  PA(mean): 14 (18 @ 09:00) (14 - 23)      Daily     Daily Weight in k.2 (01 Dec 2018 06:00)    Weight (kg): 67.5 ( @ 23:15), 66.7 ( @ 14:15)    I&O's Summary      -  01 Dec 2018 07:00  --------------------------------------------------------  IN: 760 mL / OUT: 2400 mL / NET: -1640 mL    01 Dec 2018 07:01  -  01 Dec 2018 09:14  --------------------------------------------------------  IN: 130 mL / OUT: 250 mL / NET: -120 mL        Physical Exam:  Appearance: No Acute Distress  HEENT: JVP not elevated   Cardiovascular: RRR, Normal S1 S2  Respiratory: Clear to auscultation bilaterally  Gastrointestinal: Soft, Non-tender, non-distended	  Skin: no skin lesions  Neurologic: Non-focal  Extremities: No LE edema, warm and well perfused  Psychiatry: A & O x 3, Mood & affect appropriate      Labs:                        16.0   9.8   )-----------( 101      ( 01 Dec 2018 06:51 )             48.2         129<L>  |  91<L>  |  20  ----------------------------<  305<H>  3.9   |  24  |  0.39<L>    Ca    8.3<L>      01 Dec 2018 06:51  Phos  2.4       Mg     2.0         TPro  6.3  /  Alb  3.5  /  TBili  3.5<H>  /  DBili  x   /  AST  550<H>  /  ALT  668<H>  /  AlkPhos  61        PT/INR - ( 01 Dec 2018 06:51 )   PT: 16.4 sec;   INR: 1.41 ratio         PTT - ( 01 Dec 2018 06:51 )  PTT:>200.0 sec      Serum Pro-Brain Natriuretic Peptide: 4034 pg/mL ( @ 08:56)    Oxygen Saturation, Mixed: 68 % ( @ 06:31)  Oxygen Saturation, Mixed: 66 % ( @ 21:47)  Oxygen Saturation, Mixed: 71 % ( @ 17:42)  Oxygen Saturation, Mixed: 68 % ( @ 11:28)  Oxygen Saturation, Mixed: 77 % ( @ 06:36)  Oxygen Saturation, Mixed: 74 % ( @ 03:43)    Lactate Dehydrogenase, Serum: 610 U/L ( @ 06:51)    Lactate, Blood: 2.8 mmol/L ( @ 11:31)  Lactate, Blood: 1.1 mmol/L ( @ 03:52)  Lactate, Blood: 1.7 mmol/L ( @ 19:20)        TELEMETRY:    [ ] Echocardiogram:

## 2018-12-01 NOTE — PROGRESS NOTE ADULT - ASSESSMENT
46 yo M w/ recent diagnosis in 2018 of nonischemic cardiomyopathy (EF 10%), initially presented to [facility] d/t SOB and Epigastric pain, now s/p R heart cardiac cath, showing clean coronaries, but with high wedge pressures and need for blood pressure support with pressors, transferred to [facility] CCU for cardiogenic shock.     #Neuro  -No active issues.    #Pulm  -O2 sat well on RA  -Respiratory distress likely secondary to acute decompensated heart failure.   -S/p Lasix drip.   -Heart failure following, recs appreciated. Will start on Lasix 40 IV BID    #Cardiovascular  - Cardiogenic Shock  - Patient recently diagnosed with NICM  - S/p levo drip.  5 -> 2.5. [place] and balloon pump placed.  - TTE showing severe global left ventricular systolic dysfunction with EF 10-15%, moderate-severe mitral regurgitation and moderate-severe tricuspid regurgitation.  - HF following.  - RHC showing RA18, RV 48/5/16,  PA 49/23/37 Wedge 26, PA sat 62.5%, CO 2.7, CI 1.87 on levo 0.05,  5, hbg 18.  - S/p lasix drip. Will start on Lasix 40 IV BID. C/w Captopril 6.25 TID. SVR remains ~1600 currently. Will follow up numbers tomorrow. When able, will also add spironolactone (home med, dose is 25 daily).   - heparin gtt for balloon pump. Follow up CXR ordered to check balloon pump placement.   - Hold heparin at 6 am tomorrow to remove balloon pump. And then wean off dobutamine as tolerated. Once dobutamine is off, will obtain TTE.   - EP consulted for AICD placement, recs appreciated. Patient also reports he wore life vest before he came to the hospital, so interrogation of life vest might be helpful.     -History of Hypertension; Plan - holding all meds for now.   -Strict I/Os, daily wts, keep I < O, trend BMP, supplement lytes prn  -Given SVR 1600, c/w Captopril 6.25 TID for afterload reduction    #GI  - No active issues.   -Continue Protonix 40mg for GI ppx.    #Renal/  - LARISSA resolved, currently Cr 1.23  - trend Cr, trend lytes, supplement PRN    #ID  - Afebrile, no active issues.  -Administer influenza vaccine.     #Endo  - HgbA1c 6.0 on   - Continue to monitor     #Skin  -No active issues.    #DVT  - Heparin gtt. 48 yo M w/ recent diagnosis in 2018 of nonischemic cardiomyopathy (EF 10%), initially presented to [facility] d/t SOB and Epigastric pain, now s/p R heart cardiac cath, showing clean coronaries, but with high wedge pressures and need for blood pressure support with pressors, transferred to [facility] CCU for cardiogenic shock.     #Neuro  -No active issues.    #Pulm  -O2 sat well on 2L NC  -Respiratory distress likely secondary to acute decompensated heart failure.   -S/p Lasix drip.   -Heart failure following, recs appreciated. C/w Lasix 40 IV BID    #Cardiovascular  - Cardiogenic Shock  - Patient recently diagnosed with NICM  - S/p levo drip and IABP.  5 -> 2.5.  - TTE showing severe global left ventricular systolic dysfunction with EF 10-15%, moderate-severe mitral regurgitation and moderate-severe tricuspid regurgitation.  - HF following.  - RHC showing RA18, RV 48/5/16,  PA 49/23/37 Wedge 26, PA sat 62.5%, CO 2.7, CI 1.87 on levo 0.05,  5, hbg 18.  - S/p lasix drip. C/w Lasix 40 IV BID.   - Today, SVR went up from 1600 yesterday to ~2100 today (because of elevated MAP). CO 3.7 and CI 2.16 remain stable compared to yesterday. Will increase Captopril 6.25 -> 12.5 TID. Will also add spironolactone 12.5 daily (home dose is 25 daily).   - Removed balloon pump , so d/eugenie heparin drip.   - Wean off dobutamine as tolerated. Will obtain TTE tomorrow.  - EP consulted for AICD placement, recs appreciated. Per EP, AICD not indicated until transplant/LVAD w/u completed by heart failure team    -History of Hypertension; Plan - holding all meds for now.   -Strict I/Os, daily wts, keep I < O, trend BMP, supplement lytes prn  -Given SVR 2100 today, c/w Captopril 12.5 TID for afterload reduction    #GI  - No active issues.   -Continue Protonix 40mg for GI ppx.    #Renal/  - LARISSA resolved, currently Cr 1.1  - trend Cr, trend lytes, supplement PRN    #ID  - Afebrile, no active issues.  -Administer influenza vaccine.     #Endo  - HgbA1c 6.0 on   - Continue to monitor     #Skin  -No active issues.    #DVT  -D/eugenie heparin drip since IABP removed . Will start on SCD for now, given low platelet 46 yo M w/ recent diagnosis in 2018 of nonischemic cardiomyopathy (EF 10%), initially presented to [facility] d/t SOB and Epigastric pain, now s/p R heart cardiac cath, showing clean coronaries, but with high wedge pressures and need for blood pressure support with pressors, transferred to [facility] CCU for cardiogenic shock.     #Neuro  -No active issues.    #Pulm  -O2 sat well on 2L NC  -Respiratory distress likely secondary to acute decompensated heart failure.   -S/p Lasix drip.   -Heart failure following, recs appreciated. C/w Lasix 40 IV BID    #Cardiovascular  - Cardiogenic Shock  - Patient recently diagnosed with NICM  - S/p levo drip and IABP.  5 -> 2.5.  - TTE showing severe global left ventricular systolic dysfunction with EF 10-15%, moderate-severe mitral regurgitation and moderate-severe tricuspid regurgitation.  - HF following.  - RHC showing RA18, RV 48/5/16,  PA 49/23/37 Wedge 26, PA sat 62.5%, CO 2.7, CI 1.87 on levo 0.05,  5, hbg 18.  - S/p lasix drip. C/w Lasix 40 IV BID.   - Today, SVR went up from 1600 yesterday to ~2100 today (because of elevated MAP). CO 3.7 and CI 2.16 remain stable compared to yesterday. Will increase Captopril 6.25 -> 12.5 TID. Will also add spironolactone 12.5 daily (home dose is 25 daily).   - Removed balloon pump , so d/eugenie heparin drip.   - Wean off dobutamine as tolerated. Will obtain TTE tomorrow to eval MR/TR.  - EP consulted for AICD placement, recs appreciated. Per EP, AICD not indicated until transplant/LVAD w/u completed by heart failure team    -History of Hypertension; Plan - holding all meds for now.   -Strict I/Os, daily wts, keep I < O, trend BMP, supplement lytes prn  -Given SVR 2100 today, c/w Captopril 12.5 TID for afterload reduction    #GI  - No active issues.   -Continue Protonix 40mg for GI ppx.    #Renal/  - LARISSA resolved, currently Cr 1.1  - trend Cr, trend lytes, supplement PRN    #ID  - Afebrile, no active issues.  -Administer influenza vaccine.     #Endo  - HgbA1c 6.0 on   - Continue to monitor     #Skin  -No active issues.    #DVT  -D/eugenie heparin drip since IABP removed . Will start on SCD for now, given low platelet

## 2018-12-02 LAB
ALBUMIN SERPL ELPH-MCNC: 3.8 G/DL — SIGNIFICANT CHANGE UP (ref 3.3–5)
ALBUMIN SERPL ELPH-MCNC: 4.1 G/DL — SIGNIFICANT CHANGE UP (ref 3.3–5)
ALP SERPL-CCNC: 71 U/L — SIGNIFICANT CHANGE UP (ref 40–120)
ALP SERPL-CCNC: 78 U/L — SIGNIFICANT CHANGE UP (ref 40–120)
ALT FLD-CCNC: 574 U/L — HIGH (ref 10–45)
ALT FLD-CCNC: 583 U/L — HIGH (ref 10–45)
ANION GAP SERPL CALC-SCNC: 13 MMOL/L — SIGNIFICANT CHANGE UP (ref 5–17)
ANION GAP SERPL CALC-SCNC: 15 MMOL/L — SIGNIFICANT CHANGE UP (ref 5–17)
AST SERPL-CCNC: 263 U/L — HIGH (ref 10–40)
AST SERPL-CCNC: 265 U/L — HIGH (ref 10–40)
BASE EXCESS BLDMV CALC-SCNC: 2.4 MMOL/L — SIGNIFICANT CHANGE UP (ref -3–3)
BASE EXCESS BLDMV CALC-SCNC: 2.4 MMOL/L — SIGNIFICANT CHANGE UP (ref -3–3)
BASE EXCESS BLDMV CALC-SCNC: 2.5 MMOL/L — SIGNIFICANT CHANGE UP (ref -3–3)
BASE EXCESS BLDMV CALC-SCNC: 3.1 MMOL/L — HIGH (ref -3–3)
BASOPHILS # BLD AUTO: 0.1 K/UL — SIGNIFICANT CHANGE UP (ref 0–0.2)
BASOPHILS NFR BLD AUTO: 0.5 % — SIGNIFICANT CHANGE UP (ref 0–2)
BILIRUB SERPL-MCNC: 2.9 MG/DL — HIGH (ref 0.2–1.2)
BILIRUB SERPL-MCNC: 3.1 MG/DL — HIGH (ref 0.2–1.2)
BODY SURFACE AREA CALCULATION: 1.73 M2 — SIGNIFICANT CHANGE UP
BUN SERPL-MCNC: 18 MG/DL — SIGNIFICANT CHANGE UP (ref 7–23)
BUN SERPL-MCNC: 18 MG/DL — SIGNIFICANT CHANGE UP (ref 7–23)
CALCIUM SERPL-MCNC: 9.1 MG/DL — SIGNIFICANT CHANGE UP (ref 8.4–10.5)
CALCIUM SERPL-MCNC: 9.2 MG/DL — SIGNIFICANT CHANGE UP (ref 8.4–10.5)
CHLORIDE SERPL-SCNC: 96 MMOL/L — SIGNIFICANT CHANGE UP (ref 96–108)
CHLORIDE SERPL-SCNC: 97 MMOL/L — SIGNIFICANT CHANGE UP (ref 96–108)
CMV IGG FLD QL: 0.77 U/ML — HIGH
CMV IGG SERPL-IMP: POSITIVE
CO2 BLDMV-SCNC: 28 MMOL/L — SIGNIFICANT CHANGE UP (ref 21–29)
CO2 SERPL-SCNC: 22 MMOL/L — SIGNIFICANT CHANGE UP (ref 22–31)
CO2 SERPL-SCNC: 24 MMOL/L — SIGNIFICANT CHANGE UP (ref 22–31)
COLLECT DURATION TIME UR: SIGNIFICANT CHANGE UP
CREAT CL ?TM UR+SERPL-VRATE: SIGNIFICANT CHANGE UP ML/MIN (ref 85–125)
CREAT SERPL-MCNC: 0.93 MG/DL — SIGNIFICANT CHANGE UP (ref 0.5–1.3)
CREAT SERPL-MCNC: 0.98 MG/DL — SIGNIFICANT CHANGE UP (ref 0.5–1.3)
EBV EA AB SER IA-ACNC: <5 U/ML — SIGNIFICANT CHANGE UP
EBV EA AB TITR SER IF: POSITIVE
EBV EA IGG SER-ACNC: NEGATIVE — SIGNIFICANT CHANGE UP
EBV NA IGG SER IA-ACNC: 534 U/ML — HIGH
EBV PATRN SPEC IB-IMP: SIGNIFICANT CHANGE UP
EBV VCA IGG AVIDITY SER QL IA: POSITIVE
EBV VCA IGM SER IA-ACNC: 77.4 U/ML — HIGH
EBV VCA IGM SER IA-ACNC: <10 U/ML — SIGNIFICANT CHANGE UP
EBV VCA IGM TITR FLD: NEGATIVE — SIGNIFICANT CHANGE UP
EOSINOPHIL # BLD AUTO: 0.2 K/UL — SIGNIFICANT CHANGE UP (ref 0–0.5)
EOSINOPHIL NFR BLD AUTO: 2.1 % — SIGNIFICANT CHANGE UP (ref 0–6)
GAS PNL BLDMV: SIGNIFICANT CHANGE UP
GLUCOSE SERPL-MCNC: 110 MG/DL — HIGH (ref 70–99)
GLUCOSE SERPL-MCNC: 128 MG/DL — HIGH (ref 70–99)
HCO3 BLDMV-SCNC: 27 MMOL/L — SIGNIFICANT CHANGE UP (ref 20–28)
HCT VFR BLD CALC: 53 % — HIGH (ref 39–50)
HCT VFR BLD CALC: 54.3 % — HIGH (ref 39–50)
HGB BLD-MCNC: 17.5 G/DL — HIGH (ref 13–17)
HGB BLD-MCNC: 18 G/DL — HIGH (ref 13–17)
HOROWITZ INDEX BLDMV+IHG-RTO: 21 — SIGNIFICANT CHANGE UP
HSV1 IGG SER-ACNC: 21.7 INDEX — HIGH
HSV1 IGG SERPL QL IA: POSITIVE
HSV2 IGG FLD-ACNC: 0.07 INDEX — SIGNIFICANT CHANGE UP
HSV2 IGG SERPL QL IA: NEGATIVE — SIGNIFICANT CHANGE UP
LACTATE SERPL-SCNC: 1 MMOL/L — SIGNIFICANT CHANGE UP (ref 0.7–2)
LYMPHOCYTES # BLD AUTO: 1.4 K/UL — SIGNIFICANT CHANGE UP (ref 1–3.3)
LYMPHOCYTES # BLD AUTO: 12.8 % — LOW (ref 13–44)
MAGNESIUM SERPL-MCNC: 2.2 MG/DL — SIGNIFICANT CHANGE UP (ref 1.6–2.6)
MCHC RBC-ENTMCNC: 32.2 PG — SIGNIFICANT CHANGE UP (ref 27–34)
MCHC RBC-ENTMCNC: 32.3 PG — SIGNIFICANT CHANGE UP (ref 27–34)
MCHC RBC-ENTMCNC: 33 GM/DL — SIGNIFICANT CHANGE UP (ref 32–36)
MCHC RBC-ENTMCNC: 33 GM/DL — SIGNIFICANT CHANGE UP (ref 32–36)
MCV RBC AUTO: 97.7 FL — SIGNIFICANT CHANGE UP (ref 80–100)
MCV RBC AUTO: 97.9 FL — SIGNIFICANT CHANGE UP (ref 80–100)
MEV IGG SER-ACNC: 139 AU/ML — SIGNIFICANT CHANGE UP
MEV IGG+IGM SER-IMP: POSITIVE — SIGNIFICANT CHANGE UP
MONOCYTES # BLD AUTO: 1.2 K/UL — HIGH (ref 0–0.9)
MONOCYTES NFR BLD AUTO: 10.4 % — SIGNIFICANT CHANGE UP (ref 2–14)
MUV AB SER-ACNC: POSITIVE — SIGNIFICANT CHANGE UP
MUV IGG FLD-ACNC: 36.7 AU/ML — SIGNIFICANT CHANGE UP
NEUTROPHILS # BLD AUTO: 8.3 K/UL — HIGH (ref 1.8–7.4)
NEUTROPHILS NFR BLD AUTO: 74.2 % — SIGNIFICANT CHANGE UP (ref 43–77)
O2 CT VFR BLD CALC: 34 MMHG — SIGNIFICANT CHANGE UP (ref 30–65)
O2 CT VFR BLD CALC: 37 MMHG — SIGNIFICANT CHANGE UP (ref 30–65)
O2 CT VFR BLD CALC: 37 MMHG — SIGNIFICANT CHANGE UP (ref 30–65)
O2 CT VFR BLD CALC: 44 MMHG — SIGNIFICANT CHANGE UP (ref 30–65)
PCO2 BLDMV: 39 MMHG — SIGNIFICANT CHANGE UP (ref 30–65)
PCO2 BLDMV: 41 MMHG — SIGNIFICANT CHANGE UP (ref 30–65)
PCO2 BLDMV: 43 MMHG — SIGNIFICANT CHANGE UP (ref 30–65)
PCO2 BLDMV: 44 MMHG — SIGNIFICANT CHANGE UP (ref 30–65)
PH BLDMV: 7.41 — SIGNIFICANT CHANGE UP (ref 7.32–7.45)
PH BLDMV: 7.42 — SIGNIFICANT CHANGE UP (ref 7.32–7.45)
PH BLDMV: 7.43 — SIGNIFICANT CHANGE UP (ref 7.32–7.45)
PH BLDMV: 7.45 — SIGNIFICANT CHANGE UP (ref 7.32–7.45)
PHOSPHATE SERPL-MCNC: 3.4 MG/DL — SIGNIFICANT CHANGE UP (ref 2.5–4.5)
PLATELET # BLD AUTO: 115 K/UL — LOW (ref 150–400)
PLATELET # BLD AUTO: 121 K/UL — LOW (ref 150–400)
POTASSIUM SERPL-MCNC: 3.9 MMOL/L — SIGNIFICANT CHANGE UP (ref 3.5–5.3)
POTASSIUM SERPL-MCNC: 4.6 MMOL/L — SIGNIFICANT CHANGE UP (ref 3.5–5.3)
POTASSIUM SERPL-SCNC: 3.9 MMOL/L — SIGNIFICANT CHANGE UP (ref 3.5–5.3)
POTASSIUM SERPL-SCNC: 4.6 MMOL/L — SIGNIFICANT CHANGE UP (ref 3.5–5.3)
PROT SERPL-MCNC: 7 G/DL — SIGNIFICANT CHANGE UP (ref 6–8.3)
PROT SERPL-MCNC: 7.4 G/DL — SIGNIFICANT CHANGE UP (ref 6–8.3)
RBC # BLD: 5.42 M/UL — SIGNIFICANT CHANGE UP (ref 4.2–5.8)
RBC # BLD: 5.55 M/UL — SIGNIFICANT CHANGE UP (ref 4.2–5.8)
RBC # FLD: 12.4 % — SIGNIFICANT CHANGE UP (ref 10.3–14.5)
RBC # FLD: 12.7 % — SIGNIFICANT CHANGE UP (ref 10.3–14.5)
RUBV IGG SER-ACNC: 27.3 INDEX — SIGNIFICANT CHANGE UP
RUBV IGG SER-IMP: POSITIVE — SIGNIFICANT CHANGE UP
SAO2 % BLDMV: 60 % — SIGNIFICANT CHANGE UP (ref 60–90)
SAO2 % BLDMV: 62 % — SIGNIFICANT CHANGE UP (ref 60–90)
SAO2 % BLDMV: 68 % — SIGNIFICANT CHANGE UP (ref 60–90)
SAO2 % BLDMV: 72 % — SIGNIFICANT CHANGE UP (ref 60–90)
SODIUM SERPL-SCNC: 133 MMOL/L — LOW (ref 135–145)
SODIUM SERPL-SCNC: 134 MMOL/L — LOW (ref 135–145)
T GONDII IGG SER QL: 36.8 IU/ML — HIGH
T GONDII IGG SER QL: POSITIVE
T PALLIDUM AB TITR SER: NEGATIVE — SIGNIFICANT CHANGE UP
TOTAL VOLUME - 24 HOUR: SIGNIFICANT CHANGE UP
URINE CREATININE CALCULATION: SIGNIFICANT CHANGE UP (ref 1–2)
VZV IGG FLD QL IA: 1621 INDEX — SIGNIFICANT CHANGE UP
VZV IGG FLD QL IA: POSITIVE — SIGNIFICANT CHANGE UP
WBC # BLD: 11.1 K/UL — HIGH (ref 3.8–10.5)
WBC # BLD: 8.6 K/UL — SIGNIFICANT CHANGE UP (ref 3.8–10.5)
WBC # FLD AUTO: 11.1 K/UL — HIGH (ref 3.8–10.5)
WBC # FLD AUTO: 8.6 K/UL — SIGNIFICANT CHANGE UP (ref 3.8–10.5)

## 2018-12-02 PROCEDURE — 93306 TTE W/DOPPLER COMPLETE: CPT | Mod: 26

## 2018-12-02 PROCEDURE — 99291 CRITICAL CARE FIRST HOUR: CPT

## 2018-12-02 PROCEDURE — 74176 CT ABD & PELVIS W/O CONTRAST: CPT | Mod: 26

## 2018-12-02 PROCEDURE — 93010 ELECTROCARDIOGRAM REPORT: CPT

## 2018-12-02 PROCEDURE — 71045 X-RAY EXAM CHEST 1 VIEW: CPT | Mod: 26

## 2018-12-02 PROCEDURE — 71250 CT THORAX DX C-: CPT | Mod: 26

## 2018-12-02 RX ORDER — SIMETHICONE 80 MG/1
80 TABLET, CHEWABLE ORAL THREE TIMES A DAY
Qty: 0 | Refills: 0 | Status: DISCONTINUED | OUTPATIENT
Start: 2018-12-02 | End: 2018-12-09

## 2018-12-02 RX ORDER — POTASSIUM CHLORIDE 20 MEQ
20 PACKET (EA) ORAL ONCE
Qty: 0 | Refills: 0 | Status: COMPLETED | OUTPATIENT
Start: 2018-12-02 | End: 2018-12-02

## 2018-12-02 RX ORDER — SPIRONOLACTONE 25 MG/1
25 TABLET, FILM COATED ORAL DAILY
Qty: 0 | Refills: 0 | Status: DISCONTINUED | OUTPATIENT
Start: 2018-12-03 | End: 2018-12-04

## 2018-12-02 RX ORDER — CAPTOPRIL 12.5 MG/1
18.75 TABLET ORAL THREE TIMES A DAY
Qty: 0 | Refills: 0 | Status: DISCONTINUED | OUTPATIENT
Start: 2018-12-02 | End: 2018-12-03

## 2018-12-02 RX ORDER — SPIRONOLACTONE 25 MG/1
12.5 TABLET, FILM COATED ORAL ONCE
Qty: 0 | Refills: 0 | Status: COMPLETED | OUTPATIENT
Start: 2018-12-02 | End: 2018-12-02

## 2018-12-02 RX ADMIN — CAPTOPRIL 18.75 MILLIGRAM(S): 12.5 TABLET ORAL at 21:50

## 2018-12-02 RX ADMIN — Medication 40 MILLIGRAM(S): at 17:15

## 2018-12-02 RX ADMIN — CAPTOPRIL 18.75 MILLIGRAM(S): 12.5 TABLET ORAL at 08:08

## 2018-12-02 RX ADMIN — Medication 5.06 MICROGRAM(S)/KG/MIN: at 06:15

## 2018-12-02 RX ADMIN — SPIRONOLACTONE 12.5 MILLIGRAM(S): 25 TABLET, FILM COATED ORAL at 09:06

## 2018-12-02 RX ADMIN — SIMETHICONE 80 MILLIGRAM(S): 80 TABLET, CHEWABLE ORAL at 21:49

## 2018-12-02 RX ADMIN — CAPTOPRIL 18.75 MILLIGRAM(S): 12.5 TABLET ORAL at 15:35

## 2018-12-02 RX ADMIN — PANTOPRAZOLE SODIUM 40 MILLIGRAM(S): 20 TABLET, DELAYED RELEASE ORAL at 06:14

## 2018-12-02 RX ADMIN — SPIRONOLACTONE 12.5 MILLIGRAM(S): 25 TABLET, FILM COATED ORAL at 05:22

## 2018-12-02 RX ADMIN — Medication 40 MILLIGRAM(S): at 05:08

## 2018-12-02 RX ADMIN — Medication 20 MILLIEQUIVALENT(S): at 06:27

## 2018-12-02 RX ADMIN — CHLORHEXIDINE GLUCONATE 1 APPLICATION(S): 213 SOLUTION TOPICAL at 05:08

## 2018-12-02 NOTE — PROGRESS NOTE ADULT - ASSESSMENT
48 yo M w/ recent diagnosis in 2018 of nonischemic cardiomyopathy (EF 10%), initially presented to [facility] d/t SOB and Epigastric pain, now s/p R heart cardiac cath, showing clean coronaries, but with high wedge pressures and need for blood pressure support with pressors, transferred to [facility] CCU for cardiogenic shock.     #Neuro  -No active issues.    #Pulm  -O2 sat well on 2L NC  -Respiratory distress likely secondary to acute decompensated heart failure.   -S/p Lasix drip.   -Heart failure following, recs appreciated. C/w Lasix 40 IV BID    #Cardiovascular  - Cardiogenic Shock  - Patient recently diagnosed with NICM  - off levo, s/p IABP removal, weaning  5 -> 2.5 --> taking OFF  this morning and will recheck hemodynamics at 11am, this AM SVR 1600 (improved from yesterday's 2100), CO 4, CI 2.3, CVP 2 on  2.5. Increasing captopril to 18.75 TID,  increasing spironolactone to 25 qD   - TTE  showing severe global left ventricular systolic dysfunction with EF 10-15%, moderate-severe mitral regurgitation and moderate-severe tricuspid regurgitation. Will repeat TTE today  to evaluate for MR/TR.  - F/U CT Chest/Abd/Pelvis for cardiomyopathy eval  - HF following.  - RHC showing RA18, RV 48/5/16,  PA 49/23/37 Wedge 26, PA sat 62.5%, CO 2.7, CI 1.87 on levo 0.05,  5, hbg 18.  - S/p lasix drip. C/w Lasix 40 IV BID.   - Removed balloon pump , so d/eugenie heparin drip.   - EP consulted for AICD placement, recs appreciated. Per EP, AICD not indicated until transplant/LVAD w/u completed by heart failure team    -History of Hypertension; Plan - holding all meds for now.   -Strict I/Os, daily wts, keep I < O, trend BMP, supplement lytes prn  -Given SVR 2100 today, c/w Captopril 12.5 TID for afterload reduction    #GI  - No active issues.   -Continue Protonix 40mg for GI ppx.  - f/u CT A/P    #Renal/  - LARISSA resolved, currently Cr 1.1  - trend Cr, trend lytes, supplement PRN    #ID  - Afebrile, no active issues.  -Administer influenza vaccine.     #Endo  - HgbA1c 6.0 on   - Continue to monitor     #Skin  -No active issues.    #DVT  -D/eugenie heparin drip since IABP removed . Will start on SCD for now, given low platelet

## 2018-12-02 NOTE — PROGRESS NOTE ADULT - SUBJECTIVE AND OBJECTIVE BOX
Subjective:    Medications:  aluminum hydroxide/magnesium hydroxide/simethicone Suspension 30 milliLiter(s) Oral every 6 hours PRN  captopril 18.75 milliGRAM(s) Oral three times a day  chlorhexidine 4% Liquid 1 Application(s) Topical <User Schedule>  DOBUTamine Infusion 2.5 MICROgram(s)/kG/Min IV Continuous <Continuous>  furosemide   Injectable 40 milliGRAM(s) IV Push two times a day  pantoprazole    Tablet 40 milliGRAM(s) Oral before breakfast  spironolactone 12.5 milliGRAM(s) Oral once  spironolactone 12.5 milliGRAM(s) Oral daily    Vitals:  T(C): 36.2 (18 @ 07:00), Max: 36.9 (18 @ 19:00)  HR: 94 (18 @ 08:00) (82 - 116)  BP: 103/74 (18 @ 08:00) (83/63 - 121/79)  BP(mean): 84 (18 @ 08:00) (73 - 102)  RR: 18 (18 @ 08:00) (14 - 25)  SpO2: 96% (18 @ 08:00) (94% - 98%)  CO: 5 (18 @ 13:30) (5 - 5)  CI: 2.9 (18 @ 13:30) (2.9 - 2.9)  PA: 33/14 (18 @ 08:00) (23/7 - 47/26)  PA(mean): 23 (18 @ 08:00) (13 - 34)       Daily Weight in k.2 (02 Dec 2018 05:00)        I&O's Summary    01 Dec 2018 07:01  -  02 Dec 2018 07:00  --------------------------------------------------------  IN: 720 mL / OUT: 2800 mL / NET: -2080 mL    02 Dec 2018 07:  -  02 Dec 2018 08:58  --------------------------------------------------------  IN: 5 mL / OUT: 75 mL / NET: -70 mL        Physical Exam:  Appearance: No Acute Distress  HEENT: JVP not elevated   Cardiovascular: RRR, Normal S1 S2  Respiratory: Clear to auscultation bilaterally  Gastrointestinal: Soft, Non-tender, non-distended	  Skin: no skin lesions  Neurologic: Non-focal  Extremities: No LE edema, warm and well perfused  Psychiatry: A & O x 3, Mood & affect appropriate      Labs:                        17.5   8.6   )-----------( 115      ( 02 Dec 2018 05:22 )             53.0         133<L>  |  96  |  18  ----------------------------<  110<H>  3.9   |  22  |  0.98    Ca    9.1      02 Dec 2018 05:22  Phos  3.4       Mg     2.2         TPro  7.0  /  Alb  3.8  /  TBili  2.9<H>  /  DBili  x   /  AST  263<H>  /  ALT  583<H>  /  AlkPhos  71        PT/INR - ( 01 Dec 2018 10:43 )   PT: 15.0 sec;   INR: 1.31 ratio         PTT - ( 01 Dec 2018 10:43 )  PTT:31.3 sec      Serum Pro-Brain Natriuretic Peptide: 4034 pg/mL ( @ 08:56)    Oxygen Saturation, Mixed: 72 % ( @ 05:05)  Oxygen Saturation, Mixed: 70 % ( @ 22:59)  Oxygen Saturation, Mixed: 76 % ( @ 13:22)  Oxygen Saturation, Mixed: 68 % ( @ 06:31)  Oxygen Saturation, Mixed: 66 % ( @ 21:47)  Oxygen Saturation, Mixed: 71 % ( @ 17:42)  Oxygen Saturation, Mixed: 68 % ( @ 11:28)  Oxygen Saturation, Mixed: 77 % ( @ 06:36)  Oxygen Saturation, Mixed: 74 % ( @ 03:43)    Lactate Dehydrogenase, Serum: 610 U/L ( @ 06:51)    Lactate, Blood: 1.2 mmol/L ( @ 10:43)  Lactate, Blood: 2.8 mmol/L ( @ 11:31)  Lactate, Blood: 1.1 mmol/L ( @ 03:52)  Lactate, Blood: 1.7 mmol/L ( @ 19:20)        TELEMETRY:    [ ] Echocardiogram: Subjective: No acute events overnight. Pt did not appear to be tolerate weaning of , which was restarted today.     Medications:  aluminum hydroxide/magnesium hydroxide/simethicone Suspension 30 milliLiter(s) Oral every 6 hours PRN  captopril 18.75 milliGRAM(s) Oral three times a day  chlorhexidine 4% Liquid 1 Application(s) Topical <User Schedule>  DOBUTamine Infusion 2.5 MICROgram(s)/kG/Min IV Continuous <Continuous>  furosemide   Injectable 40 milliGRAM(s) IV Push two times a day  pantoprazole    Tablet 40 milliGRAM(s) Oral before breakfast  spironolactone 12.5 milliGRAM(s) Oral once  spironolactone 12.5 milliGRAM(s) Oral daily    Vitals:  T(C): 36.2 (18 @ 07:00), Max: 36.9 (18 @ 19:00)  HR: 94 (18 @ 08:00) (82 - 116)  BP: 103/74 (18 @ 08:00) (83/63 - 121/79)  BP(mean): 84 (18 @ 08:00) (73 - 102)  RR: 18 (18 @ 08:00) (14 - 25)  SpO2: 96% (18 @ 08:00) (94% - 98%)  CO: 5 (18 @ 13:30) (5 - 5)  CI: 2.9 (18 @ 13:30) (2.9 - 2.9)  PA: 33/14 (18 @ 08:00) (23/7 - 47/26)  PA(mean): 23 (18 @ 08:00) (13 - 34)       Daily Weight in k.2 (02 Dec 2018 05:00)        I&O's Summary    01 Dec 2018 07:01  -  02 Dec 2018 07:00  --------------------------------------------------------  IN: 720 mL / OUT: 2800 mL / NET: -2080 mL    02 Dec 2018 07:01  -  02 Dec 2018 08:58  --------------------------------------------------------  IN: 5 mL / OUT: 75 mL / NET: -70 mL        Physical Exam:  Appearance: No Acute Distress  HEENT: JVP not elevated   Cardiovascular: RRR, Normal S1 S2  Respiratory: Clear to auscultation bilaterally  Gastrointestinal: Soft, Non-tender, non-distended	  Skin: no skin lesions  Neurologic: Non-focal  Extremities: No LE edema, warm and well perfused  Psychiatry: A & O x 3, Mood & affect appropriate      Labs:                        17.5   8.6   )-----------( 115      ( 02 Dec 2018 05:22 )             53.0     12    133<L>  |  96  |  18  ----------------------------<  110<H>  3.9   |  22  |  0.98    Ca    9.1      02 Dec 2018 05:22  Phos  3.4       Mg     2.2         TPro  7.0  /  Alb  3.8  /  TBili  2.9<H>  /  DBili  x   /  AST  263<H>  /  ALT  583<H>  /  AlkPhos  71  12      PT/INR - ( 01 Dec 2018 10:43 )   PT: 15.0 sec;   INR: 1.31 ratio         PTT - ( 01 Dec 2018 10:43 )  PTT:31.3 sec      Serum Pro-Brain Natriuretic Peptide: 4034 pg/mL ( @ 08:56)    Oxygen Saturation, Mixed: 72 % ( @ 05:05)  Oxygen Saturation, Mixed: 70 % ( @ 22:59)  Oxygen Saturation, Mixed: 76 % ( @ 13:22)  Oxygen Saturation, Mixed: 68 % ( @ 06:31)  Oxygen Saturation, Mixed: 66 % ( @ 21:47)  Oxygen Saturation, Mixed: 71 % ( @ 17:42)  Oxygen Saturation, Mixed: 68 % ( @ 11:28)  Oxygen Saturation, Mixed: 77 % ( @ 06:36)  Oxygen Saturation, Mixed: 74 % ( @ 03:43)    Lactate Dehydrogenase, Serum: 610 U/L ( @ 06:51)    Lactate, Blood: 1.2 mmol/L ( @ 10:43)  Lactate, Blood: 2.8 mmol/L ( @ 11:31)  Lactate, Blood: 1.1 mmol/L ( @ 03:52)  Lactate, Blood: 1.7 mmol/L ( @ 19:20)

## 2018-12-02 NOTE — PROGRESS NOTE ADULT - SUBJECTIVE AND OBJECTIVE BOX
INTERVAL HISTORY:    MEDICATIONS  (STANDING):  captopril 18.75 milliGRAM(s) Oral three times a day  chlorhexidine 4% Liquid 1 Application(s) Topical <User Schedule>  DOBUTamine Infusion 2.5 MICROgram(s)/kG/Min (5.063 mL/Hr) IV Continuous <Continuous>  furosemide   Injectable 40 milliGRAM(s) IV Push two times a day  pantoprazole    Tablet 40 milliGRAM(s) Oral before breakfast  spironolactone 12.5 milliGRAM(s) Oral daily    MEDICATIONS  (PRN):  aluminum hydroxide/magnesium hydroxide/simethicone Suspension 30 milliLiter(s) Oral every 6 hours PRN Dyspepsia      Objective:  ICU Vital Signs Last 24 Hrs  T(C): 36.2 (02 Dec 2018 07:00), Max: 36.9 (01 Dec 2018 19:00)  T(F): 97.2 (02 Dec 2018 07:00), Max: 98.4 (01 Dec 2018 19:00)  HR: 106 (02 Dec 2018 07:00) (82 - 116)  BP: 103/81 (02 Dec 2018 07:00) (83/63 - 121/79)  BP(mean): 92 (02 Dec 2018 07:00) (73 - 102)  ABP: --  ABP(mean): --  RR: 18 (02 Dec 2018 07:00) (14 - 25)  SpO2: 96% (02 Dec 2018 07:00) (94% - 98%)           @ :  -   @ 07:00  --------------------------------------------------------  IN: 720 mL / OUT: 2800 mL / NET: -2080 mL     @ 07:  -   @ 08:06  --------------------------------------------------------  IN: 5 mL / OUT: 0 mL / NET: 5 mL      Daily     Daily Weight in k.2 (02 Dec 2018 05:00)    Physical Exam:  HEENT - PERRLA, pharynx is nonerythematous with no erythema or exudate. No cervical, maxillary, mandibular, or supraclavicular lymphadenopathy.    Cardiovascular - RRR, no murmurs, rubs, or gallops.     Pulmonary - clear to auscultation bilaterally. No rheezes, rales, or ronchi.    Abdomen - Soft, nontender, nondistended, with no organomegaly, no rebound or guarding. Negative suprapubic tenderness.    Extremities - No edema or deformity.     Neuro - CNs II-XII grossly intact. Motor strength 5/5 in all extremities with no restriction to active or passive ROM.     TELEMETRY:     EKG:     IMAGING:    Labs:                          17.5   8.6   )-----------( 115      ( 02 Dec 2018 05:22 )             53.0     12-    133<L>  |  96  |  18  ----------------------------<  110<H>  3.9   |  22  |  0.98    Ca    9.1      02 Dec 2018 05:22  Phos  3.4     12-  Mg     2.2     12-    TPro  7.0  /  Alb  3.8  /  TBili  2.9<H>  /  DBili  x   /  AST  263<H>  /  ALT  583<H>  /  AlkPhos  71  12-02    LIVER FUNCTIONS - ( 02 Dec 2018 05:22 )  Alb: 3.8 g/dL / Pro: 7.0 g/dL / ALK PHOS: 71 U/L / ALT: 583 U/L / AST: 263 U/L / GGT: x           PT/INR - ( 01 Dec 2018 10:43 )   PT: 15.0 sec;   INR: 1.31 ratio         PTT - ( 01 Dec 2018 10:43 )  PTT:31.3 sec    Urinalysis Basic - ( 01 Dec 2018 09:46 )    Color: Light Yellow / Appearance: Clear / S.010 / pH: x  Gluc: x / Ketone: Negative  / Bili: Negative / Urobili: Negative   Blood: x / Protein: Negative / Nitrite: Negative   Leuk Esterase: Negative / RBC: 385 /hpf / WBC 2 /hpf   Sq Epi: x / Non Sq Epi: 0 /hpf / Bacteria: Negative        HEALTH ISSUES - PROBLEM Dx:  Dilated cardiomyopathy: Dilated cardiomyopathy INTERVAL HISTORY: Overnight, IABP removed. Otherwise no acute events overnight. Patient seen and examined at bedside this AM, denying chest pain/sob/other acute complaints.     MEDICATIONS  (STANDING):  captopril 18.75 milliGRAM(s) Oral three times a day  chlorhexidine 4% Liquid 1 Application(s) Topical <User Schedule>  DOBUTamine Infusion 2.5 MICROgram(s)/kG/Min (5.063 mL/Hr) IV Continuous <Continuous>  furosemide   Injectable 40 milliGRAM(s) IV Push two times a day  pantoprazole    Tablet 40 milliGRAM(s) Oral before breakfast  spironolactone 12.5 milliGRAM(s) Oral daily    MEDICATIONS  (PRN):  aluminum hydroxide/magnesium hydroxide/simethicone Suspension 30 milliLiter(s) Oral every 6 hours PRN Dyspepsia      Objective:  ICU Vital Signs Last 24 Hrs  T(C): 36.2 (02 Dec 2018 07:00), Max: 36.9 (01 Dec 2018 19:00)  T(F): 97.2 (02 Dec 2018 07:00), Max: 98.4 (01 Dec 2018 19:00)  HR: 106 (02 Dec 2018 07:00) (82 - 116)  BP: 103/81 (02 Dec 2018 07:00) (83/63 - 121/79)  BP(mean): 92 (02 Dec 2018 07:00) (73 - 102)  ABP: --  ABP(mean): --  RR: 18 (02 Dec 2018 07:00) (14 - 25)  SpO2: 96% (02 Dec 2018 07:00) (94% - 98%)           @ : @ 07:00  --------------------------------------------------------  IN: 720 mL / OUT: 2800 mL / NET: -2080 mL     @ 07:  -   @ 08:06  --------------------------------------------------------  IN: 5 mL / OUT: 0 mL / NET: 5 mL      Daily     Daily Weight in k.2 (02 Dec 2018 05:00)    Physical Exam:    General: Adult male resting comfortably in bed in no acute distress      HEENT - PERRLA, pharynx is nonerythematous with no erythema or exudate. No cervical, maxillary, mandibular, or supraclavicular lymphadenopathy.    Cardiovascular - RRR, no murmurs, rubs, or gallops.     Pulmonary - clear to auscultation bilaterally. No rheezes, rales, or ronchi.    Abdomen - Soft, nontender, nondistended, with no organomegaly, no rebound or guarding. Negative suprapubic tenderness.    Extremities - No edema or deformity.     Neuro - CNs II-XII grossly intact.       Labs:                          17.5   8.6   )-----------( 115      ( 02 Dec 2018 05:22 )             53.0     12-    133<L>  |  96  |  18  ----------------------------<  110<H>  3.9   |  22  |  0.98    Ca    9.1      02 Dec 2018 05:22  Phos  3.4     12-02  Mg     2.2     12-    TPro  7.0  /  Alb  3.8  /  TBili  2.9<H>  /  DBili  x   /  AST  263<H>  /  ALT  583<H>  /  AlkPhos  71  12-02    LIVER FUNCTIONS - ( 02 Dec 2018 05:22 )  Alb: 3.8 g/dL / Pro: 7.0 g/dL / ALK PHOS: 71 U/L / ALT: 583 U/L / AST: 263 U/L / GGT: x           PT/INR - ( 01 Dec 2018 10:43 )   PT: 15.0 sec;   INR: 1.31 ratio         PTT - ( 01 Dec 2018 10:43 )  PTT:31.3 sec    Urinalysis Basic - ( 01 Dec 2018 09:46 )    Color: Light Yellow / Appearance: Clear / S.010 / pH: x  Gluc: x / Ketone: Negative  / Bili: Negative / Urobili: Negative   Blood: x / Protein: Negative / Nitrite: Negative   Leuk Esterase: Negative / RBC: 385 /hpf / WBC 2 /hpf   Sq Epi: x / Non Sq Epi: 0 /hpf / Bacteria: Negative        HEALTH ISSUES - PROBLEM Dx:  Dilated cardiomyopathy: Dilated cardiomyopathy

## 2018-12-02 NOTE — PROGRESS NOTE ADULT - SUBJECTIVE AND OBJECTIVE BOX
====================  CCU MIDNIGHT ROUNDS  ====================    RIZWAN VILLALPANDO  [id number]    Patient is a 47y old  Male who presents with a chief complaint of Cardiogenic Shock (02 Dec 2018 08:57)    ====================  SUMMARY:  ====================    ====================  NEW EVENTS:  ====================    MEDICATIONS  (STANDING):  captopril 18.75 milliGRAM(s) Oral three times a day  chlorhexidine 4% Liquid 1 Application(s) Topical <User Schedule>  DOBUTamine Infusion 2.5 MICROgram(s)/kG/Min (5.063 mL/Hr) IV Continuous <Continuous>  furosemide   Injectable 40 milliGRAM(s) IV Push two times a day  pantoprazole    Tablet 40 milliGRAM(s) Oral before breakfast    MEDICATIONS  (PRN):  aluminum hydroxide/magnesium hydroxide/simethicone Suspension 30 milliLiter(s) Oral every 6 hours PRN Dyspepsia  simethicone 80 milliGRAM(s) Chew three times a day PRN Gas    ====================  VITALS (Last 12 hrs):  ====================  T(C): 37 (12-02-18 @ 19:00), Max: 37 (12-02-18 @ 19:00)  T(F): 98.6 (12-02-18 @ 19:00), Max: 98.6 (12-02-18 @ 19:00)  HR: 102 (12-02-18 @ 21:00) (102 - 114)  BP: 105/81 (12-02-18 @ 21:00) (103/67 - 115/107)  BP(mean): 94 (12-02-18 @ 21:00) (78 - 112)  RR: 27 (12-02-18 @ 21:00) (14 - 28)  SpO2: 96% (12-02-18 @ 21:00) (95% - 97%)  CVP(mm Hg): 2 (12-02-18 @ 20:00) (2 - 4)  PA: 25/11 (12-02-18 @ 21:00) (22/5 - 45/21)  PA(mean): 16 (12-02-18 @ 21:00) (11 - 30)    I&O's Summary    01 Dec 2018 07:01  -  02 Dec 2018 07:00  --------------------------------------------------------  IN: 720 mL / OUT: 2800 mL / NET: -2080 mL    02 Dec 2018 07:01  -  02 Dec 2018 22:06  --------------------------------------------------------  IN: 422.5 mL / OUT: 1490 mL / NET: -1067.5 mL    ====================  NEW LABS:  ====================  12-02    134<L>  |  97  |  18  ----------------------------<  128<H>  4.6   |  24  |  0.93    Ca    9.2      02 Dec 2018 11:23  Phos  3.4     12-02  Mg     2.2     12-02    TPro  7.4  /  Alb  4.1  /  TBili  3.1<H>  /  DBili  x   /  AST  265<H>  /  ALT  574<H>  /  AlkPhos  78  12-02    PT/INR - ( 01 Dec 2018 10:43 )   PT: 15.0 sec;   INR: 1.31 ratio       PTT - ( 01 Dec 2018 10:43 )  PTT:31.3 sec    ====================  PLAN:  ====================      Samantha Goode CCU NP  Beeper #[id number]  Spectra # [phone] ====================  CCU MIDNIGHT ROUNDS  ====================    RIZWAN VILLALPANDO  [id number]    Patient is a 47y old  Male who presents with a chief complaint of Cardiogenic Shock (02 Dec 2018 08:57)    ====================  SUMMARY: 46 yo M w/ recent diagnosis in 2018 of nonischemic cardiomyopathy (EF 10%), initially presented to [facility] d/t SOB and Epigastric pain, now s/p R heart cardiac cath, showing clean coronaries, but with high wedge pressures and need for blood pressure support with pressors, transferred to [facility] CCU for cardiogenic shock.   ====================    ====================  NEW EVENTS: 12am Mv02 62 Manny ~ CO 2.8 CI 1.6 on  2.5, CVP 2-4  ====================    MEDICATIONS  (STANDING):  captopril 18.75 milliGRAM(s) Oral three times a day  chlorhexidine 4% Liquid 1 Application(s) Topical <User Schedule>  DOBUTamine Infusion 2.5 MICROgram(s)/kG/Min (5.063 mL/Hr) IV Continuous <Continuous>  furosemide   Injectable 40 milliGRAM(s) IV Push two times a day  pantoprazole    Tablet 40 milliGRAM(s) Oral before breakfast    MEDICATIONS  (PRN):  aluminum hydroxide/magnesium hydroxide/simethicone Suspension 30 milliLiter(s) Oral every 6 hours PRN Dyspepsia  simethicone 80 milliGRAM(s) Chew three times a day PRN Gas    ====================  VITALS (Last 12 hrs):  ====================  T(C): 37 (18 @ 19:00), Max: 37 (18 @ 19:00)  T(F): 98.6 (18 @ 19:00), Max: 98.6 (18 @ 19:00)  HR: 102 (18 @ 21:00) (102 - 114)  BP: 105/81 (18 @ 21:00) (103/67 - 115/107)  BP(mean): 94 (18 @ 21:00) (78 - 112)  RR: 27 (18 @ 21:00) (14 - 28)  SpO2: 96% (18 @ 21:00) (95% - 97%)  CVP(mm Hg): 2 (18 @ 20:00) (2 - 4)  PA: 25/11 (18 @ 21:00) ( - )  PA(mean): 16 (18 @ 21:00) (11 - 30)    I&O's Summary    01 Dec 2018 07:  -  02 Dec 2018 07:00  --------------------------------------------------------  IN: 720 mL / OUT: 2800 mL / NET: -2080 mL    02 Dec 2018 07:01  -  02 Dec 2018 22:06  --------------------------------------------------------  IN: 422.5 mL / OUT: 1490 mL / NET: -1067.5 mL    ====================  NEW LABS:  ====================      134<L>  |  97  |  18  ----------------------------<  128<H>  4.6   |  24  |  0.93    Ca    9.2      02 Dec 2018 11:23  Phos  3.4     12-02  Mg     2.2         TPro  7.4  /  Alb  4.1  /  TBili  3.1<H>  /  DBili  x   /  AST  265<H>  /  ALT  574<H>  /  AlkPhos  78  12    PT/INR - ( 01 Dec 2018 10:43 )   PT: 15.0 sec;   INR: 1.31 ratio       PTT - ( 01 Dec 2018 10:43 )  PTT:31.3 sec    ====================  PLAN:  ====================        #Cardiovascular  - Cardiogenic Shock  - Patient recently diagnosed with NICM  - off levo, s/p IABP removal, weaning  5 -> 2.5 --> taking OFF  this morning and will recheck hemodynamics at 11am, this AM SVR 1600 (improved from yesterday's 2100), CO 4, CI 2.3, CVP 2 on  2.5. Increasing captopril to 18.75 TID,  increasing spironolactone to 25 qD   - TTE  showing severe global left ventricular systolic dysfunction with EF 10-15%, moderate-severe mitral regurgitation and moderate-severe tricuspid regurgitation. Will repeat TTE today  to evaluate for MR/TR.  - F/U CT Chest/Abd/Pelvis for cardiomyopathy eval  - HF following.  - RHC showing RA18, RV 48/5/16,  PA 49/23/37 Wedge 26, PA sat 62.5%, CO 2.7, CI 1.87 on levo 0.05,  5, hbg 18.  - S/p lasix drip. C/w Lasix 40 IV BID.   - Removed balloon pump , so d/eugenie heparin drip.   - EP consulted for AICD placement, recs appreciated. Per EP, AICD not indicated until transplant/LVAD w/u completed by heart failure team    -IABP pulled yesterday  -This AM, CO 4, CI 2.6, MvO2 72, CVP 1-2, SVR 1600  -Captopril uptitrated to 18.76  -Will increased spironolactone to 25 mg daily  -Will d/c dobutamine and repeat swan #'s 1 hour post  -F/u CT scan          Samantha Goode CCU NP  Beeper #[id number]  Spectra # [phone] ====================  CCU MIDNIGHT ROUNDS  ====================    RIZWAN VILLALPANDO  [id number]    Patient is a 47y old  Male who presents with a chief complaint of Cardiogenic Shock (02 Dec 2018 08:57)    ====================  SUMMARY: 46 yo M w/ recent diagnosis in 2018 of nonischemic cardiomyopathy (EF 10%), initially presented to [facility] d/t SOB and Epigastric pain, now s/p R heart cardiac cath, showing clean coronaries, but with high wedge pressures and need for blood pressure support with pressors, transferred to [facility] CCU for cardiogenic shock.   ====================    ====================  NEW EVENTS: 12am Mv02 62 Manny ~ CO 2.8 CI 1.6 on  2.5, CVP 2-4  ====================    MEDICATIONS  (STANDING):  captopril 18.75 milliGRAM(s) Oral three times a day  chlorhexidine 4% Liquid 1 Application(s) Topical <User Schedule>  DOBUTamine Infusion 2.5 MICROgram(s)/kG/Min (5.063 mL/Hr) IV Continuous <Continuous>  furosemide   Injectable 40 milliGRAM(s) IV Push two times a day  pantoprazole    Tablet 40 milliGRAM(s) Oral before breakfast    MEDICATIONS  (PRN):  aluminum hydroxide/magnesium hydroxide/simethicone Suspension 30 milliLiter(s) Oral every 6 hours PRN Dyspepsia  simethicone 80 milliGRAM(s) Chew three times a day PRN Gas    ====================  VITALS (Last 12 hrs):  ====================  T(C): 37 (18 @ 19:00), Max: 37 (18 @ 19:00)  T(F): 98.6 (18 @ 19:00), Max: 98.6 (18 @ 19:00)  HR: 102 (18 @ 21:00) (102 - 114)  BP: 105/81 (18 @ 21:00) (103/67 - 115/107)  BP(mean): 94 (18 @ 21:00) (78 - 112)  RR: 27 (18 @ 21:00) (14 - 28)  SpO2: 96% (18 @ 21:00) (95% - 97%)  CVP(mm Hg): 2 (18 @ 20:00) (2 - 4)  PA: 25/11 (18 @ 21:00) ( - )  PA(mean): 16 (18 @ 21:00) (11 - 30)    I&O's Summary    01 Dec 2018 07:  -  02 Dec 2018 07:00  --------------------------------------------------------  IN: 720 mL / OUT: 2800 mL / NET: -2080 mL    02 Dec 2018 07:01  -  02 Dec 2018 22:06  --------------------------------------------------------  IN: 422.5 mL / OUT: 1490 mL / NET: -1067.5 mL    ====================  NEW LABS:  ====================      134<L>  |  97  |  18  ----------------------------<  128<H>  4.6   |  24  |  0.93    Ca    9.2      02 Dec 2018 11:23  Phos  3.4     12-02  Mg     2.2         TPro  7.4  /  Alb  4.1  /  TBili  3.1<H>  /  DBili  x   /  AST  265<H>  /  ALT  574<H>  /  AlkPhos  78  12    PT/INR - ( 01 Dec 2018 10:43 )   PT: 15.0 sec;   INR: 1.31 ratio       PTT - ( 01 Dec 2018 10:43 )  PTT:31.3 sec    ====================  PLAN:  ====================  #Cardiogenic shock s/p IABP; NICM  - 12a Mv02 62 Manny ~ CO 2.8 CI 1.6 on  2.5, CVP 2-4  - Continue  2.5mcg/kg/min; unable to wean   - Lasix gtt 40mg IV bid; strict I/Os; daily wts   - Continue Spirolactone 25mg daily   - Continue Captopril 18.75mg tid; titrate if able  - Transplant/LVAD work up   - F/U HF team    #Mod-severe MR/TR  - Repeat TTE to re-assess valves       Samantha Goode CCU NP  Beeper #[id number]  Spectra # [phone] ====================  CCU MIDNIGHT ROUNDS  ====================    RIZWAN VILLALPANDO  [id number]    Patient is a 47y old  Male who presents with a chief complaint of Cardiogenic Shock (02 Dec 2018 08:57)    ====================  SUMMARY: 48 yo M w/ recent diagnosis in 2018 of nonischemic cardiomyopathy (EF 10%), initially presented to [facility] d/t SOB and Epigastric pain, now s/p R heart cardiac cath, showing clean coronaries, but with high wedge pressures and need for blood pressure support with pressors, transferred to [facility] CCU for cardiogenic shock.   ====================    ====================  NEW EVENTS: 12am Mv02 62 Manny ~ CO 2.8 CI 1.6 on  2.5, CVP 2-4; u/o 40-80ml/hr -1.2L  ====================    MEDICATIONS  (STANDING):  captopril 18.75 milliGRAM(s) Oral three times a day  chlorhexidine 4% Liquid 1 Application(s) Topical <User Schedule>  DOBUTamine Infusion 2.5 MICROgram(s)/kG/Min (5.063 mL/Hr) IV Continuous <Continuous>  furosemide   Injectable 40 milliGRAM(s) IV Push two times a day  pantoprazole    Tablet 40 milliGRAM(s) Oral before breakfast    MEDICATIONS  (PRN):  aluminum hydroxide/magnesium hydroxide/simethicone Suspension 30 milliLiter(s) Oral every 6 hours PRN Dyspepsia  simethicone 80 milliGRAM(s) Chew three times a day PRN Gas    ====================  VITALS (Last 12 hrs):  ====================  T(C): 37 (18 @ 19:00), Max: 37 (18 @ 19:00)  T(F): 98.6 (18 @ 19:00), Max: 98.6 (18 @ 19:00)  HR: 102 (18 @ :00) (102 - 114)  BP: 105/81 (18 @ 21:00) (103/67 - 115/107)  BP(mean): 94 (18 @ :00) (78 - 112)  RR: 27 (18 @ :00) (14 - 28)  SpO2: 96% (18 @ :00) (95% - 97%)  CVP(mm Hg): 2 (18 @ 20:00) (2 - 4)  PA: 25/11 (18 @ 21:00) (22 - )  PA(mean): 16 (18 @ 21:00) (11 - 30)    I&O's Summary    01 Dec 2018 07:  -  02 Dec 2018 07:00  --------------------------------------------------------  IN: 720 mL / OUT: 2800 mL / NET: -2080 mL    02 Dec 2018 07:  -  02 Dec 2018 22:06  --------------------------------------------------------  IN: 422.5 mL / OUT: 1490 mL / NET: -1067.5 mL    ====================  NEW LABS:  ====================  12    134<L>  |  97  |  18  ----------------------------<  128<H>  4.6   |  24  |  0.93    Ca    9.2      02 Dec 2018 11:23  Phos  3.4     12-  Mg     2.2         TPro  7.4  /  Alb  4.1  /  TBili  3.1<H>  /  DBili  x   /  AST  265<H>  /  ALT  574<H>  /  AlkPhos  78  12-    PT/INR - ( 01 Dec 2018 10:43 )   PT: 15.0 sec;   INR: 1.31 ratio       PTT - ( 01 Dec 2018 10:43 )  PTT:31.3 sec    ====================  PLAN:  ====================  #Cardiogenic shock s/p IABP; NICM  - 12a Mv02 62 Manny ~ CO 2.8 CI 1.6 on  2.5, CVP 2-4  - Continue  2.5mcg/kg/min; unable to wean; trend hemodynamics   - Lasix gtt 40mg IV bid; strict I/Os; daily wts;  u/o 40-80ml/hr -1.2L   - Continue Spirolactone 25mg daily   - Continue Captopril 18.75mg tid; titrate if able  - Transplant/LVAD work up   - F/U HF team    #Mod-severe MR/TR  - Repeat TTE to re-assess valves       Samantah Goode CCU NP  Beeper #[id number]  Spectra # [phone]

## 2018-12-02 NOTE — CHART NOTE - NSCHARTNOTEFT_GEN_A_CORE
Balloon pump was removed on 12/1/2018 at 11AM. Hep was held 6h prior to pull. Pt was hemodynamically stable off pump. No complications during pull. R groin w/o hematoma, no tenderness. Repeat hemodynamics improved (CO 5, CI 2.8, mixed 68, SVR 1200).     Samm Munoz MD  Cardiology Fellow

## 2018-12-02 NOTE — PROGRESS NOTE ADULT - PROBLEM SELECTOR PLAN 1
captopril 12.5 mg TID, uptitrate as tolerated  - repeat hemos following IABP removal, maybe able to wean  tomorrow   -- IV Lasix 40 mg BID  -- trend LFTs  -- monitor Cr  -- monitor UOP  -- I/Os  -- daily weights  -- K>4, Mg>2. captopril 12.5 mg TID, uptitrate as tolerated  - repeat hemos following IABP removal, can reattempt to wean  tomorrow  - will likely launch LVAD/Transplant Evaluation this week    -- IV Lasix 40 mg BID  -- trend LFTs  -- monitor Cr  -- monitor UOP  -- I/Os  -- daily weights  -- K>4, Mg>2.

## 2018-12-02 NOTE — PROGRESS NOTE ADULT - ASSESSMENT
46 yo [place] M, w/ PMH of NICM LVIDd 7 cm (EF 10% 11/2018 - recently diagnosed in August 2018) a/w cardiogenic shock requiring IABP and inotropes.    RHC on 11/29/2018 RA 18, RV 48/5/16 PA 49/23/37 PCWP 26 PA 62% CO 2.7 CI 1.87 SVR 2300 PVR 4    11/30 CVP 4, PA 33/16/23 MvO2 77% CO 4.97 CI 2.37 SVR 1200    Currently on dobutamine 2.5 mcg/kg/min

## 2018-12-03 LAB
ALBUMIN SERPL ELPH-MCNC: 4 G/DL — SIGNIFICANT CHANGE UP (ref 3.3–5)
ALBUMIN SERPL ELPH-MCNC: 4.1 G/DL — SIGNIFICANT CHANGE UP (ref 3.3–5)
ALBUMIN SERPL ELPH-MCNC: 4.2 G/DL — SIGNIFICANT CHANGE UP (ref 3.3–5)
ALP SERPL-CCNC: 76 U/L — SIGNIFICANT CHANGE UP (ref 40–120)
ALP SERPL-CCNC: 80 U/L — SIGNIFICANT CHANGE UP (ref 40–120)
ALP SERPL-CCNC: 81 U/L — SIGNIFICANT CHANGE UP (ref 40–120)
ALT FLD-CCNC: 436 U/L — HIGH (ref 10–45)
ALT FLD-CCNC: 480 U/L — HIGH (ref 10–45)
ALT FLD-CCNC: 497 U/L — HIGH (ref 10–45)
ANION GAP SERPL CALC-SCNC: 14 MMOL/L — SIGNIFICANT CHANGE UP (ref 5–17)
ANION GAP SERPL CALC-SCNC: 14 MMOL/L — SIGNIFICANT CHANGE UP (ref 5–17)
ANION GAP SERPL CALC-SCNC: 18 MMOL/L — HIGH (ref 5–17)
AST SERPL-CCNC: 210 U/L — HIGH (ref 10–40)
AST SERPL-CCNC: 227 U/L — HIGH (ref 10–40)
AST SERPL-CCNC: 232 U/L — HIGH (ref 10–40)
BASE EXCESS BLDMV CALC-SCNC: 1.1 MMOL/L — SIGNIFICANT CHANGE UP (ref -3–3)
BASE EXCESS BLDMV CALC-SCNC: 2.2 MMOL/L — SIGNIFICANT CHANGE UP (ref -3–3)
BILIRUB SERPL-MCNC: 1.9 MG/DL — HIGH (ref 0.2–1.2)
BILIRUB SERPL-MCNC: 2.3 MG/DL — HIGH (ref 0.2–1.2)
BILIRUB SERPL-MCNC: 2.3 MG/DL — HIGH (ref 0.2–1.2)
BUN SERPL-MCNC: 22 MG/DL — SIGNIFICANT CHANGE UP (ref 7–23)
CALCIUM SERPL-MCNC: 9.3 MG/DL — SIGNIFICANT CHANGE UP (ref 8.4–10.5)
CALCIUM SERPL-MCNC: 9.6 MG/DL — SIGNIFICANT CHANGE UP (ref 8.4–10.5)
CALCIUM SERPL-MCNC: 9.6 MG/DL — SIGNIFICANT CHANGE UP (ref 8.4–10.5)
CHLORIDE SERPL-SCNC: 93 MMOL/L — LOW (ref 96–108)
CHLORIDE SERPL-SCNC: 96 MMOL/L — SIGNIFICANT CHANGE UP (ref 96–108)
CHLORIDE SERPL-SCNC: 96 MMOL/L — SIGNIFICANT CHANGE UP (ref 96–108)
CO2 BLDMV-SCNC: 27 MMOL/L — SIGNIFICANT CHANGE UP (ref 21–29)
CO2 BLDMV-SCNC: 27 MMOL/L — SIGNIFICANT CHANGE UP (ref 21–29)
CO2 SERPL-SCNC: 21 MMOL/L — LOW (ref 22–31)
CO2 SERPL-SCNC: 21 MMOL/L — LOW (ref 22–31)
CO2 SERPL-SCNC: 22 MMOL/L — SIGNIFICANT CHANGE UP (ref 22–31)
CREAT SERPL-MCNC: 0.96 MG/DL — SIGNIFICANT CHANGE UP (ref 0.5–1.3)
CREAT SERPL-MCNC: 1.03 MG/DL — SIGNIFICANT CHANGE UP (ref 0.5–1.3)
CREAT SERPL-MCNC: 1.19 MG/DL — SIGNIFICANT CHANGE UP (ref 0.5–1.3)
EBV DNA SERPL NAA+PROBE-ACNC: SIGNIFICANT CHANGE UP IU/ML
EBVPCR LOG: SIGNIFICANT CHANGE UP LOGIU/ML
GAMMA INTERFERON BACKGROUND BLD IA-ACNC: 0.02 IU/ML — SIGNIFICANT CHANGE UP
GAS PNL BLDMV: SIGNIFICANT CHANGE UP
GAS PNL BLDMV: SIGNIFICANT CHANGE UP
GLUCOSE SERPL-MCNC: 112 MG/DL — HIGH (ref 70–99)
GLUCOSE SERPL-MCNC: 118 MG/DL — HIGH (ref 70–99)
GLUCOSE SERPL-MCNC: 125 MG/DL — HIGH (ref 70–99)
HCO3 BLDMV-SCNC: 26 MMOL/L — SIGNIFICANT CHANGE UP (ref 20–28)
HCO3 BLDMV-SCNC: 26 MMOL/L — SIGNIFICANT CHANGE UP (ref 20–28)
HCT VFR BLD CALC: 54.3 % — HIGH (ref 39–50)
HCT VFR BLD CALC: 55.5 % — HIGH (ref 39–50)
HCT VFR BLD CALC: 56.4 % — HIGH (ref 39–50)
HCV RNA SERPL NAA DL=5-ACNC: SIGNIFICANT CHANGE UP IU/ML
HCV RNA SPEC NAA+PROBE-LOG IU: SIGNIFICANT CHANGE UP LOGIU/ML
HGB BLD-MCNC: 18 G/DL — HIGH (ref 13–17)
HGB BLD-MCNC: 18.4 G/DL — HIGH (ref 13–17)
HGB BLD-MCNC: 18.6 G/DL — HIGH (ref 13–17)
HOROWITZ INDEX BLDMV+IHG-RTO: 21 — SIGNIFICANT CHANGE UP
HOROWITZ INDEX BLDMV+IHG-RTO: 21 — SIGNIFICANT CHANGE UP
LACTATE SERPL-SCNC: 0.8 MMOL/L — SIGNIFICANT CHANGE UP (ref 0.7–2)
M TB IFN-G BLD-IMP: NEGATIVE — SIGNIFICANT CHANGE UP
M TB IFN-G CD4+ BCKGRND COR BLD-ACNC: 0.01 IU/ML — SIGNIFICANT CHANGE UP
M TB IFN-G CD4+CD8+ BCKGRND COR BLD-ACNC: 0.02 IU/ML — SIGNIFICANT CHANGE UP
MAGNESIUM SERPL-MCNC: 2.1 MG/DL — SIGNIFICANT CHANGE UP (ref 1.6–2.6)
MAGNESIUM SERPL-MCNC: 2.2 MG/DL — SIGNIFICANT CHANGE UP (ref 1.6–2.6)
MCHC RBC-ENTMCNC: 31.8 PG — SIGNIFICANT CHANGE UP (ref 27–34)
MCHC RBC-ENTMCNC: 32.2 PG — SIGNIFICANT CHANGE UP (ref 27–34)
MCHC RBC-ENTMCNC: 32.4 GM/DL — SIGNIFICANT CHANGE UP (ref 32–36)
MCHC RBC-ENTMCNC: 32.7 GM/DL — SIGNIFICANT CHANGE UP (ref 32–36)
MCHC RBC-ENTMCNC: 33.5 PG — SIGNIFICANT CHANGE UP (ref 27–34)
MCHC RBC-ENTMCNC: 34.2 GM/DL — SIGNIFICANT CHANGE UP (ref 32–36)
MCV RBC AUTO: 98 FL — SIGNIFICANT CHANGE UP (ref 80–100)
MCV RBC AUTO: 98.2 FL — SIGNIFICANT CHANGE UP (ref 80–100)
MCV RBC AUTO: 98.7 FL — SIGNIFICANT CHANGE UP (ref 80–100)
O2 CT VFR BLD CALC: 35 MMHG — SIGNIFICANT CHANGE UP (ref 30–65)
O2 CT VFR BLD CALC: 40 MMHG — SIGNIFICANT CHANGE UP (ref 30–65)
PCO2 BLDMV: 37 MMHG — SIGNIFICANT CHANGE UP (ref 30–65)
PCO2 BLDMV: 42 MMHG — SIGNIFICANT CHANGE UP (ref 30–65)
PH BLDMV: 7.4 — SIGNIFICANT CHANGE UP (ref 7.32–7.45)
PH BLDMV: 7.45 — SIGNIFICANT CHANGE UP (ref 7.32–7.45)
PHOSPHATE SERPL-MCNC: 3.9 MG/DL — SIGNIFICANT CHANGE UP (ref 2.5–4.5)
PHOSPHATE SERPL-MCNC: 4.4 MG/DL — SIGNIFICANT CHANGE UP (ref 2.5–4.5)
PLATELET # BLD AUTO: 125 K/UL — LOW (ref 150–400)
PLATELET # BLD AUTO: 136 K/UL — LOW (ref 150–400)
PLATELET # BLD AUTO: 143 K/UL — LOW (ref 150–400)
POTASSIUM SERPL-MCNC: 4.1 MMOL/L — SIGNIFICANT CHANGE UP (ref 3.5–5.3)
POTASSIUM SERPL-MCNC: 4.2 MMOL/L — SIGNIFICANT CHANGE UP (ref 3.5–5.3)
POTASSIUM SERPL-MCNC: 4.2 MMOL/L — SIGNIFICANT CHANGE UP (ref 3.5–5.3)
POTASSIUM SERPL-SCNC: 4.1 MMOL/L — SIGNIFICANT CHANGE UP (ref 3.5–5.3)
POTASSIUM SERPL-SCNC: 4.2 MMOL/L — SIGNIFICANT CHANGE UP (ref 3.5–5.3)
POTASSIUM SERPL-SCNC: 4.2 MMOL/L — SIGNIFICANT CHANGE UP (ref 3.5–5.3)
PROT SERPL-MCNC: 7.3 G/DL — SIGNIFICANT CHANGE UP (ref 6–8.3)
PROT SERPL-MCNC: 7.4 G/DL — SIGNIFICANT CHANGE UP (ref 6–8.3)
PROT SERPL-MCNC: 7.8 G/DL — SIGNIFICANT CHANGE UP (ref 6–8.3)
QUANT TB PLUS MITOGEN MINUS NIL: 1.71 IU/ML — SIGNIFICANT CHANGE UP
RBC # BLD: 5.54 M/UL — SIGNIFICANT CHANGE UP (ref 4.2–5.8)
RBC # BLD: 5.65 M/UL — SIGNIFICANT CHANGE UP (ref 4.2–5.8)
RBC # BLD: 5.71 M/UL — SIGNIFICANT CHANGE UP (ref 4.2–5.8)
RBC # FLD: 12.1 % — SIGNIFICANT CHANGE UP (ref 10.3–14.5)
RBC # FLD: 12.4 % — SIGNIFICANT CHANGE UP (ref 10.3–14.5)
RBC # FLD: 12.6 % — SIGNIFICANT CHANGE UP (ref 10.3–14.5)
SAO2 % BLDMV: 68 % — SIGNIFICANT CHANGE UP (ref 60–90)
SAO2 % BLDMV: 68 % — SIGNIFICANT CHANGE UP (ref 60–90)
SODIUM SERPL-SCNC: 131 MMOL/L — LOW (ref 135–145)
SODIUM SERPL-SCNC: 132 MMOL/L — LOW (ref 135–145)
SODIUM SERPL-SCNC: 132 MMOL/L — LOW (ref 135–145)
WBC # BLD: 10.4 K/UL — SIGNIFICANT CHANGE UP (ref 3.8–10.5)
WBC # BLD: 11.3 K/UL — HIGH (ref 3.8–10.5)
WBC # BLD: 11.9 K/UL — HIGH (ref 3.8–10.5)
WBC # FLD AUTO: 10.4 K/UL — SIGNIFICANT CHANGE UP (ref 3.8–10.5)
WBC # FLD AUTO: 11.3 K/UL — HIGH (ref 3.8–10.5)
WBC # FLD AUTO: 11.9 K/UL — HIGH (ref 3.8–10.5)

## 2018-12-03 PROCEDURE — 93880 EXTRACRANIAL BILAT STUDY: CPT | Mod: 26

## 2018-12-03 PROCEDURE — 99291 CRITICAL CARE FIRST HOUR: CPT

## 2018-12-03 PROCEDURE — 99222 1ST HOSP IP/OBS MODERATE 55: CPT | Mod: GC

## 2018-12-03 PROCEDURE — 93923 UPR/LXTR ART STDY 3+ LVLS: CPT | Mod: 26

## 2018-12-03 PROCEDURE — 93925 LOWER EXTREMITY STUDY: CPT | Mod: 26

## 2018-12-03 PROCEDURE — 93010 ELECTROCARDIOGRAM REPORT: CPT

## 2018-12-03 PROCEDURE — 99233 SBSQ HOSP IP/OBS HIGH 50: CPT | Mod: GC

## 2018-12-03 PROCEDURE — 76700 US EXAM ABDOM COMPLETE: CPT | Mod: 26

## 2018-12-03 PROCEDURE — 94010 BREATHING CAPACITY TEST: CPT | Mod: 26

## 2018-12-03 RX ORDER — CAPTOPRIL 12.5 MG/1
25 TABLET ORAL THREE TIMES A DAY
Qty: 0 | Refills: 0 | Status: DISCONTINUED | OUTPATIENT
Start: 2018-12-03 | End: 2018-12-04

## 2018-12-03 RX ADMIN — Medication 5.06 MICROGRAM(S)/KG/MIN: at 06:38

## 2018-12-03 RX ADMIN — Medication 40 MILLIGRAM(S): at 18:40

## 2018-12-03 RX ADMIN — CAPTOPRIL 18.75 MILLIGRAM(S): 12.5 TABLET ORAL at 06:02

## 2018-12-03 RX ADMIN — SPIRONOLACTONE 25 MILLIGRAM(S): 25 TABLET, FILM COATED ORAL at 06:40

## 2018-12-03 RX ADMIN — CAPTOPRIL 25 MILLIGRAM(S): 12.5 TABLET ORAL at 14:08

## 2018-12-03 RX ADMIN — PANTOPRAZOLE SODIUM 40 MILLIGRAM(S): 20 TABLET, DELAYED RELEASE ORAL at 06:39

## 2018-12-03 RX ADMIN — Medication 40 MILLIGRAM(S): at 07:17

## 2018-12-03 RX ADMIN — CHLORHEXIDINE GLUCONATE 1 APPLICATION(S): 213 SOLUTION TOPICAL at 06:03

## 2018-12-03 RX ADMIN — CAPTOPRIL 25 MILLIGRAM(S): 12.5 TABLET ORAL at 22:11

## 2018-12-03 NOTE — PROGRESS NOTE ADULT - PROBLEM SELECTOR PLAN 1
- captopril 25 mg TID, will discuss when to convert to lisinopril  - continue spironolactone  - monitor hemodynamics, would check thermodilution method  - ongoing LVAD evaluation    - will discuss converting lasix to standing oral dose  -- trend LFTs  -- monitor Cr  -- monitor UOP  -- I/Os  -- daily weights  -- K>4, Mg>2.

## 2018-12-03 NOTE — CONSULT NOTE ADULT - PROBLEM SELECTOR RECOMMENDATION 9
The patient was tangential and sometimes not clearly concerned about his advanced illness and limited Rx options. He was constantly stating that he wanted to go home since in the hospital he is not able to rest. I believe there may be a language barrier that may not be allowing him to better realize his situation. I will try to talk to him with a [employer]  tomorrow. I am concerned about his social situation since he indicated his wife is the main provider for his home and that since she works that there may not be anyone to care for him if she were not to be available for him. I will try to call her to better understand his social situation.   I will follow up tomorrow. The patient was sometimes tangential and not clearly concerned about his advanced illness and limited Rx options. He was constantly stating that he wanted to go home since in the hospital he is not able to rest. There may be a language barrier that may not be allowing him to better realize his situation. We will try to talk to him with a [employer]  tomorrow. We are concerned about his social situation since he indicated his wife is the main provider for his home and that due to her work hours, she may not be available to care for him. It is unclear if other family members may compromise themselves to be his caregivers.  Tomorrow, I will try to call his wife to better understand his social situation.   I will follow up tomorrow. The patient was sometimes tangential and not clearly concerned about his advanced illness and limited Rx options. He was constantly stating that he wanted to go home since in the hospital he is not able to rest. There may be a language barrier that may not be allowing him to better realize his situation. We will try to talk to him with a [employer]  tomorrow. We are concerned about his social situation since he indicated his wife is the main provider for his home and that due to her work hours, she may not be available to care for him. It is unclear if other family members may compromise themselves to be his caregivers. Another concern are his issues with compliance with medical Rx and referrals in the past. Tomorrow, I will try to call his wife to better understand his social situation.   I will follow up tomorrow.

## 2018-12-03 NOTE — PROGRESS NOTE ADULT - ASSESSMENT
48 yo [place] M, w/ PMH of NICM LVIDd 7 cm (EF 10% 11/2018 - recently diagnosed in August 2018) a/w cardiogenic shock requiring IABP and inotropes.    RHC on 11/29/2018 RA 18, RV 48/5/16 PA 49/23/37 PCWP 26 PA 62% CO 2.7 CI 1.87 SVR 2300 PVR 4    11/30 CVP 4, PA 33/16/23 MvO2 77% CO 4.97 CI 2.37 SVR 1200    Currently on dobutamine 2.5 mcg/kg/min

## 2018-12-03 NOTE — PROGRESS NOTE ADULT - SUBJECTIVE AND OBJECTIVE BOX
Patient seen and examined at bedside in CCU Bed 10, no family at bedside    Overnight Events: No events, IABP was D/C'ed over the weekend    Review Of Systems: No chest pain, shortness of breath, or palpitations. The patient is asking to get into a chair          Current Meds:  aluminum hydroxide/magnesium hydroxide/simethicone Suspension 30 milliLiter(s) Oral every 6 hours PRN  captopril 18.75 milliGRAM(s) Oral three times a day  chlorhexidine 4% Liquid 1 Application(s) Topical <User Schedule>  DOBUTamine Infusion 2.5 MICROgram(s)/kG/Min IV Continuous <Continuous>  furosemide   Injectable 40 milliGRAM(s) IV Push two times a day  pantoprazole    Tablet 40 milliGRAM(s) Oral before breakfast  simethicone 80 milliGRAM(s) Chew three times a day PRN  spironolactone 25 milliGRAM(s) Oral daily    Vitals:  T(F): 97.9 (12-03), Max: 98.6 (12-02)  HR: 108 (12-03) (96 - 114)  BP: 101/81 (12-03) (81/56 - 115/107)  RR: 21 (12-03)  SpO2: 97% on RA    I&O's Summary    02 Dec 2018 07:01  -  03 Dec 2018 07:00  --------------------------------------------------------  IN: 647.5 mL / OUT: 2010 mL / NET: -1362.5 mL    03 Dec 2018 07:01  -  03 Dec 2018 10:14  --------------------------------------------------------  IN: 5 mL / OUT: 350 mL / NET: -345 mL    Physical Exam:  GENERAL: No acute distress, well-developed, laying at 30 degrees, breathing comfortably on RA  HEAD:  Atraumatic, Normocephalic  ENT: EOMI, PERRLA, conjunctiva and sclera clear, Neck supple, RIJ [place] precluded clear assessment of JVP, moist mucosa  CHEST/LUNG: Clear to auscultation bilaterally, no rales  BACK: No spinal tenderness  HEART: Regular rate and rhythm; No murmurs, rubs, or gallops, LE warm to touch  ABDOMEN: Soft, Nontender, Nondistended; Bowel sounds present  EXTREMITIES:  No clubbing, cyanosis, or edema  PSYCH: Nl behavior, nl affect  NEUROLOGY: AAOx3, non-focal, cranial nerves intact  SKIN: Normal color, No rashes or lesions  LINES: MILLY Vlilalobos, PIV                          18.4   10.4  )-----------( 125      ( 03 Dec 2018 05:19 )             56.4     12-03    131<L>  |  96  |  22  ----------------------------<  112<H>  4.2   |  21<L>  |  0.96    Ca    9.6      03 Dec 2018 05:19  Phos  3.9     12-03  Mg     2.2     12-03    TPro  7.3  /  Alb  4.1  /  TBili  2.3<H>  /  DBili  x   /  AST  232<H>  /  ALT  480<H>  /  AlkPhos  76  12-03    PT/INR - ( 01 Dec 2018 10:43 )   PT: 15.0 sec;   INR: 1.31 ratio    PTT - ( 01 Dec 2018 10:43 )  PTT:31.3 sec    CARDIAC MARKERS ( 29 Nov 2018 08:56 )  x     / x     / x     / 242 u/L / 3.21 ng/mL / x        Serum Pro-Brain Natriuretic Peptide: 4034 pg/mL (11-29 @ 08:56)    Interpretation of Telemetry: Sinus 90s - 120s

## 2018-12-03 NOTE — PROGRESS NOTE ADULT - ASSESSMENT
48 yo M w/ recent diagnosis in 2018 of nonischemic cardiomyopathy (EF 10%), initially presented to [facility] d/t SOB and Epigastric pain, now s/p R heart cardiac cath, showing clean coronaries, but with high wedge pressures and need for blood pressure support with pressors, transferred to [facility] CCU for cardiogenic shock.     #Neuro  -No active issues.    #Pulm  -O2 sat well on RA  -Respiratory distress likely secondary to acute decompensated heart failure.   -S/p Lasix drip.   -Heart failure following, recs appreciated. C/w Lasix 40 IV BID    #Cardiovascular  - Cardiogenic Shock  - Patient recently diagnosed with NICM  - off levo, s/p IABP removal, weaning  5 -> 2.5 Increasing captopril to 18.75 TID,  increasing spironolactone to 25 qD   -Captopril increased to 25 mg TID  - F/U CT Chest/Abd/Pelvis for cardiomyopathy eval  - HF following.  - RHC showing RA18, RV 48/5/16,  PA 49/23/37 Wedge 26, PA sat 62.5%, CO 2.7, CI 1.87 on levo 0.05,  5, hbg 18.  - S/p lasix drip. C/w Lasix 40 IV BID.   - Removed balloon pump , so d/eugenie heparin drip.   - EP consulted for AICD placement, recs appreciated. Per EP, AICD not indicated until transplant/LVAD w/u completed by heart failure team    -History of Hypertension; Plan - Uptitrating captopril for afterload reduction  -Strict I/Os, daily wts, keep I < O, trend BMP, supplement lytes prn      #GI  - No active issues.   -Continue Protonix 40mg for GI ppx.  - f/u CT A/P    #Renal/  - LARISSA resolved, currently Cr 1.1  - trend Cr, trend lytes, supplement PRN    #ID  - Afebrile, no active issues.  -Administer influenza vaccine.     #Endo  - HgbA1c 6.0 on   - Continue to monitor     #Skin  -No active issues.    #DVT  -D/eugenie heparin drip since IABP removed . Will start on SCD for now, given low platelet

## 2018-12-03 NOTE — PROGRESS NOTE ADULT - SUBJECTIVE AND OBJECTIVE BOX
Patient is a 47y old  Male who presents with a chief complaint of Cardiogenic Shock (02 Dec 2018 22:06)      Overnight Events:      REVIEW OF SYSTEMS:  CONSTITUTIONAL: No weakness, fevers or chills  EYES/ENT: No visual changes, no throat pain   RESPIRATORY: No cough, wheezing, hemoptysis; No shortness of breath  CARDIOVASCULAR: No chest pain or palpitations  GASTROINTESTINAL: No abdominal, nausea, vomiting, or hematemesis; No diarrhea or constipation. No melena or hematochezia.  GENITOURINARY: No dysuria, frequency or hematuria  NEUROLOGICAL: No dizziness, numbness, or weakness  SKIN: No itching, burning, rashes, or lesions   All other review of systems is negative unless indicated above.    VITAL SIGNS:  Vital Signs Last 24 Hrs  T(C): 36.6 (03 Dec 2018 03:00), Max: 37 (02 Dec 2018 19:00)  T(F): 97.9 (03 Dec 2018 03:00), Max: 98.6 (02 Dec 2018 19:00)  HR: 96 (03 Dec 2018 06:00) (94 - 114)  BP: 104/79 (03 Dec 2018 06:00) (81/56 - 121/69)  BP(mean): 87 (03 Dec 2018 06:00) (69 - 112)  RR: 11 (03 Dec 2018 06:00) (11 - 28)  SpO2: 96% (03 Dec 2018 06:00) (94% - 97%)    PHYSICAL EXAM:   GENERAL: no acute distress  HEENT: NC/AT, EOMI, neck supple, MMM  RESPIRATORY: LCTAB/L, no rhonchi, rales, or wheezing  CARDIOVASCULAR: RRR, no murmurs, gallops, rubs  ABDOMINAL: soft, non-tender, non-distended, positive bowel sounds   EXTREMITIES: no clubbing, cyanosis, or edema  NEUROLOGICAL: alert and oriented x 3, non-focal  SKIN: no rashes or lesions   MUSCULOSKELETAL: no gross joint deformity                          18.4   10.4  )-----------( 125      ( 03 Dec 2018 05:19 )             56.4     12-03    131<L>  |  96  |  22  ----------------------------<  112<H>  4.2   |  21<L>  |  0.96    Ca    9.6      03 Dec 2018 05:19  Phos  3.9     12-03  Mg     2.2     12-03    TPro  7.3  /  Alb  4.1  /  TBili  2.3<H>  /  DBili  x   /  AST  232<H>  /  ALT  480<H>  /  AlkPhos  76  12-03    PT/INR - ( 01 Dec 2018 10:43 )   PT: 15.0 sec;   INR: 1.31 ratio         PTT - ( 01 Dec 2018 10:43 )  PTT:31.3 sec    CAPILLARY BLOOD GLUCOSE        I&O's Summary    02 Dec 2018 07:01  -  03 Dec 2018 07:00  --------------------------------------------------------  IN: 647.5 mL / OUT: 2010 mL / NET: -1362.5 mL        MEDICATIONS  (STANDING):  captopril 18.75 milliGRAM(s) Oral three times a day  chlorhexidine 4% Liquid 1 Application(s) Topical <User Schedule>  DOBUTamine Infusion 2.5 MICROgram(s)/kG/Min (5.063 mL/Hr) IV Continuous <Continuous>  furosemide   Injectable 40 milliGRAM(s) IV Push two times a day  pantoprazole    Tablet 40 milliGRAM(s) Oral before breakfast  spironolactone 25 milliGRAM(s) Oral daily Patient is a 47y old  Male who presents with a chief complaint of Cardiogenic Shock (02 Dec 2018 22:06)      Overnight Events:  Pt denies CP or SOB this am. He continues on dobutamine.     REVIEW OF SYSTEMS:  CONSTITUTIONAL: No weakness, fevers or chills  EYES/ENT: No visual changes, no throat pain   RESPIRATORY: No cough, wheezing, hemoptysis; No shortness of breath  CARDIOVASCULAR: No chest pain or palpitations  GASTROINTESTINAL: No abdominal, nausea, vomiting, or hematemesis; No diarrhea or constipation. No melena or hematochezia.  GENITOURINARY: No dysuria, frequency or hematuria  NEUROLOGICAL: No dizziness, numbness, or weakness  SKIN: No itching, burning, rashes, or lesions   All other review of systems is negative unless indicated above.    VITAL SIGNS:  Vital Signs Last 24 Hrs  T(C): 36.6 (03 Dec 2018 03:00), Max: 37 (02 Dec 2018 19:00)  T(F): 97.9 (03 Dec 2018 03:00), Max: 98.6 (02 Dec 2018 19:00)  HR: 96 (03 Dec 2018 06:00) (94 - 114)  BP: 104/79 (03 Dec 2018 06:00) (81/56 - 121/69)  BP(mean): 87 (03 Dec 2018 06:00) (69 - 112)  RR: 11 (03 Dec 2018 06:00) (11 - 28)  SpO2: 96% (03 Dec 2018 06:00) (94% - 97%)    PHYSICAL EXAM:   GENERAL: no acute distress  HEENT: NC/AT, EOMI, neck supple, MMM  RESPIRATORY: LCTAB/L, no rhonchi, rales, or wheezing  CARDIOVASCULAR: RRR, no murmurs, gallops, rubs  ABDOMINAL: soft, non-tender, non-distended, positive bowel sounds   EXTREMITIES: no clubbing, cyanosis, or edema  NEUROLOGICAL: alert and oriented x 3, non-focal  SKIN: no rashes or lesions   MUSCULOSKELETAL: no gross joint deformity                          18.4   10.4  )-----------( 125      ( 03 Dec 2018 05:19 )             56.4     12-03    131<L>  |  96  |  22  ----------------------------<  112<H>  4.2   |  21<L>  |  0.96    Ca    9.6      03 Dec 2018 05:19  Phos  3.9     12-03  Mg     2.2     12-03    TPro  7.3  /  Alb  4.1  /  TBili  2.3<H>  /  DBili  x   /  AST  232<H>  /  ALT  480<H>  /  AlkPhos  76  12-03    PT/INR - ( 01 Dec 2018 10:43 )   PT: 15.0 sec;   INR: 1.31 ratio         PTT - ( 01 Dec 2018 10:43 )  PTT:31.3 sec    CAPILLARY BLOOD GLUCOSE        I&O's Summary    02 Dec 2018 07:01  -  03 Dec 2018 07:00  --------------------------------------------------------  IN: 647.5 mL / OUT: 2010 mL / NET: -1362.5 mL        MEDICATIONS  (STANDING):  captopril 18.75 milliGRAM(s) Oral three times a day  chlorhexidine 4% Liquid 1 Application(s) Topical <User Schedule>  DOBUTamine Infusion 2.5 MICROgram(s)/kG/Min (5.063 mL/Hr) IV Continuous <Continuous>  furosemide   Injectable 40 milliGRAM(s) IV Push two times a day  pantoprazole    Tablet 40 milliGRAM(s) Oral before breakfast  spironolactone 25 milliGRAM(s) Oral daily

## 2018-12-03 NOTE — PROGRESS NOTE ADULT - SUBJECTIVE AND OBJECTIVE BOX
Cardiology Progress Note    Interval events: No acute events overnight.  IABP removed. Patient with no complaints.     Wt 61.9 kg today, previously 61.2 kg.    Tele: SR, PVCs    Medications:  aluminum hydroxide/magnesium hydroxide/simethicone Suspension 30 milliLiter(s) Oral every 6 hours PRN  captopril 25 milliGRAM(s) Oral three times a day  chlorhexidine 4% Liquid 1 Application(s) Topical <User Schedule>  DOBUTamine Infusion 2.5 MICROgram(s)/kG/Min IV Continuous <Continuous>  furosemide   Injectable 40 milliGRAM(s) IV Push two times a day  pantoprazole    Tablet 40 milliGRAM(s) Oral before breakfast  simethicone 80 milliGRAM(s) Chew three times a day PRN  spironolactone 25 milliGRAM(s) Oral daily    Review of Systems:  Constitutional: [ ] Fever [ ] Chills [ ] Fatigue [ ] Weight change   HEENT: [ ] Blurred vision [ ] Eye Pain [ ] Headache [ ] Runny nose [ ] Sore Throat   Respiratory: [ ] Cough [ ] Wheezing [ ] Shortness of breath  Cardiovascular: [ ] Chest Pain [ ] Palpitations [ ] DRUMMOND [ ] PND [ ] Orthopnea  Gastrointestinal: [ ] Abdominal Pain [ ] Diarrhea [ ] Constipation [ ] Hemorrhoids [ ] Nausea [ ] Vomiting  Genitourinary: [ ] Nocturia [ ] Dysuria [ ] Incontinence  Extremities: [ ] Swelling [ ] Joint Pain  Neurologic: [ ] Focal deficit [ ] Paresthesias [ ] Syncope  Lymphatic: [ ] Swelling [ ] Lymphadenopathy   Skin: [ ] Rash [ ] Ecchymoses [ ] Wounds [ ] Lesions  Psychiatry: [ ] Depression [ ] Suicidal/Homicidal Ideation [ ] Anxiety [ ] Sleep Disturbances  [x] 10 point review of systems is otherwise negative except as mentioned above            [ ]Unable to obtain    Vitals:  T(C): 37 (18 @ 15:00), Max: 37 (18 @ 19:00)  HR: 112 (18 @ 15:00) (96 - 116)  BP: 116/77 (18 @ 15:00) (81/56 - 116/77)  BP(mean): 93 (18 @ 15:00) (69 - 101)  RR: 23 (18 @ 15:00) (11 - 28)  SpO2: 96% (18 @ 15:00) (94% - 99%)  Wt(kg): --  Daily     Daily Weight in k.9 (03 Dec 2018 06:00)  I&O's Summary    02 Dec 2018 07:01  -  03 Dec 2018 07:00  --------------------------------------------------------  IN: 647.5 mL / OUT: 2010 mL / NET: -1362.5 mL    03 Dec 2018 07:01  -  03 Dec 2018 16:18  --------------------------------------------------------  IN: 340.8 mL / OUT: 1200 mL / NET: -859.2 mL        Physical Exam:  NAD  PERRL, EOMI  Normal oral [place] NC/AT  S1, S2, RRR, No m/r/g appreciated, No edema, no elevation in JVP  Clear to auscultation bilaterally  Soft, Non-tender, Non-distended, BS+  No clubbing, No joint deformity   Non-focal  No lymphadenopathy  AAOx3, Mood & affect appropriate  No rashes, No ecchymoses, No cyanosis  Procedural Access Site: R groin No hematoma, Non-tender to palpation, 2+ pulse , No bruit, No Ecchymosis    Labs:                        18.4   10.4  )-----------( 125      ( 03 Dec 2018 05:19 )             56.4     12-03    132<L>  |  93<L>  |  22  ----------------------------<  118<H>  4.1   |  21<L>  |  1.19    Ca    9.6      03 Dec 2018 15:36  Phos  3.9     12-  Mg     2.2     12-    TPro  7.8  /  Alb  4.2  /  TBili  2.3<H>  /  DBili  x   /  AST  210<H>  /  ALT  436<H>  /  AlkPhos  81  12    Serum Pro-Brain Natriuretic Peptide: 4034 pg/mL ( @ 08:56)    New results/imaging:  TTE 2018  Conclusions:  1. Mild mitral annular calcification. Tethered mitral valve  leaflets with normal opening. Moderate-severe mitral  regurgitation.  2. Normal trileaflet aortic valve. Minimal aortic  regurgitation.  3. Eccentric left ventricular hypertrophy (dilated left  ventricle with normal relative wall thickness). Severe left  ventricular enlargement.  4. Severe global left ventricular systolic dysfunction.  Endocardial visualization enhanced with intravenous  injection of Ultrasonic Enhancing Agent (Definity). No left  ventricular thrombus.  5. Normal right ventricular size with decreased right  ventricular systolic function.

## 2018-12-03 NOTE — PROGRESS NOTE ADULT - ASSESSMENT
47M w/ PMHx of HTN, pre-DM and chronic HFrEF (2/2 NICM, Dx 8/2018, had LifeVest as outpatient) who was transferred from [facility] to [facility] CCU due to cardiogenic shock.  The patient was poorly compliant with medications and his LifeVest prior to this admission and has not been on OMT. Currently undergoing LVAD evaluation. IABP D/C'ed over the weekend, still on Dobutamine.     Plan    1. chronic HFrEF (2/2 NICM) with current cardiogenic shock  -Given that the patient is currently undergoing an LVAD/Transplant w/u would prefer to implant an AICD after decision on advanced therapies has been made  -The patient's poor compliance with OMT and his LifeVest is concerning    EP will sing off, please call once decision regarding advanced therapies has been made, d/w CCU team    SHERRELL Pham  Cardiology Fellow  (p): [phone]

## 2018-12-03 NOTE — CONSULT NOTE ADULT - SUBJECTIVE AND OBJECTIVE BOX
Patient is a 47y old  Male who presents with a chief complaint of Cardiogenic Shock (03 Dec 2018 10:14)      HPI:  48 yo [place] M, w/ PMH of nICMP (EF 10% 11/2018 - recently diagnosed in August 2018), presenting as transfer from [facility] CCU d/t cardiogenic shock, possible LVAD workup. Patient initially presented to [facility] ED morning of 11/29/18 d/t one day of shortness of breath, also with abdominal pain, worst in the epigastrium, and with one episode of nausea/vomiting the previous day. Patient denies chest pain, palpitations, fevers, chills, or other complaints.     Patient was first diagnosed with non-ischemic cardiomyopathy in August, 2018, after he presented to ED d/t Lower Extremity edema and shortness of breath. He had a L heart catheterization, which showed clean coronaries, but significant for nICMP. Patient also had a Cardiac MRI which showed dilated cardiomyopathy but no infiltrative diseases. Following this hospitalization, patient followed up with Dr. Barnes as outpatient, but has not seen him since September 2018. Patient was also supposed to follow up with the HF team, but never did so.    Patient presented to [facility] ED one week ago d/t similar presentation of SOB and abd pain. Patient was seen by Dr. Flaherty and recommended to transfer to [facility] for further treatment. However, pt did not want to be transferred and was discharged for follow up.    Previous note from [facility] states that per wife, pt is not compliant with medications. Stopped taking the HF medications he was discharged with except for lasix. She was also concerned about IVDU 3-4 years ago. Also notes significant fam hx in father and brother of sudden death in 40s. Patient acknowledges that he only takes spironolactone, Lasix, and potassium, and that he only wears his Life Vest some of the time. States that he has never used intravenous drugs. Used marijuana for very brief period in college over 20 years ago. Denies any other drug use, and states no longer drinks alcohol.     In the [facility] ER, patient BP low with elevated lactate, poor oxygenation. Also with hyperkalemia.  Dobutamine gtt started and levophed gtt initiated. Patient emergently transferred to cardiac cath lab for a RHC. A balloon pump was inserted, levophed gtt decreased to 0.3mcg/kg/min. Dobutamine gtt titrated to 5mck/kg/min. Patient titrated off BIPAP and onto High Flow  oxygen. Augmenting 120s on ballon pump.     In the [facility] CCU, patient continues to endorse diffuse, mild abdominal pain, worst in the epigastrium, but states that he no longer is experiencing shortness of breath, and is breathing comfortably on nasal canula in the room. Patient denies other complaints. Denies headache, neck stiffness, fever/chills, vision change, chest pain, current shortness of breath or difficulty breathing, palpitations, lightheadedness, weakness, dizziness, numbness, tingling, current nausea, vomiting, diarrhea, dysuria, hematuria, syncope. (29 Nov 2018 23:31)      PAST MEDICAL & SURGICAL HISTORY:  HTN (hypertension)  Cardiomyopathy  No significant past surgical history      Allergies  aspirin (Swelling)  Motrin (Hives)        ANTIMICROBIALS:      OTHER MEDS: MEDICATIONS  (STANDING):  aluminum hydroxide/magnesium hydroxide/simethicone Suspension 30 every 6 hours PRN  captopril 18.75 three times a day  DOBUTamine Infusion 2.5 <Continuous>  furosemide   Injectable 40 two times a day  pantoprazole    Tablet 40 before breakfast  simethicone 80 three times a day PRN  spironolactone 25 daily      SOCIAL HISTORY:     Patient is a former smoker (2005 - 2011).  	Patient previously drank alcohol (wine), no longer drinks alcohol.  Patient denies use of illicit drugs.      FAMILY HISTORY:  Family history of lung disease (Grandparent): maternal grandmother (no history of smoking)  Family history of hypertension: father  Family history of heart disease: mother      REVIEW OF SYSTEMS  [  ] All other systems negative except as noted below:	    Constitutional:  [ ] fever [ ] chills  [ ] weight loss  [ ] weakness  Skin:  [ ] rash [ ] phlebitis	  Eyes: [ ] icterus [ ] pain  [ ] discharge	  ENMT: [ ] sore throat  [ ] thrush [ ] ulcers [ ] exudates  Respiratory: [ ] dyspnea [ ] hemoptysis [ ] cough [ ] sputum	  Cardiovascular:  [ ] chest pain [ ] palpitations [ ] edema	  Gastrointestinal:  [ ] nausea [ ] vomiting [ ] diarrhea [ ] constipation [ ] pain	  Genitourinary:  [ ] dysuria [ ] frequency [ ] hematuria [ ] discharge [ ] flank pain  [ ] incontinence  Musculoskeletal:  [ ] myalgias [ ] arthralgias [ ] arthritis  [ ] back pain  Neurological:  [ ] headache [ ] seizures  [ ] confusion/altered mental status  Psychiatric:  [ ] anxiety [ ] depression	  Hematology/Lymphatics:  [ ] lymphadenopathy  Endocrine:  [ ] adrenal [ ] thyroid  Allergic/Immunologic:	 [ ] transplant [ ] seasonal    Vital Signs Last 24 Hrs  T(F): 97.9 (12-03-18 @ 08:00), Max: 99 (11-29-18 @ 23:00)    Vital Signs Last 24 Hrs  HR: 108 (12-03-18 @ 10:00) (96 - 114)  BP: 101/81 (12-03-18 @ 10:00) (81/56 - 111/80)  RR: 21 (12-03-18 @ 10:00)  SpO2: 97% (12-03-18 @ 10:00) (94% - 99%)  Wt(kg): --    PHYSICAL EXAM:  General: non-toxic  HEAD/EYES: anicteric, PERRL  ENT:  supple  Cardiovascular:   S1, S2  Respiratory:  clear bilaterally  GI:  soft, non-tender, normal bowel sounds  :  no CVA tenderness   Musculoskeletal:  no synovitis  Neurologic:  grossly non-focal  Skin:  no rash  Lymph: no lymphadenopathy  Psychiatric:  appropriate affect  Vascular:  no phlebitis      WBC Count: 10.4 K/uL (12-03 @ 05:19)  WBC Count: 11.9 K/uL (12-03 @ 00:10)  WBC Count: 11.1 K/uL (12-02 @ 14:51)  WBC Count: 8.6 K/uL (12-02 @ 05:22)  WBC Count: 9.1 K/uL (12-01 @ 23:05)  WBC Count: 8.8 K/uL (12-01 @ 10:43)  WBC Count: 9.8 K/uL (12-01 @ 06:51)                            18.4   10.4  )-----------( 125      ( 03 Dec 2018 05:19 )             56.4       12-03    131<L>  |  96  |  22  ----------------------------<  112<H>  4.2   |  21<L>  |  0.96    Ca    9.6      03 Dec 2018 05:19  Phos  3.9     12-03  Mg     2.2     12-03    TPro  7.3  /  Alb  4.1  /  TBili  2.3<H>  /  DBili  x   /  AST  232<H>  /  ALT  480<H>  /  AlkPhos  76  12-03    Creatinine Trend: 0.96<--, 1.03<--, 0.93<--, 0.98<--, 1.00<--, 0.93<--        MICROBIOLOGY:  CMV IgG Antibody: 0.77 U/mL (12-01-18 @ 08:46)    Toxoplasma IgG Screen: 36.8 IU/mL (12-01-18 @ 08:46)    MRSA/MSSA PCR (12.01.18 @ 08:31)    MRSA PCR Result.: NotDetec    Staph Aureus PCR Result: Detected    Herpes simplex (1,2) IgG, Serum (12.01.18 @ 08:46)    Herpes simplex 1 IgG Ab Interp: Positive    Herpes simplex 2 IgG Ab Interp: Negative        Syphilis Screen (12.01.18 @ 08:46)    Treponema Pallidum Antibody Interpretation: Negative    Acute Hepatitis Panel (12.01.18 @ 08:46)    Hepatitis C Virus Interpretation: Nonreact:      Hepatitis C Virus S/CO Ratio: 0.13 S/CO    Hepatitis B Core IgM Antibody: Nonreact    Hepatitis B Surface Antigen: Nonreact    Hepatitis A IgM Antibody: Nonreact    Rubeola Virus IgG Antibody (12.01.18 @ 08:46)    Measles IgG Interpretation: Positive    Mumps Virus IgG Antibody (12.01.18 @ 08:46)    Mumps Antibody Interpretation: Positive    Rubella Virus IgG Antibody (12.01.18 @ 08:46)    Rubella IgG Interp: Positive    Varicella Zoster IgG Antibody (12.01.18 @ 08:46)    Varicella IgG Interpretation: Positive    HIV-1/2 Antigen/Antibody Screen by [facility] (11.29.18 @ 15:35)    HIV-1/2 Combo Result: Nonreactive           RADIOLOGY:  < from: CT Abdomen and Pelvis No Cont (12.02.18 @ 09:45) >  IMPRESSION: Tiny ill-defined groundglass nodular opacities vaguely   appreciated within the right upper lobe medially possibly in a   centrilobular distribution. Follow-up can be performed.    Ill-defined hypodensity anterior spleen not appreciated on previous study   performed with intravenous contrast 8/2/2018. Contrast enhanced study may   be beneficial for further evaluation.

## 2018-12-03 NOTE — CONSULT NOTE ADULT - SUBJECTIVE AND OBJECTIVE BOX
HPI:  48 yo [place] M, w/ PMH of nICMP (EF 10% 11/2018 - recently diagnosed in August 2018), presenting as transfer from [facility] CCU due to cardiogenic shock, possible LVAD workup. As per EMR, the patient was dx in August 2018. Prior work up showed clear coronaries and not infiltrative dilated cardiomyopathy. The patient was supposed to follow up with HF but he did not do so. He was also recently admitted to [facility] where a transfer to [facility] was recommended; however, he refused. EMR also indicates, his wife was stating the patient is not complaint with medications. "She was also concerned about IVDU 3-4 years ago. " It is also noted in the chart that "Patient acknowledges that he only takes spironolactone, Lasix, and potassium, and that he only wears his Life Vest some of the time. States that he has never used intravenous drugs. Used marijuana for very brief period in college over 20 years ago. Denies any other drug use, and states no longer drinks alcohol."    In the [facility] ER, patient BP low with elevated lactate, poor oxygenation. Also with hyperkalemia.  Dobutamine gtt started and levophed gtt initiated. Patient emergently transferred to cardiac cath lab for a RHC. A balloon pump was inserted, and levophed was decreased to 0.3mcg/kg/min and Dobutamine gtt titrated.     In CCU he is on Dobutamine infusion. This consult was called for LVAD evaluation       Patient’s understanding of heart failure/ cardiac condition:    What the CHF team has told them:     What the CHF team has told them about LVAD:    What is their understanding of LVAD:    What are their thoughts of living with an LVAD, and their understanding of how it may affect ADLs:    What is their social situation: Employed [ ] Retired [ ] Who resides at home:                             Who will care for patient should he or she have an LVAD:       PERTINENT PM/SXH:   HTN (hypertension)  Cardiomyopathy  No pertinent past medical history    No significant past surgical history    FAMILY HISTORY:  Family history of lung disease (Grandparent): maternal grandmother (no history of smoking)  Family history of hypertension: father  Family history of heart disease: mother  fam hx in father and brother of sudden death in 40s.    ITEMS NOT CHECKED ARE NOT PRESENT    SOCIAL HISTORY:   Significant other/partner:  [ ]  Children:  [ ]  Pentecostalism/Spirituality:  Substance hx:  [ ]   Tobacco hx:  [ ]   Alcohol hx: [ ]   Home Opioid hx:  [ ] I-Stop Reference No:  Living Situation: [ ]Home  [ ]Long term care  [ ]Rehab [ ]Other  As per care coordination notes:   Patient resides with spouse, mother-in-law, and his 2 children (ages 17 & 10)  in a private house in [place], [place]. Patient must negotiate 4 stairs to  enter home and 12 stairs inside. Patient identified his spouse, Trisha Charles  [phone], as emergency contact and caregiver. Patient with no advanced  directives in place. Patient was independent with ADLs and ambulation prior to  admission. Patient with no prior home care services. Patient is currently  unemployed due to his medical issues. 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 Patient's primary care doctor is Dr. Armando Flaherty, last seen 1 week ago. Patient's cardiologist is Dr. Malcolm Kerns, last  seen in August. Patient admits to noncompliance with his medications as he  forgets to take them at times. Patient also reports issues with his pharmacy  ([facility]) as he reports that some of his prescriptions are missing when he  goes to  his medications. Patient requesting for his medications to be  sent to [facility] pharmacy moving forward. Patient reports that all of his  medications are covered through his insurance.      LCSW explored substance use history. Patient reports using marijuana 1x in  college in 2001. Patient also reports tobacco use, but quit in 2011.  ADVANCE DIRECTIVES:    DNR  MOLST  [ ]  Living Will  [ ]   DECISION MAKER(s):  [ ] Health Care Proxy(s)  [ ] Surrogate(s)  [ ] Guardian           Name(s): Phone Number(s):    BASELINE (I)ADL(s) (prior to admission):  [place]: [ ]Total  [ ] Moderate [ ]Dependent    Allergies    aspirin (Swelling)  Motrin (Hives)    Intolerances    MEDICATIONS  (STANDING):  captopril 25 milliGRAM(s) Oral three times a day  chlorhexidine 4% Liquid 1 Application(s) Topical <User Schedule>  DOBUTamine Infusion 2.5 MICROgram(s)/kG/Min (5.063 mL/Hr) IV Continuous <Continuous>  furosemide   Injectable 40 milliGRAM(s) IV Push two times a day  pantoprazole    Tablet 40 milliGRAM(s) Oral before breakfast  spironolactone 25 milliGRAM(s) Oral daily    MEDICATIONS  (PRN):  aluminum hydroxide/magnesium hydroxide/simethicone Suspension 30 milliLiter(s) Oral every 6 hours PRN Dyspepsia  simethicone 80 milliGRAM(s) Chew three times a day PRN Gas    PRESENT SYMPTOMS: [ ]Unable to obtain due to poor mentation   Source if other than patient:  [ ]Family   [ ]Team     Pain (Impact on QOL):    Location -         Minimal acceptable level (0-10 scale):                    Aggravating factors -  Quality -  Radiation -  Severity (0-10 scale) -    Timing -    PAIN AD Score:     [email or web address] (press ctrl +  left click to view)    Dyspnea:                           [ ]Mild [ ]Moderate [ ]Severe  Anxiety:                             [ ]Mild [ ]Moderate [ ]Severe  Fatigue:                             [ ]Mild [ ]Moderate [ ]Severe  Nausea:                             [ ]Mild [ ]Moderate [ ]Severe  Loss of appetite:              [ ]Mild [ ]Moderate [ ]Severe  Constipation:                    [ ]Mild [ ]Moderate [ ]Severe    Other Symptoms:  [ ]All other review of systems negative     Karnofsky Performance Score/Palliative Performance Status Version 2:         %    http://palliative.info/resource_material/[email or web address].pdf  PHYSICAL EXAM:  Vital Signs Last 24 Hrs  T(C): 36.6 (03 Dec 2018 08:00), Max: 37 (02 Dec 2018 19:00)  T(F): 97.9 (03 Dec 2018 08:00), Max: 98.6 (02 Dec 2018 19:00)  HR: 114 (03 Dec 2018 12:00) (96 - 114)  BP: 109/89 (03 Dec 2018 12:00) (81/56 - 111/80)  BP(mean): 101 (03 Dec 2018 12:00) (69 - 103)  RR: 19 (03 Dec 2018 12:00) (11 - 28)  SpO2: 95% (03 Dec 2018 12:00) (94% - 99%) I&O's Summary    02 Dec 2018 07:01  -  03 Dec 2018 07:00  --------------------------------------------------------  IN: 647.5 mL / OUT: 2010 mL / NET: -1362.5 mL    03 Dec 2018 07:01  -  03 Dec 2018 13:39  --------------------------------------------------------  IN: 275.5 mL / OUT: 1050 mL / NET: -774.5 mL    GENERAL:  [ ]Alert  [ ]Oriented x   [ ]Lethargic  [ ]Cachexia  [ ]Unarousable  [ ]Verbal  [ ]Non-Verbal  Behavioral:   [ ] Anxiety  [ ] Delirium [ ] Agitation [ ] Other  HEENT:  [ ]Normal   [ ]Dry mouth   [ ]ET Tube/Trach  [ ]Oral lesions  PULMONARY:   [ ]Clear [ ]Tachypnea  [ ]Audible excessive secretions   [ ]Rhonchi        [ ]Right [ ]Left [ ]Bilateral  [ ]Crackles        [ ]Right [ ]Left [ ]Bilateral  [ ]Wheezing     [ ]Right [ ]Left [ ]Bilateral  CARDIOVASCULAR:    [ ]Regular [ ]Irregular [ ]Tachy  [ ]Oj [ ]Murmur [ ]Other  GASTROINTESTINAL:  [ ]Soft  [ ]Distended   [ ]+BS  [ ]Non tender [ ]Tender  [ ]PEG [ ]OGT/ NGT  Last BM:   GENITOURINARY:  [ ]Normal [ ] Incontinent   [ ]Oliguria/Anuria   [ ]Villalobos  MUSCULOSKELETAL:   [ ]Normal   [ ]Weakness  [ ]Bed/Wheelchair bound [ ]Edema  NEUROLOGIC:   [ ]No focal deficits  [ ] Cognitive impairment  [ ] Dysphagia [ ]Dysarthria [ ] Paresis [ ]Other   SKIN:   [ ]Normal   [ ]Pressure ulcer(s)  [ ]Rash    CRITICAL CARE:  [ ] Shock Present  [ ]Septic [ ]Cardiogenic [ ]Neurologic [ ]Hypovolemic  [ ]  Vasopressors [ ]  Inotropes   [ ] Respiratory failure present  [ ] Acute  [ ] Chronic [ ] Hypoxic  [ ] Hypercarbic [ ] Other  [ ] Other organ failure     LABS:                        18.4   10.4  )-----------( 125      ( 03 Dec 2018 05:19 )             56.4   12-03    131<L>  |  96  |  22  ----------------------------<  112<H>  4.2   |  21<L>  |  0.96    Ca    9.6      03 Dec 2018 05:19  Phos  3.9     12-03  Mg     2.2     12-03    TPro  7.3  /  Alb  4.1  /  TBili  2.3<H>  /  DBili  x   /  AST  232<H>  /  ALT  480<H>  /  AlkPhos  76  12-03        RADIOLOGY & ADDITIONAL STUDIES:    PROTEIN CALORIE MALNUTRITION PRESENT: [ ] Yes [ ] No  [ ] PPSV2 < or = to 30% [ ] significant weight loss  [ ] poor nutritional intake [ ] catabolic state [ ] anasarca     Albumin, Serum: 4.1 g/dL (12-03-18 @ 05:19)  Artificial Nutrition [ ]     REFERRALS:   [ ]Chaplaincy  [ ] Hospice  [ ]Child Life  [ ]Social Work  [ ]Case management [ ]Holistic Therapy   Goals of Care Discussion Document: HPI:  46 yo [place] M, w/ PMH of nICMP (EF 10% 11/2018 - recently diagnosed in August 2018), presenting as transfer from [facility] CCU due to cardiogenic shock, possible LVAD workup. As per EMR, the patient was dx in August 2018. Prior work up showed clear coronaries and not infiltrative dilated cardiomyopathy. The patient was supposed to follow up with HF but he did not do so. He was also recently admitted to [facility] where a transfer to [facility] was recommended; however, he refused. EMR also indicates, his wife was stating the patient is not complaint with medications. "She was also concerned about IVDU 3-4 years ago. " It is also noted in the chart that "Patient acknowledges that he only takes spironolactone, Lasix, and potassium, and that he only wears his Life Vest some of the time. States that he has never used intravenous drugs. Used marijuana for very brief period in college over 20 years ago. Denies any other drug use, and states no longer drinks alcohol."    In the [facility] ER, patient BP low with elevated lactate, poor oxygenation. Also with hyperkalemia.  Dobutamine gtt started and levophed gtt initiated. Patient emergently transferred to cardiac cath lab for a RHC. A balloon pump was inserted, and levophed was decreased to 0.3mcg/kg/min and Dobutamine gtt titrated.     In CCU he is on Dobutamine infusion. This consult was called for LVAD evaluation       Patient’s understanding of heart failure/ cardiac condition:    Patient seemed to understand basics of his heart condition, but is limited in describing the complexity and severity of his condition. Patient has an upbeat attitude towards current plan, but it is unclear if he truly has internalized the severity of his situation and what will be involved in the daily monitoring and maintenance of the LVAD.  Patient does acknowledge that he made a mistake in the past by not taking his medications and not taking his condition seriously enough, and understands that he will need to be very compliant moving forward.     What the CHF team has told them:   Patient explains that his heart is working at "10%" when it should work at >90%.     What the CHF team has told them about LVAD:  Patient explains how LVAD will work as "emergency lifeline" and increase the heart activity to above 90%, where it should be for a normal person.     What is their understanding of LVAD:  Patient understands that LVAD is necessary to help his heart pump out blood and increase his functionality.     What are their thoughts of living with an LVAD, and their understanding of how it may affect ADLs:  Patient seems to be very concerned with the financial burden involved in the LVAD workup and eventual LVAD placement. Patient endorsing that he feels confident that he will make sure to pay attention to battery life. Asked questions about how to shower with machine.     What is their social situation: Employed [ ] Retired [ ] Who resides at home:         Patient lives with wife and two children, ages 10 and 17. Patient is dependant on his wife's insurance.                       Who will care for patient should he or she have an LVAD:   Patient endorsing that wife is his main support. Patient says that his mother in law might be able to help on the weekend, and he is planning to ask other family members as well.     PERTINENT PM/SXH:   HTN (hypertension)  Cardiomyopathy  No pertinent past medical history    No significant past surgical history    FAMILY HISTORY:  Family history of lung disease (Grandparent): maternal grandmother (no history of smoking)  Family history of hypertension: father  Family history of heart disease: mother  fam hx in father and brother of sudden death in 40s.    ITEMS NOT CHECKED ARE NOT PRESENT    SOCIAL HISTORY:   Significant other/partner:  [ ]  Children:  [ ]  Islam/Spirituality:  Substance hx:  [ ]   Tobacco hx:  [ ]   Alcohol hx: [ ]   Home Opioid hx:  [ ] I-Stop Reference No:  Living Situation: [ ]Home  [ ]Long term care  [ ]Rehab [ ]Other  As per care coordination notes:   Patient resides with spouse, mother-in-law, and his 2 children (ages 17 & 10)  in a private house in [place], [place]. Patient must negotiate 4 stairs to  enter home and 12 stairs inside. Patient identified his spouse, Trisha Charles  [phone], as emergency contact and caregiver. Patient with no advanced  directives in place. Patient was independent with ADLs and ambulation prior to  admission. Patient with no prior home care services. Patient is currently  unemployed due to his medical issues. 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 Patient's primary care doctor is Dr. Armando Flaherty, last seen 1 week ago. Patient's cardiologist is Dr. Malcolm Kerns, last  seen in August. Patient admits to noncompliance with his medications as he  forgets to take them at times. Patient also reports issues with his pharmacy  ([facility]) as he reports that some of his prescriptions are missing when he  goes to  his medications. Patient requesting for his medications to be  sent to [facility] pharmacy moving forward. Patient reports that all of his  medications are covered through his insurance.      LCSW explored substance use history. Patient reports using marijuana 1x in  college in 2001. Patient also reports tobacco use, but quit in 2011.  ADVANCE DIRECTIVES:    DNR  MOLST  [ ]  Living Will  [ ]   DECISION MAKER(s):  [ ] Health Care Proxy(s)  [ ] Surrogate(s)  [ ] Guardian           Name(s): Phone Number(s):    BASELINE (I)ADL(s) (prior to admission):  [place]: [ ]Total  [ ] Moderate [ ]Dependent    Allergies    aspirin (Swelling)  Motrin (Hives)    Intolerances    MEDICATIONS  (STANDING):  captopril 25 milliGRAM(s) Oral three times a day  chlorhexidine 4% Liquid 1 Application(s) Topical <User Schedule>  DOBUTamine Infusion 2.5 MICROgram(s)/kG/Min (5.063 mL/Hr) IV Continuous <Continuous>  furosemide   Injectable 40 milliGRAM(s) IV Push two times a day  pantoprazole    Tablet 40 milliGRAM(s) Oral before breakfast  spironolactone 25 milliGRAM(s) Oral daily    MEDICATIONS  (PRN):  aluminum hydroxide/magnesium hydroxide/simethicone Suspension 30 milliLiter(s) Oral every 6 hours PRN Dyspepsia  simethicone 80 milliGRAM(s) Chew three times a day PRN Gas    PRESENT SYMPTOMS: [ ]Unable to obtain due to poor mentation   Source if other than patient:  [ ]Family   [ ]Team     Pain (Impact on QOL):  Not in pain.   Location -         Minimal acceptable level (0-10 scale):                    Aggravating factors -  Quality -  Radiation -  Severity (0-10 scale) -    Timing -    PAIN AD Score:     [email or web address].[email or web address] (press ctrl +  left click to view)    Dyspnea:                           [ ]Mild [ ]Moderate [ ]Severe  Anxiety:                             [ ]Mild [ ]Moderate [ ]Severe  Fatigue:                             [ ]Mild [ ]Moderate [x]Severe  Nausea:                             [ ]Mild [ ]Moderate [ ]Severe  Loss of appetite:              [ ]Mild [ ]Moderate [ ]Severe  Constipation:                    [ ]Mild [ ]Moderate [ ]Severe    Other Symptoms:  [x]All other review of systems negative     Karnofsky Performance Score/Palliative Performance Status Version 2:      30 %    http://palliative.info/resource_material/[email or web address].pdf  PHYSICAL EXAM:  Vital Signs Last 24 Hrs  T(C): 36.6 (03 Dec 2018 08:00), Max: 37 (02 Dec 2018 19:00)  T(F): 97.9 (03 Dec 2018 08:00), Max: 98.6 (02 Dec 2018 19:00)  HR: 114 (03 Dec 2018 12:00) (96 - 114)  BP: 109/89 (03 Dec 2018 12:00) (81/56 - 111/80)  BP(mean): 101 (03 Dec 2018 12:00) (69 - 103)  RR: 19 (03 Dec 2018 12:00) (11 - 28)  SpO2: 95% (03 Dec 2018 12:00) (94% - 99%) I&O's Summary    02 Dec 2018 07:01  -  03 Dec 2018 07:00  --------------------------------------------------------  IN: 647.5 mL / OUT: 2010 mL / NET: -1362.5 mL    03 Dec 2018 07:01  -  03 Dec 2018 13:39  --------------------------------------------------------  IN: 275.5 mL / OUT: 1050 mL / NET: -774.5 mL    GENERAL:  [ ]Alert  [ ]Oriented x   [ ]Lethargic  [ ]Cachexia  [ ]Unarousable  [ ]Verbal  [ ]Non-Verbal  Behavioral:   [ ] Anxiety  [ ] Delirium [ ] Agitation [ ] Other  HEENT:  [ ]Normal   [ ]Dry mouth   [ ]ET Tube/Trach  [ ]Oral lesions  PULMONARY:   [ ]Clear [ ]Tachypnea  [ ]Audible excessive secretions   [ ]Rhonchi        [ ]Right [ ]Left [ ]Bilateral  [ ]Crackles        [ ]Right [ ]Left [ ]Bilateral  [ ]Wheezing     [ ]Right [ ]Left [ ]Bilateral  CARDIOVASCULAR:    [ ]Regular [ ]Irregular [ ]Tachy  [ ]Oj [ ]Murmur [ ]Other  GASTROINTESTINAL:  [ ]Soft  [ ]Distended   [ ]+BS  [ ]Non tender [ ]Tender  [ ]PEG [ ]OGT/ NGT  Last BM:   GENITOURINARY:  [ ]Normal [ ] Incontinent   [ ]Oliguria/Anuria   [ ]Villalobos  MUSCULOSKELETAL:   [ ]Normal   [ ]Weakness  [ ]Bed/Wheelchair bound [ ]Edema  NEUROLOGIC:   [ ]No focal deficits  [ ] Cognitive impairment  [ ] Dysphagia [ ]Dysarthria [ ] Paresis [ ]Other   SKIN:   [ ]Normal   [ ]Pressure ulcer(s)  [ ]Rash    CRITICAL CARE:  [ ] Shock Present  [ ]Septic [ ]Cardiogenic [ ]Neurologic [ ]Hypovolemic  [ ]  Vasopressors [ ]  Inotropes   [ ] Respiratory failure present  [ ] Acute  [ ] Chronic [ ] Hypoxic  [ ] Hypercarbic [ ] Other  [ ] Other organ failure     LABS:                        18.4   10.4  )-----------( 125      ( 03 Dec 2018 05:19 )             56.4   12-03    131<L>  |  96  |  22  ----------------------------<  112<H>  4.2   |  21<L>  |  0.96    Ca    9.6      03 Dec 2018 05:19  Phos  3.9     12-03  Mg     2.2     12-03    TPro  7.3  /  Alb  4.1  /  TBili  2.3<H>  /  DBili  x   /  AST  232<H>  /  ALT  480<H>  /  AlkPhos  76  12-03        RADIOLOGY & ADDITIONAL STUDIES:    PROTEIN CALORIE MALNUTRITION PRESENT: [ ] Yes [ ] No  [ ] PPSV2 < or = to 30% [ ] significant weight loss  [ ] poor nutritional intake [ ] catabolic state [ ] anasarca     Albumin, Serum: 4.1 g/dL (12-03-18 @ 05:19)  Artificial Nutrition [ ]     REFERRALS:   [ ]Chaplaincy  [ ] Hospice  [ ]Child Life  [ ]Social Work  [ ]Case management [ ]Holistic Therapy   Goals of Care Discussion Document: HPI:  48 yo [place] M, w/ PMH of nICMP (EF 10% 11/2018 - recently diagnosed in August 2018), presenting as transfer from [facility] CCU due to cardiogenic shock, possible LVAD workup. As per EMR, the patient was dx in August 2018. Prior work up showed clear coronaries and not infiltrative dilated cardiomyopathy. The patient was supposed to follow up with HF but he did not do so. He was also recently admitted to [facility] where a transfer to [facility] was recommended; however, he refused. EMR also indicates, his wife was stating the patient is not complaint with medications. "She was also concerned about IVDU 3-4 years ago. " It is also noted in the chart that "Patient acknowledges that he only takes spironolactone, Lasix, and potassium, and that he only wears his Life Vest some of the time. States that he has never used intravenous drugs. Used marijuana for very brief period in college over 20 years ago. Denies any other drug use, and states no longer drinks alcohol."    In the [facility] ER, patient BP low with elevated lactate, poor oxygenation. Also with hyperkalemia.  Dobutamine gtt started and levophed gtt initiated. Patient emergently transferred to cardiac cath lab for a RHC. A balloon pump was inserted, and levophed was decreased to 0.3mcg/kg/min and Dobutamine gtt titrated.     In CCU he is on Dobutamine infusion. This consult was called for LVAD evaluation       Patient’s understanding of heart failure/ cardiac condition:    Patient seemed to understand basics of his heart condition, but is limited in describing the complexity and severity of his condition. Patient has an upbeat attitude towards current plan, but it is unclear if he truly has internalized the severity of his situation and what will be involved in the daily monitoring and maintenance of the LVAD.  Patient does acknowledge that he made a mistake in the past by not taking his medications and not taking his condition seriously enough, and understands that he will need to be very compliant moving forward.     What the CHF team has told them:   Patient explains that his heart is working at "10%" when it should work at >90%.     What the CHF team has told them about LVAD:  Patient explains how LVAD will work as "emergency lifeline" and increase the heart activity to above 90%, where it should be for a normal person.     What is their understanding of LVAD:  Patient understands that LVAD is necessary to help his heart pump out blood and increase his functionality.     What are their thoughts of living with an LVAD, and their understanding of how it may affect ADLs:  Patient seems to be very concerned with the financial burden involved in the LVAD workup and eventual LVAD placement. Patient endorsing that he feels confident that he will make sure to pay attention to battery life. Asked questions about how to shower with machine.     What is their social situation: Employed [ ] Retired [ ] Who resides at home:         Patient lives with wife and two children, ages 10 and 17. Patient is dependant on his wife's insurance.                       Who will care for patient should he or she have an LVAD:   Patient endorsing that wife is his main support. Patient says that his mother in law might be able to help on the weekend, and he is planning to ask other family members as well.     PERTINENT PM/SXH:   HTN (hypertension)  Cardiomyopathy  No pertinent past medical history    No significant past surgical history    FAMILY HISTORY:  Family history of lung disease (Grandparent): maternal grandmother (no history of smoking)  Family history of hypertension: father  Family history of heart disease: mother  fam hx in father and brother of sudden death in 40s.    ITEMS NOT CHECKED ARE NOT PRESENT    SOCIAL HISTORY:   Significant other/partner:  [x ]  2 Children:  [ ]  Restorationism/Spirituality:  Substance hx:  [ ]   Tobacco hx:  [ ]   Alcohol hx: [ ]   Home Opioid hx:  [ ] I-Stop Reference No:  Living Situation: [ ]Home  [ ]Long term care  [ ]Rehab [ ]Other  As per care coordination notes:   Patient resides with spouse, mother-in-law, and his 2 children (ages 17 & 10)  in a private house in [place], [place]. Patient must negotiate 4 stairs to  enter home and 12 stairs inside. Patient identified his spouse, Trisha Charles  [phone], as emergency contact and caregiver. Patient with no advanced  directives in place. Patient was independent with ADLs and ambulation prior to  admission. Patient with no prior home care services. Patient is currently  unemployed due to his medical issues. 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 Patient's primary care doctor is Dr. Armando Flaherty, last seen 1 week ago. Patient's cardiologist is Dr. Malcolm Kerns, last  seen in August. Patient admits to noncompliance with his medications as he  forgets to take them at times. Patient also reports issues with his pharmacy  ([facility]) as he reports that some of his prescriptions are missing when he  goes to  his medications. Patient requesting for his [facility] moving forward. Patient reports that all of his  medications are covered through his insurance.      LCSW explored substance use history. Patient reports using marijuana 1x in  college in 2001. Patient also reports tobacco use, but quit in 2011.  ADVANCE DIRECTIVES:    DNR  MOLST  [ ]  Living Will  [ ]   DECISION MAKER(s):  [ ] Health Care Proxy(s)  [x ] Surrogate(s)  [ ] Guardian           Name(s): Phone Number(s): Trisha [phone]    BASELINE (I)ADL(s) (prior to admission):  [place]: [ ]Total  [ ] Moderate [x ]Dependent    Allergies    aspirin (Swelling)  Motrin (Hives)    Intolerances    MEDICATIONS  (STANDING):  captopril 25 milliGRAM(s) Oral three times a day  chlorhexidine 4% Liquid 1 Application(s) Topical <User Schedule>  DOBUTamine Infusion 2.5 MICROgram(s)/kG/Min (5.063 mL/Hr) IV Continuous <Continuous>  furosemide   Injectable 40 milliGRAM(s) IV Push two times a day  pantoprazole    Tablet 40 milliGRAM(s) Oral before breakfast  spironolactone 25 milliGRAM(s) Oral daily    MEDICATIONS  (PRN):  aluminum hydroxide/magnesium hydroxide/simethicone Suspension 30 milliLiter(s) Oral every 6 hours PRN Dyspepsia  simethicone 80 milliGRAM(s) Chew three times a day PRN Gas    PRESENT SYMPTOMS: [ ]Unable to obtain due to poor mentation   Source if other than patient:  [ ]Family   [ ]Team     Pain (Impact on QOL):  Not in pain.   Location -         Minimal acceptable level (0-10 scale):                    Aggravating factors -  Quality -  Radiation -  Severity (0-10 scale) -    Timing -    PAIN AD Score:     http://[email or web address]/painad.pdf (press ctrl +  left click to view)    Dyspnea:                           [ ]Mild [ ]Moderate [ ]Severe  Anxiety:                             [ ]Mild [ ]Moderate [ ]Severe  Fatigue:                             [ ]Mild [ ]Moderate [x]Severe  Nausea:                             [ ]Mild [ ]Moderate [ ]Severe  Loss of appetite:              [ ]Mild [ ]Moderate [ ]Severe  Constipation:                    [ ]Mild [ ]Moderate [ ]Severe    Other Symptoms:  [x]All other review of systems negative     Karnofsky Performance Score/Palliative Performance Status Version 2:      30 %    http://palliative.info/resource_material/[email or web address].pdf  PHYSICAL EXAM:  Vital Signs Last 24 Hrs  T(C): 36.6 (03 Dec 2018 08:00), Max: 37 (02 Dec 2018 19:00)  T(F): 97.9 (03 Dec 2018 08:00), Max: 98.6 (02 Dec 2018 19:00)  HR: 114 (03 Dec 2018 12:00) (96 - 114)  BP: 109/89 (03 Dec 2018 12:00) (81/56 - 111/80)  BP(mean): 101 (03 Dec 2018 12:00) (69 - 103)  RR: 19 (03 Dec 2018 12:00) (11 - 28)  SpO2: 95% (03 Dec 2018 12:00) (94% - 99%) I&O's Summary    02 Dec 2018 07:01  -  03 Dec 2018 07:00  --------------------------------------------------------  IN: 647.5 mL / OUT: 2010 mL / NET: -1362.5 mL    03 Dec 2018 07:01  -  03 Dec 2018 13:39  --------------------------------------------------------  IN: 275.5 mL / OUT: 1050 mL / NET: -774.5 mL    GENERAL:  [x ]Alert  [ x]Oriented x3   [ ]Lethargic  [ ]Cachexia  [ ]Unarousable  [x ]Verbal  [ ]Non-Verbal  Behavioral:   [ ] Anxiety  [ ] Delirium [ ] Agitation [ ] Other  HEENT:  [x ]Normal   [ ]Dry mouth   [ ]ET Tube/Trach  [ ]Oral lesions  PULMONARY:   [ x]Clear [ ]Tachypnea  [ ]Audible excessive secretions   [ ]Rhonchi        [ ]Right [ ]Left [ ]Bilateral  [ ]Crackles        [ ]Right [ ]Left [ ]Bilateral  [ ]Wheezing     [ ]Right [ ]Left [ ]Bilateral  CARDIOVASCULAR:    [x ]Regular [ ]Irregular [ ]Tachy  [ ]Jo [ ]Murmur [ ]Other  GASTROINTESTINAL:  [x ]Soft  [ ]Distended   [ x]+BS  [ ]Non tender [ ]Tender  [ ]PEG [ ]OGT/ NGT  Last BM: 12/3  GENITOURINARY:  [ ]Normal [ ] Incontinent   [ ]Oliguria/Anuria   [x ]Villalobos  MUSCULOSKELETAL:   [ ]Normal   [ ]Weakness  [x ]Bed/Wheelchair bound [ ]Edema  NEUROLOGIC:   [x ]No focal deficits  [ ] Cognitive impairment  [ ] Dysphagia [ ]Dysarthria [ ] Paresis [ ]Other   SKIN:   [ ]Normal   [ ]Pressure ulcer(s)  [ ]Rash    CRITICAL CARE:  [x ] Shock Present  [ ]Septic [x ]Cardiogenic [ ]Neurologic [ ]Hypovolemic  [ ]  Vasopressors [ ]  Inotropes   [ ] Respiratory failure present  [ ] Acute  [ ] Chronic [ ] Hypoxic  [ ] Hypercarbic [ ] Other  [ ] Other organ failure     LABS:                        18.4   10.4  )-----------( 125      ( 03 Dec 2018 05:19 )             56.4   12-03    131<L>  |  96  |  22  ----------------------------<  112<H>  4.2   |  21<L>  |  0.96    Ca    9.6      03 Dec 2018 05:19  Phos  3.9     12-03  Mg     2.2     12-03    TPro  7.3  /  Alb  4.1  /  TBili  2.3<H>  /  DBili  x   /  AST  232<H>  /  ALT  480<H>  /  AlkPhos  76  12-03        RADIOLOGY & ADDITIONAL STUDIES:    PROTEIN CALORIE MALNUTRITION PRESENT: [ ] Yes [ ] No  [ ] PPSV2 < or = to 30% [ ] significant weight loss  [ ] poor nutritional intake [ ] catabolic state [ ] anasarca     Albumin, Serum: 4.1 g/dL (12-03-18 @ 05:19)  Artificial Nutrition [ ]     REFERRALS:   [ ]Chaplaincy  [ ] Hospice  [ ]Child Life  [ ]Social Work  [ ]Case management [ ]Holistic Therapy   Goals of Care Discussion Document: HPI:  48 yo [place] M, w/ PMH of nICMP (EF 10% 11/2018 - recently diagnosed in August 2018), presenting as transfer from [facility] CCU due to cardiogenic shock, possible LVAD workup. As per EMR, the patient was dx in August 2018. Prior work up showed clear coronaries and not infiltrative dilated cardiomyopathy. The patient was supposed to follow up with HF but he did not do so. He was also recently admitted to [facility] where a transfer to [facility] was recommended; however, he refused. EMR also indicates, his wife was stating the patient is not complaint with medications. "She was also concerned about IVDU 3-4 years ago. " It is also noted in the chart that "Patient acknowledges that he only takes spironolactone, Lasix, and potassium, and that he only wears his Life Vest some of the time. States that he has never used intravenous drugs. Used marijuana for very brief period in college over 20 years ago. Denies any other drug use, and states no longer drinks alcohol."    In the [facility] ER, patient BP low with elevated lactate, poor oxygenation. Also with hyperkalemia.  Dobutamine gtt started and levophed gtt initiated. Patient emergently transferred to cardiac cath lab for a RHC. A balloon pump was inserted, and levophed was decreased to 0.3mcg/kg/min and Dobutamine gtt titrated.     In CCU he is on Dobutamine infusion. This consult was called for LVAD evaluation       Patient’s understanding of heart failure/ cardiac condition:    Patient seemed to understand basics of his heart condition, but is limited in describing the complexity and severity of his condition. Patient has an upbeat attitude towards current plan, but it is unclear if he truly has internalized the severity of his situation and what will be involved in the daily monitoring and maintenance of the LVAD.  Patient does acknowledge that he made a mistake in the past by not taking his medications and not taking his condition seriously enough, and understands that he will need to be very compliant moving forward.     What the CHF team has told them:   Patient explains that his heart is working at "10%" when it should work at >90%.     What the CHF team has told them about LVAD:  Patient explains how LVAD will work as "emergency lifeline" and increase the heart activity to above 90%, where it should be for a normal person.     What is their understanding of LVAD:  Patient understands that LVAD is necessary to help his heart pump out blood and increase his functionality.     What are their thoughts of living with an LVAD, and their understanding of how it may affect ADLs:  Patient seems to be very concerned with the financial burden involved in the LVAD workup and eventual LVAD placement. Patient endorsing that he feels confident that he will make sure to pay attention to battery life. Asked questions about how to shower with machine.     What is their social situation: Employed [ ] Retired [ ] Who resides at home:         Patient lives with wife and two children, ages 10 and 17. Patient is dependant on his wife's insurance and income.                       Who will care for patient should he or she have an LVAD:   Patient endorsing that wife is his main support. Patient says that his mother in law might be able to help on the weekend, and he is planning to ask other family members as well.     PERTINENT PM/SXH:   HTN (hypertension)  Cardiomyopathy  No pertinent past medical history    No significant past surgical history    FAMILY HISTORY:  Family history of lung disease (Grandparent): maternal grandmother (no history of smoking)  Family history of hypertension: father  Family history of heart disease: mother  fam hx in father and brother of sudden death in 40s.    ITEMS NOT CHECKED ARE NOT PRESENT    SOCIAL HISTORY:   Significant other/partner:  [x ]  2 Children:  [ ]  Hoahaoism/Spirituality:  Substance hx:  [ ]   Tobacco hx:  [ ]   Alcohol hx: [ ]   Home Opioid hx:  [ ] I-Stop Reference No:  Living Situation: [ ]Home  [ ]Long term care  [ ]Rehab [ ]Other  As per care coordination notes:   Patient resides with spouse, mother-in-law, and his 2 children (ages 17 & 10)  in a private house in [place], [place]. Patient must negotiate 4 stairs to  enter home and 12 stairs inside. Patient identified his spouse, Trisha Charles  [phone], as emergency contact and caregiver. Patient with no advanced  directives in place. Patient was independent with ADLs and ambulation prior to  admission. Patient with no prior home care services. Patient is currently  unemployed due to his medical issues. 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 Patient's primary care doctor is Dr. Armando Flaherty, last seen 1 week ago. Patient's cardiologist is Dr. Malcolm Kerns, last  seen in August. Patient admits to noncompliance with his medications as he  forgets to take them at times. Patient also reports issues with his pharmacy  ([facility]) as he reports that some of his prescriptions are missing when he  goes to  his medications. Patient requesting for his medications to be  sent to [facility] pharmacy moving forward. Patient reports that all of his  medications are covered through his insurance.      LCSW explored substance use history. Patient reports using marijuana 1x in  college in 2001. Patient also reports tobacco use, but quit in 2011.  ADVANCE DIRECTIVES:    DNR  MOLST  [ ]  Living Will  [ ]   DECISION MAKER(s):  [ ] Health Care Proxy(s)  [x ] Surrogate(s)  [ ] Guardian           Name(s): Phone Number(s): Trisha [phone]    BASELINE (I)ADL(s) (prior to admission):  [place]: [ ]Total  [ ] Moderate [x ]Dependent    Allergies    aspirin (Swelling)  Motrin (Hives)    Intolerances    MEDICATIONS  (STANDING):  captopril 25 milliGRAM(s) Oral three times a day  chlorhexidine 4% Liquid 1 Application(s) Topical <User Schedule>  DOBUTamine Infusion 2.5 MICROgram(s)/kG/Min (5.063 mL/Hr) IV Continuous <Continuous>  furosemide   Injectable 40 milliGRAM(s) IV Push two times a day  pantoprazole    Tablet 40 milliGRAM(s) Oral before breakfast  spironolactone 25 milliGRAM(s) Oral daily    MEDICATIONS  (PRN):  aluminum hydroxide/magnesium hydroxide/simethicone Suspension 30 milliLiter(s) Oral every 6 hours PRN Dyspepsia  simethicone 80 milliGRAM(s) Chew three times a day PRN Gas    PRESENT SYMPTOMS: [ ]Unable to obtain due to poor mentation   Source if other than patient:  [ ]Family   [ ]Team     Pain (Impact on QOL):  Not in pain.   Location -         Minimal acceptable level (0-10 scale):                    Aggravating factors -  Quality -  Radiation -  Severity (0-10 scale) -    Timing -    PAIN AD Score:     [email or web address]/[email or web address] (press ctrl +  left click to view)    Dyspnea:                           [ ]Mild [ ]Moderate [ ]Severe  Anxiety:                             [ ]Mild [ ]Moderate [ ]Severe  Fatigue:                             [ ]Mild [ ]Moderate [x]Severe  Nausea:                             [ ]Mild [ ]Moderate [ ]Severe  Loss of appetite:              [ ]Mild [ ]Moderate [ ]Severe  Constipation:                    [ ]Mild [ ]Moderate [ ]Severe    Other Symptoms:  [x]All other review of systems negative     Karnofsky Performance Score/Palliative Performance Status Version 2:      30 %    http://palliative.info/resource_material/[email or web address].pdf  PHYSICAL EXAM:  Vital Signs Last 24 Hrs  T(C): 36.6 (03 Dec 2018 08:00), Max: 37 (02 Dec 2018 19:00)  T(F): 97.9 (03 Dec 2018 08:00), Max: 98.6 (02 Dec 2018 19:00)  HR: 114 (03 Dec 2018 12:00) (96 - 114)  BP: 109/89 (03 Dec 2018 12:00) (81/56 - 111/80)  BP(mean): 101 (03 Dec 2018 12:00) (69 - 103)  RR: 19 (03 Dec 2018 12:00) (11 - 28)  SpO2: 95% (03 Dec 2018 12:00) (94% - 99%) I&O's Summary    02 Dec 2018 07:01  -  03 Dec 2018 07:00  --------------------------------------------------------  IN: 647.5 mL / OUT: 2010 mL / NET: -1362.5 mL    03 Dec 2018 07:01  -  03 Dec 2018 13:39  --------------------------------------------------------  IN: 275.5 mL / OUT: 1050 mL / NET: -774.5 mL    GENERAL:  [x ]Alert  [ x]Oriented x3   [ ]Lethargic  [ ]Cachexia  [ ]Unarousable  [x ]Verbal  [ ]Non-Verbal  Behavioral:   [ ] Anxiety  [ ] Delirium [ ] Agitation [ ] Other  HEENT:  [x ]Normal   [ ]Dry mouth   [ ]ET Tube/Trach  [ ]Oral lesions  PULMONARY:   [ x]Clear [ ]Tachypnea  [ ]Audible excessive secretions   [ ]Rhonchi        [ ]Right [ ]Left [ ]Bilateral  [ ]Crackles        [ ]Right [ ]Left [ ]Bilateral  [ ]Wheezing     [ ]Right [ ]Left [ ]Bilateral  CARDIOVASCULAR:    [x ]Regular [ ]Irregular [ ]Tachy  [ ]Oj [ ]Murmur [ ]Other  GASTROINTESTINAL:  [x ]Soft  [ ]Distended   [ x]+BS  [ ]Non tender [ ]Tender  [ ]PEG [ ]OGT/ NGT  Last BM: 12/3  GENITOURINARY:  [ ]Normal [ ] Incontinent   [ ]Oliguria/Anuria   [x ]Villalobos  MUSCULOSKELETAL:   [ ]Normal   [ ]Weakness  [x ]Bed/Wheelchair bound [ ]Edema  NEUROLOGIC:   [x ]No focal deficits  [ ] Cognitive impairment  [ ] Dysphagia [ ]Dysarthria [ ] Paresis [ ]Other   SKIN:   [ ]Normal   [ ]Pressure ulcer(s)  [ ]Rash    CRITICAL CARE:  [x ] Shock Present  [ ]Septic [x ]Cardiogenic [ ]Neurologic [ ]Hypovolemic  [ ]  Vasopressors [ ]  Inotropes   [ ] Respiratory failure present  [ ] Acute  [ ] Chronic [ ] Hypoxic  [ ] Hypercarbic [ ] Other  [ ] Other organ failure     LABS:                        18.4   10.4  )-----------( 125      ( 03 Dec 2018 05:19 )             56.4   12-03    131<L>  |  96  |  22  ----------------------------<  112<H>  4.2   |  21<L>  |  0.96    Ca    9.6      03 Dec 2018 05:19  Phos  3.9     12-03  Mg     2.2     12-03    TPro  7.3  /  Alb  4.1  /  TBili  2.3<H>  /  DBili  x   /  AST  232<H>  /  ALT  480<H>  /  AlkPhos  76  12-03        RADIOLOGY & ADDITIONAL STUDIES:  < from: TTE with Doppler (w/Cont) (12.02.18 @ 09:53) >  Patient name: RIZWAN CHARLES  YOB: 1970   Age: 47 (M)   MR#: [id number]  Study Date: 12/2/2018  Location: [place] [id number]: Selene Arreguin RDCSEF (Visual Estimate): 5-10 %Conclusions:  1. Mild mitral annular calcification. Tethered mitral valve  leaflets with normal opening. Moderate-severe mitral  regurgitation.  2. Normal trileaflet aortic valve. Minimal aortic  regurgitation.  3. Eccentric left ventricular hypertrophy (dilated left  ventricle with normal relative wall thickness). Severe left  ventricular enlargement.  4. Severe global left ventricular systolic dysfunction.  Endocardial visualization enhanced with intravenous  injection of Ultrasonic Enhancing Agent (Definity). No left  ventricular thrombus.  5. Normal right ventricular size with decreased right  ventricular systolic function.  *** No previous Echo exam.    < end of copied text >    PROTEIN CALORIE MALNUTRITION PRESENT: [ ] Yes [ ] No  [ ] PPSV2 < or = to 30% [ ] significant weight loss  [ ] poor nutritional intake [ ] catabolic state [ ] anasarca     Albumin, Serum: 4.1 g/dL (12-03-18 @ 05:19)  Artificial Nutrition [ ]     REFERRALS:   [ ]Chaplaincy  [ ] Hospice  [ ]Child Life  [ ]Social Work  [ ]Case management [ ]Holistic Therapy   Goals of Care Discussion Document:

## 2018-12-03 NOTE — CONSULT NOTE ADULT - SUBJECTIVE AND OBJECTIVE BOX
Behavioral Cardiology Psychological Assessment     History of present illness:     Social history:     Substance use:   Tobacco:   Alcohol:   Drug:     Understanding of cardiac illness and treatment options:     Support system:     Psychological assessment:     Mental status exam: Psychological Assessment as part of evaluation for LVAD   History of present illness: Mr. Charles is a 47 year old Montene[place]in man with a PMH of NICM LVIDd 7 cm (EF 10% 2018 - recently diagnosed in 2018) a/w cardiogenic shock requiring IABP and inotropes. No reported psychiatric history.     Social history:  18 years, has 2 children (son 17, daughter 10). Lives with his wife (Trisha [phone]), mother-in-law, and 2 children in a private house in [place].  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  Prior to that worked in [employer] in [place].  His wife is an RN; works in [employer] in [employer] full time.  Sabianism wade.  Primary language Tagalog; fluent in English. 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      Substance use:   ·	Tobacco: former use; quit ; smoked 1PPD for 25 years. Stopped after losing his job in  as he couldn’t afford.     ·	Alcohol: none for past 1.5 years; prior to that drank occasional glass wine; many years ago reports 5 drinks of taylor 3x/week with friends.    ·	Drug: occasional marijuana use in college.      Understanding of health history:  General understanding of his heart disease and reasons for needing advanced therapies, "my heart is weak." Prefers to have his wife handle medical information.        ·	Understanding of heart failure: First diagnosed with heart failure 2018.  Has family history of cardiac disease (brother and father   in their 40s).  ·	Understanding of advanced HF therapies: Limited knowledge, would look for wife to answer questions about the LVAD.    ·	Knowledge of LVAD management: unable to provide information other than "it will help my heart."      ·	Understanding of LVAD indications/risks/benefits/other options: Unable to provide information; will need review of this information.      Adherence to heart failure guidelines:   ·	Medication: wife has been filling pill organizer for past few weeks. Needs to remind him to take medications.  As per wife, past nonadherence with medication.  He indicated that he stopped taking Lisinopril because it gave him headache, did not discuss first with MD.    ·	Low Na diet: no  ·	Daily weight: not monitoring weight.   ·	Physical activity: decreased since August, most days tired and short of breath. At home, responsible for children in morning and driving them to school.    ·	Symptom management: no.   ·	MD follow-up: no follow up in Sept or Oct.  Went to ER for symptoms of HF.       Psychological assessment:   ·	Past psychiatric history: denied    ·	Depression:  denies.  Reports he feels "bored" and is unhappy he cannot work to support his family. 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 Since Oct has been experiencing fatigue, poor appetite, trouble concentrating. Expressed some regret for not taking better care of himself but focused on issued related to being out of work more than his health issues and tended to minimize illness severity. Denies anhedonia.  Denies s/i.  Denies h/i.     ·	Anxiety: denies.   ·	Coping strategies: spending time with family.   ·	Sleep: occasional trouble sleeping      ·	Appetite: has trouble eating as he gets full very easily   ·	Pain: during cold months reports arthritis pain in his hands; takes Tylenol for pain.    Mental Status Exam/Cognitive: Seen resting in bed. Pleasant and cooperative; well related with good eye contact. Speech: normal range, volume, rate. Mood "ok." Affect euthymic. Thought process logical. Thought content without evidence of any psychosis, arelis, or delusions. Appeared to minimize severity of current health status. A&Ox3. Insight into disease limited. Judgment adequate.    Performed within normal range on brief cognitive screen; MoCA score 26/30.    Support system:  Identified his wife ( Trisha) and mother-in-law as primary support persons to provide necessary assistance post LVAD. Wife is an RN who works full time at ER in [employer]. She will try to take FMLA and mother-in-law can help on weekends. Wife will do driveline dressing changes.     Impression: Mr. Charles is a 47 year-old man undergoing evaluation for LVAD. Reports he ignored health issues in hope “they would go away.” During assessment, minimized severity of current health status.  Acknowledged nonadherence with treatment, expressed motivation to make changes but unable to provide good understanding of heart failure guidelines. Former tobacco use, quit . Former alcohol use, none since 2017.  Former marijuana use in college. Denies other drug use. Support system includes wife and mother-in-law.  Both work full time, wife will try to take FMLA and mother-in-law is available on weekends. Wife concerned as she is responsible for finances.  General understanding of LVAD.  Unable to teach back information. Performed within normal range on brief cognitive screen.   Dx: r/o Adjustment disorder with depressed mood; F43.21    Recommendations: Some concerns regarding readiness for LVAD   ·	Poor adherence to treatment guidelines including medication and MD follow-up  ·	Needs review of LVAD education as he was unable to provide teach back of information.     ·	Unclear of his understanding of severity of illness as he tends to minimize current health status. Will benefit from additional information on end stage HF.   ·	Denies anxiety or depression; may be minimizing given tendency to do so with most aspects of his situation.

## 2018-12-03 NOTE — CONSULT NOTE ADULT - ASSESSMENT
46 yo M w/ recent diagnosis in august 2018 of nonischemic cardiomyopathy (EF 10%), initially presented to [facility] d/t SOB and Epigastric pain, now s/p R heart cardiac cath, showing clean coronaries, but with high wedge pressures and need for blood pressure support with pressors,transferred to [facility] CCU for cardiogenic shock and being worked up for prelvad vs heart transplant.     Pre-Transplant Workup  CMV IgG positive   Syphilis screen neg  EBV past infection  Toxo IgG positive   MMR immune   MRSA screen negative, MSSA positive   HIV negative  Hepatitis A nonreactive  Hepatitis B sAg nonreactive  Hepatitis B cAb nonreactive  Hepatitis B c IgM Ab nonreactive   Hepatitic C Ab nonreactive   HSV 1 IgG positive  HSV 2 IgG negative     Check for Hepatitis B sAb, HAV IgG, QuantiFeron

## 2018-12-03 NOTE — CONSULT NOTE ADULT - ASSESSMENT
48 yo M w/ recent diagnosis in august 2018 of nonischemic cardiomyopathy (EF 10%), initially presented to [facility] with SOB and Epigastric pain, now s/p R heart cardiac cath, showing clean coronaries, but with high wedge pressures and need for blood pressure support with pressors, transferred to [facility] CCU for cardiogenic shock. Patient is now s/p IABP (d/eugenie on 12/1), pending LVAD workup.

## 2018-12-04 LAB
ALBUMIN SERPL ELPH-MCNC: 1.8 G/DL — LOW (ref 3.3–5)
ALBUMIN SERPL ELPH-MCNC: 3.7 G/DL — SIGNIFICANT CHANGE UP (ref 3.3–5)
ALBUMIN SERPL ELPH-MCNC: 3.9 G/DL — SIGNIFICANT CHANGE UP (ref 3.3–5)
ALP SERPL-CCNC: 33 U/L — LOW (ref 40–120)
ALP SERPL-CCNC: 67 U/L — SIGNIFICANT CHANGE UP (ref 40–120)
ALP SERPL-CCNC: 67 U/L — SIGNIFICANT CHANGE UP (ref 40–120)
ALT FLD-CCNC: 131 U/L — HIGH (ref 10–45)
ALT FLD-CCNC: 270 U/L — HIGH (ref 10–45)
ALT FLD-CCNC: 298 U/L — HIGH (ref 10–45)
ANA TITR SER: NEGATIVE — SIGNIFICANT CHANGE UP
ANION GAP SERPL CALC-SCNC: 12 MMOL/L — SIGNIFICANT CHANGE UP (ref 5–17)
ANION GAP SERPL CALC-SCNC: 14 MMOL/L — SIGNIFICANT CHANGE UP (ref 5–17)
ANION GAP SERPL CALC-SCNC: 9 MMOL/L — SIGNIFICANT CHANGE UP (ref 5–17)
AST SERPL-CCNC: 114 U/L — HIGH (ref 10–40)
AST SERPL-CCNC: 44 U/L — HIGH (ref 10–40)
AST SERPL-CCNC: 85 U/L — HIGH (ref 10–40)
BACTERIA BLD CULT: SIGNIFICANT CHANGE UP
BACTERIA BLD CULT: SIGNIFICANT CHANGE UP
BASE EXCESS BLDMV CALC-SCNC: -0.2 MMOL/L — SIGNIFICANT CHANGE UP (ref -3–3)
BASE EXCESS BLDMV CALC-SCNC: 0.2 MMOL/L — SIGNIFICANT CHANGE UP (ref -3–3)
BASE EXCESS BLDMV CALC-SCNC: 0.6 MMOL/L — SIGNIFICANT CHANGE UP (ref -3–3)
BASE EXCESS BLDMV CALC-SCNC: 0.7 MMOL/L — SIGNIFICANT CHANGE UP (ref -3–3)
BASOPHILS # BLD AUTO: 0 K/UL — SIGNIFICANT CHANGE UP (ref 0–0.2)
BASOPHILS NFR BLD AUTO: 0 % — SIGNIFICANT CHANGE UP (ref 0–2)
BILIRUB SERPL-MCNC: 0.6 MG/DL — SIGNIFICANT CHANGE UP (ref 0.2–1.2)
BILIRUB SERPL-MCNC: 1.4 MG/DL — HIGH (ref 0.2–1.2)
BILIRUB SERPL-MCNC: 1.8 MG/DL — HIGH (ref 0.2–1.2)
BUN SERPL-MCNC: 14 MG/DL — SIGNIFICANT CHANGE UP (ref 7–23)
BUN SERPL-MCNC: 22 MG/DL — SIGNIFICANT CHANGE UP (ref 7–23)
BUN SERPL-MCNC: 24 MG/DL — HIGH (ref 7–23)
CALCIUM SERPL-MCNC: 4.3 MG/DL — CRITICAL LOW (ref 8.4–10.5)
CALCIUM SERPL-MCNC: 8.6 MG/DL — SIGNIFICANT CHANGE UP (ref 8.4–10.5)
CALCIUM SERPL-MCNC: 8.9 MG/DL — SIGNIFICANT CHANGE UP (ref 8.4–10.5)
CHLORIDE SERPL-SCNC: 105 MMOL/L — SIGNIFICANT CHANGE UP (ref 96–108)
CHLORIDE SERPL-SCNC: 94 MMOL/L — LOW (ref 96–108)
CHLORIDE SERPL-SCNC: 99 MMOL/L — SIGNIFICANT CHANGE UP (ref 96–108)
CO2 BLDMV-SCNC: 25 MMOL/L — SIGNIFICANT CHANGE UP (ref 21–29)
CO2 BLDMV-SCNC: 26 MMOL/L — SIGNIFICANT CHANGE UP (ref 21–29)
CO2 SERPL-SCNC: 14 MMOL/L — LOW (ref 22–31)
CO2 SERPL-SCNC: 19 MMOL/L — LOW (ref 22–31)
CO2 SERPL-SCNC: 22 MMOL/L — SIGNIFICANT CHANGE UP (ref 22–31)
CREAT SERPL-MCNC: 0.35 MG/DL — LOW (ref 0.5–1.3)
CREAT SERPL-MCNC: 1.01 MG/DL — SIGNIFICANT CHANGE UP (ref 0.5–1.3)
CREAT SERPL-MCNC: <0.3 MG/DL — LOW (ref 0.5–1.3)
EOSINOPHIL # BLD AUTO: 0.3 K/UL — SIGNIFICANT CHANGE UP (ref 0–0.5)
EOSINOPHIL NFR BLD AUTO: 2 % — SIGNIFICANT CHANGE UP (ref 0–6)
GAS PNL BLDMV: SIGNIFICANT CHANGE UP
GAS PNL BLDV: SIGNIFICANT CHANGE UP
GLUCOSE BLDC GLUCOMTR-MCNC: 106 MG/DL — HIGH (ref 70–99)
GLUCOSE SERPL-MCNC: 110 MG/DL — HIGH (ref 70–99)
GLUCOSE SERPL-MCNC: 261 MG/DL — HIGH (ref 70–99)
GLUCOSE SERPL-MCNC: 504 MG/DL — CRITICAL HIGH (ref 70–99)
HAV IGG SER QL IA: REACTIVE
HBV SURFACE AB SER-ACNC: REACTIVE
HCO3 BLDMV-SCNC: 24 MMOL/L — SIGNIFICANT CHANGE UP (ref 20–28)
HCO3 BLDMV-SCNC: 25 MMOL/L — SIGNIFICANT CHANGE UP (ref 20–28)
HCT VFR BLD CALC: 48.6 % — SIGNIFICANT CHANGE UP (ref 39–50)
HCT VFR BLD CALC: 51.7 % — HIGH (ref 39–50)
HCT VFR BLD CALC: 54.5 % — HIGH (ref 39–50)
HGB BLD-MCNC: 16.5 G/DL — SIGNIFICANT CHANGE UP (ref 13–17)
HGB BLD-MCNC: 16.6 G/DL — SIGNIFICANT CHANGE UP (ref 13–17)
HGB BLD-MCNC: 18.3 G/DL — HIGH (ref 13–17)
HOROWITZ INDEX BLDMV+IHG-RTO: 21 — SIGNIFICANT CHANGE UP
LYMPHOCYTES # BLD AUTO: 9 % — LOW (ref 13–44)
LYMPHOCYTES # BLD AUTO: SIGNIFICANT CHANGE UP (ref 1–3.3)
MAGNESIUM SERPL-MCNC: 1.9 MG/DL — SIGNIFICANT CHANGE UP (ref 1.6–2.6)
MAGNESIUM SERPL-MCNC: 2 MG/DL — SIGNIFICANT CHANGE UP (ref 1.6–2.6)
MCHC RBC-ENTMCNC: 31.6 PG — SIGNIFICANT CHANGE UP (ref 27–34)
MCHC RBC-ENTMCNC: 32.1 GM/DL — SIGNIFICANT CHANGE UP (ref 32–36)
MCHC RBC-ENTMCNC: 33 PG — SIGNIFICANT CHANGE UP (ref 27–34)
MCHC RBC-ENTMCNC: 33.2 PG — SIGNIFICANT CHANGE UP (ref 27–34)
MCHC RBC-ENTMCNC: 33.6 GM/DL — SIGNIFICANT CHANGE UP (ref 32–36)
MCHC RBC-ENTMCNC: 33.9 GM/DL — SIGNIFICANT CHANGE UP (ref 32–36)
MCV RBC AUTO: 97.8 FL — SIGNIFICANT CHANGE UP (ref 80–100)
MCV RBC AUTO: 98.2 FL — SIGNIFICANT CHANGE UP (ref 80–100)
MCV RBC AUTO: 98.3 FL — SIGNIFICANT CHANGE UP (ref 80–100)
MONOCYTES # BLD AUTO: 1.7 K/UL — HIGH (ref 0–0.9)
MONOCYTES NFR BLD AUTO: 8 % — SIGNIFICANT CHANGE UP (ref 2–14)
NEUTROPHILS # BLD AUTO: 16.3 K/UL — HIGH (ref 1.8–7.4)
NEUTROPHILS NFR BLD AUTO: 72 % — SIGNIFICANT CHANGE UP (ref 43–77)
NEUTS BAND # BLD: 9 % — HIGH (ref 0–8)
O2 CT VFR BLD CALC: 35 MMHG — SIGNIFICANT CHANGE UP (ref 30–65)
O2 CT VFR BLD CALC: 36 MMHG — SIGNIFICANT CHANGE UP (ref 30–65)
O2 CT VFR BLD CALC: 38 MMHG — SIGNIFICANT CHANGE UP (ref 30–65)
O2 CT VFR BLD CALC: 38 MMHG — SIGNIFICANT CHANGE UP (ref 30–65)
PCO2 BLDMV: 39 MMHG — SIGNIFICANT CHANGE UP (ref 30–65)
PCO2 BLDMV: 39 MMHG — SIGNIFICANT CHANGE UP (ref 30–65)
PCO2 BLDMV: 40 MMHG — SIGNIFICANT CHANGE UP (ref 30–65)
PCO2 BLDMV: 41 MMHG — SIGNIFICANT CHANGE UP (ref 30–65)
PH BLDMV: 7.39 — SIGNIFICANT CHANGE UP (ref 7.32–7.45)
PH BLDMV: 7.41 — SIGNIFICANT CHANGE UP (ref 7.32–7.45)
PH BLDMV: 7.41 — SIGNIFICANT CHANGE UP (ref 7.32–7.45)
PH BLDMV: 7.42 — SIGNIFICANT CHANGE UP (ref 7.32–7.45)
PHOSPHATE SERPL-MCNC: 3.1 MG/DL — SIGNIFICANT CHANGE UP (ref 2.5–4.5)
PHOSPHATE SERPL-MCNC: 3.2 MG/DL — SIGNIFICANT CHANGE UP (ref 2.5–4.5)
PLAT MORPH BLD: NORMAL — SIGNIFICANT CHANGE UP
PLATELET # BLD AUTO: 124 K/UL — LOW (ref 150–400)
PLATELET # BLD AUTO: 151 K/UL — SIGNIFICANT CHANGE UP (ref 150–400)
PLATELET # BLD AUTO: 151 K/UL — SIGNIFICANT CHANGE UP (ref 150–400)
POTASSIUM SERPL-MCNC: 2.5 MMOL/L — CRITICAL LOW (ref 3.5–5.3)
POTASSIUM SERPL-MCNC: 4.2 MMOL/L — SIGNIFICANT CHANGE UP (ref 3.5–5.3)
POTASSIUM SERPL-MCNC: 4.5 MMOL/L — SIGNIFICANT CHANGE UP (ref 3.5–5.3)
POTASSIUM SERPL-SCNC: 2.5 MMOL/L — CRITICAL LOW (ref 3.5–5.3)
POTASSIUM SERPL-SCNC: 4.2 MMOL/L — SIGNIFICANT CHANGE UP (ref 3.5–5.3)
POTASSIUM SERPL-SCNC: 4.5 MMOL/L — SIGNIFICANT CHANGE UP (ref 3.5–5.3)
PROT SERPL-MCNC: 3.4 G/DL — LOW (ref 6–8.3)
PROT SERPL-MCNC: 6.7 G/DL — SIGNIFICANT CHANGE UP (ref 6–8.3)
PROT SERPL-MCNC: 7 G/DL — SIGNIFICANT CHANGE UP (ref 6–8.3)
RBC # BLD: 4.97 M/UL — SIGNIFICANT CHANGE UP (ref 4.2–5.8)
RBC # BLD: 5.26 M/UL — SIGNIFICANT CHANGE UP (ref 4.2–5.8)
RBC # BLD: 5.55 M/UL — SIGNIFICANT CHANGE UP (ref 4.2–5.8)
RBC # FLD: 12.1 % — SIGNIFICANT CHANGE UP (ref 10.3–14.5)
RBC # FLD: 12.3 % — SIGNIFICANT CHANGE UP (ref 10.3–14.5)
RBC # FLD: 12.6 % — SIGNIFICANT CHANGE UP (ref 10.3–14.5)
RBC BLD AUTO: NORMAL — SIGNIFICANT CHANGE UP
SAO2 % BLDMV: 62 % — SIGNIFICANT CHANGE UP (ref 60–90)
SAO2 % BLDMV: 65 % — SIGNIFICANT CHANGE UP (ref 60–90)
SAO2 % BLDMV: 67 % — SIGNIFICANT CHANGE UP (ref 60–90)
SAO2 % BLDMV: 68 % — SIGNIFICANT CHANGE UP (ref 60–90)
SODIUM SERPL-SCNC: 127 MMOL/L — LOW (ref 135–145)
SODIUM SERPL-SCNC: 128 MMOL/L — LOW (ref 135–145)
SODIUM SERPL-SCNC: 133 MMOL/L — LOW (ref 135–145)
WBC # BLD: 11.2 K/UL — HIGH (ref 3.8–10.5)
WBC # BLD: 12.8 K/UL — HIGH (ref 3.8–10.5)
WBC # BLD: 19.7 K/UL — HIGH (ref 3.8–10.5)
WBC # FLD AUTO: 11.2 K/UL — HIGH (ref 3.8–10.5)
WBC # FLD AUTO: 12.8 K/UL — HIGH (ref 3.8–10.5)
WBC # FLD AUTO: 19.7 K/UL — HIGH (ref 3.8–10.5)

## 2018-12-04 PROCEDURE — 71045 X-RAY EXAM CHEST 1 VIEW: CPT | Mod: 26

## 2018-12-04 PROCEDURE — 99233 SBSQ HOSP IP/OBS HIGH 50: CPT | Mod: GC

## 2018-12-04 PROCEDURE — 99223 1ST HOSP IP/OBS HIGH 75: CPT | Mod: GC

## 2018-12-04 PROCEDURE — 99222 1ST HOSP IP/OBS MODERATE 55: CPT | Mod: GC

## 2018-12-04 PROCEDURE — 99291 CRITICAL CARE FIRST HOUR: CPT

## 2018-12-04 RX ORDER — CAPTOPRIL 12.5 MG/1
18.75 TABLET ORAL EVERY 8 HOURS
Qty: 0 | Refills: 0 | Status: DISCONTINUED | OUTPATIENT
Start: 2018-12-04 | End: 2018-12-04

## 2018-12-04 RX ORDER — CAPTOPRIL 12.5 MG/1
18.75 TABLET ORAL EVERY 8 HOURS
Qty: 0 | Refills: 0 | Status: DISCONTINUED | OUTPATIENT
Start: 2018-12-04 | End: 2018-12-06

## 2018-12-04 RX ORDER — DOBUTAMINE HCL 250MG/20ML
2.5 VIAL (ML) INTRAVENOUS
Qty: 500 | Refills: 0 | Status: DISCONTINUED | OUTPATIENT
Start: 2018-12-04 | End: 2018-12-04

## 2018-12-04 RX ORDER — CAPTOPRIL 12.5 MG/1
25 TABLET ORAL EVERY 8 HOURS
Qty: 0 | Refills: 0 | Status: DISCONTINUED | OUTPATIENT
Start: 2018-12-04 | End: 2018-12-04

## 2018-12-04 RX ORDER — SODIUM CHLORIDE 9 MG/ML
250 INJECTION INTRAMUSCULAR; INTRAVENOUS; SUBCUTANEOUS ONCE
Qty: 0 | Refills: 0 | Status: DISCONTINUED | OUTPATIENT
Start: 2018-12-04 | End: 2018-12-06

## 2018-12-04 RX ORDER — FENTANYL CITRATE 50 UG/ML
25 INJECTION INTRAVENOUS ONCE
Qty: 0 | Refills: 0 | Status: DISCONTINUED | OUTPATIENT
Start: 2018-12-04 | End: 2018-12-04

## 2018-12-04 RX ORDER — SODIUM CHLORIDE 9 MG/ML
250 INJECTION INTRAMUSCULAR; INTRAVENOUS; SUBCUTANEOUS ONCE
Qty: 0 | Refills: 0 | Status: COMPLETED | OUTPATIENT
Start: 2018-12-04 | End: 2018-12-04

## 2018-12-04 RX ADMIN — CHLORHEXIDINE GLUCONATE 1 APPLICATION(S): 213 SOLUTION TOPICAL at 06:21

## 2018-12-04 RX ADMIN — CAPTOPRIL 18.75 MILLIGRAM(S): 12.5 TABLET ORAL at 21:41

## 2018-12-04 RX ADMIN — CAPTOPRIL 18.75 MILLIGRAM(S): 12.5 TABLET ORAL at 13:27

## 2018-12-04 RX ADMIN — SPIRONOLACTONE 25 MILLIGRAM(S): 25 TABLET, FILM COATED ORAL at 06:24

## 2018-12-04 RX ADMIN — PANTOPRAZOLE SODIUM 40 MILLIGRAM(S): 20 TABLET, DELAYED RELEASE ORAL at 06:22

## 2018-12-04 RX ADMIN — CAPTOPRIL 18.75 MILLIGRAM(S): 12.5 TABLET ORAL at 06:24

## 2018-12-04 RX ADMIN — SODIUM CHLORIDE 83.33 MILLILITER(S): 9 INJECTION INTRAMUSCULAR; INTRAVENOUS; SUBCUTANEOUS at 12:13

## 2018-12-04 NOTE — CHART NOTE - NSCHARTNOTEFT_GEN_A_CORE
====================  CCU MIDNIGHT ROUNDS  ====================    RIZWAN VILLALPANDO  [id number]    ====================  SUMMARY:  ====================        ====================  NEW EVENTS:  ====================        ====================  VITALS (Last 12 hrs):  ====================    T(C): 37.1 (12-04-18 @ 19:26), Max: 37.1 (12-04-18 @ 19:26)  HR: 108 (12-04-18 @ 22:00) (102 - 118)  BP: 99/66 (12-04-18 @ 22:00) (79/52 - 112/79)  BP(mean): 79 (12-04-18 @ 22:00) (63 - 95)  ABP: --  ABP(mean): --  RR: 20 (12-04-18 @ 22:00) (16 - 28)  SpO2: 98% (12-04-18 @ 22:00) (96% - 98%)  Wt(kg): --  CVP(mm Hg): 6 (12-04-18 @ 21:00) (0 - 6)  CO: --  CI: --  PA:  (26/7 - 48/23)  PA(mean): 25 (12-04-18 @ 22:00) (16 - 34)  PA(direct): --  PCWP: --  SVR: --    TELEMETRY:        *BLOOD GAS/ARTERIAL/MIXED/VENOUS  *LACTATE    I&O's Summary    03 Dec 2018 07:01  -  04 Dec 2018 07:00  --------------------------------------------------------  IN: 1122.4 mL / OUT: 2760 mL / NET: -1637.6 mL    04 Dec 2018 07:01  -  04 Dec 2018 23:37  --------------------------------------------------------  IN: 945.8 mL / OUT: 1155 mL / NET: -209.2 mL        ====================  PLAN:  ====================    Nuris OSORIOBC/ARNELU  spectra #[id number]  beeper #[id number] ====================  CCU MIDNIGHT ROUNDS  ====================    RIZWAN IRASEMA  [id number]    ====================  SUMMARY:  ====================    48 yo Shashank CALDERA NICM (10%, Dx 2018), tx from [facility]  w/ cardiogenic shock req , RFA IABP (DCed ).  off .     ====================  NEW EVENTS:  ====================    mvo2 stable off  (68 > 67), given 250 cc NS per HF, swan DCed, intro in place. no events     ====================  VITALS (Last 12 hrs):  ====================    T(C): 37.1 (18 @ 19:26), Max: 37.1 (18 @ 19:26)  HR: 108 (18 @ 22:00) (102 - 118)  BP: 99/66 (18 @ 22:00) (79/52 - 112/79)  BP(mean): 79 (18 @ 22:00) (63 - 95)  RR: 20 (18 @ 22:00) (16 - 28)  SpO2: 98% (18 @ 22:00) (96% - 98%)  CVP(mm Hg): 6 (18 @ 21:00) (0 - 6)  PA:  (/ - 48/)  PA(mean): 25 (18 @ 22:00) (16 - 34)    TELEMETRY: sinus tach     I&O's Summary    03 Dec 2018 07:01  -  04 Dec 2018 07:00  --------------------------------------------------------  IN: 1122.4 mL / OUT: 2760 mL / NET: -1637.6 mL    04 Dec 2018 07:  -  04 Dec 2018 23:37  --------------------------------------------------------  IN: 945.8 mL / OUT: 1155 mL / NET: -209.2 mL    ====================  PLAN:  ====================  -  c/w ACE w/ hold parameters and increase to 25 mg TID captopril if BP will tolerate in AM, encourage PO intake, strict I&os, daily weights, monitor Cr, lytes, venous sats     Nuris Bella [facility]-BC/CCU  spectra #[phone]  beeper #[id number]

## 2018-12-04 NOTE — PROGRESS NOTE ADULT - PROBLEM SELECTOR PLAN 1
- liberalize fluid intake  - captopril 18.75 mg TID, will discuss when to convert to lisinopril  - hold spironolactone  - check thermodilution CO via swan, then remove  - ongoing LVAD evaluation    - hold diuresis  -- trend LFTs  -- monitor Cr  -- monitor UOP  -- I/Os  -- daily weights  -- K>4, Mg>2.

## 2018-12-04 NOTE — CHART NOTE - NSCHARTNOTEFT_GEN_A_CORE
CCU Fellow Addendum    Agree with above. Hemodynamics stable. Diuretics held due to low filling pressures. c/w Dobutamine 2.5. BP on low side. Will decrease Captopril dose to 18.75.    Hermes Castellanos MD  Cardiology Fellow  p: [phone] ====================  CCU MIDNIGHT ROUNDS  ====================    RIZWAN IRASEMA  [id number]    ====================  SUMMARY:  ====================    46 yo Shashank CLEMENT (10%, Dx 8/2018), tx from [facility] 11/29 w/ cardiogenic shock req , RFA IABP (DCed 12/1)     ====================  NEW EVENTS:  ====================    this PM on commode, lightheaded, nauseous, per patient not straining. diuretics DCed, ACE decreased     ====================  VITALS (Last 12 hrs):  ====================    T(C): 36.6 (12-04-18 @ 00:00), Max: 36.7 (12-03-18 @ 19:00)  HR: 102 (12-04-18 @ 04:00) (94 - 120)  BP: 92/64 (12-04-18 @ 04:00) (82/61 - 109/78)  BP(mean): 72 (12-04-18 @ 04:00) (67 - 94)  RR: 19 (12-04-18 @ 04:00) (17 - 32)  SpO2: 95% (12-04-18 @ 04:00) (93% - 98%)  CVP(mm Hg): 2 (12-04-18 @ 04:00) (0 - 5)  PA:  (25/10 - 35/16)  PA(mean): 17 (12-04-18 @ 04:00) (15 - 23)    TELEMETRY: sinus     I&O's Summary    02 Dec 2018 07:01  -  03 Dec 2018 07:00  --------------------------------------------------------  IN: 647.5 mL / OUT: 2010 mL / NET: -1362.5 mL    03 Dec 2018 07:01  -  04 Dec 2018 06:00  --------------------------------------------------------  IN: 962.2 mL / OUT: 2930 mL / NET: -1967.8 mL    ====================  PLAN:  ====================    - c/w , wean as tolerated, c/w ACE w/ hold parameters, c/w captopril, encourage PO intake, strict I&os, daily weights, monitor Cr, lytes, HD numbers     Nuris Bella [facility]-BC/CCU  spectra #[phone]  beeper #[phone]    CCU Fellow Addendum    Agree with above. Hemodynamics stable. Diuretics held due to low filling pressures. c/w Dobutamine 2.5. BP on low side. Will decrease Captopril dose to 18.75.    Hermes Castellanos MD  Cardiology Fellow  p: [phone]

## 2018-12-04 NOTE — PROGRESS NOTE ADULT - ASSESSMENT
46 yo [place] M, w/ PMH of NICM LVIDd 7 cm (EF 10% 11/2018 - recently diagnosed in August 2018) a/w cardiogenic shock requiring IABP and inotropes.    RHC on 11/29/2018 RA 18, RV 48/5/16 PA 49/23/37 PCWP 26 PA 62% CO 2.7 CI 1.87 SVR 2300 PVR 4    11/30 CVP 4, PA 33/16/23 MvO2 77% CO 4.97 CI 2.37 SVR 1200  12/4 PA 24/9/15 CVP -2 PCWP 10    Currently on dobutamine 2.5 mcg/kg/min

## 2018-12-04 NOTE — CONSULT NOTE ADULT - ATTENDING COMMENTS
Patient seen and examined with Dr. Carrillo  I agree with his  history exam findings and plans as noted above    Patient undergoing LVAD/OHTx evaluation    History notable for born in the [facility], grandmother with hx. of TB and patient with calcified granuloma seen on Ct scan    Will need latent TB prophylaxis  CMV and toxo ab+ will be intermediate risk  flu vaccine and prevnar prior to discharge    Braeden Read MD  [phone]  After 5pm/weekends [phone]
Thais Bridges, PhD  [phone]
Patient seen and examined. Agree with above. The patient is at high risk for anesthesia given heart failure and now being on dobutamine infusion. It would be safer to first obtain CT colonography to rule out large polyps or masses. If it is positive, then we can consider endoscopic evaluation.
47 year old male w nicm admitted in cardiogenic shock - with elevated filling pressures; transferred to ccu from [facility].    Active issues  Cardiogenic shock s/p IABP 11/29  NICM, HFrEF, STage C  DM    88, 74/54/73, augm map 86  PA 24/9/14 CVP 1  dobutamine 2.5, lasix infusion 5, heparin infusion  CI 2.37, svr 1200    although there is some non compliance history, due to severity of presentation he is at high risk for recurrence and death  he would benefit from an evaluation for advanced therapies that will yield his barriers to mcs/txp     - stop lasix infusion, transition to bolus 40mg iv bid  - introduce afterload reduction if possible  - wean iabp , keep dobutamine after weaning  - will launch lvad/transplant eval

## 2018-12-04 NOTE — CONSULT NOTE ADULT - SUBJECTIVE AND OBJECTIVE BOX
Patient is a 47y old  Male who presents with a chief complaint of Cardiogenic Shock (04 Dec 2018 17:48)      HPI:  46 yo [place] Verde[place] M, w/ PMH of nICMP (EF 10% 11/2018 - recently diagnosed in August 2018), presenting as transfer from [facility] CCU d/t cardiogenic shock, possible LVAD workup. Patient initially presented to [facility] ED morning of 11/29/18 d/t one day of shortness of breath, also with abdominal pain, worst in the epigastrium, and with one episode of nausea/vomiting the previous day. Patient denies chest pain, palpitations, fevers, chills, or other complaints.     Patient was first diagnosed with non-ischemic cardiomyopathy in August, 2018, after he presented to ED d/t Lower Extremity edema and shortness of breath. He had a L heart catheterization, which showed clean coronaries, but significant for nICMP. Patient also had a Cardiac MRI which showed dilated cardiomyopathy but no infiltrative diseases. Following this hospitalization, patient followed up with Dr. Barnes as outpatient, but has not seen him since September 2018. Patient was also supposed to follow up with the HF team, but never did so.    Patient presented to [facility] ED one week ago d/t similar presentation of SOB and abd pain. Patient was seen by Dr. Flaherty and recommended to transfer to [facility] for further treatment. However, pt did not want to be transferred and was discharged for follow up.    Previous note from [facility] states that per wife, pt is not compliant with medications. Stopped taking the HF medications he was discharged with except for lasix. She was also concerned about IVDU 3-4 years ago. Also notes significant fam hx in father and brother of sudden death in 40s. Patient acknowledges that he only takes spironolactone, Lasix, and potassium, and that he only wears his Life Vest some of the time. States that he has never used intravenous drugs. Used marijuana for very brief period in college over 20 years ago. Denies any other drug use, and states no longer drinks alcohol.     In the [facility] ER, patient BP low with elevated lactate, poor oxygenation. Also with hyperkalemia.  Dobutamine gtt started and levophed gtt initiated. Patient emergently transferred to cardiac cath lab for a RHC. A balloon pump was inserted, levophed gtt decreased to 0.3mcg/kg/min. Dobutamine gtt titrated to 5mck/kg/min. Patient titrated off BIPAP and onto High Flow  oxygen. Augmenting 120s on ballon pump.     In the [facility] CCU, patient continues to endorse diffuse, mild abdominal pain, worst in the epigastrium, but states that he no longer is experiencing shortness of breath, and is breathing comfortably on nasal canula in the room. Patient denies other complaints. Denies headache, neck stiffness, fever/chills, vision change, chest pain, current shortness of breath or difficulty breathing, palpitations, lightheadedness, weakness, dizziness, numbness, tingling, current nausea, vomiting, diarrhea, dysuria, hematuria, syncope. (29 Nov 2018 23:31)      PAST MEDICAL & SURGICAL HISTORY:  HTN (hypertension)  Cardiomyopathy  No significant past surgical history      MEDICATIONS  (STANDING):  captopril 18.75 milliGRAM(s) Oral every 8 hours  chlorhexidine 4% Liquid 1 Application(s) Topical <User Schedule>  DOBUTamine Infusion 2.5 MICROgram(s)/kG/Min (5.063 mL/Hr) IV Continuous <Continuous>  fentaNYL    Injectable 25 MICROGram(s) IV Push once  pantoprazole    Tablet 40 milliGRAM(s) Oral before breakfast  spironolactone 25 milliGRAM(s) Oral daily    MEDICATIONS  (PRN):  aluminum hydroxide/magnesium hydroxide/simethicone Suspension 30 milliLiter(s) Oral every 6 hours PRN Dyspepsia  simethicone 80 milliGRAM(s) Chew three times a day PRN Gas      Allergies    aspirin (Swelling)  Motrin (Hives)    Intolerances        FAMILY HISTORY:  Family history of lung disease (Grandparent): maternal grandmother (no history of smoking)  Family history of hypertension: father  Family history of heart disease: mother      *Last Dental Visit: Pt states that his last appointment was 6 months ago for a cleaning.     Vital Signs Last 24 Hrs  T(C): 37 (04 Dec 2018 18:00), Max: 37 (04 Dec 2018 18:00)  T(F): 98.6 (04 Dec 2018 18:00), Max: 98.6 (04 Dec 2018 18:00)  HR: 106 (04 Dec 2018 18:00) (94 - 118)  BP: 98/64 (04 Dec 2018 18:00) (79/52 - 112/79)  BP(mean): 81 (04 Dec 2018 18:00) (63 - 95)  RR: 19 (04 Dec 2018 18:00) (16 - 32)  SpO2: 97% (04 Dec 2018 18:00) (93% - 98%)    EOE:             Mandible FROM             Facial bones and MOM grossly intact             ( -  ) trismus             ( -  ) LAD             ( -  ) swelling             ( -  ) asymmetry             ( -  ) palpation             ( -  ) SOB             ( -  ) dysphagia             ( -  ) LOC    IOE:  permanent dentition: grossly intact with multiple carious teeth and multiple missing teeth>>           hard/soft palate:  ( +  ) palatal torus           tongue/FOM WNL           labial/buccal mucosa WNL           ( -  ) percussion           ( -  ) palpation           ( -  ) swelling     Dentition present: permanent   Mobility: Class I mobility  Caries: several carious lesions noted     Radiographs: Pt was not transportable at this time     LABS:                        16.6   11.2  )-----------( 124      ( 04 Dec 2018 05:54 )             51.7     12-04    133<L>  |  99  |  22  ----------------------------<  110<H>  4.5   |  22  |  1.01    Ca    8.9      04 Dec 2018 15:12  Phos  3.2     12-04  Mg     2.0     12-04    TPro  7.0  /  Alb  3.9  /  TBili  1.4<H>  /  DBili  x   /  AST  85<H>  /  ALT  270<H>  /  AlkPhos  67  12-04    WBC Count: 11.2 K/uL <H> [3.8 - 10.5] (12-04 @ 05:54)  WBC Count: 12.8 K/uL <H> [3.8 - 10.5] (12-04 @ 00:51)    Platelet Count - Automated: 124 K/uL <L> [150 - 400] (12-04 @ 05:54)  Platelet Count - Automated: 151 K/uL [150 - 400] (12-04 @ 00:51)  Platelet Count - Automated: 143 K/uL <L> [150 - 400] (12-03 @ 15:36)  Platelet Count - Automated: 125 K/uL <L> [150 - 400] (12-03 @ 05:19)  Platelet Count - Automated: 136 K/uL <L> [150 - 400] (12-03 @ 00:10)      ASSESSMENT: 46 yo [place] M, w/ PMH of nICMP (EF 10% 11/2018 - recently diagnosed in August 2018), presenting as transfer from [facility] CCU d/t cardiogenic shock, possible LVAD workup. Patient initially presented to [facility] ED morning of 11/29/18 d/t one day of shortness of breath, also with abdominal pain, worst in the epigastrium, and with one episode of nausea/vomiting the previous day. Patient denies chest pain, palpitations, fevers, chills, or other complaints. Dental consult was obtained for LVAD clearance. Examination was completed bedside, pt was not transportable at this time according to cardiac medical team.  EOE: WNL, (-) Swelling, asymmetry, LAD. IOE: WNL, several carious teeth noted, partially edentulous, no signs of acute infection/purulence/fistulas. Pt reports no pain on palpation, percussion intraorally.   Re-consult dental when patient is optimized for evaluation and transportable to dental room for x-rays.       PROCEDURE:  EOE/IOE    RECOMMENDATIONS:   1) reconsult dental when patient is optimized for evaluation and transportable to dental room for x-rays   2) Dental F/U with outpatient dentist for comprehensive dental care.   3) If any difficulty swallowing/breathing, fever occur, page medical.     Baldev Euceda, JASMEET, pager #[phone]  Oral surgeon consulted: Dr. Knott Patient is a 47y old  Male who presents with a chief complaint of Cardiogenic Shock (04 Dec 2018 17:48)      HPI:  48 yo [place] M, w/ PMH of nICMP (EF 10% 11/2018 - recently diagnosed in August 2018), presenting as transfer from [facility] CCU d/t cardiogenic shock, possible LVAD workup. Patient initially presented to [facility] ED morning of 11/29/18 d/t one day of shortness of breath, also with abdominal pain, worst in the epigastrium, and with one episode of nausea/vomiting the previous day. Patient denies chest pain, palpitations, fevers, chills, or other complaints.     Patient was first diagnosed with non-ischemic cardiomyopathy in August, 2018, after he presented to ED d/t Lower Extremity edema and shortness of breath. He had a L heart catheterization, which showed clean coronaries, but significant for nICMP. Patient also had a Cardiac MRI which showed dilated cardiomyopathy but no infiltrative diseases. Following this hospitalization, patient followed up with Dr. Barnes as outpatient, but has not seen him since September 2018. Patient was also supposed to follow up with the HF team, but never did so.    Patient presented to [facility] ED one week ago d/t similar presentation of SOB and abd pain. Patient was seen by Dr. Flaherty and recommended to transfer to [facility] for further treatment. However, pt did not want to be transferred and was discharged for follow up.    Previous note from [facility] states that per wife, pt is not compliant with medications. Stopped taking the HF medications he was discharged with except for lasix. She was also concerned about IVDU 3-4 years ago. Also notes significant fam hx in father and brother of sudden death in 40s. Patient acknowledges that he only takes spironolactone, Lasix, and potassium, and that he only wears his Life Vest some of the time. States that he has never used intravenous drugs. Used marijuana for very brief period in college over 20 years ago. Denies any other drug use, and states no longer drinks alcohol.     In the [facility] ER, patient BP low with elevated lactate, poor oxygenation. Also with hyperkalemia.  Dobutamine gtt started and levophed gtt initiated. Patient emergently transferred to cardiac cath lab for a RHC. A balloon pump was inserted, levophed gtt decreased to 0.3mcg/kg/min. Dobutamine gtt titrated to 5mck/kg/min. Patient titrated off BIPAP and onto High Flow  oxygen. Augmenting 120s on ballon pump.     In the [facility] CCU, patient continues to endorse diffuse, mild abdominal pain, worst in the epigastrium, but states that he no longer is experiencing shortness of breath, and is breathing comfortably on nasal canula in the room. Patient denies other complaints. Denies headache, neck stiffness, fever/chills, vision change, chest pain, current shortness of breath or difficulty breathing, palpitations, lightheadedness, weakness, dizziness, numbness, tingling, current nausea, vomiting, diarrhea, dysuria, hematuria, syncope. (29 Nov 2018 23:31)      PAST MEDICAL & SURGICAL HISTORY:  HTN (hypertension)  Cardiomyopathy  No significant past surgical history      MEDICATIONS  (STANDING):  captopril 18.75 milliGRAM(s) Oral every 8 hours  chlorhexidine 4% Liquid 1 Application(s) Topical <User Schedule>  DOBUTamine Infusion 2.5 MICROgram(s)/kG/Min (5.063 mL/Hr) IV Continuous <Continuous>  fentaNYL    Injectable 25 MICROGram(s) IV Push once  pantoprazole    Tablet 40 milliGRAM(s) Oral before breakfast  spironolactone 25 milliGRAM(s) Oral daily    MEDICATIONS  (PRN):  aluminum hydroxide/magnesium hydroxide/simethicone Suspension 30 milliLiter(s) Oral every 6 hours PRN Dyspepsia  simethicone 80 milliGRAM(s) Chew three times a day PRN Gas      Allergies    aspirin (Swelling)  Motrin (Hives)    Intolerances        FAMILY HISTORY:  Family history of lung disease (Grandparent): maternal grandmother (no history of smoking)  Family history of hypertension: father  Family history of heart disease: mother      *Last Dental Visit: Pt states that his last appointment was 6 months ago for a cleaning.     Vital Signs Last 24 Hrs  T(C): 37 (04 Dec 2018 18:00), Max: 37 (04 Dec 2018 18:00)  T(F): 98.6 (04 Dec 2018 18:00), Max: 98.6 (04 Dec 2018 18:00)  HR: 106 (04 Dec 2018 18:00) (94 - 118)  BP: 98/64 (04 Dec 2018 18:00) (79/52 - 112/79)  BP(mean): 81 (04 Dec 2018 18:00) (63 - 95)  RR: 19 (04 Dec 2018 18:00) (16 - 32)  SpO2: 97% (04 Dec 2018 18:00) (93% - 98%)    EOE:             Mandible FROM             Facial bones and MOM grossly intact             ( -  ) trismus             ( -  ) LAD             ( -  ) swelling             ( -  ) asymmetry             ( -  ) palpation             ( -  ) SOB             ( -  ) dysphagia             ( -  ) LOC    IOE:  permanent dentition: grossly intact with multiple carious teeth and multiple missing teeth>>           hard/soft palate:  ( +  ) palatal torus           tongue/FOM WNL           labial/buccal mucosa WNL           ( -  ) percussion           ( -  ) palpation           ( -  ) swelling     Dentition present: permanent   Mobility: Class I mobility  Caries: several carious lesions noted     Radiographs: Pt was not transportable at this time     LABS:                        16.6   11.2  )-----------( 124      ( 04 Dec 2018 05:54 )             51.7     12-04    133<L>  |  99  |  22  ----------------------------<  110<H>  4.5   |  22  |  1.01    Ca    8.9      04 Dec 2018 15:12  Phos  3.2     12-04  Mg     2.0     12-04    TPro  7.0  /  Alb  3.9  /  TBili  1.4<H>  /  DBili  x   /  AST  85<H>  /  ALT  270<H>  /  AlkPhos  67  12-04    WBC Count: 11.2 K/uL <H> [3.8 - 10.5] (12-04 @ 05:54)  WBC Count: 12.8 K/uL <H> [3.8 - 10.5] (12-04 @ 00:51)    Platelet Count - Automated: 124 K/uL <L> [150 - 400] (12-04 @ 05:54)  Platelet Count - Automated: 151 K/uL [150 - 400] (12-04 @ 00:51)  Platelet Count - Automated: 143 K/uL <L> [150 - 400] (12-03 @ 15:36)  Platelet Count - Automated: 125 K/uL <L> [150 - 400] (12-03 @ 05:19)  Platelet Count - Automated: 136 K/uL <L> [150 - 400] (12-03 @ 00:10)      ASSESSMENT: 48 yo [place] M, w/ PMH of nICMP (EF 10% 11/2018 - recently diagnosed in August 2018), presenting as transfer from [facility] CCU d/t cardiogenic shock, possible LVAD workup. Patient initially presented to [facility] ED morning of 11/29/18 d/t one day of shortness of breath, also with abdominal pain, worst in the epigastrium, and with one episode of nausea/vomiting the previous day. Patient denies chest pain, palpitations, fevers, chills, or other complaints. Dental consult was obtained for LVAD clearance. Examination was completed bedside, pt was not transportable at this time according to cardiac medical team.  EOE: WNL, (-) Swelling, asymmetry, LAD. IOE: WNL, several carious teeth noted, partially edentulous, no signs of acute infection/purulence/fistulas. Pt reports no pain on palpation, percussion intraorally.   Re-consult dental when patient is optimized for evaluation and transportable to dental room for x-rays.       PROCEDURE:  EOE/IOE    RECOMMENDATIONS:   1) reconsult dental when patient is optimized for evaluation and transportable to dental room for x-rays   2) Dental F/U with outpatient dentist for comprehensive dental care.   3) If any difficulty swallowing/breathing, fever occur, page medical.     Baldev Euceda, JASMEET, pager #[phone]  Oral surgeon consulted: Dr. Fields

## 2018-12-04 NOTE — CONSULT NOTE ADULT - CONSULT REQUESTED DATE/TIME
03-Dec-2018 13:39
03-Dec-2018 15:57
04-Dec-2018 13:47
04-Dec-2018 18:25
30-Nov-2018 13:57
30-Nov-2018 15:34
03-Dec-2018

## 2018-12-04 NOTE — CONSULT NOTE ADULT - CONSULT REASON
Pre-LVAD/Transplant workup
Cardiogenic shock
ICD evaluation
LVAD clearance
LVAD evaluation
LVAD evalution
Psychological assessment for LVAD/transplant evaluation

## 2018-12-04 NOTE — PROGRESS NOTE ADULT - SUBJECTIVE AND OBJECTIVE BOX
Patient is a 47y old  Male who presents with a chief complaint of Cardiogenic Shock (03 Dec 2018 16:18)      Overnight Events:      REVIEW OF SYSTEMS:  CONSTITUTIONAL: No weakness, fevers or chills  EYES/ENT: No visual changes, no throat pain   RESPIRATORY: No cough, wheezing, hemoptysis; No shortness of breath  CARDIOVASCULAR: No chest pain or palpitations  GASTROINTESTINAL: No abdominal, nausea, vomiting, or hematemesis; No diarrhea or constipation. No melena or hematochezia.  GENITOURINARY: No dysuria, frequency or hematuria  NEUROLOGICAL: No dizziness, numbness, or weakness  SKIN: No itching, burning, rashes, or lesions   All other review of systems is negative unless indicated above.    VITAL SIGNS:  Vital Signs Last 24 Hrs  T(C): 36.4 (04 Dec 2018 04:00), Max: 37 (03 Dec 2018 12:00)  T(F): 97.5 (04 Dec 2018 04:00), Max: 98.6 (03 Dec 2018 12:00)  HR: 98 (04 Dec 2018 06:00) (94 - 120)  BP: 97/64 (04 Dec 2018 06:00) (82/61 - 116/77)  BP(mean): 84 (04 Dec 2018 06:00) (67 - 101)  RR: 18 (04 Dec 2018 06:00) (15 - 32)  SpO2: 95% (04 Dec 2018 06:00) (93% - 98%)    PHYSICAL EXAM:   GENERAL: no acute distress  HEENT: NC/AT, EOMI, neck supple, MMM  RESPIRATORY: LCTAB/L, no rhonchi, rales, or wheezing  CARDIOVASCULAR: RRR, no murmurs, gallops, rubs  ABDOMINAL: soft, non-tender, non-distended, positive bowel sounds   EXTREMITIES: no clubbing, cyanosis, or edema  NEUROLOGICAL: alert and oriented x 3, non-focal  SKIN: no rashes or lesions   MUSCULOSKELETAL: no gross joint deformity                          16.6   11.2  )-----------( 124      ( 04 Dec 2018 05:54 )             51.7     12-04    127<L>  |  94<L>  |  24<H>  ----------------------------<  261<H>  4.2   |  19<L>  |  0.35<L>    Ca    8.6      04 Dec 2018 05:54  Phos  3.2     12-04  Mg     2.0     12-04    TPro  6.7  /  Alb  3.7  /  TBili  1.8<H>  /  DBili  x   /  AST  114<H>  /  ALT  298<H>  /  AlkPhos  67  12-04        CAPILLARY BLOOD GLUCOSE      POCT Blood Glucose.: 106 mg/dL (04 Dec 2018 06:20)    I&O's Summary    03 Dec 2018 07:01  -  04 Dec 2018 07:00  --------------------------------------------------------  IN: 1122.4 mL / OUT: 2760 mL / NET: -1637.6 mL        MEDICATIONS  (STANDING):  captopril 18.75 milliGRAM(s) Oral every 8 hours  chlorhexidine 4% Liquid 1 Application(s) Topical <User Schedule>  DOBUTamine Infusion 2.5 MICROgram(s)/kG/Min (5.063 mL/Hr) IV Continuous <Continuous>  pantoprazole    Tablet 40 milliGRAM(s) Oral before breakfast  spironolactone 25 milliGRAM(s) Oral daily Patient is a 47y old  Male who presents with a chief complaint of Cardiogenic Shock (03 Dec 2018 16:18)      Overnight Events:  Pt suffered vagal episode last night while on toilet; he was noted to be hypotensive and diaphoretic. He endorsed lightheadedness. Pt is currently asymptomatic.     REVIEW OF SYSTEMS:  CONSTITUTIONAL: No weakness, fevers or chills  EYES/ENT: No visual changes, no throat pain   RESPIRATORY: No cough, wheezing, hemoptysis; No shortness of breath  CARDIOVASCULAR: No chest pain or palpitations  GASTROINTESTINAL: No abdominal, nausea, vomiting, or hematemesis; No diarrhea or constipation. No melena or hematochezia.  GENITOURINARY: No dysuria, frequency or hematuria  NEUROLOGICAL: No dizziness, numbness, or weakness  SKIN: No itching, burning, rashes, or lesions   All other review of systems is negative unless indicated above.    VITAL SIGNS:  Vital Signs Last 24 Hrs  T(C): 36.4 (04 Dec 2018 04:00), Max: 37 (03 Dec 2018 12:00)  T(F): 97.5 (04 Dec 2018 04:00), Max: 98.6 (03 Dec 2018 12:00)  HR: 98 (04 Dec 2018 06:00) (94 - 120)  BP: 97/64 (04 Dec 2018 06:00) (82/61 - 116/77)  BP(mean): 84 (04 Dec 2018 06:00) (67 - 101)  RR: 18 (04 Dec 2018 06:00) (15 - 32)  SpO2: 95% (04 Dec 2018 06:00) (93% - 98%)    PHYSICAL EXAM:   GENERAL: no acute distress  HEENT: NC/AT, EOMI, neck supple, MMM  RESPIRATORY: LCTAB/L, no rhonchi, rales, or wheezing  CARDIOVASCULAR: RRR, no murmurs, gallops, rubs  ABDOMINAL: soft, non-tender, non-distended, positive bowel sounds   EXTREMITIES: no clubbing, cyanosis, or edema  NEUROLOGICAL: alert and oriented x 3, non-focal  SKIN: no rashes or lesions   MUSCULOSKELETAL: no gross joint deformity                          16.6   11.2  )-----------( 124      ( 04 Dec 2018 05:54 )             51.7     12-04    127<L>  |  94<L>  |  24<H>  ----------------------------<  261<H>  4.2   |  19<L>  |  0.35<L>    Ca    8.6      04 Dec 2018 05:54  Phos  3.2     12-04  Mg     2.0     12-04    TPro  6.7  /  Alb  3.7  /  TBili  1.8<H>  /  DBili  x   /  AST  114<H>  /  ALT  298<H>  /  AlkPhos  67  12-04        CAPILLARY BLOOD GLUCOSE      POCT Blood Glucose.: 106 mg/dL (04 Dec 2018 06:20)    I&O's Summary    03 Dec 2018 07:01  -  04 Dec 2018 07:00  --------------------------------------------------------  IN: 1122.4 mL / OUT: 2760 mL / NET: -1637.6 mL        MEDICATIONS  (STANDING):  captopril 18.75 milliGRAM(s) Oral every 8 hours  chlorhexidine 4% Liquid 1 Application(s) Topical <User Schedule>  DOBUTamine Infusion 2.5 MICROgram(s)/kG/Min (5.063 mL/Hr) IV Continuous <Continuous>  pantoprazole    Tablet 40 milliGRAM(s) Oral before breakfast  spironolactone 25 milliGRAM(s) Oral daily

## 2018-12-04 NOTE — CONSULT NOTE ADULT - REASON FOR ADMISSION
Cardiogenic Shock

## 2018-12-04 NOTE — PROGRESS NOTE ADULT - SUBJECTIVE AND OBJECTIVE BOX
Cardiology Progress Note    Interval events: Episode of syncope yesterday evening. Captopril decreased from 25 mg to 18.75 mg. Continued on dobutamine 2.5 mcg/kg/min.     Bedside doe PEREZ 24/9/15, CVP -2 PCWP    Tele:    Medications:  aluminum hydroxide/magnesium hydroxide/simethicone Suspension 30 milliLiter(s) Oral every 6 hours PRN  captopril 18.75 milliGRAM(s) Oral every 8 hours  chlorhexidine 4% Liquid 1 Application(s) Topical <User Schedule>  DOBUTamine Infusion 2.5 MICROgram(s)/kG/Min IV Continuous <Continuous>  fentaNYL    Injectable 25 MICROGram(s) IV Push once  pantoprazole    Tablet 40 milliGRAM(s) Oral before breakfast  simethicone 80 milliGRAM(s) Chew three times a day PRN  spironolactone 25 milliGRAM(s) Oral daily      Review of Systems:  Constitutional: [ ] Fever [ ] Chills [ ] Fatigue [ ] Weight change   HEENT: [ ] Blurred vision [ ] Eye Pain [ ] Headache [ ] Runny nose [ ] Sore Throat   Respiratory: [ ] Cough [ ] Wheezing [ ] Shortness of breath  Cardiovascular: [ ] Chest Pain [ ] Palpitations [ ] DRUMMOND [ ] PND [ ] Orthopnea  Gastrointestinal: [ ] Abdominal Pain [ ] Diarrhea [ ] Constipation [ ] Hemorrhoids [ ] Nausea [ ] Vomiting  Genitourinary: [ ] Nocturia [ ] Dysuria [ ] Incontinence  Extremities: [ ] Swelling [ ] Joint Pain  Neurologic: [ ] Focal deficit [ ] Paresthesias [ ] Syncope  Lymphatic: [ ] Swelling [ ] Lymphadenopathy   Skin: [ ] Rash [ ] Ecchymoses [ ] Wounds [ ] Lesions  Psychiatry: [ ] Depression [ ] Suicidal/Homicidal Ideation [ ] Anxiety [ ] Sleep Disturbances  [ ] 10 point review of systems is otherwise negative except as mentioned above            [ ]Unable to obtain    Vitals:  T(C): 36.9 (12-04-18 @ 15:00), Max: 36.9 (12-04-18 @ 11:20)  HR: 110 (12-04-18 @ 15:00) (94 - 120)  BP: 95/56 (12-04-18 @ 15:00) (80/56 - 112/79)  BP(mean): 68 (12-04-18 @ 15:00) (67 - 95)  RR: 16 (12-04-18 @ 15:00) (16 - 32)  SpO2: 97% (12-04-18 @ 15:00) (93% - 98%)  Wt(kg): --  Daily     Daily   I&O's Summary    03 Dec 2018 07:01  -  04 Dec 2018 07:00  --------------------------------------------------------  IN: 1122.4 mL / OUT: 2760 mL / NET: -1637.6 mL    04 Dec 2018 07:01  -  04 Dec 2018 17:48  --------------------------------------------------------  IN: 690.7 mL / OUT: 800 mL / NET: -109.3 mL        Physical Exam:  NAD  PERRL, EOMI  Normal oral [place] NC/AT  S1, S2, RRR, No m/r/g appreciated, No edema, no elevation in JVP  Clear to auscultation bilaterally  Soft, Non-tender, Non-distended, BS+  No clubbing, No joint deformity   Non-focal  No lymphadenopathy  AAOx3, Mood & affect appropriate  No rashes, No ecchymoses, No cyanosis  Procedural Access Site: No hematoma, Non-tender to palpation, 2+ pulse , No bruit, No Ecchymosis    Labs:                        16.6   11.2  )-----------( 124      ( 04 Dec 2018 05:54 )             51.7     12-04    133<L>  |  99  |  22  ----------------------------<  110<H>  4.5   |  22  |  1.01    Ca    8.9      04 Dec 2018 15:12  Phos  3.2     12-04  Mg     2.0     12-04    TPro  7.0  /  Alb  3.9  /  TBili  1.4<H>  /  DBili  x   /  AST  85<H>  /  ALT  270<H>  /  AlkPhos  67  12-04          Serum Pro-Brain Natriuretic Peptide: 4034 pg/mL (11-29 @ 08:56)          New results/imaging: Cardiology Progress Note    Interval events: Episode of syncope yesterday evening. Captopril decreased from 25 mg to 18.75 mg. Continued on dobutamine 2.5 mcg/kg/min.     Bedside swan PA 24/9/15, CVP -2 PCWP    I/Os 1122/2760 net neg 1631    Medications:  aluminum hydroxide/magnesium hydroxide/simethicone Suspension 30 milliLiter(s) Oral every 6 hours PRN  captopril 18.75 milliGRAM(s) Oral every 8 hours  chlorhexidine 4% Liquid 1 Application(s) Topical <User Schedule>  DOBUTamine Infusion 2.5 MICROgram(s)/kG/Min IV Continuous <Continuous>  fentaNYL    Injectable 25 MICROGram(s) IV Push once  pantoprazole    Tablet 40 milliGRAM(s) Oral before breakfast  simethicone 80 milliGRAM(s) Chew three times a day PRN  spironolactone 25 milliGRAM(s) Oral daily      Review of Systems:  Constitutional: [ ] Fever [ ] Chills [ ] Fatigue [ ] Weight change   HEENT: [ ] Blurred vision [ ] Eye Pain [ ] Headache [ ] Runny nose [ ] Sore Throat   Respiratory: [ ] Cough [ ] Wheezing [ ] Shortness of breath  Cardiovascular: [ ] Chest Pain [ ] Palpitations [ ] DRUMMOND [ ] PND [ ] Orthopnea  Gastrointestinal: [ ] Abdominal Pain [ ] Diarrhea [ ] Constipation [ ] Hemorrhoids [ ] Nausea [ ] Vomiting  Genitourinary: [ ] Nocturia [ ] Dysuria [ ] Incontinence  Extremities: [ ] Swelling [ ] Joint Pain  Neurologic: [ ] Focal deficit [ ] Paresthesias [ ] Syncope  Lymphatic: [ ] Swelling [ ] Lymphadenopathy   Skin: [ ] Rash [ ] Ecchymoses [ ] Wounds [ ] Lesions  Psychiatry: [ ] Depression [ ] Suicidal/Homicidal Ideation [ ] Anxiety [ ] Sleep Disturbances  [ ] 10 point review of systems is otherwise negative except as mentioned above            [ ]Unable to obtain    Vitals:  T(C): 36.9 (12-04-18 @ 15:00), Max: 36.9 (12-04-18 @ 11:20)  HR: 110 (12-04-18 @ 15:00) (94 - 120)  BP: 95/56 (12-04-18 @ 15:00) (80/56 - 112/79)  BP(mean): 68 (12-04-18 @ 15:00) (67 - 95)  RR: 16 (12-04-18 @ 15:00) (16 - 32)  SpO2: 97% (12-04-18 @ 15:00) (93% - 98%)  Wt(kg): --  Daily     Daily   I&O's Summary    03 Dec 2018 07:01  -  04 Dec 2018 07:00  --------------------------------------------------------  IN: 1122.4 mL / OUT: 2760 mL / NET: -1637.6 mL    04 Dec 2018 07:01  -  04 Dec 2018 17:48  --------------------------------------------------------  IN: 690.7 mL / OUT: 800 mL / NET: -109.3 mL        Physical Exam:  NAD  PERRL, EOMI  Normal oral [place] NC/AT  S1, S2, RRR, No m/r/g appreciated, No edema, no elevation in JVP  Clear to auscultation bilaterally  Soft, Non-tender, Non-distended, BS+  No clubbing, No joint deformity   Non-focal  No lymphadenopathy  AAOx3, Mood & affect appropriate  No rashes, No ecchymoses, No cyanosis  Procedural Access Site: No hematoma, Non-tender to palpation, 2+ pulse , No bruit, No Ecchymosis    Labs:                        16.6   11.2  )-----------( 124      ( 04 Dec 2018 05:54 )             51.7     12-04    133<L>  |  99  |  22  ----------------------------<  110<H>  4.5   |  22  |  1.01    Ca    8.9      04 Dec 2018 15:12  Phos  3.2     12-04  Mg     2.0     12-04    TPro  7.0  /  Alb  3.9  /  TBili  1.4<H>  /  DBili  x   /  AST  85<H>  /  ALT  270<H>  /  AlkPhos  67  12-04          Serum Pro-Brain Natriuretic Peptide: 4034 pg/mL (11-29 @ 08:56)          New results/imaging:

## 2018-12-04 NOTE — PROGRESS NOTE ADULT - ASSESSMENT
48 yo M w/ recent diagnosis in 2018 of nonischemic cardiomyopathy (EF 10%), initially presented to [facility] d/t SOB and Epigastric pain, now s/p R heart cardiac cath, showing clean coronaries, but with high wedge pressures and need for blood pressure support with pressors, transferred to [facility] CCU for cardiogenic shock. Patient is now s/p IABP (d/eugenie on ). He suffered vagal episode last night in the setting overdiuresis.     #Neuro  -No active issues.  -Patient AAO x 3    #Pulm  -O2 sat well on RA  -S/p Lasix drip.   -Heart failure following, recs appreciated. Lasix currently d/eugenie in the setting of hypotension 2/2 overdiuresis.     #Cardiovascular  - Cardiogenic Shock  - Patient recently diagnosed with NICM  - off levo, s/p IABP removal, weaning  5 -> 2.5 Increasing captopril to 25 mg  TID,  c/w spirinolactone 25 mg daily  - F/U CT Chest/Abd/Pelvis for cardiomyopathy eval  - HF following.  - Lasix currently d/eugenie  - Removed balloon pump   - EP consulted for AICD placement, recs appreciated. Per EP, AICD not indicated until transplant/LVAD w/u completed by heart failure team    Hypovolemia  -d/eugenie Lasix  -per HF, will fluid resuscitate with 250 cc NS over 3 hours    - Hypertension; Plan - Uptitrating captopril for afterload reduction  -Strict I/Os, daily wts, keep I < O, trend BMP, supplement lytes prn      #GI  - No active issues.   -Continue Protonix 40mg for GI ppx.  - f/u CT A/P    #Renal/  - LARISSA resolved, currently Cr 1.1  - trend Cr, trend lytes, supplement PRN    #ID  - Afebrile, no active issues.  -Administer influenza vaccine.     #Endo  - HgbA1c 6.0 on   - Continue to monitor     #Skin  -No active issues.    #DVT  -D/eugenie heparin drip since IABP removed . Will start on SCD for now, given low platelet

## 2018-12-04 NOTE — CONSULT NOTE ADULT - SUBJECTIVE AND OBJECTIVE BOX
Chief Complaint:  Patient is a 47y old  Male who presents with a chief complaint of Cardiogenic Shock (04 Dec 2018 07:22)      HPI:  The patient is a 48 yo M w/ recent diagnosis in august 2018 of nonischemic cardiomyopathy (EF 10%), initially presented to [facility] d/t SOB and Epigastric pain, now s/p R heart cardiac cath, showing clean coronaries, but with high wedge pressures and need for blood pressure support with dobutamine now transferred to [facility] CCU for cardiogenic shock. GI was consulted for LVAD evaluation.  Patient has never had an endoscopy of colonoscopy before but is agreeable to a GI work up.       Allergies:  aspirin (Swelling)  Motrin (Hives)      Home Medications:    Hospital Medications:  aluminum hydroxide/magnesium hydroxide/simethicone Suspension 30 milliLiter(s) Oral every 6 hours PRN  captopril 18.75 milliGRAM(s) Oral every 8 hours  chlorhexidine 4% Liquid 1 Application(s) Topical <User Schedule>  DOBUTamine Infusion 2.5 MICROgram(s)/kG/Min IV Continuous <Continuous>  pantoprazole    Tablet 40 milliGRAM(s) Oral before breakfast  simethicone 80 milliGRAM(s) Chew three times a day PRN  spironolactone 25 milliGRAM(s) Oral daily      PMHX/PSHX:  HTN (hypertension)  Cardiomyopathy  No pertinent past medical history  No significant past surgical history      Family history:  Family history of lung disease (Grandparent)  Family history of hypertension  Family history of heart disease  No pertinent family history in first degree relatives      Social History:     ROS:     General:  No wt loss, fevers, chills, night sweats, fatigue,   Eyes:  Good vision, no reported pain  ENT:  No sore throat, pain, runny nose, dysphagia  CV:  No pain, palpitations, hypo/hypertension  Resp:  No dyspnea, cough, tachypnea, wheezing  GI:  See HPI  :  No pain, bleeding, incontinence, nocturia  Muscle:  No pain, weakness  Neuro:  No weakness, tingling, memory problems  Psych:  No fatigue, insomnia, mood problems, depression  Endocrine:  No polyuria, polydipsia, cold/heat intolerance  Heme:  No petechiae, ecchymosis, easy bruisability  Skin:  No rash, edema      PHYSICAL EXAM:     GENERAL:  Appears stated age, well-groomed, well-nourished, NAD  CHEST:  Full & symmetric excursion  HEART:  Regular rhythm, no abdominal bruit, no edema  ABDOMEN:  Soft, non-tender, non-distended, normoactive bowel sounds,  no masses , no hepatosplenomegaly  EXTREMITIES:  no cyanosis,clubbing or edema  SKIN:  No rash/erythema/ecchymoses/petechiae/wounds/abscess/warm/dry  NEURO:  Alert, oriented    Vital Signs:  Vital Signs Last 24 Hrs  T(C): 36.9 (04 Dec 2018 11:20), Max: 37 (03 Dec 2018 15:00)  T(F): 98.4 (04 Dec 2018 11:20), Max: 98.6 (03 Dec 2018 15:00)  HR: 118 (04 Dec 2018 13:33) (94 - 120)  BP: 112/79 (04 Dec 2018 13:33) (80/56 - 116/77)  BP(mean): 95 (04 Dec 2018 13:33) (67 - 95)  RR: 16 (04 Dec 2018 13:33) (16 - 32)  SpO2: 96% (04 Dec 2018 13:33) (93% - 98%)  Daily     Daily     LABS:                        16.6   11.2  )-----------( 124      ( 04 Dec 2018 05:54 )             51.7     12-04    127<L>  |  94<L>  |  24<H>  ----------------------------<  261<H>  4.2   |  19<L>  |  0.35<L>    Ca    8.6      04 Dec 2018 05:54  Phos  3.2     12-04  Mg     2.0     12-04    TPro  6.7  /  Alb  3.7  /  TBili  1.8<H>  /  DBili  x   /  AST  114<H>  /  ALT  298<H>  /  AlkPhos  67  12-04    LIVER FUNCTIONS - ( 04 Dec 2018 05:54 )  Alb: 3.7 g/dL / Pro: 6.7 g/dL / ALK PHOS: 67 U/L / ALT: 298 U/L / AST: 114 U/L / GGT: x                   Imaging: Chief Complaint:  Patient is a 47y old  Male who presents with a chief complaint of Cardiogenic Shock (04 Dec 2018 07:22)      HPI:  The patient is a 48 yo M w/ recent diagnosis in august 2018 of nonischemic cardiomyopathy (EF 10%), initially presented to [facility] d/t SOB and Epigastric pain, now s/p R heart cardiac cath, showing clean coronaries, but with high wedge pressures and need for blood pressure support with dobutamine now transferred to [facility] CCU for cardiogenic shock. GI was consulted for LVAD evaluation.  Patient has never had an endoscopy of colonoscopy before but is agreeable to a GI work up.  He has had no abdominal pain, nausea/vomiting. His stools are normal, and he has not had any melena, hematochezia or weight loss.    Allergies:  aspirin (Swelling)  Motrin (Hives)      Home Medications:    Hospital Medications:  aluminum hydroxide/magnesium hydroxide/simethicone Suspension 30 milliLiter(s) Oral every 6 hours PRN  captopril 18.75 milliGRAM(s) Oral every 8 hours  chlorhexidine 4% Liquid 1 Application(s) Topical <User Schedule>  DOBUTamine Infusion 2.5 MICROgram(s)/kG/Min IV Continuous <Continuous>  pantoprazole    Tablet 40 milliGRAM(s) Oral before breakfast  simethicone 80 milliGRAM(s) Chew three times a day PRN  spironolactone 25 milliGRAM(s) Oral daily      PMHX/PSHX:  HTN (hypertension)  Cardiomyopathy  No pertinent past medical history  No significant past surgical history      Family history:  Family history of lung disease (Grandparent)  Family history of hypertension  Family history of heart disease  No pertinent family history in first degree relatives      SOCIAL HISTORY:     Patient is a former smoker (2005 - 2011).  	Patient previously drank alcohol (wine), no longer drinks alcohol.  Patient denies use of illicit drugs.      ROS:     General:  No wt loss, fevers, chills, night sweats, fatigue,   Eyes:  Good vision, no reported pain  ENT:  No sore throat, pain, runny nose, dysphagia  CV:  No pain, palpitations, hypo/hypertension  Resp:  No dyspnea, cough, tachypnea, wheezing  GI:  See HPI  :  No pain, bleeding, incontinence, nocturia  Muscle:  No pain, weakness  Neuro:  No weakness, tingling, memory problems  Psych:  No fatigue, insomnia, mood problems, depression  Endocrine:  No polyuria, polydipsia, cold/heat intolerance  Heme:  No petechiae, ecchymosis, easy bruisability  Skin:  No rash, edema      PHYSICAL EXAM:     GENERAL:  Appears stated age, well-groomed, well-nourished, NAD  CHEST:  Full & symmetric excursion  HEART:  Regular rhythm, no abdominal bruit, no edema  ABDOMEN:  Soft, non-tender, non-distended, normoactive bowel sounds,  no masses , no hepatosplenomegaly  EXTREMITIES:  no cyanosis,clubbing or edema  SKIN:  No rash/erythema/ecchymoses/petechiae/wounds/abscess/warm/dry  NEURO:  Alert, oriented    Vital Signs:  Vital Signs Last 24 Hrs  T(C): 36.9 (04 Dec 2018 11:20), Max: 37 (03 Dec 2018 15:00)  T(F): 98.4 (04 Dec 2018 11:20), Max: 98.6 (03 Dec 2018 15:00)  HR: 118 (04 Dec 2018 13:33) (94 - 120)  BP: 112/79 (04 Dec 2018 13:33) (80/56 - 116/77)  BP(mean): 95 (04 Dec 2018 13:33) (67 - 95)  RR: 16 (04 Dec 2018 13:33) (16 - 32)  SpO2: 96% (04 Dec 2018 13:33) (93% - 98%)  Daily     Daily     LABS:                        16.6   11.2  )-----------( 124      ( 04 Dec 2018 05:54 )             51.7     12-04    127<L>  |  94<L>  |  24<H>  ----------------------------<  261<H>  4.2   |  19<L>  |  0.35<L>    Ca    8.6      04 Dec 2018 05:54  Phos  3.2     12-04  Mg     2.0     12-04    TPro  6.7  /  Alb  3.7  /  TBili  1.8<H>  /  DBili  x   /  AST  114<H>  /  ALT  298<H>  /  AlkPhos  67  12-04    LIVER FUNCTIONS - ( 04 Dec 2018 05:54 )  Alb: 3.7 g/dL / Pro: 6.7 g/dL / ALK PHOS: 67 U/L / ALT: 298 U/L / AST: 114 U/L / GGT: x                   Imaging:    < from: CT Abdomen and Pelvis No Cont (12.02.18 @ 09:45) >  FINDINGS:    Evaluation of the intra-abdominal visceral organs is limited due to lack   of intravenous contrast.    CHEST:     LUNGS AND LARGE AIRWAYS: Patent central airways. Tiny hazy groundglass   ill-defined nodularities possibly in an centrilobular distribution   located at the right upper lobe medially. There is no previous CT   examination of the chest for comparison. Small calcified granuloma left   lower lobe. Left upper lobe linear atelectasis. Minimal linear   atelectasis leftposterior costophrenic angle.    PLEURA: No pleural effusion.  VESSELS: A right internal jugular Adams-Deirdre catheter terminates within   the right main pulmonary artery.  HEART: Cardiomegaly. No pericardial effusion.  MEDIASTINUM AND ARTURO: No lymphadenopathy.  CHEST WALL AND LOWER NECK: Within normal limits.    ABDOMEN AND PELVIS:    LIVER: Within normal limits.  BILE DUCTS: Normal caliber.  GALLBLADDER: Within normal limits.  SPLEEN: Ill-defined hypodensity anterior spleen are not appreciated on   previous study dated 8/2/2018.    PANCREAS: Within normal limits.  ADRENALS: Within normal limits.  KIDNEYS/URETERS: Within normal limits. No hydronephrosis.    BLADDER: Villalobos catheter and air within the urinary bladder.  REPRODUCTIVE ORGANS: Prostate within normal limits.    BOWEL: No bowel obstruction. Few scattered diverticula. Appendix is   normal.  PERITONEUM: No ascites.  VESSELS:  Within normal limits.  RETROPERITONEUM: No lymphadenopathy.    ABDOMINAL WALL: Within normal limits.  BONES: Within normal limits.    IMPRESSION: Tiny ill-defined groundglass nodular opacities vaguely   appreciated within the right upper lobe medially possibly in a   centrilobular distribution. Follow-up can be performed.    Ill-defined hypodensity anterior spleen not appreciated on previous study   performed with intravenous contrast 8/2/2018. Contrast enhanced study may   be beneficial for further evaluation.    < end of copied text >

## 2018-12-04 NOTE — CONSULT NOTE ADULT - ASSESSMENT
Impression:  48 yo M w/ recent diagnosis in august 2018 of nonischemic cardiomyopathy (EF 10%) transferred to [facility] CCU for cardiogenic shock currently on dobutamine. GI was consulted for LVAD evaluation.  Patient has never had an endoscopy of colonoscopy before but is agreeable to a GI work up.       Recommendation:   - would obtain CT colonography given patients high cardiac risk   - please call GI back with abnormal results or any further questions   - rest of car per primary team    Minda Montiel, PGY-4  Gastroenterology Fellow  Pager x [phone] or [phone]  (After 5 pm or on weekends please page GI on call) Impression:  48 yo M w/ recent diagnosis in august 2018 of nonischemic cardiomyopathy (EF 10%) transferred to [facility]U for cardiogenic shock currently on dobutamine. GI was consulted for LVAD evaluation.  Patient has never had an endoscopy of colonoscopy before but is agreeable to a GI work up.       Recommendation:   - would obtain CT colonography given patients high cardiac risk   - please call GI back with abnormal results   - rest of car per primary team    Minda Montiel, PGY-4  Gastroenterology Fellow  Pager x [phone] or [phone]  (After 5 pm or on weekends please page GI on call)

## 2018-12-05 LAB
ALBUMIN SERPL ELPH-MCNC: 3.6 G/DL — SIGNIFICANT CHANGE UP (ref 3.3–5)
ALBUMIN SERPL ELPH-MCNC: 3.7 G/DL — SIGNIFICANT CHANGE UP (ref 3.3–5)
ALP SERPL-CCNC: 66 U/L — SIGNIFICANT CHANGE UP (ref 40–120)
ALP SERPL-CCNC: 71 U/L — SIGNIFICANT CHANGE UP (ref 40–120)
ALT FLD-CCNC: 170 U/L — HIGH (ref 10–45)
ALT FLD-CCNC: 202 U/L — HIGH (ref 10–45)
AMPHET UR-MCNC: NEGATIVE — SIGNIFICANT CHANGE UP
ANABASINE UR-MCNC: <2 NG/ML — SIGNIFICANT CHANGE UP
ANION GAP SERPL CALC-SCNC: 13 MMOL/L — SIGNIFICANT CHANGE UP (ref 5–17)
ANION GAP SERPL CALC-SCNC: 15 MMOL/L — SIGNIFICANT CHANGE UP (ref 5–17)
AST SERPL-CCNC: 52 U/L — HIGH (ref 10–40)
AST SERPL-CCNC: 60 U/L — HIGH (ref 10–40)
BARBITURATES, URINE.: NEGATIVE — SIGNIFICANT CHANGE UP
BASE EXCESS BLDMV CALC-SCNC: -1.7 MMOL/L — SIGNIFICANT CHANGE UP (ref -3–3)
BASOPHILS # BLD AUTO: 0.1 K/UL — SIGNIFICANT CHANGE UP (ref 0–0.2)
BASOPHILS NFR BLD AUTO: 0.6 % — SIGNIFICANT CHANGE UP (ref 0–2)
BENZODIAZ UR-MCNC: NEGATIVE — SIGNIFICANT CHANGE UP
BILIRUB SERPL-MCNC: 0.7 MG/DL — SIGNIFICANT CHANGE UP (ref 0.2–1.2)
BILIRUB SERPL-MCNC: 1.5 MG/DL — HIGH (ref 0.2–1.2)
BLD GP AB SCN SERPL QL: NEGATIVE — SIGNIFICANT CHANGE UP
BUN SERPL-MCNC: 17 MG/DL — SIGNIFICANT CHANGE UP (ref 7–23)
BUN SERPL-MCNC: 20 MG/DL — SIGNIFICANT CHANGE UP (ref 7–23)
CALCIUM SERPL-MCNC: 8.8 MG/DL — SIGNIFICANT CHANGE UP (ref 8.4–10.5)
CALCIUM SERPL-MCNC: 9.1 MG/DL — SIGNIFICANT CHANGE UP (ref 8.4–10.5)
CHLORIDE SERPL-SCNC: 102 MMOL/L — SIGNIFICANT CHANGE UP (ref 96–108)
CHLORIDE SERPL-SCNC: 103 MMOL/L — SIGNIFICANT CHANGE UP (ref 96–108)
CO2 BLDMV-SCNC: 25 MMOL/L — SIGNIFICANT CHANGE UP (ref 21–29)
CO2 SERPL-SCNC: 20 MMOL/L — LOW (ref 22–31)
CO2 SERPL-SCNC: 20 MMOL/L — LOW (ref 22–31)
COCAINE METAB.OTHER UR-MCNC: NEGATIVE — SIGNIFICANT CHANGE UP
COTININE UR-MCNC: <5 NG/ML — SIGNIFICANT CHANGE UP
CREAT SERPL-MCNC: 0.95 MG/DL — SIGNIFICANT CHANGE UP (ref 0.5–1.3)
CREAT SERPL-MCNC: 0.96 MG/DL — SIGNIFICANT CHANGE UP (ref 0.5–1.3)
CREATININE, URINE THERAPEUTIC: 96.6 MG/DL — SIGNIFICANT CHANGE UP
EOSINOPHIL # BLD AUTO: 0.4 K/UL — SIGNIFICANT CHANGE UP (ref 0–0.5)
EOSINOPHIL NFR BLD AUTO: 3.2 % — SIGNIFICANT CHANGE UP (ref 0–6)
GAMMA INTERFERON BACKGROUND BLD IA-ACNC: 0.01 IU/ML — SIGNIFICANT CHANGE UP
GAS PNL BLDMV: SIGNIFICANT CHANGE UP
GAS PNL BLDV: SIGNIFICANT CHANGE UP
GLUCOSE SERPL-MCNC: 106 MG/DL — HIGH (ref 70–99)
GLUCOSE SERPL-MCNC: 132 MG/DL — HIGH (ref 70–99)
HBV CORE AB SER-ACNC: REACTIVE
HBV SURFACE AB SER-ACNC: REACTIVE
HBV SURFACE AG SER-ACNC: SIGNIFICANT CHANGE UP
HCO3 BLDMV-SCNC: 24 MMOL/L — SIGNIFICANT CHANGE UP (ref 20–28)
HCT VFR BLD CALC: 48.2 % — SIGNIFICANT CHANGE UP (ref 39–50)
HCT VFR BLD CALC: 51.3 % — HIGH (ref 39–50)
HDV AB SER-ACNC: NEGATIVE — SIGNIFICANT CHANGE UP
HEV AB FLD QL: NEGATIVE — SIGNIFICANT CHANGE UP
HGB BLD-MCNC: 15.7 G/DL — SIGNIFICANT CHANGE UP (ref 13–17)
HGB BLD-MCNC: 17.1 G/DL — HIGH (ref 13–17)
LACTATE SERPL-SCNC: 2.3 MMOL/L — HIGH (ref 0.7–2)
LYMPHOCYTES # BLD AUTO: 1.8 K/UL — SIGNIFICANT CHANGE UP (ref 1–3.3)
LYMPHOCYTES # BLD AUTO: 13.5 % — SIGNIFICANT CHANGE UP (ref 13–44)
M TB IFN-G BLD-IMP: ABNORMAL
M TB IFN-G CD4+ BCKGRND COR BLD-ACNC: 0 IU/ML — SIGNIFICANT CHANGE UP
M TB IFN-G CD4+CD8+ BCKGRND COR BLD-ACNC: 0.02 IU/ML — SIGNIFICANT CHANGE UP
MAGNESIUM SERPL-MCNC: 1.9 MG/DL — SIGNIFICANT CHANGE UP (ref 1.6–2.6)
MAGNESIUM SERPL-MCNC: 2 MG/DL — SIGNIFICANT CHANGE UP (ref 1.6–2.6)
MCHC RBC-ENTMCNC: 32.3 PG — SIGNIFICANT CHANGE UP (ref 27–34)
MCHC RBC-ENTMCNC: 32.6 GM/DL — SIGNIFICANT CHANGE UP (ref 32–36)
MCHC RBC-ENTMCNC: 32.7 PG — SIGNIFICANT CHANGE UP (ref 27–34)
MCHC RBC-ENTMCNC: 33.4 GM/DL — SIGNIFICANT CHANGE UP (ref 32–36)
MCV RBC AUTO: 98.2 FL — SIGNIFICANT CHANGE UP (ref 80–100)
MCV RBC AUTO: 99.2 FL — SIGNIFICANT CHANGE UP (ref 80–100)
METHADONE UR-MCNC: NEGATIVE — SIGNIFICANT CHANGE UP
METHAQUALONE UR QL: NEGATIVE — SIGNIFICANT CHANGE UP
METHAQUALONE UR-MCNC: NEGATIVE — SIGNIFICANT CHANGE UP
MONOCYTES # BLD AUTO: 1.3 K/UL — HIGH (ref 0–0.9)
MONOCYTES NFR BLD AUTO: 9.8 % — SIGNIFICANT CHANGE UP (ref 2–14)
NEUTROPHILS # BLD AUTO: 9.7 K/UL — HIGH (ref 1.8–7.4)
NEUTROPHILS NFR BLD AUTO: 73 % — SIGNIFICANT CHANGE UP (ref 43–77)
NICOTINE UR-MCNC: <5 NG/ML — SIGNIFICANT CHANGE UP
NORNICOTINE UR-MCNC: <2 NG/ML — SIGNIFICANT CHANGE UP
O2 CT VFR BLD CALC: 35 MMHG — SIGNIFICANT CHANGE UP (ref 30–65)
OPIATES UR-MCNC: NEGATIVE — SIGNIFICANT CHANGE UP
PCO2 BLDMV: 46 MMHG — SIGNIFICANT CHANGE UP (ref 30–65)
PCP UR-MCNC: NEGATIVE — SIGNIFICANT CHANGE UP
PH BLDMV: 7.34 — SIGNIFICANT CHANGE UP (ref 7.32–7.45)
PHOSPHATE SERPL-MCNC: 3.4 MG/DL — SIGNIFICANT CHANGE UP (ref 2.5–4.5)
PHOSPHATE SERPL-MCNC: 3.4 MG/DL — SIGNIFICANT CHANGE UP (ref 2.5–4.5)
PLATELET # BLD AUTO: 128 K/UL — LOW (ref 150–400)
PLATELET # BLD AUTO: 155 K/UL — SIGNIFICANT CHANGE UP (ref 150–400)
POTASSIUM SERPL-MCNC: 4.4 MMOL/L — SIGNIFICANT CHANGE UP (ref 3.5–5.3)
POTASSIUM SERPL-MCNC: 4.6 MMOL/L — SIGNIFICANT CHANGE UP (ref 3.5–5.3)
POTASSIUM SERPL-SCNC: 4.4 MMOL/L — SIGNIFICANT CHANGE UP (ref 3.5–5.3)
POTASSIUM SERPL-SCNC: 4.6 MMOL/L — SIGNIFICANT CHANGE UP (ref 3.5–5.3)
PROPOXYPH UR QL: NEGATIVE — SIGNIFICANT CHANGE UP
PROT SERPL-MCNC: 6.5 G/DL — SIGNIFICANT CHANGE UP (ref 6–8.3)
PROT SERPL-MCNC: 6.7 G/DL — SIGNIFICANT CHANGE UP (ref 6–8.3)
QUANT TB PLUS MITOGEN MINUS NIL: 0.06 IU/ML — SIGNIFICANT CHANGE UP
RBC # BLD: 4.86 M/UL — SIGNIFICANT CHANGE UP (ref 4.2–5.8)
RBC # BLD: 5.22 M/UL — SIGNIFICANT CHANGE UP (ref 4.2–5.8)
RBC # FLD: 12.4 % — SIGNIFICANT CHANGE UP (ref 10.3–14.5)
RBC # FLD: 12.4 % — SIGNIFICANT CHANGE UP (ref 10.3–14.5)
RH IG SCN BLD-IMP: POSITIVE — SIGNIFICANT CHANGE UP
SAO2 % BLDMV: 55 % — LOW (ref 60–90)
SODIUM SERPL-SCNC: 136 MMOL/L — SIGNIFICANT CHANGE UP (ref 135–145)
SODIUM SERPL-SCNC: 137 MMOL/L — SIGNIFICANT CHANGE UP (ref 135–145)
THC UR QL: NEGATIVE — SIGNIFICANT CHANGE UP
WBC # BLD: 13.3 K/UL — HIGH (ref 3.8–10.5)
WBC # BLD: 8.3 K/UL — SIGNIFICANT CHANGE UP (ref 3.8–10.5)
WBC # FLD AUTO: 13.3 K/UL — HIGH (ref 3.8–10.5)
WBC # FLD AUTO: 8.3 K/UL — SIGNIFICANT CHANGE UP (ref 3.8–10.5)

## 2018-12-05 PROCEDURE — 99233 SBSQ HOSP IP/OBS HIGH 50: CPT | Mod: GC

## 2018-12-05 PROCEDURE — 99291 CRITICAL CARE FIRST HOUR: CPT

## 2018-12-05 PROCEDURE — 93010 ELECTROCARDIOGRAM REPORT: CPT

## 2018-12-05 PROCEDURE — 99232 SBSQ HOSP IP/OBS MODERATE 35: CPT

## 2018-12-05 RX ORDER — DOBUTAMINE HCL 250MG/20ML
2.5 VIAL (ML) INTRAVENOUS
Qty: 500 | Refills: 0 | Status: DISCONTINUED | OUTPATIENT
Start: 2018-12-05 | End: 2018-12-06

## 2018-12-05 RX ORDER — SODIUM CHLORIDE 9 MG/ML
500 INJECTION INTRAMUSCULAR; INTRAVENOUS; SUBCUTANEOUS ONCE
Qty: 0 | Refills: 0 | Status: COMPLETED | OUTPATIENT
Start: 2018-12-05 | End: 2018-12-05

## 2018-12-05 RX ORDER — ALBUMIN HUMAN 25 %
250 VIAL (ML) INTRAVENOUS ONCE
Qty: 0 | Refills: 0 | Status: COMPLETED | OUTPATIENT
Start: 2018-12-05 | End: 2018-12-05

## 2018-12-05 RX ORDER — SODIUM CHLORIDE 9 MG/ML
250 INJECTION INTRAMUSCULAR; INTRAVENOUS; SUBCUTANEOUS
Qty: 0 | Refills: 0 | Status: DISCONTINUED | OUTPATIENT
Start: 2018-12-05 | End: 2018-12-09

## 2018-12-05 RX ADMIN — Medication 5.06 MICROGRAM(S)/KG/MIN: at 22:45

## 2018-12-05 RX ADMIN — SODIUM CHLORIDE 100 MILLILITER(S): 9 INJECTION INTRAMUSCULAR; INTRAVENOUS; SUBCUTANEOUS at 18:00

## 2018-12-05 RX ADMIN — SODIUM CHLORIDE 125 MILLILITER(S): 9 INJECTION INTRAMUSCULAR; INTRAVENOUS; SUBCUTANEOUS at 10:00

## 2018-12-05 RX ADMIN — PANTOPRAZOLE SODIUM 40 MILLIGRAM(S): 20 TABLET, DELAYED RELEASE ORAL at 05:52

## 2018-12-05 RX ADMIN — Medication 125 MILLILITER(S): at 23:55

## 2018-12-05 RX ADMIN — CHLORHEXIDINE GLUCONATE 1 APPLICATION(S): 213 SOLUTION TOPICAL at 05:51

## 2018-12-05 RX ADMIN — CAPTOPRIL 18.75 MILLIGRAM(S): 12.5 TABLET ORAL at 17:24

## 2018-12-05 NOTE — PROGRESS NOTE ADULT - SUBJECTIVE AND OBJECTIVE BOX
Patient is a 47y old  Male who presents with a chief complaint of Cardiogenic Shock (04 Dec 2018 18:24)      Overnight Events:  Savage removed ON. Patient denies CP or SOB.     REVIEW OF SYSTEMS:  CONSTITUTIONAL: No weakness, fevers or chills  EYES/ENT: No visual changes, no throat pain   RESPIRATORY: No cough, wheezing, hemoptysis; No shortness of breath  CARDIOVASCULAR: No chest pain or palpitations  GASTROINTESTINAL: No abdominal, nausea, vomiting, or hematemesis; No diarrhea or constipation. No melena or hematochezia.  GENITOURINARY: No dysuria, frequency or hematuria  NEUROLOGICAL: No dizziness, numbness, or weakness  SKIN: No itching, burning, rashes, or lesions   All other review of systems is negative unless indicated above.    VITAL SIGNS:  Vital Signs Last 24 Hrs  T(C): 36.6 (05 Dec 2018 07:15), Max: 37.1 (04 Dec 2018 19:26)  T(F): 97.9 (05 Dec 2018 07:15), Max: 98.7 (04 Dec 2018 19:26)  HR: 109 (05 Dec 2018 12:14) (92 - 118)  BP: 98/78 (05 Dec 2018 12:14) (79/52 - 112/79)  BP(mean): 68 (05 Dec 2018 12:14) (63 - 95)  RR: 20 (05 Dec 2018 12:14) (16 - 28)  SpO2: 96% (05 Dec 2018 10:52) (95% - 98%)    PHYSICAL EXAM:   GENERAL: no acute distress  HEENT: NC/AT, EOMI, neck supple, MMM  RESPIRATORY: LCTAB/L, no rhonchi, rales, or wheezing  CARDIOVASCULAR: RRR, no murmurs, gallops, rubs  ABDOMINAL: soft, non-tender, non-distended, positive bowel sounds   EXTREMITIES: no clubbing, cyanosis, or edema  NEUROLOGICAL: alert and oriented x 3, non-focal  SKIN: no rashes or lesions   MUSCULOSKELETAL: no gross joint deformity                          17.1   13.3  )-----------( 128      ( 05 Dec 2018 05:15 )             51.3     12-05    136  |  103  |  20  ----------------------------<  106<H>  4.6   |  20<L>  |  0.96    Ca    9.1      05 Dec 2018 05:15  Phos  3.4     12-05  Mg     2.0     12-05    TPro  6.7  /  Alb  3.7  /  TBili  1.5<H>  /  DBili  x   /  AST  60<H>  /  ALT  202<H>  /  AlkPhos  66  12-05        CAPILLARY BLOOD GLUCOSE        I&O's Summary    04 Dec 2018 07:01  -  05 Dec 2018 07:00  --------------------------------------------------------  IN: 1075.8 mL / OUT: 1925 mL / NET: -849.2 mL    05 Dec 2018 07:01  -  05 Dec 2018 13:04  --------------------------------------------------------  IN: 280 mL / OUT: 0 mL / NET: 280 mL        MEDICATIONS  (STANDING):  captopril 18.75 milliGRAM(s) Oral every 8 hours  chlorhexidine 4% Liquid 1 Application(s) Topical <User Schedule>  pantoprazole    Tablet 40 milliGRAM(s) Oral before breakfast  sodium chloride 0.9% Bolus 500 milliLiter(s) IV Bolus once  sodium chloride 0.9% Bolus 250 milliLiter(s) IV Bolus once  sodium chloride 0.9%. 250 milliLiter(s) (10 mL/Hr) IV Continuous <Continuous>

## 2018-12-05 NOTE — PROGRESS NOTE ADULT - SUBJECTIVE AND OBJECTIVE BOX
HPI:  46 yo [place] M, w/ PMH of nICMP (EF 10% 11/2018 - recently diagnosed in August 2018), presenting as transfer from [facility] CCU due to cardiogenic shock, possible LVAD workup. As per EMR, the patient was dx in August 2018. Prior work up showed clear coronaries and not infiltrative dilated cardiomyopathy. The patient was supposed to follow up with HF but he did not do so. He was also recently admitted to [facility] where a transfer to [facility] was recommended; however, he refused. EMR also indicates, his wife was stating the patient is not complaint with medications. "She was also concerned about IVDU 3-4 years ago. " It is also noted in the chart that "Patient acknowledges that he only takes spironolactone, Lasix, and potassium, and that he only wears his Life Vest some of the time. States that he has never used intravenous drugs. Used marijuana for very brief period in college over 20 years ago. Denies any other drug use, and states no longer drinks alcohol."    In the [facility] ER, patient BP low with elevated lactate, poor oxygenation. Also with hyperkalemia.  Dobutamine gtt started and levophed gtt initiated. Patient emergently transferred to cardiac cath lab for a RHC. A balloon pump was inserted, and levophed was decreased to 0.3mcg/kg/min and Dobutamine gtt titrated.     In CCU he is on Dobutamine infusion. This consult was called for LVAD evaluation     The following topics were elaborated on today with the patient and wife. A video  was offered but refused by the patient who insisted we speak with his wife as well as him.     Patient’s understanding of heart failure/ cardiac condition:   Patient feels that after the discussion with the heart failure team this morning, he is happy to go home. He will be more compliant. He is confident the medications will fix his problem. It was discussed although his heart failure regimen may not reverse his ventricular function completely, it will help to control his symptoms and maintain him to his best optimal state. The understanding of how far medical management will go in the reversibility of his heart failure was minimal. He was frustrated that there had been a lot of varying degree of LVAD dialogue about the severity of his need to have the procedure done now versus later and was overwhelmed.     What the CHF team has told them:   Due to improving numbers and symptom improvement, patient will likely be discharged. Should symptoms worsen, he will inform his HF specialist in the outpatient setting and a LVAD will be reconsidered     What are their thoughts of living with an LVAD, and their understanding of how it may affect ADLs:  Although financial questions of an LVAD remain, today he felt that there is no need for an LVAD. He felt he will be better with medications and was alarmed that an LVAD is still a close option or consideration. He asked for statistics of mortality in the event he is on medication management alone vs destination therapy.     What is their social situation: Employed [ ] Retired/ Disability [x ] Who resides at home:  Wife   Patient lives with wife and two children, ages 10 and 17. Patient is dependant on his wife's insurance and income. Wife works as a nurse at [employer] 3-4 times a week and does the night shift. She stated in the event her  needed close outpatient follow up either pre or post LVAD, she would be the one to do it. It is manageable for her to take him to his appointments without burden 1x weekly. Her mother can help with caring for the patient on the weekends only. Her sisters are busy with work themselves and therefore can not help with patients care. There is the patients sister who is in the [facility]. Ideally she would be able to be a full time care giver to patient and patients wife if her visa was approved. The obstacle here is the visa approval for the sister.    The patient wishes to have the wife be the health care proxy. The wife shared that her concern is that the patient will become acutely symptomatic as soon as she takes him home either due to the nature of the heart failure or noncompliance to his medications. Her emotions and understanding of where the optimal window to receive an LVAD was in question.      PERTINENT PM/SXH:   HTN (hypertension)  Cardiomyopathy  No pertinent past medical history    No significant past surgical history    FAMILY HISTORY:  Family history of lung disease (Grandparent): maternal grandmother (no history of smoking)  Family history of hypertension: father  Family history of heart disease: mother  fam hx in father and brother of sudden death in 40s.    ITEMS NOT CHECKED ARE NOT PRESENT    SOCIAL HISTORY:   Significant other/partner:  [x ]  2 Children:  [ ]  Taoist/Spirituality:  Substance hx:  [ ]   Tobacco hx:  [ ]   Alcohol hx: [ ]   Home Opioid hx:  [ ] I-Stop Reference No:  Living Situation: [ ]Home  [ ]Long term care  [ ]Rehab [ ]Other  As per care coordination notes:   Patient resides with spouse, mother-in-law, and his 2 children (ages 17 & 10)  in a private house in [place], [place]. Patient must negotiate 4 stairs to  enter home and 12 stairs inside. Patient identified his spouse, Trisha Charles  [phone], as emergency contact and caregiver. Patient with no advanced  directives in place. Patient was independent with ADLs and ambulation prior to  admission. Patient with no prior home care services. Patient is currently  unemployed due to his medical issues. 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 Patient's primary care doctor is Dr. Armando Flaherty, last seen 1 week ago. Patient's cardiologist is Dr. Malcolm Kerns, last  seen in August. Patient admits to noncompliance with his medications as he  forgets to take them at times. Patient also reports issues with his pharmacy  ([facility]) as he reports that some of his prescriptions are missing when he  goes to  his medications. Patient requesting for his medications to be  sent to [facility] pharmacy moving forward. Patient reports that all of his  medications are covered through his insurance.      LCSW explored substance use history. Patient reports using marijuana 1x in  college in 2001. Patient also reports tobacco use, but quit in 2011.  ADVANCE DIRECTIVES:    DNR  MOLST  [ ]  Living Will  [ ]   DECISION MAKER(s):  [ ] Health Care Proxy(s)  [x ] Surrogate(s)  [ ] Guardian           Name(s): Phone Number(s): Trisha [phone]    BASELINE (I)ADL(s) (prior to admission):  [place]: [ ]Total  [ ] Moderate [x ]Dependent    Allergies    aspirin (Swelling)  Motrin (Hives)    Intolerances    MEDICATIONS  (STANDING):  captopril 25 milliGRAM(s) Oral three times a day  chlorhexidine 4% Liquid 1 Application(s) Topical <User Schedule>  DOBUTamine Infusion 2.5 MICROgram(s)/kG/Min (5.063 mL/Hr) IV Continuous <Continuous>  furosemide   Injectable 40 milliGRAM(s) IV Push two times a day  pantoprazole    Tablet 40 milliGRAM(s) Oral before breakfast  spironolactone 25 milliGRAM(s) Oral daily    MEDICATIONS  (PRN):  aluminum hydroxide/magnesium hydroxide/simethicone Suspension 30 milliLiter(s) Oral every 6 hours PRN Dyspepsia  simethicone 80 milliGRAM(s) Chew three times a day PRN Gas    PRESENT SYMPTOMS: [ ]Unable to obtain due to poor mentation   Source if other than patient:  [ ]Family   [ ]Team     Pain (Impact on QOL):  Not in pain.   Location -         Minimal acceptable level (0-10 scale):                    Aggravating factors -  Quality -  Radiation -  Severity (0-10 scale) -    Timing -    PAIN AD Score:     [email or web address] (press ctrl +  left click to view)    Dyspnea:                           [ ]Mild [ ]Moderate [ ]Severe  Anxiety:                             [ ]Mild [ ]Moderate [ ]Severe  Fatigue:                             [ ]Mild [ ]Moderate [x]Severe  Nausea:                             [ ]Mild [ ]Moderate [ ]Severe  Loss of appetite:              [ ]Mild [ ]Moderate [ ]Severe  Constipation:                    [ ]Mild [ ]Moderate [ ]Severe    Other Symptoms:  [x]All other review of systems negative     Karnofsky Performance Score/Palliative Performance Status Version 2:      30 %    http://palliative.info/resource_material/[email or web address]  PHYSICAL EXAM:  Vital Signs Last 24 Hrs  T(C): 36.6 (03 Dec 2018 08:00), Max: 37 (02 Dec 2018 19:00)  T(F): 97.9 (03 Dec 2018 08:00), Max: 98.6 (02 Dec 2018 19:00)  HR: 114 (03 Dec 2018 12:00) (96 - 114)  BP: 109/89 (03 Dec 2018 12:00) (81/56 - 111/80)  BP(mean): 101 (03 Dec 2018 12:00) (69 - 103)  RR: 19 (03 Dec 2018 12:00) (11 - 28)  SpO2: 95% (03 Dec 2018 12:00) (94% - 99%) I&O's Summary    02 Dec 2018 07:01  -  03 Dec 2018 07:00  --------------------------------------------------------  IN: 647.5 mL / OUT: 2010 mL / NET: -1362.5 mL    03 Dec 2018 07:01  -  03 Dec 2018 13:39  --------------------------------------------------------  IN: 275.5 mL / OUT: 1050 mL / NET: -774.5 mL    GENERAL:  [x ]Alert  [ x]Oriented x3   [ ]Lethargic  [ ]Cachexia  [ ]Unarousable  [x ]Verbal  [ ]Non-Verbal  Behavioral:   [ ] Anxiety  [ ] Delirium [ ] Agitation [ ] Other  HEENT:  [x ]Normal   [ ]Dry mouth   [ ]ET Tube/Trach  [ ]Oral lesions  PULMONARY:   [ x]Clear [ ]Tachypnea  [ ]Audible excessive secretions   [ ]Rhonchi        [ ]Right [ ]Left [ ]Bilateral  [ ]Crackles        [ ]Right [ ]Left [ ]Bilateral  [ ]Wheezing     [ ]Right [ ]Left [ ]Bilateral  CARDIOVASCULAR:    [x ]Regular [ ]Irregular [ ]Tachy  [ ]Oj [ ]Murmur [ ]Other  GASTROINTESTINAL:  [x ]Soft  [ ]Distended   [ x]+BS  [ ]Non tender [ ]Tender  [ ]PEG [ ]OGT/ NGT  Last BM: 12/3  GENITOURINARY:  [ ]Normal [ ] Incontinent   [ ]Oliguria/Anuria   [x ]Villalobos  MUSCULOSKELETAL:   [ ]Normal   [ ]Weakness  [x ]Bed/Wheelchair bound [ ]Edema  NEUROLOGIC:   [x ]No focal deficits  [ ] Cognitive impairment  [ ] Dysphagia [ ]Dysarthria [ ] Paresis [ ]Other   SKIN:   [ ]Normal   [ ]Pressure ulcer(s)  [ ]Rash    CRITICAL CARE:  [x ] Shock Present  [ ]Septic [x ]Cardiogenic [ ]Neurologic [ ]Hypovolemic  [ ]  Vasopressors [ ]  Inotropes   [ ] Respiratory failure present  [ ] Acute  [ ] Chronic [ ] Hypoxic  [ ] Hypercarbic [ ] Other  [ ] Other organ failure     LABS:                        18.4   10.4  )-----------( 125      ( 03 Dec 2018 05:19 )             56.4   12-03    131<L>  |  96  |  22  ----------------------------<  112<H>  4.2   |  21<L>  |  0.96    Ca    9.6      03 Dec 2018 05:19  Phos  3.9     12-03  Mg     2.2     12-03    TPro  7.3  /  Alb  4.1  /  TBili  2.3<H>  /  DBili  x   /  AST  232<H>  /  ALT  480<H>  /  AlkPhos  76  12-03        RADIOLOGY & ADDITIONAL STUDIES:  < from: TTE with Doppler (w/Cont) (12.02.18 @ 09:53) >  Patient name: RIZWAN CHARLES  YOB: 1970   Age: 47 (M)   MR#: [id number]  Study Date: 12/2/2018  Location: [place] [id number]: Selene Arreguin RDCSEF (Visual Estimate): 5-10 %Conclusions:  1. Mild mitral annular calcification. Tethered mitral valve  leaflets with normal opening. Moderate-severe mitral  regurgitation.  2. Normal trileaflet aortic valve. Minimal aortic  regurgitation.  3. Eccentric left ventricular hypertrophy (dilated left  ventricle with normal relative wall thickness). Severe left  ventricular enlargement.  4. Severe global left ventricular systolic dysfunction.  Endocardial visualization enhanced with intravenous  injection of Ultrasonic Enhancing Agent (Definity). No left  ventricular thrombus.  5. Normal right ventricular size with decreased right  ventricular systolic function.  *** No previous Echo exam.    < end of copied text >    PROTEIN CALORIE MALNUTRITION PRESENT: [ ] Yes [ ] No  [ ] PPSV2 < or = to 30% [ ] significant weight loss  [ ] poor nutritional intake [ ] catabolic state [ ] anasarca     Albumin, Serum: 4.1 g/dL (12-03-18 @ 05:19)  Artificial Nutrition [ ]     REFERRALS:   [ ]Chaplaincy  [ ] Hospice  [ ]Child Life  [ ]Social Work  [ ]Case management [ ]Holistic Therapy   Goals of Care Discussion Document: HPI:  48 yo Croatian M, w/ PMH of nICMP (EF 10% 11/2018 - recently diagnosed in August 2018), presenting as transfer from [facility] CCU due to cardiogenic shock, possible LVAD workup. As per EMR, the patient was dx in August 2018. Prior work up showed clear coronaries and not infiltrative dilated cardiomyopathy. The patient was supposed to follow up with HF but he did not do so. He was also recently admitted to [facility] where a transfer to [facility] was recommended; however, he refused. EMR also indicates, his wife was stating the patient is not complaint with medications. "She was also concerned about IVDU 3-4 years ago. " It is also noted in the chart that "Patient acknowledges that he only takes spironolactone, Lasix, and potassium, and that he only wears his Life Vest some of the time. States that he has never used intravenous drugs. Used marijuana for very brief period in college over 20 years ago. Denies any other drug use, and states no longer drinks alcohol."    In the [facility] ER, patient BP low with elevated lactate, poor oxygenation. Also with hyperkalemia.  Dobutamine gtt started and levophed gtt initiated. Patient emergently transferred to cardiac cath lab for a RHC. A balloon pump was inserted, and levophed was decreased to 0.3mcg/kg/min and Dobutamine gtt titrated.     In CCU he is on Dobutamine infusion. This consult was called for LVAD evaluation     The following topics were elaborated on today with the patient and wife. A video  was offered but refused by the patient who insisted we speak with his wife as well as him.     Patient’s understanding of heart failure/ cardiac condition:   Patient feels that after the discussion with the heart failure team this morning, he is happy to go home. He will be more compliant. He is confident the medications will fix his problem. It was discussed although his heart failure regimen may not reverse his ventricular function completely, it will help to control his symptoms and maintain him to his best optimal state. The understanding of how far medical management will go in the reversibility of his heart failure was minimal. He was frustrated that there had been a lot of varying degree of LVAD dialogue about the severity of his need to have the procedure done now versus later and was overwhelmed.     What the CHF team has told them:   Due to improving numbers and symptom improvement, patient will likely be discharged. Should symptoms worsen, he will inform his HF specialist in the outpatient setting and a LVAD will be reconsidered     What are their thoughts of living with an LVAD, and their understanding of how it may affect ADLs:  Although financial questions of an LVAD remain, today he felt that there is no need for an LVAD. He felt he will be better with medications and was alarmed that an LVAD is still a close option or consideration. He asked for statistics of mortality in the event he is on medication management alone vs destination therapy.     What is their social situation: Employed [ ] Retired/ Disability [x ] Who resides at home:  Wife   Patient lives with wife and two children, ages 10 and 17. Patient is dependant on his wife's insurance and income. Wife works as a nurse at [employer] 3-4 times a week and does the night shift. She stated in the event her  needed close outpatient follow up either pre or post LVAD, she would be the one to do it. It is manageable for her to take him to his appointments without burden 1x weekly. Her mother can help with caring for the patient on the weekends only. Her sisters are busy with work themselves and therefore can not help with patients care. There is the patients sister who is in the [facility]. Ideally she would be able to be a full time care giver to patient and patients wife if her visa was approved. The obstacle here is the visa approval for the sister.    The patient wishes to have the wife be the health care proxy. The wife shared aside from her  that her concern is that the patient will become acutely symptomatic as soon as she takes him home either due to the nature of the heart failure or noncompliance to his medications. There seemed to be nervousness and anxiety upon the question of when will be the right time for the patient to receive the LVAD.     PERTINENT PM/SXH:   HTN (hypertension)  Cardiomyopathy  No pertinent past medical history    No significant past surgical history    FAMILY HISTORY:  Family history of lung disease (Grandparent): maternal grandmother (no history of smoking)  Family history of hypertension: father  Family history of heart disease: mother  fam hx in father and brother of sudden death in 40s.    ITEMS NOT CHECKED ARE NOT PRESENT    SOCIAL HISTORY:   Significant other/partner:  [x ]  2 Children:  [ ]  Cheondoism/Spirituality:  Substance hx:  [ ]   Tobacco hx:  [ ]   Alcohol hx: [ ]   Home Opioid hx:  [ ] I-Stop Reference No:  Living Situation: [ ]Home  [ ]Long term care  [ ]Rehab [ ]Other  As per care coordination notes:   Patient resides with spouse, mother-in-law, and his 2 children (ages 17 & 10)  in a private house in [place], [place]. Patient must negotiate 4 stairs to  enter home and 12 stairs inside. Patient identified his spouse, Trisha Charles  [phone], as emergency contact and caregiver. Patient with no advanced  directives in place. Patient was independent with ADLs and ambulation prior to  admission. Patient with no prior home care services. Patient is currently  unemployed due to his medical issues. 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 Patient's primary care doctor is Dr. Armando Flaherty, last seen 1 week ago. Patient's cardiologist is Dr. Malcolm Kerns, last  seen in August. Patient admits to noncompliance with his medications as he  forgets to take them at times. Patient also reports issues with his pharmacy  ([facility]) as he reports that some of his prescriptions are missing when he  goes to  his medications. Patient requesting for his medications to be  sent to [facility] pharmacy moving forward. Patient reports that all of his  medications are covered through his insurance.      LCSW explored substance use history. Patient reports using marijuana 1x in  college in 2001. Patient also reports tobacco use, but quit in 2011.  ADVANCE DIRECTIVES:    DNR  MOLST  [ ]  Living Will  [ ]   DECISION MAKER(s):  [ ] Health Care Proxy(s)  [x ] Surrogate(s)  [ ] Guardian           Name(s): Phone Number(s): Trisha [phone]    BASELINE (I)ADL(s) (prior to admission):  Early: [ ]Total  [ ] Moderate [x ]Dependent    Allergies    aspirin (Swelling)  Motrin (Hives)    Intolerances    MEDICATIONS  (STANDING):  captopril 25 milliGRAM(s) Oral three times a day  chlorhexidine 4% Liquid 1 Application(s) Topical <User Schedule>  DOBUTamine Infusion 2.5 MICROgram(s)/kG/Min (5.063 mL/Hr) IV Continuous <Continuous>  furosemide   Injectable 40 milliGRAM(s) IV Push two times a day  pantoprazole    Tablet 40 milliGRAM(s) Oral before breakfast  spironolactone 25 milliGRAM(s) Oral daily    MEDICATIONS  (PRN):  aluminum hydroxide/magnesium hydroxide/simethicone Suspension 30 milliLiter(s) Oral every 6 hours PRN Dyspepsia  simethicone 80 milliGRAM(s) Chew three times a day PRN Gas    PRESENT SYMPTOMS: [ ]Unable to obtain due to poor mentation   Source if other than patient:  [ ]Family   [ ]Team     Pain (Impact on QOL):  Not in pain.   Location -         Minimal acceptable level (0-10 scale):                    Aggravating factors -  Quality -  Radiation -  Severity (0-10 scale) -    Timing -    PAIN AD Score:     [email or web address] (press ctrl +  left click to view)    Dyspnea:                           [ ]Mild [ ]Moderate [ ]Severe  Anxiety:                             [ ]Mild [ ]Moderate [ ]Severe  Fatigue:                             [ ]Mild [ ]Moderate [x]Severe  Nausea:                             [ ]Mild [ ]Moderate [ ]Severe  Loss of appetite:              [ ]Mild [ ]Moderate [ ]Severe  Constipation:                    [ ]Mild [ ]Moderate [ ]Severe    Other Symptoms:  [x]All other review of systems negative     Karnofsky Performance Score/Palliative Performance Status Version 2:      30 %    http://palliative.info/resource_material/[email or web address]    PHYSICAL EXAM:  Vital Signs Last 24 Hrs  T(C): 36.6 (03 Dec 2018 08:00), Max: 37 (02 Dec 2018 19:00)  T(F): 97.9 (03 Dec 2018 08:00), Max: 98.6 (02 Dec 2018 19:00)  HR: 114 (03 Dec 2018 12:00) (96 - 114)  BP: 109/89 (03 Dec 2018 12:00) (81/56 - 111/80)  BP(mean): 101 (03 Dec 2018 12:00) (69 - 103)  RR: 19 (03 Dec 2018 12:00) (11 - 28)  SpO2: 95% (03 Dec 2018 12:00) (94% - 99%) I&O's Summary    02 Dec 2018 07:01  -  03 Dec 2018 07:00  --------------------------------------------------------  IN: 647.5 mL / OUT: 2010 mL / NET: -1362.5 mL    03 Dec 2018 07:01  -  03 Dec 2018 13:39  --------------------------------------------------------  IN: 275.5 mL / OUT: 1050 mL / NET: -774.5 mL    GENERAL:  [x ]Alert  [ x]Oriented x3   [ ]Lethargic  [ ]Cachexia  [ ]Unarousable  [x ]Verbal  [ ]Non-Verbal  Behavioral:   [ x] Anxiety  [ ] Delirium [ ] Agitation [ ] Other  HEENT:  [x ]Normal   [ ]Dry mouth   [ ]ET Tube/Trach  [ ]Oral lesions  PULMONARY:   [ x]Clear [ ]Tachypnea  [ ]Audible excessive secretions   [ ]Rhonchi        [ ]Right [ ]Left [ ]Bilateral  [ ]Crackles        [ ]Right [ ]Left [ ]Bilateral  [ ]Wheezing     [ ]Right [ ]Left [ ]Bilateral  CARDIOVASCULAR:    [x ]Regular [ ]Irregular [ ]Tachy  [ ]Oj [ ]Murmur [ ]Other  GASTROINTESTINAL:  [x ]Soft  [ ]Distended   [ x]+BS  [ ]Non tender [ ]Tender  [ ]PEG [ ]OGT/ NGT  Last BM: 12/3  GENITOURINARY:  [ ]Normal [ ] Incontinent   [ ]Oliguria/Anuria   [x ]Villalobos  MUSCULOSKELETAL:   [ ]Normal   [ ]Weakness  [x ]Bed/Wheelchair bound [ ]Edema  NEUROLOGIC:   [x ]No focal deficits  [ ] Cognitive impairment  [ ] Dysphagia [ ]Dysarthria [ ] Paresis [ ]Other   SKIN:   [ ]Normal   [ ]Pressure ulcer(s)  [ ]Rash    CRITICAL CARE:  [x ] Shock Present  [ ]Septic [x ]Cardiogenic [ ]Neurologic [ ]Hypovolemic  [ ]  Vasopressors [ ]  Inotropes   [ ] Respiratory failure present  [ ] Acute  [ ] Chronic [ ] Hypoxic  [ ] Hypercarbic [ ] Other  [ ] Other organ failure     LABS:                        17.1   13.3  )-----------( 128      ( 05 Dec 2018 05:15 )             51.3     12-05    136  |  103  |  20  ----------------------------<  106<H>  4.6   |  20<L>  |  0.96    Ca    9.1      05 Dec 2018 05:15  Phos  3.4     12-05  Mg     2.0     12-05    TPro  6.7  /  Alb  3.7  /  TBili  1.5<H>  /  DBili  x   /  AST  60<H>  /  ALT  202<H>  /  AlkPhos  66  12-05    RADIOLOGY & ADDITIONAL STUDIES: no new imaging for review     PROTEIN CALORIE MALNUTRITION PRESENT: [ ] Yes [x ] No  [ ] PPSV2 < or = to 30% [ ] significant weight loss  [ ] poor nutritional intake [ ] catabolic state [ ] anasarca     Albumin, Serum: 4.1 g/dL (12-03-18 @ 05:19)  Artificial Nutrition [ ]     REFERRALS:   [ ]Chaplaincy  [ ] Hospice  [ ]Child Life  [ ]Social Work  [ ]Case management [ ]Holistic Therapy   Goals of Care Discussion Document: 1 HPI:  46 yo Citizen of the [place] M, w/ PMH of nICMP (EF 10% 11/2018 - recently diagnosed in August 2018), presenting as transfer from [facility] CCU due to cardiogenic shock, possible LVAD workup. As per EMR, the patient was dx in August 2018. Prior work up showed clear coronaries and not infiltrative dilated cardiomyopathy. The patient was supposed to follow up with HF but he did not do so. He was also recently admitted to [facility] where a transfer to [facility] was recommended; however, he refused. EMR also indicates, his wife was stating the patient is not complaint with medications. "She was also concerned about IVDU 3-4 years ago. " It is also noted in the chart that "Patient acknowledges that he only takes spironolactone, Lasix, and potassium, and that he only wears his Life Vest some of the time. States that he has never used intravenous drugs. Used marijuana for very brief period in college over 20 years ago. Denies any other drug use, and states no longer drinks alcohol."    In the [facility] ER, patient BP low with elevated lactate, poor oxygenation. Also with hyperkalemia.  Dobutamine gtt started and levophed gtt initiated. Patient emergently transferred to cardiac cath lab for a RHC. A balloon pump was inserted, and levophed was decreased to 0.3mcg/kg/min and Dobutamine gtt titrated.     In CCU he is on Dobutamine infusion. This consult was called for LVAD evaluation     The following topics were elaborated on today with the patient and wife. A video  was offered but refused by the patient who insisted we speak with his wife as well as him.     Patient’s understanding of heart failure/ cardiac condition:   Patient feels that after the discussion with the heart failure team this morning, he is happy to go home. He will be more compliant. He is confident the medications will fix his problem. It was discussed although his heart failure regimen may not reverse his ventricular function completely, it will help to control his symptoms and maintain him to his best optimal state. The understanding of how far medical management will go in the reversibility of his heart failure was minimal. He was frustrated that there had been a lot of varying degree of LVAD dialogue about the severity of his need to have the procedure done now versus later and was overwhelmed.     What the CHF team has told them:   Due to improving numbers and symptom improvement, patient will likely be discharged. Should symptoms worsen, he will inform his HF specialist in the outpatient setting and a LVAD will be reconsidered     What are their thoughts of living with an LVAD, and their understanding of how it may affect ADLs:  Although financial questions of an LVAD remain, today he felt that there is no need for an LVAD. He felt he will be better with medications and was alarmed that an LVAD is still a close option or consideration. He asked for statistics of mortality in the event he is on medication management alone vs destination therapy and that information was provided. He had some misconceptions about a LVAD. He thought that a LVAD was going to harm is kidney and other organs. However, we informed him that in the situation he needs a LVAD that it will actually help out his kidneys and other organs to be perfused so their function does not get impaired.      What is their social situation: Employed [ ] Retired/ Disability [x ] Who resides at home:  Wife   Patient lives with wife and two children, ages 10 and 17. Patient is dependant on his wife's insurance and income. Wife works as a nurse at [employer] 12 hour a day 3-4 times a week and does the night shift. If implanted or having a transplant, she may be available to care for him most of the time for up to 2 months under LA; however, after that "he will be by himself."  She stated in the event her  needed close outpatient follow up either pre or post LVAD, she would be the one to do it. It is manageable for her to take him to his appointments without burden 1x weekly. Other people that may be available on a limited base are his wife's mother and the patient's sister. However, Her mother can help with caring for the patient on the weekends only. The patients sister is in the [facility] and may be able to be a full time care giver to patient and patients wife if her visa was approved. The obstacle here is the visa approval for the sister.    The patient wishes to have the wife be the health care proxy.     The wife shared aside from her  that her concern is that the patient will become acutely symptomatic as soon as she takes him home either due to the nature of the heart failure or noncompliance to his medications. There seemed to be nervousness and anxiety upon the question of when will be the right time for the patient to receive the LVAD.     PERTINENT PM/SXH:   HTN (hypertension)  Cardiomyopathy  No pertinent past medical history    No significant past surgical history    FAMILY HISTORY:  Family history of lung disease (Grandparent): maternal grandmother (no history of smoking)  Family history of hypertension: father  Family history of heart disease: mother  fam hx in father and brother of sudden death in 40s.    ITEMS NOT CHECKED ARE NOT PRESENT    SOCIAL HISTORY:   Significant other/partner:  [x ]  2 Children:  [ ]  Restorationist/Spirituality:  Substance hx:  [ ]   Tobacco hx:  [ ]   Alcohol hx: [ ]   Home Opioid hx:  [ ] I-Stop Reference No:  Living Situation: [ ]Home  [ ]Long term care  [ ]Rehab [ ]Other  As per care coordination notes:   Patient resides with spouse, mother-in-law, and his 2 children (ages 17 & 10)  in a private house in [place], [place]. Patient must negotiate 4 stairs to  enter home and 12 stairs inside. Patient identified his spouse, Trisha Charles  [phone], as emergency contact and caregiver. Patient with no advanced  directives in place. Patient was independent with ADLs and ambulation prior to  admission. Patient with no prior home care services. Patient is currently  unemployed due to his medical issues. 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 Patient's primary care doctor is Dr. Armando Flaherty, last seen 1 week ago. Patient's cardiologist is Dr. Malcolm Kerns, last  seen in August. Patient admits to noncompliance with his medications as he  forgets to take them at times. Patient also reports issues with his pharmacy  ([facility]) as he reports that some of his prescriptions are missing when he  goes to  his medications. Patient requesting for his medications to be  [facility] moving forward. Patient reports that all of his  medications are covered through his insurance.      LCSW explored substance use history. Patient reports using marijuana 1x in  college in 2001. Patient also reports tobacco use, but quit in 2011.  ADVANCE DIRECTIVES:    DNR  MOLST  [ ]  Living Will  [ ]   DECISION MAKER(s):  [ ] Health Care Proxy(s)  [x ] Surrogate(s)  [ ] Guardian           Name(s): Phone Number(s): Trisha [phone]    BASELINE (I)ADL(s) (prior to admission):  Miner: [ ]Total  [ ] Moderate [x ]Dependent    Allergies    aspirin (Swelling)  Motrin (Hives)    Intolerances    MEDICATIONS  (STANDING):  captopril 25 milliGRAM(s) Oral three times a day  chlorhexidine 4% Liquid 1 Application(s) Topical <User Schedule>  DOBUTamine Infusion 2.5 MICROgram(s)/kG/Min (5.063 mL/Hr) IV Continuous <Continuous>  furosemide   Injectable 40 milliGRAM(s) IV Push two times a day  pantoprazole    Tablet 40 milliGRAM(s) Oral before breakfast  spironolactone 25 milliGRAM(s) Oral daily    MEDICATIONS  (PRN):  aluminum hydroxide/magnesium hydroxide/simethicone Suspension 30 milliLiter(s) Oral every 6 hours PRN Dyspepsia  simethicone 80 milliGRAM(s) Chew three times a day PRN Gas    PRESENT SYMPTOMS: [ ]Unable to obtain due to poor mentation   Source if other than patient:  [ ]Family   [ ]Team     Pain (Impact on QOL):  Not in pain.   Location -         Minimal acceptable level (0-10 scale):                    Aggravating factors -  Quality -  Radiation -  Severity (0-10 scale) -    Timing -    PAIN AD Score:     http://geriatrictoolkit.[place].[facility]/cog/painad.pdf (press ctrl +  left click to view)    Dyspnea:                           [ ]Mild [ ]Moderate [ ]Severe  Anxiety:                             [ ]Mild [ ]Moderate [ ]Severe  Fatigue:                             [ ]Mild [ ]Moderate [x]Severe  Nausea:                             [ ]Mild [ ]Moderate [ ]Severe  Loss of appetite:              [ ]Mild [ ]Moderate [ ]Severe  Constipation:                    [ ]Mild [ ]Moderate [ ]Severe    Other Symptoms:  [x]All other review of systems negative     Karnofsky Performance Score/Palliative Performance Status Version 2:      30 %    http://palliative.info/resource_material/[email or web address].pdf    PHYSICAL EXAM:  Vital Signs Last 24 Hrs  T(C): 36.6 (03 Dec 2018 08:00), Max: 37 (02 Dec 2018 19:00)  T(F): 97.9 (03 Dec 2018 08:00), Max: 98.6 (02 Dec 2018 19:00)  HR: 114 (03 Dec 2018 12:00) (96 - 114)  BP: 109/89 (03 Dec 2018 12:00) (81/56 - 111/80)  BP(mean): 101 (03 Dec 2018 12:00) (69 - 103)  RR: 19 (03 Dec 2018 12:00) (11 - 28)  SpO2: 95% (03 Dec 2018 12:00) (94% - 99%) I&O's Summary    02 Dec 2018 07:01  -  03 Dec 2018 07:00  --------------------------------------------------------  IN: 647.5 mL / OUT: 2010 mL / NET: -1362.5 mL    03 Dec 2018 07:01  -  03 Dec 2018 13:39  --------------------------------------------------------  IN: 275.5 mL / OUT: 1050 mL / NET: -774.5 mL    GENERAL:  [x ]Alert  [ x]Oriented x3   [ ]Lethargic  [ ]Cachexia  [ ]Unarousable  [x ]Verbal  [ ]Non-Verbal  Behavioral:   [ x] Anxiety  [ ] Delirium [ ] Agitation [ ] Other  HEENT:  [x ]Normal   [ ]Dry mouth   [ ]ET Tube/Trach  [ ]Oral lesions  PULMONARY:   [ x]Clear [ ]Tachypnea  [ ]Audible excessive secretions   [ ]Rhonchi        [ ]Right [ ]Left [ ]Bilateral  [ ]Crackles        [ ]Right [ ]Left [ ]Bilateral  [ ]Wheezing     [ ]Right [ ]Left [ ]Bilateral  CARDIOVASCULAR:    [x ]Regular [ ]Irregular [ ]Tachy  [ ]Oj [ ]Murmur [ ]Other  GASTROINTESTINAL:  [x ]Soft  [ ]Distended   [ x]+BS  [ ]Non tender [ ]Tender  [ ]PEG [ ]OGT/ NGT  Last BM: 12/3  GENITOURINARY:  [ ]Normal [ ] Incontinent   [ ]Oliguria/Anuria   [x ]Villalobos  MUSCULOSKELETAL:   [ ]Normal   [ ]Weakness  [x ]Bed/Wheelchair bound [ ]Edema  NEUROLOGIC:   [x ]No focal deficits  [ ] Cognitive impairment  [ ] Dysphagia [ ]Dysarthria [ ] Paresis [ ]Other   SKIN:   [ ]Normal   [ ]Pressure ulcer(s)  [ ]Rash    CRITICAL CARE:  [x ] Shock Present  [ ]Septic [x ]Cardiogenic [ ]Neurologic [ ]Hypovolemic  [ ]  Vasopressors [ ]  Inotropes   [ ] Respiratory failure present  [ ] Acute  [ ] Chronic [ ] Hypoxic  [ ] Hypercarbic [ ] Other  [ ] Other organ failure     LABS:                        17.1   13.3  )-----------( 128      ( 05 Dec 2018 05:15 )             51.3     12-05    136  |  103  |  20  ----------------------------<  106<H>  4.6   |  20<L>  |  0.96    Ca    9.1      05 Dec 2018 05:15  Phos  3.4     12-05  Mg     2.0     12-05    TPro  6.7  /  Alb  3.7  /  TBili  1.5<H>  /  DBili  x   /  AST  60<H>  /  ALT  202<H>  /  AlkPhos  66  12-05    RADIOLOGY & ADDITIONAL STUDIES: no new imaging for review     PROTEIN CALORIE MALNUTRITION PRESENT: [ ] Yes [x ] No  [ ] PPSV2 < or = to 30% [ ] significant weight loss  [ ] poor nutritional intake [ ] catabolic state [ ] anasarca     Albumin, Serum: 4.1 g/dL (12-03-18 @ 05:19)  Artificial Nutrition [ ]     REFERRALS:   [ ]Chaplaincy  [ ] Hospice  [ ]Child Life  [ ]Social Work  [ ]Case management [ ]Holistic Therapy   Goals of Care Discussion Document:

## 2018-12-05 NOTE — PROGRESS NOTE ADULT - PROBLEM SELECTOR PLAN 2
At this point, there will be a need, in the future, to revisit the patient's understanding about LVAD, its risks and benefits. Furthermore, there will be a need to further clarify his social support so he can be and ideal candidate for LVAD/Transplant. Giving the patient some time to process the concept of LVAD/Transplant, using a Tagalog  and having his wife with him when discussing advanced treatment, communicating the same message between different team members and the patient, and trying to provide documentation so his sister can come from the [facility] will be important elements to facilitate his access to advance cardiac treatment.    Will appreciate if LVAD  can f/u the patient and his wife in regards to completing a HCP form, discussing financial aspects of LVAD based on the patient's insurance, and providing a letter of support so the patient's sister can ask for a Visa in the [facility].     We will sign off at this point. Please call us back if having issues with symptoms Rx or GO discussion.

## 2018-12-05 NOTE — PROGRESS NOTE ADULT - ASSESSMENT
46 yo M w/ recent diagnosis in august 2018 of nonischemic cardiomyopathy (EF 10%), initially presented to [facility] d/t SOB and Epigastric pain, now s/p R heart cardiac cath, showing clean coronaries, but with high wedge pressures and need for blood pressure support with pressors,transferred to [facility] CCU for cardiogenic shock and being worked up for prelvad vs heart transplant.     Pre-Transplant Workup  CMV IgG positive   Syphilis screen neg  EBV past infection  Toxo IgG positive   MMR immune   MRSA screen negative, MSSA positive   HIV negative  Hepatitis A nonreactive  Hepatitis B sAg nonreactive  Hepatitis B cAb nonreactive  Hepatitis B c IgM Ab nonreactive   Hepatitic C Ab nonreactive   HSV 1 IgG positive  HSV 2 IgG negative   Hep BSab+  HAV ab+  quantiferon negative but with a history of exposure to grandmother with active TB and granuloma finding on chest CT scan- no signs or sxs. of active TB will need prophylaxis for  latent disease if transplant is pursued  No contraindications from an ID perspective to VAD placement    Braeden Read MD  [phone]  After 5pm/weekends [phone]

## 2018-12-05 NOTE — PROGRESS NOTE ADULT - PROBLEM SELECTOR PLAN 1
Patient was very overwhelmed upon our visit today. He stated he was going to go home and that he vows to be more compliant to his meds which will fix the heart problem. Patient was counseled that while medications will help symptoms and maintain his of his heart failure at his best optimal state, he may still have worsening of symptoms which would require an LVAD. This information was difficult for him to grasp and became unclear in his management goals. The patient wishes to go home and take a break to process all the information. He also is happy to follow up outpatient. The reality of mortality rate of medical management vs destination therapy was discussed as patient wanted to know statistics.     The wife noted away from the patient that she was nervous that this whole workup and process would have to be done again if the patient has another exacerbation or ends up being noncompliant again. She stated that she sets everything out for him in a pill box, but in the past he has not been good about taking all his doses.     As per the wife, it would be of great supportive benefit if a letter can be written in support of the patients sister to come here to the [place] to help aide in care for him as she is trying to obtain a visa. She would like to discuss and fill out HCP paper work with the patient prior to discharge. Both patient and wife would also like to discuss what would be covered in their insurance in the event of an LVAD procedure pre and post LVAD. Will reach out to [place]. Patient was very overwhelmed upon our visit today. He stated he was going to go home and that he vows to be more compliant to his meds which will fix the heart problem. Patient was counseled that while medications will help symptoms and maintain his of his heart failure at his best optimal state, he may still have worsening of symptoms which would require an LVAD. This information was difficult for him to grasp and became unclear in his management goals. The patient wishes to go home and take a break to process all the information. He also is happy to follow up outpatient. The reality of mortality rate of medical management vs destination therapy was discussed as patient wanted to know statistics.     The wife noted away from the patient that she was nervous that this whole workup and process would have to be done again if the patient has another exacerbation or ends up being noncompliant again. She stated that she sets everything out for him in a pill box, but in the past he has not been good about taking all his doses.     As per the wife, it would be of great supportive benefit if a letter can be written in support of the patients sister to come here to the [place] to help aide in care for him as she is trying to obtain a visa.

## 2018-12-05 NOTE — PROGRESS NOTE ADULT - ASSESSMENT
46 yo M w/ recent diagnosis in august 2018 of nonischemic cardiomyopathy (EF 10%), initially presented to [facility] d/t SOB and Epigastric pain, now s/p R heart cardiac cath, showing clean coronaries, but with high wedge pressures and need for blood pressure support with pressors, transferred to [facility] CCU for cardiogenic shock. Patient is now s/p IABP (d/eugenie on 12/1). He suffered vagal episode on 12/3 in the setting overdiuresis. Walcott removed ON.     #Neuro  -No active issues.  -Patient AAO x 3    #Pulm  -O2 sat well on RA  -Heart failure following, recs appreciated. Diuretics currently d/eugenie in the setting of hypotension 2/2 overdiuresis.     #Cardiovascular  - Cardiogenic Shock upon admission  - Patient recently diagnosed with NICM  - off levo, s/p IABP removal, now off dobutamine since 12.  d/c spirinolactone 25 mg daily  - will maintain on dose of captopril 18.75 mg given softer BP  - F/U CT Chest/Abd/Pelvis for cardiomyopathy eval  - HF following.  - Lasix currently d/eugenie  - Removed balloon pump 12/1  - EP consulted for AICD placement, recs appreciated. Per EP, AICD not indicated until transplant/LVAD w/u completed by heart failure team    Hypovolemia  -d/eugenie Lasix  -per HF, will fluid resuscitate with 500 cc NS over 4 hours    - Hypertension; Plan - captopril for afterload reduction  -Strict I/Os, daily wts, keep I < O, trend BMP, supplement lytes prn      #GI  - No active issues.   -Continue Protonix 40mg for GI ppx.  - f/u CT A/P    #Renal/  - LARISSA resolved, currently Cr 1.1  - trend Cr, trend lytes, supplement PRN    #ID  - Afebrile, no active issues.  -Administer influenza vaccine.     #Endo  - HgbA1c 6.0 on 11/24  - Continue to monitor     #Skin  -No active issues.    #DVT  -D/eugenie heparin drip since IABP removed 12/1. Will start on SCD for now, given low platelet

## 2018-12-05 NOTE — PROGRESS NOTE ADULT - SUBJECTIVE AND OBJECTIVE BOX
Cardiology Progress Note    Interval events: Episodes of dizziness overnight. Dobutamine discontinued at 3 pm day prior. Thermodilution on [place] consistent with Manny, swan discontinued but introducer remains. Received 250 cc overnight.    Tele: SR     Medications:  aluminum hydroxide/magnesium hydroxide/simethicone Suspension 30 milliLiter(s) Oral every 6 hours PRN  captopril 18.75 milliGRAM(s) Oral every 8 hours  pantoprazole    Tablet 40 milliGRAM(s) Oral before breakfast  simethicone 80 milliGRAM(s) Chew three times a day PRN  sodium chloride 0.9% Bolus 250 milliLiter(s) IV Bolus once  sodium chloride 0.9%. 250 milliLiter(s) IV Continuous <Continuous>      Review of Systems:  Constitutional: [ ] Fever [ ] Chills [ ] Fatigue [ ] Weight change   HEENT: [ ] Blurred vision [ ] Eye Pain [ ] Headache [ ] Runny nose [ ] Sore Throat   Respiratory: [ ] Cough [ ] Wheezing [ ] Shortness of breath  Cardiovascular: [ ] Chest Pain [ ] Palpitations [ ] DRUMMOND [ ] PND [ ] Orthopnea  Gastrointestinal: [ ] Abdominal Pain [ ] Diarrhea [ ] Constipation [ ] Hemorrhoids [ ] Nausea [ ] Vomiting  Genitourinary: [ ] Nocturia [ ] Dysuria [ ] Incontinence  Extremities: [ ] Swelling [ ] Joint Pain  Neurologic: [ ] Focal deficit [ ] Paresthesias [ ] Syncope  Lymphatic: [ ] Swelling [ ] Lymphadenopathy   Skin: [ ] Rash [ ] Ecchymoses [ ] Wounds [ ] Lesions  Psychiatry: [ ] Depression [ ] Suicidal/Homicidal Ideation [ ] Anxiety [ ] Sleep Disturbances  [ ] 10 point review of systems is otherwise negative except as mentioned above            [ ]Unable to obtain    Vitals:  T(C): 36.7 (18 @ 14:14), Max: 37.1 (18 @ 19:26)  HR: 92 (18 @ 18:54) (92 - 118)  BP: 80/60 (18 @ 18:54) (79/58 - 105/76)  BP(mean): 74 (18 @ 18:54) (63 - 87)  RR: 16 (18 @ 18:54) (16 - 26)  SpO2: 96% (18 @ 10:52) (95% - 98%)  Wt(kg): --  Daily     Daily Weight in k.1 (05 Dec 2018 08:00)  I&O's Summary    04 Dec 2018 07:  -  05 Dec 2018 07:00  --------------------------------------------------------  IN: 1075.8 mL / OUT: 1925 mL / NET: -849.2 mL    05 Dec 2018 07:01  -  05 Dec 2018 18:57  --------------------------------------------------------  IN: 685 mL / OUT: 400 mL / NET: 285 mL        Physical Exam:  NAD  PERRL, EOMI  Normal oral [place] NC/AT  S1, S2, RRR, No m/r/g appreciated, No edema, no elevation in JVP  Clear to auscultation bilaterally  Soft, Non-tender, Non-distended, BS+  No clubbing, No joint deformity   Non-focal  No lymphadenopathy  AAOx3, Mood & affect appropriate  No rashes, No ecchymoses, No cyanosis  Procedural Access Site: No hematoma, Non-tender to palpation, 2+ pulse , No bruit, No Ecchymosis    Labs:                        17.1   13.3  )-----------( 128      ( 05 Dec 2018 05:15 )             51.3     12-    136  |  103  |  20  ----------------------------<  106<H>  4.6   |  20<L>  |  0.96    Ca    9.1      05 Dec 2018 05:15  Phos  3.4     12-  Mg     2.0     12-    TPro  6.7  /  Alb  3.7  /  TBili  1.5<H>  /  DBili  x   /  AST  60<H>  /  ALT  202<H>  /  AlkPhos  66  12-05    Serum Pro-Brain Natriuretic Peptide: 4034 pg/mL ( @ 08:56)    New results/imaging:

## 2018-12-05 NOTE — PROGRESS NOTE ADULT - SUBJECTIVE AND OBJECTIVE BOX
INFECTIOUS DISEASES FOLLOW UP-- Teresa Read  [phone]    This is a follow up note for this  47yMale with  CHF undergoing pre OHTX/VAD work up      ROS:  CONSTITUTIONAL:  No fever, good appetite  CARDIOVASCULAR:  No chest pain or palpitations  RESPIRATORY:  No dyspnea  GASTROINTESTINAL:  No nausea, vomiting, diarrhea, or abdominal pain  GENITOURINARY:  No dysuria  NEUROLOGIC:  No headache,     Allergies    aspirin (Swelling)  Motrin (Hives)    Intolerances        ANTIBIOTICS/RELEVANT:  antimicrobials    immunologic:    OTHER:  aluminum hydroxide/magnesium hydroxide/simethicone Suspension 30 milliLiter(s) Oral every 6 hours PRN  captopril 18.75 milliGRAM(s) Oral every 8 hours  pantoprazole    Tablet 40 milliGRAM(s) Oral before breakfast  simethicone 80 milliGRAM(s) Chew three times a day PRN  sodium chloride 0.9% Bolus 250 milliLiter(s) IV Bolus once  sodium chloride 0.9%. 250 milliLiter(s) IV Continuous <Continuous>      Objective:  Vital Signs Last 24 Hrs  T(C): 36.7 (05 Dec 2018 14:14), Max: 36.8 (05 Dec 2018 00:00)  T(F): 98 (05 Dec 2018 14:14), Max: 98.2 (05 Dec 2018 00:00)  HR: 92 (05 Dec 2018 18:54) (92 - 118)  BP: 80/60 (05 Dec 2018 18:54) (79/58 - 105/76)  BP(mean): 74 (05 Dec 2018 18:54) (63 - 87)  RR: 16 (05 Dec 2018 18:54) (16 - 25)  SpO2: 96% (05 Dec 2018 10:52) (95% - 98%)    PHYSICAL EXAM:  Constitutional:no acute distress  Eyes:BRENDEN, EOMI  Ear/Nose/Throat: no oral lesions, 	  Respiratory: clear BL  Cardiovascular: S1S2  Gastrointestinal:soft, (+) BS, no tenderness  Extremities:no e/e/c  No Lymphadenopathy  IV sites not inflammed.    LABS:                        15.7   8.3   )-----------( 155      ( 05 Dec 2018 18:26 )             48.2     12-05    137  |  102  |  17  ----------------------------<  132<H>  4.4   |  20<L>  |  0.95    Ca    8.8      05 Dec 2018 18:26  Phos  3.4     12-05  Mg     1.9     12-05    TPro  6.5  /  Alb  3.6  /  TBili  0.7  /  DBili  x   /  AST  52<H>  /  ALT  170<H>  /  AlkPhos  71  12-05          MICROBIOLOGY:            RECENT CULTURES:  11-29 @ 12:34  BLOOD PERIPHERAL  --  --  --  --    NO ORGANISMS ISOLATED      RADIOLOGY & ADDITIONAL STUDIES:

## 2018-12-05 NOTE — PROGRESS NOTE ADULT - ASSESSMENT
48 yo [place] M, w/ PMH of NICM LVIDd 7 cm (EF 10% 11/2018 - recently diagnosed in August 2018) a/w cardiogenic shock requiring IABP and inotropes.    RHC on 11/29/2018 RA 18, RV 48/5/16 PA 49/23/37 PCWP 26 PA 62% CO 2.7 CI 1.87 SVR 2300 PVR 4    11/30 CVP 4, PA 33/16/23 MvO2 77% CO 4.97 CI 2.37 SVR 1200  12/4 PA 24/9/15 CVP -2 PCWP 10    Discontinued dobutamine 12/4

## 2018-12-05 NOTE — CHART NOTE - NSCHARTNOTESELECT_GEN_ALL_CORE
CCU [place] Rounds/Event Note
Event Note
Event Note
Event Note/Balloon Pump Removal
Event Note/CCU [place] Rounds
Transfer Note

## 2018-12-05 NOTE — CHART NOTE - NSCHARTNOTEFT_GEN_A_CORE
CCU Transfer Note    Transfer from: CCU    Transfer to: (  ) Medicine    (X) Telemetry     (   ) RCU        (    ) Palliative         (   ) Stroke Unit          (   ) __________________    Accepting Physician:  Signout given to:     CCU COURSE:  48 yo [place]ym[place] M, w/ PMH of nICMP (EF 10% 11/2018 - recently diagnosed in August 2018), presenting as transfer from [facility] CCU d/t cardiogenic shock, possible LVAD workup. Patient initially presented to [facility] ED morning of 11/29/18 d/t one day of shortness of breath, also with abdominal pain, worst in the epigastrium, and with one episode of nausea/vomiting the previous day. Patient denies chest pain, palpitations, fevers, chills, or other complaints.     Patient was first diagnosed with non-ischemic cardiomyopathy in August, 2018, after he presented to ED d/t Lower Extremity edema and shortness of breath. He had a L heart catheterization, which showed clean coronaries, but significant for nICMP. Patient also had a Cardiac MRI which showed dilated cardiomyopathy but no infiltrative diseases. Following this hospitalization, patient followed up with Dr. Barnes as outpatient, but has not seen him since September 2018. Patient was also supposed to follow up with the HF team, but never did so.    In the [facility] ER, patient BP low with elevated lactate, poor oxygenation. Also with hyperkalemia.  Dobutamine gtt started and levophed gtt initiated. Patient emergently transferred to cardiac cath lab for a RHC. A balloon pump was inserted, levophed gtt decreased to 0.3mcg/kg/min. Dobutamine gtt titrated to 5mck/kg/min. Patient titrated off BIPAP and onto High Flow  oxygen. Augmenting 120s on ballon pump.         ASSESSMENT & PLAN:             FOR FOLLOW UP: CCU Transfer Note    Transfer from: CCU    Transfer to: (  ) Medicine    (X) Telemetry     (   ) RCU        (    ) Palliative         (   ) Stroke Unit          (   ) __________________    Accepting Physician:  Signout given to:     CCU COURSE:  46 yo [place] M, w/ PMH of nICMP (EF 10% 11/2018 - recently diagnosed in August 2018), presenting as transfer from [facility] CCU d/t cardiogenic shock, possible LVAD workup. Patient initially presented to [facility] ED morning of 11/29/18 d/t one day of shortness of breath, also with abdominal pain, worst in the epigastrium, and with one episode of nausea/vomiting the previous day. Patient denies chest pain, palpitations, fevers, chills, or other complaints.     Patient was first diagnosed with non-ischemic cardiomyopathy in August, 2018, after he presented to ED d/t Lower Extremity edema and shortness of breath. He had a L heart catheterization, which showed clean coronaries, but significant for nICMP. Patient also had a Cardiac MRI which showed dilated cardiomyopathy but no infiltrative diseases. Following this hospitalization, patient followed up with Dr. Barnes as outpatient, but has not seen him since September 2018. Patient was also supposed to follow up with the HF team, but never did so.    In the [facility] ER, patient BP low with elevated lactate, poor oxygenation. Also with hyperkalemia.  Dobutamine gtt started and levophed gtt initiated. Patient emergently transferred to cardiac cath lab for a RHC. A balloon pump was inserted. Patient titrated off BIPAP and onto High Flow oxygen.         ASSESSMENT & PLAN:     46 yo M w/ recent diagnosis in august 2018 of nonischemic cardiomyopathy (EF 10%), initially presented to [facility] d/t SOB and Epigastric pain, now s/p R heart cardiac cath, showing clean coronaries, but with high wedge pressures and need for blood pressure support with pressors, transferred to [facility] CCU for cardiogenic shock. Patient is now stable, IABP was d/eugenie on 12/1, now off dobutamine. Currently hypotensive in the likely setting of overdiuresis.     - Problem: HFrEF with EF of 10%  - off levo, s/p IABP removal, now off dobutamine since 12/4.    - will maintain on dose of captopril 18.75 mg given softer BP  - HF following.  - Removed balloon pump 12/1  - EP consulted for AICD placement, recs appreciated. Per EP, AICD not indicated until transplant/LVAD w/u completed by heart failure team    Problem: Hypovolemia  -In the setting of overdiuresis  -d/eugenie Lasix and spironolactone  -fluid resuscitation per HF recommendations     Hypertension; Plan - captopril for afterload reduction  -Currently remaining at dose of 18.75 TID given softer BPs, titrate to 25 mg TID once BPs tolerate  -Strict I/Os, daily wts, keep I < O, trend BMP, supplement lytes prn        FOR FOLLOW UP:    [ ] Titrate to 25 mg captopril TID once BPs tolerate  [ ] Continue LVAD w/u  [ ] BID BMP, venous blood gas CCU Transfer Note    Transfer from: CCU    Transfer to: (  ) Medicine    (X) Telemetry     (   ) RCU        (    ) Palliative         (   ) Stroke Unit          (   ) __________________    Accepting Physician: Armando Flaherty  Signout given to:     CCU COURSE:  48 yo [place]ym[place] M, w/ PMH of nICMP (EF 10% 11/2018 - recently diagnosed in August 2018), presenting as transfer from [facility] CCU d/t cardiogenic shock, possible LVAD workup. Patient initially presented to [facility] ED morning of 11/29/18 d/t one day of shortness of breath, also with abdominal pain, worst in the epigastrium, and with one episode of nausea/vomiting the previous day. Patient denies chest pain, palpitations, fevers, chills, or other complaints.     Patient was first diagnosed with non-ischemic cardiomyopathy in August, 2018, after he presented to ED d/t Lower Extremity edema and shortness of breath. He had a L heart catheterization, which showed clean coronaries, but significant for nICMP. Patient also had a Cardiac MRI which showed dilated cardiomyopathy but no infiltrative diseases. Following this hospitalization, patient followed up with Dr. Barnes as outpatient, but has not seen him since September 2018. Patient was also supposed to follow up with the HF team, but never did so.    In the [facility] ER, patient BP low with elevated lactate, poor oxygenation. Also with hyperkalemia.  Dobutamine gtt started and levophed gtt initiated. Patient emergently transferred to cardiac cath lab for a RHC. A balloon pump was inserted. Patient titrated off BIPAP and onto High Flow oxygen.         ASSESSMENT & PLAN:     48 yo M w/ recent diagnosis in august 2018 of nonischemic cardiomyopathy (EF 10%), initially presented to [facility] d/t SOB and Epigastric pain, now s/p R heart cardiac cath, showing clean coronaries, but with high wedge pressures and need for blood pressure support with pressors, transferred to [facility] CCU for cardiogenic shock. Patient is now stable, IABP was d/eugenie on 12/1, now off dobutamine. Currently hypotensive in the likely setting of overdiuresis.     - Problem: HFrEF with EF of 10%  - off levo, s/p IABP removal, now off dobutamine since 12/4.    - will maintain on dose of captopril 18.75 mg given softer BP  - HF following.  - Removed balloon pump 12/1  - EP consulted for AICD placement, recs appreciated. Per EP, AICD not indicated until transplant/LVAD w/u completed by heart failure team    Problem: Hypovolemia  -In the setting of overdiuresis  -d/eugenie Lasix and spironolactone  -fluid resuscitation per HF recommendations     Hypertension; Plan - captopril for afterload reduction  -Currently remaining at dose of 18.75 TID given softer BPs, titrate to 25 mg TID once BPs tolerate  -Strict I/Os, daily wts, keep I < O, trend BMP, supplement lytes prn        FOR FOLLOW UP:    [ ] Titrate to 25 mg captopril TID once BPs tolerate  [ ] Continue LVAD w/u  [ ] BID BMP, venous blood gas CCU Transfer Note    Transfer from: CCU    Transfer to: (  ) Medicine    (X) Telemetry     (   ) RCU        (    ) Palliative         (   ) Stroke Unit          (   ) __________________    Accepting Physician: Armando Flaherty  Signout given to:     CCU COURSE:  48 yo Thai M, w/ PMH of nICMP (EF 10% 11/2018 - recently diagnosed in August 2018), presenting as transfer from [facility] CCU d/t cardiogenic shock, possible LVAD workup. Patient initially presented to [facility] ED morning of 11/29/18 d/t one day of shortness of breath, also with abdominal pain, worst in the epigastrium, and with one episode of nausea/vomiting the previous day. Patient denies chest pain, palpitations, fevers, chills, or other complaints.     Patient was first diagnosed with non-ischemic cardiomyopathy in August, 2018, after he presented to ED d/t Lower Extremity edema and shortness of breath. He had a L heart catheterization, which showed clean coronaries, but significant for nICMP. Patient also had a Cardiac MRI which showed dilated cardiomyopathy but no infiltrative diseases. Following this hospitalization, patient followed up with Dr. Barnes as outpatient, but has not seen him since September 2018. Patient was also supposed to follow up with the HF team, but never did so.    In the [facility] ER, patient BP low with elevated lactate, poor oxygenation. Also with hyperkalemia.  Dobutamine gtt started and levophed gtt initiated. Patient emergently transferred to cardiac cath lab for a RHC. A balloon pump was inserted. Patient titrated off BIPAP and onto High Flow oxygen.         ASSESSMENT & PLAN:     48 yo M w/ recent diagnosis in august 2018 of nonischemic cardiomyopathy (EF 10%), initially presented to [facility] d/t SOB and Epigastric pain, now s/p R heart cardiac cath, showing clean coronaries, but with high wedge pressures and need for blood pressure support with pressors, transferred to [facility] CCU for cardiogenic shock. Patient is now stable, IABP was d/eugenie on 12/1, now off dobutamine. Currently hypotensive in the likely setting of overdiuresis.     - Problem: HFrEF with EF of 10%  - off levo, s/p IABP removal, now off dobutamine since 12/4.    - will maintain on dose of captopril 18.75 mg given softer BP  - HF following.  - Removed balloon pump 12/1  - EP consulted for AICD placement, recs appreciated. Per EP, AICD not indicated until transplant/LVAD w/u completed by heart failure team    Problem: Hypovolemia  -In the setting of overdiuresis  -d/eugenie Lasix and spironolactone  -fluid resuscitation per HF recommendations     Hypertension; Plan - captopril for afterload reduction  -Currently remaining at dose of 12.5 mg TID of captopril  -Strict I/Os, daily wts, keep I < O, trend BMP, supplement lytes prn        FOR FOLLOW UP:    [ ] Uptitrate captopril based on HF recs   [ ] Continue LVAD w/u  [ ] F/u labs CCU Transfer Note    Transfer from: CCU    Transfer to: (  ) Medicine    (X) Telemetry     (   ) RCU        (    ) Palliative         (   ) Stroke Unit          (   ) __________________    Accepting Physician: Armando Flaherty  Signout given to:     CCU COURSE:  46 yo Malag[place] M, w/ PMH of nICMP (EF 10% 11/2018 - recently diagnosed in August 2018), presenting as transfer from [facility] CCU d/t cardiogenic shock, possible LVAD workup. Patient initially presented to [facility] ED morning of 11/29/18 d/t one day of shortness of breath, also with abdominal pain, worst in the epigastrium, and with one episode of nausea/vomiting the previous day. Patient denies chest pain, palpitations, fevers, chills, or other complaints.     Patient was first diagnosed with non-ischemic cardiomyopathy in August, 2018, after he presented to ED d/t Lower Extremity edema and shortness of breath. He had a L heart catheterization, which showed clean coronaries, but significant for nICMP. Patient also had a Cardiac MRI which showed dilated cardiomyopathy but no infiltrative diseases. Following this hospitalization, patient followed up with Dr. Barnes as outpatient, but has not seen him since September 2018. Patient was also supposed to follow up with the HF team, but never did so.    In the [facility] ER, patient BP low with elevated lactate, poor oxygenation. Also with hyperkalemia.  Dobutamine gtt started and levophed gtt initiated. Patient emergently transferred to cardiac cath lab for a RHC. A balloon pump was inserted. Patient titrated off BIPAP and onto High Flow oxygen.         ASSESSMENT & PLAN:     46 yo M w/ recent diagnosis in august 2018 of nonischemic cardiomyopathy (EF 10%), initially presented to [facility] d/t SOB and Epigastric pain, now s/p R heart cardiac cath, showing clean coronaries, but with high wedge pressures and need for blood pressure support with pressors, transferred to [facility] CCU for cardiogenic shock. Patient is now stable, IABP was d/eugenie on 12/1, now off dobutamine. Currently hypotensive in the likely setting of overdiuresis.     - Problem: HFrEF with EF of 10%  - off levo, s/p IABP removal, now off dobutamine since 12/4.    - will maintain on dose of captopril 12.5 mg TID given softer BP  - HF following.  - Removed balloon pump 12/1, [place] removed 12/4, RIJ cordis removed 12/6  - EP consulted for AICD placement, recs appreciated. Per EP, AICD not indicated until transplant/LVAD w/u completed by heart failure team    Problem: Hypovolemia  -In the setting of overdiuresis  -d/eugenie Lasix and spironolactone  -fluid resuscitation per HF recommendations     Hypertension; Plan - captopril for afterload reduction  -Currently remaining at dose of 12.5 mg TID of captopril  -Strict I/Os, daily wts, keep I < O, trend BMP, supplement lytes prn        FOR FOLLOW UP:    [ ] Uptitrate captopril based on HF recs   [ ] Continue LVAD w/u  [ ] F/u labs CCU Transfer Note    Transfer from: CCU    Transfer to: (  ) Medicine    (X) Telemetry     (   ) RCU        (    ) Palliative         (   ) Stroke Unit          (   ) __________________    Accepting Physician: Armando Flaherty  Signout given to:     CCU COURSE:  46 yo [place] M, w/ PMH of nICMP (EF 10% 11/2018 - recently diagnosed in August 2018), presenting as transfer from [facility] CCU d/t cardiogenic shock, possible LVAD workup. Patient initially presented to [facility] ED morning of 11/29/18 d/t one day of shortness of breath, also with abdominal pain, worst in the epigastrium, and with one episode of nausea/vomiting the previous day. Patient denies chest pain, palpitations, fevers, chills, or other complaints.     Patient was first diagnosed with non-ischemic cardiomyopathy in August, 2018, after he presented to ED d/t Lower Extremity edema and shortness of breath. He had a L heart catheterization, which showed clean coronaries, but significant for nICMP. Patient also had a Cardiac MRI which showed dilated cardiomyopathy but no infiltrative diseases. Following this hospitalization, patient followed up with Dr. Barnes as outpatient, but has not seen him since September 2018. Patient was also supposed to follow up with the HF team, but never did so.    In the [facility] ER, patient BP low with elevated lactate, poor oxygenation. Also with hyperkalemia.  Dobutamine gtt started and levophed gtt initiated. Patient emergently transferred to cardiac cath lab for a RHC. A balloon pump was inserted. Patient titrated off BIPAP and onto High Flow oxygen.         ASSESSMENT & PLAN:     46 yo M w/ recent diagnosis in august 2018 of nonischemic cardiomyopathy (EF 10%), initially presented to [facility] d/t SOB and Epigastric pain, now s/p R heart cardiac cath, showing clean coronaries, but with high wedge pressures and need for blood pressure support with pressors, transferred to [facility] CCU for cardiogenic shock. Patient is now stable, IABP was d/eugenie on 12/1, now off dobutamine. Currently hypotensive in the likely setting of overdiuresis.     - Problem: HFrEF with EF of 10%  - off levo, s/p IABP removal, now off dobutamine since 12/4.    - will maintain on dose of captopril 12.5 mg TID given softer BP  - HF following.  - Removed balloon pump 12/1, [place] removed 12/4, RIJ cordis removed 12/6  - EP consulted for AICD placement, recs appreciated. Per EP, AICD not indicated until transplant/LVAD w/u completed by heart failure team    Problem: Hypovolemia  -In the setting of overdiuresis  -d/eugenie Lasix and spironolactone  -fluid resuscitation per HF recommendations     Hypertension; Plan - captopril for afterload reduction  -Currently remaining at dose of 12.5 mg TID of captopril  -Strict I/Os, daily wts, keep I < O, trend BMP, supplement lytes prn        FOR FOLLOW UP:    [ ] Uptitrate captopril based on HF recs   [ ] Continue LVAD w/u  [ ]CT colonography ordered as part of LVAD eval  [ ] F/u labs

## 2018-12-05 NOTE — PROGRESS NOTE ADULT - SUBJECTIVE AND OBJECTIVE BOX
====================  CCU MIDNIGHT ROUNDS  ====================    RIZWAN VILLALPANDO  [id number]  Patient is a 47y old  Male who presents with a chief complaint of Cardiogenic Shock, PRe VAD/OHTx work up (05 Dec 2018 19:28)      ====================  SUMMARY: 48 yo M w/ recent diagnosis in august 2018 of nonischemic cardiomyopathy (EF 10%), initially presented to [facility] d/t SOB and Epigastric pain, now s/p R heart cardiac cath, showing clean coronaries, but with high wedge pressures and need for blood pressure support with pressors, transferred to [facility] CCU for cardiogenic shock. Patient is now s/p IABP (d/eugenie on 12/1). He suffered vagal episode on 12/3 in the setting overdiuresis. [place] removed, cordis still in place.   ====================      ====================  NEW EVENTS:   ====================    MEDICATIONS  (STANDING):  albumin human  5% IVPB 250 milliLiter(s) IV Intermittent once  captopril 18.75 milliGRAM(s) Oral every 8 hours  DOBUTamine Infusion 2.5 MICROgram(s)/kG/Min (5.063 mL/Hr) IV Continuous <Continuous>  pantoprazole    Tablet 40 milliGRAM(s) Oral before breakfast  sodium chloride 0.9% Bolus 250 milliLiter(s) IV Bolus once  sodium chloride 0.9%. 250 milliLiter(s) (10 mL/Hr) IV Continuous <Continuous>    MEDICATIONS  (PRN):  aluminum hydroxide/magnesium hydroxide/simethicone Suspension 30 milliLiter(s) Oral every 6 hours PRN Dyspepsia  simethicone 80 milliGRAM(s) Chew three times a day PRN Gas      ====================  VITALS (Last 12 hrs):  ====================    T(C): 36.7 (12-05-18 @ 19:00), Max: 36.7 (12-05-18 @ 14:14)  T(F): 98 (12-05-18 @ 19:00), Max: 98 (12-05-18 @ 14:14)  HR: 94 (12-05-18 @ 19:00) (92 - 118)  BP: 86/62 (12-05-18 @ 19:00) (79/58 - 98/78)  BP(mean): 72 (12-05-18 @ 19:00) (63 - 74)  ABP: --  ABP(mean): --  RR: 18 (12-05-18 @ 19:00) (16 - 20)      I&O's Summary    04 Dec 2018 07:01  -  05 Dec 2018 07:00  --------------------------------------------------------  IN: 1075.8 mL / OUT: 1925 mL / NET: -849.2 mL    05 Dec 2018 07:01  -  05 Dec 2018 23:56  --------------------------------------------------------  IN: 985 mL / OUT: 400 mL / NET: 585 mL        ====================  NEW LABS:  ====================      12-05    137  |  102  |  17  ----------------------------<  132<H>  4.4   |  20<L>  |  0.95    Ca    8.8      05 Dec 2018 18:26  Phos  3.4     12-05  Mg     1.9     12-05    TPro  6.5  /  Alb  3.6  /  TBili  0.7  /  DBili  x   /  AST  52<H>  /  ALT  170<H>  /  AlkPhos  71  12-05              ====================  PLAN:  ====================  -       Eileen Garza CCU NP  Beeper #[id number]  Spectra # [phone] ====================  CCU MIDNIGHT ROUNDS  ====================    RIZWAN VILLALPANDO  [id number]  Patient is a 47y old  Male who presents with a chief complaint of Cardiogenic Shock, PRe VAD/OHTx work up (05 Dec 2018 19:28)      ====================  SUMMARY: 46 yo M w/ recent diagnosis in august 2018 of nonischemic cardiomyopathy (EF 10%), initially presented to [facility] d/t SOB and Epigastric pain, now s/p R heart cardiac cath, showing clean coronaries, but with high wedge pressures and need for blood pressure support with pressors, transferred to [facility] CCU for cardiogenic shock. Patient is now s/p IABP (d/eugenie on 12/1). He suffered vagal episode on 12/3 in the setting overdiuresis. [place] removed, cordis still in place.   ====================      ====================  NEW EVENTS: Continue Dobutamine 2.5, urine output 380, net -9.9L. Lact 2.3, NS 125ml for 4h given  ====================    MEDICATIONS  (STANDING):  albumin human  5% IVPB 250 milliLiter(s) IV Intermittent once  captopril 18.75 milliGRAM(s) Oral every 8 hours  DOBUTamine Infusion 2.5 MICROgram(s)/kG/Min (5.063 mL/Hr) IV Continuous <Continuous>  pantoprazole    Tablet 40 milliGRAM(s) Oral before breakfast  sodium chloride 0.9% Bolus 250 milliLiter(s) IV Bolus once  sodium chloride 0.9%. 250 milliLiter(s) (10 mL/Hr) IV Continuous <Continuous>    MEDICATIONS  (PRN):  aluminum hydroxide/magnesium hydroxide/simethicone Suspension 30 milliLiter(s) Oral every 6 hours PRN Dyspepsia  simethicone 80 milliGRAM(s) Chew three times a day PRN Gas      ====================  VITALS (Last 12 hrs):  ====================    T(C): 36.7 (12-05-18 @ 19:00), Max: 36.7 (12-05-18 @ 14:14)  T(F): 98 (12-05-18 @ 19:00), Max: 98 (12-05-18 @ 14:14)  HR: 94 (12-05-18 @ 19:00) (92 - 118)  BP: 86/62 (12-05-18 @ 19:00) (79/58 - 98/78)  BP(mean): 72 (12-05-18 @ 19:00) (63 - 74)    RR: 18 (12-05-18 @ 19:00) (16 - 20)      I&O's Summary    04 Dec 2018 07:01  -  05 Dec 2018 07:00  --------------------------------------------------------  IN: 1075.8 mL / OUT: 1925 mL / NET: -849.2 mL    05 Dec 2018 07:01  -  05 Dec 2018 23:56  --------------------------------------------------------  IN: 985 mL / OUT: 400 mL / NET: 585 mL        ====================  NEW LABS:  ====================      12-05    137  |  102  |  17  ----------------------------<  132<H>  4.4   |  20<L>  |  0.95    Ca    8.8      05 Dec 2018 18:26  Phos  3.4     12-05  Mg     1.9     12-05    TPro  6.5  /  Alb  3.6  /  TBili  0.7  /  DBili  x   /  AST  52<H>  /  ALT  170<H>  /  AlkPhos  71  12-05        ====================  PLAN:  ====================    #NICM (EF 10%)  -Continue ACE  -continue inotropic, trend VGO2, lactate, Scr, urine output  -Strict I+O, goal O>I  -Lact 2.3, NS at 125ml for 4h  -f/u LVAD workup        Eileen Garza CCU NP  Beeper #[id number]  Spectra # [phone] ====================  CCU MIDNIGHT ROUNDS  ====================    RIZWAN VILLALPANDO  [id number]  Patient is a 47y old  Male who presents with a chief complaint of Cardiogenic Shock, PRe VAD/OHTx work up (05 Dec 2018 19:28)      ====================  SUMMARY: 46 yo M w/ recent diagnosis in august 2018 of nonischemic cardiomyopathy (EF 10%), initially presented to [facility] d/t SOB and Epigastric pain, now s/p R heart cardiac cath, showing clean coronaries, but with high wedge pressures and need for blood pressure support with pressors, transferred to [facility] CCU for cardiogenic shock. Patient is now s/p IABP (d/eugenie on 12/1). He suffered vagal episode on 12/3 in the setting overdiuresis. [place] removed, cordis still in place.   ====================      ====================  NEW EVENTS: Continue Dobutamine 2.5, urine output 380, net -9.9L. Lact 2.3, NS 125ml for 4h given  ====================    MEDICATIONS  (STANDING):  albumin human  5% IVPB 250 milliLiter(s) IV Intermittent once  captopril 18.75 milliGRAM(s) Oral every 8 hours  DOBUTamine Infusion 2.5 MICROgram(s)/kG/Min (5.063 mL/Hr) IV Continuous <Continuous>  pantoprazole    Tablet 40 milliGRAM(s) Oral before breakfast  sodium chloride 0.9% Bolus 250 milliLiter(s) IV Bolus once  sodium chloride 0.9%. 250 milliLiter(s) (10 mL/Hr) IV Continuous <Continuous>    MEDICATIONS  (PRN):  aluminum hydroxide/magnesium hydroxide/simethicone Suspension 30 milliLiter(s) Oral every 6 hours PRN Dyspepsia  simethicone 80 milliGRAM(s) Chew three times a day PRN Gas      ====================  VITALS (Last 12 hrs):  ====================    T(C): 36.7 (12-05-18 @ 19:00), Max: 36.7 (12-05-18 @ 14:14)  T(F): 98 (12-05-18 @ 19:00), Max: 98 (12-05-18 @ 14:14)  HR: 94 (12-05-18 @ 19:00) (92 - 118)  BP: 86/62 (12-05-18 @ 19:00) (79/58 - 98/78)  BP(mean): 72 (12-05-18 @ 19:00) (63 - 74)    RR: 18 (12-05-18 @ 19:00) (16 - 20)      I&O's Summary    04 Dec 2018 07:01  -  05 Dec 2018 07:00  --------------------------------------------------------  IN: 1075.8 mL / OUT: 1925 mL / NET: -849.2 mL    05 Dec 2018 07:01  -  05 Dec 2018 23:56  --------------------------------------------------------  IN: 985 mL / OUT: 400 mL / NET: 585 mL        ====================  NEW LABS:  ====================      12-05    137  |  102  |  17  ----------------------------<  132<H>  4.4   |  20<L>  |  0.95    Ca    8.8      05 Dec 2018 18:26  Phos  3.4     12-05  Mg     1.9     12-05    TPro  6.5  /  Alb  3.6  /  TBili  0.7  /  DBili  x   /  AST  52<H>  /  ALT  170<H>  /  AlkPhos  71  12-05        ====================  PLAN:  ====================    #NICM (EF 10%)  -Continue ACE, titrate as BP tolerates  -continue inotropic, trend VGO2, lactate, Scr, urine output  -Strict I+O, goal O>I  -Lact 2.3, NS at 125ml for 4h  -f/u LVAD workup        Eileen Garza CCU NP  Beeper #[id number]  Spectra # [phone]

## 2018-12-05 NOTE — PROGRESS NOTE ADULT - PROBLEM SELECTOR PLAN 1
- liberalize fluid intake  - 500 cc over 4 hours x 2  - captopril 18.75 mg TID, will discuss when to convert to lisinopril  - hold spironolactone  - check thermodilution CO via swan, then remove  - ongoing LVAD evaluation    - hold diuresis  -- trend LFTs  -- monitor Cr  -- monitor UOP  -- I/Os  -- daily weights  -- K>4, Mg>2. no

## 2018-12-05 NOTE — PROGRESS NOTE ADULT - ASSESSMENT
48 yo M w/ recent diagnosis in august 2018 of nonischemic cardiomyopathy (EF 10%), initially presented to [facility] with SOB and Epigastric pain, now s/p R heart cardiac cath, showing clean coronaries, but with high wedge pressures and need for blood pressure support with pressors, transferred to [facility] CCU for cardiogenic shock. Patient is now s/p IABP (d/eugenie on 12/1), pending LVAD workup.

## 2018-12-06 DIAGNOSIS — Z51.5 ENCOUNTER FOR PALLIATIVE CARE: ICD-10-CM

## 2018-12-06 LAB
ALBUMIN SERPL ELPH-MCNC: 3.5 G/DL — SIGNIFICANT CHANGE UP (ref 3.3–5)
ALBUMIN SERPL ELPH-MCNC: 4.1 G/DL — SIGNIFICANT CHANGE UP (ref 3.3–5)
ALP SERPL-CCNC: 52 U/L — SIGNIFICANT CHANGE UP (ref 40–120)
ALP SERPL-CCNC: 68 U/L — SIGNIFICANT CHANGE UP (ref 40–120)
ALT FLD-CCNC: 129 U/L — HIGH (ref 10–45)
ALT FLD-CCNC: 131 U/L — HIGH (ref 10–45)
ANION GAP SERPL CALC-SCNC: 13 MMOL/L — SIGNIFICANT CHANGE UP (ref 5–17)
ANION GAP SERPL CALC-SCNC: 16 MMOL/L — SIGNIFICANT CHANGE UP (ref 5–17)
APTT BLD: 27.6 SEC — SIGNIFICANT CHANGE UP (ref 27.5–36.3)
AST SERPL-CCNC: 38 U/L — SIGNIFICANT CHANGE UP (ref 10–40)
AST SERPL-CCNC: 42 U/L — HIGH (ref 10–40)
BASOPHILS # BLD AUTO: 0.1 K/UL — SIGNIFICANT CHANGE UP (ref 0–0.2)
BASOPHILS # BLD AUTO: 0.1 K/UL — SIGNIFICANT CHANGE UP (ref 0–0.2)
BASOPHILS NFR BLD AUTO: 0.8 % — SIGNIFICANT CHANGE UP (ref 0–2)
BASOPHILS NFR BLD AUTO: 0.9 % — SIGNIFICANT CHANGE UP (ref 0–2)
BILIRUB SERPL-MCNC: 0.7 MG/DL — SIGNIFICANT CHANGE UP (ref 0.2–1.2)
BILIRUB SERPL-MCNC: 1 MG/DL — SIGNIFICANT CHANGE UP (ref 0.2–1.2)
BUN SERPL-MCNC: 12 MG/DL — SIGNIFICANT CHANGE UP (ref 7–23)
BUN SERPL-MCNC: 16 MG/DL — SIGNIFICANT CHANGE UP (ref 7–23)
CALCIUM SERPL-MCNC: 8.7 MG/DL — SIGNIFICANT CHANGE UP (ref 8.4–10.5)
CALCIUM SERPL-MCNC: 9 MG/DL — SIGNIFICANT CHANGE UP (ref 8.4–10.5)
CHLORIDE SERPL-SCNC: 102 MMOL/L — SIGNIFICANT CHANGE UP (ref 96–108)
CHLORIDE SERPL-SCNC: 104 MMOL/L — SIGNIFICANT CHANGE UP (ref 96–108)
CO2 SERPL-SCNC: 20 MMOL/L — LOW (ref 22–31)
CO2 SERPL-SCNC: 21 MMOL/L — LOW (ref 22–31)
CREAT SERPL-MCNC: 0.89 MG/DL — SIGNIFICANT CHANGE UP (ref 0.5–1.3)
CREAT SERPL-MCNC: 0.98 MG/DL — SIGNIFICANT CHANGE UP (ref 0.5–1.3)
CRP SERPL-MCNC: 0.87 MG/DL — HIGH (ref 0–0.4)
EOSINOPHIL # BLD AUTO: 0.2 K/UL — SIGNIFICANT CHANGE UP (ref 0–0.5)
EOSINOPHIL # BLD AUTO: 0.3 K/UL — SIGNIFICANT CHANGE UP (ref 0–0.5)
EOSINOPHIL NFR BLD AUTO: 2 % — SIGNIFICANT CHANGE UP (ref 0–6)
EOSINOPHIL NFR BLD AUTO: 3.8 % — SIGNIFICANT CHANGE UP (ref 0–6)
GAS PNL BLDV: SIGNIFICANT CHANGE UP
GLUCOSE SERPL-MCNC: 104 MG/DL — HIGH (ref 70–99)
GLUCOSE SERPL-MCNC: 132 MG/DL — HIGH (ref 70–99)
HCT VFR BLD CALC: 43 % — SIGNIFICANT CHANGE UP (ref 39–50)
HCT VFR BLD CALC: 46.8 % — SIGNIFICANT CHANGE UP (ref 39–50)
HGB BLD-MCNC: 14.5 G/DL — SIGNIFICANT CHANGE UP (ref 13–17)
HGB BLD-MCNC: 15.8 G/DL — SIGNIFICANT CHANGE UP (ref 13–17)
INR BLD: 1.08 RATIO — SIGNIFICANT CHANGE UP (ref 0.88–1.16)
LYMPHOCYTES # BLD AUTO: 1.3 K/UL — SIGNIFICANT CHANGE UP (ref 1–3.3)
LYMPHOCYTES # BLD AUTO: 1.3 K/UL — SIGNIFICANT CHANGE UP (ref 1–3.3)
LYMPHOCYTES # BLD AUTO: 15.9 % — SIGNIFICANT CHANGE UP (ref 13–44)
LYMPHOCYTES # BLD AUTO: 17.5 % — SIGNIFICANT CHANGE UP (ref 13–44)
MAGNESIUM SERPL-MCNC: 1.8 MG/DL — SIGNIFICANT CHANGE UP (ref 1.6–2.6)
MAGNESIUM SERPL-MCNC: 2 MG/DL — SIGNIFICANT CHANGE UP (ref 1.6–2.6)
MCHC RBC-ENTMCNC: 33.2 PG — SIGNIFICANT CHANGE UP (ref 27–34)
MCHC RBC-ENTMCNC: 33.3 PG — SIGNIFICANT CHANGE UP (ref 27–34)
MCHC RBC-ENTMCNC: 33.7 GM/DL — SIGNIFICANT CHANGE UP (ref 32–36)
MCHC RBC-ENTMCNC: 33.7 GM/DL — SIGNIFICANT CHANGE UP (ref 32–36)
MCV RBC AUTO: 98.6 FL — SIGNIFICANT CHANGE UP (ref 80–100)
MCV RBC AUTO: 98.7 FL — SIGNIFICANT CHANGE UP (ref 80–100)
MONOCYTES # BLD AUTO: 0.7 K/UL — SIGNIFICANT CHANGE UP (ref 0–0.9)
MONOCYTES # BLD AUTO: 0.9 K/UL — SIGNIFICANT CHANGE UP (ref 0–0.9)
MONOCYTES NFR BLD AUTO: 10.9 % — SIGNIFICANT CHANGE UP (ref 2–14)
MONOCYTES NFR BLD AUTO: 9 % — SIGNIFICANT CHANGE UP (ref 2–14)
NEUTROPHILS # BLD AUTO: 5.2 K/UL — SIGNIFICANT CHANGE UP (ref 1.8–7.4)
NEUTROPHILS # BLD AUTO: 5.7 K/UL — SIGNIFICANT CHANGE UP (ref 1.8–7.4)
NEUTROPHILS NFR BLD AUTO: 68.4 % — SIGNIFICANT CHANGE UP (ref 43–77)
NEUTROPHILS NFR BLD AUTO: 70.7 % — SIGNIFICANT CHANGE UP (ref 43–77)
PHOSPHATE SERPL-MCNC: 3.2 MG/DL — SIGNIFICANT CHANGE UP (ref 2.5–4.5)
PHOSPHATE SERPL-MCNC: 4 MG/DL — SIGNIFICANT CHANGE UP (ref 2.5–4.5)
PLATELET # BLD AUTO: 132 K/UL — LOW (ref 150–400)
PLATELET # BLD AUTO: 175 K/UL — SIGNIFICANT CHANGE UP (ref 150–400)
POTASSIUM SERPL-MCNC: 4 MMOL/L — SIGNIFICANT CHANGE UP (ref 3.5–5.3)
POTASSIUM SERPL-MCNC: 4.3 MMOL/L — SIGNIFICANT CHANGE UP (ref 3.5–5.3)
POTASSIUM SERPL-SCNC: 4 MMOL/L — SIGNIFICANT CHANGE UP (ref 3.5–5.3)
POTASSIUM SERPL-SCNC: 4.3 MMOL/L — SIGNIFICANT CHANGE UP (ref 3.5–5.3)
PREALB SERPL-MCNC: 21 MG/DL — SIGNIFICANT CHANGE UP (ref 20–40)
PROT SERPL-MCNC: 6 G/DL — SIGNIFICANT CHANGE UP (ref 6–8.3)
PROT SERPL-MCNC: 6.8 G/DL — SIGNIFICANT CHANGE UP (ref 6–8.3)
PROTHROM AB SERPL-ACNC: 12.4 SEC — SIGNIFICANT CHANGE UP (ref 10–12.9)
RBC # BLD: 4.35 M/UL — SIGNIFICANT CHANGE UP (ref 4.2–5.8)
RBC # BLD: 4.75 M/UL — SIGNIFICANT CHANGE UP (ref 4.2–5.8)
RBC # FLD: 12.3 % — SIGNIFICANT CHANGE UP (ref 10.3–14.5)
RBC # FLD: 12.4 % — SIGNIFICANT CHANGE UP (ref 10.3–14.5)
SODIUM SERPL-SCNC: 138 MMOL/L — SIGNIFICANT CHANGE UP (ref 135–145)
SODIUM SERPL-SCNC: 138 MMOL/L — SIGNIFICANT CHANGE UP (ref 135–145)
WBC # BLD: 7.4 K/UL — SIGNIFICANT CHANGE UP (ref 3.8–10.5)
WBC # BLD: 8.3 K/UL — SIGNIFICANT CHANGE UP (ref 3.8–10.5)
WBC # FLD AUTO: 7.4 K/UL — SIGNIFICANT CHANGE UP (ref 3.8–10.5)
WBC # FLD AUTO: 8.3 K/UL — SIGNIFICANT CHANGE UP (ref 3.8–10.5)

## 2018-12-06 PROCEDURE — 99291 CRITICAL CARE FIRST HOUR: CPT

## 2018-12-06 PROCEDURE — 99233 SBSQ HOSP IP/OBS HIGH 50: CPT | Mod: GC

## 2018-12-06 RX ORDER — HEPARIN SODIUM 5000 [USP'U]/ML
5000 INJECTION INTRAVENOUS; SUBCUTANEOUS EVERY 8 HOURS
Qty: 0 | Refills: 0 | Status: DISCONTINUED | OUTPATIENT
Start: 2018-12-06 | End: 2018-12-09

## 2018-12-06 RX ORDER — CAPTOPRIL 12.5 MG/1
12.5 TABLET ORAL THREE TIMES A DAY
Qty: 0 | Refills: 0 | Status: DISCONTINUED | OUTPATIENT
Start: 2018-12-06 | End: 2018-12-08

## 2018-12-06 RX ADMIN — CAPTOPRIL 18.75 MILLIGRAM(S): 12.5 TABLET ORAL at 00:19

## 2018-12-06 RX ADMIN — CAPTOPRIL 12.5 MILLIGRAM(S): 12.5 TABLET ORAL at 13:38

## 2018-12-06 RX ADMIN — Medication 5.06 MICROGRAM(S)/KG/MIN: at 07:00

## 2018-12-06 RX ADMIN — PANTOPRAZOLE SODIUM 40 MILLIGRAM(S): 20 TABLET, DELAYED RELEASE ORAL at 06:33

## 2018-12-06 RX ADMIN — CAPTOPRIL 12.5 MILLIGRAM(S): 12.5 TABLET ORAL at 21:37

## 2018-12-06 RX ADMIN — CAPTOPRIL 18.75 MILLIGRAM(S): 12.5 TABLET ORAL at 06:32

## 2018-12-06 NOTE — PROGRESS NOTE ADULT - ASSESSMENT
48 yo [place] M, w/ PMH of NICM LVIDd 7 cm (EF 10% 11/2018 - recently diagnosed in August 2018) a/w cardiogenic shock requiring IABP and inotropes.    RHC on 11/29/2018 RA 18, RV 48/5/16 PA 49/23/37 PCWP 26 PA 62% CO 2.7 CI 1.87 SVR 2300 PVR 4    11/30 CVP 4, PA 33/16/23 MvO2 77% CO 4.97 CI 2.37 SVR 1200  12/4 PA 24/9/15 CVP -2 PCWP 10    Discontinued dobutamine 12/4, resumed 12/5

## 2018-12-06 NOTE — DIETITIAN INITIAL EVALUATION ADULT. - FACTORS AFF FOOD INTAKE
other (specify)/Pt state he notices that he will feel bloated after lunch, therefore skips dinner. Reports some difficulty with swallowing water, but denies any issues currently.

## 2018-12-06 NOTE — DIETITIAN INITIAL EVALUATION ADULT. - ENERGY NEEDS
Ht: 5'4", Wt: 141lbs, BMI: 24.1kg/m2, IBW: 130lbs (+/-10%), 108%IBW  Pertinent information: Pt admitted to OSH for SOB nad chest pain. Transferred to [facility] 2/2 cardiogenic shock. Pt with NICM, EF 10%, s/p IABP now removed. ? need for AICD. Pt under LVAD and Transplant evaluation  No Edema, Skin intact

## 2018-12-06 NOTE — DIETITIAN INITIAL EVALUATION ADULT. - ORAL INTAKE PTA
good/Pt reports a good PO intake PTA. Breakfast: skips food, had tea or coffee instead. Lunch: (large meal) rice, chicken, pork, and vegetables. Dinner: either skips or has left overs from lunch. States he will skip dinner atleast 2x weekly. drinks Water, tea gator aid. Does not snack.

## 2018-12-06 NOTE — PROGRESS NOTE ADULT - ASSESSMENT
Seen sitting in chair. Pleased he is able to get out of bed. Indicated he understands necessary lifestyle changes he needs to make related to health (improve adherence with medication and MD follow up, call MD for symptom management).  When asked what his understanding of disease severity was on 1/10 scale, indicated his heart failure was 8/10. Provided additional psychoeducation on importance of following treatment recommendations and becoming more of an active participant in his health. Slept well.  Appetite ok. Denies depression.  Denies s/i. Receptive to support and validation.    Dx: r/o Adjustment disorder with depressed mood; F43.21

## 2018-12-06 NOTE — DIETITIAN INITIAL EVALUATION ADULT. - ADHERENCE
poor/Pt admits to consuming a diet high in salt. Add slat to meals, often cooks with salt and consumes العل[place] and chinese food.

## 2018-12-06 NOTE — PROGRESS NOTE ADULT - PROBLEM SELECTOR PLAN 1
- no further IVF  - reduce captopril 12.5 mg TID  - hold spironolactone  - check thermodilution CO via swan, then remove  - ongoing LVAD evaluation    - hold diuresis  -- trend LFTs  -- monitor Cr  -- monitor UOP  -- I/Os  -- daily weights  -- K>4, Mg>2.

## 2018-12-06 NOTE — DIETITIAN INITIAL EVALUATION ADULT. - NS AS NUTRI INTERV ED CONTENT
Recommended modifications/Purpose of the nutrition education/Nutrition relationship to health/disease/Reviewed CHF diet education. Discussed Na restriction, foods high in Na to avoid, reading food labels, tips for limiting Na in your diet. Reviewed daily weights, Wt gain parameters to contact MD. Discussed Na intake in relation to fluid retention, edema and Wt gain. Pt verbalized understanding and accepted Heart Failure Nutrition Therapy Handout.  LVAD and transplant nutrition education introduced. Will continue to follow up for education review. RD remains available to monitor PO intake, wt, labs and diet education/Other (specify)

## 2018-12-06 NOTE — PROGRESS NOTE ADULT - SUBJECTIVE AND OBJECTIVE BOX
Patient is a 47y old  Male who presents with a chief complaint of Cardiogenic Shock (05 Dec 2018 23:56)      Overnight Events:      REVIEW OF SYSTEMS:  CONSTITUTIONAL: No weakness, fevers or chills  EYES/ENT: No visual changes, no throat pain   RESPIRATORY: No cough, wheezing, hemoptysis; No shortness of breath  CARDIOVASCULAR: No chest pain or palpitations  GASTROINTESTINAL: No abdominal, nausea, vomiting, or hematemesis; No diarrhea or constipation. No melena or hematochezia.  GENITOURINARY: No dysuria, frequency or hematuria  NEUROLOGICAL: No dizziness, numbness, or weakness  SKIN: No itching, burning, rashes, or lesions   All other review of systems is negative unless indicated above.    VITAL SIGNS:  Vital Signs Last 24 Hrs  T(C): 36.3 (06 Dec 2018 06:00), Max: 36.7 (05 Dec 2018 14:14)  T(F): 97.3 (06 Dec 2018 06:00), Max: 98 (05 Dec 2018 14:14)  HR: 90 (06 Dec 2018 06:00) (80 - 118)  BP: 99/60 (06 Dec 2018 06:00) (79/58 - 113/71)  BP(mean): 73 (06 Dec 2018 06:00) (63 - 95)  RR: 18 (06 Dec 2018 06:00) (14 - 23)  SpO2: 97% (05 Dec 2018 20:00) (96% - 98%)    PHYSICAL EXAM:   GENERAL: no acute distress  HEENT: NC/AT, EOMI, neck supple, MMM  RESPIRATORY: LCTAB/L, no rhonchi, rales, or wheezing  CARDIOVASCULAR: RRR, no murmurs, gallops, rubs  ABDOMINAL: soft, non-tender, non-distended, positive bowel sounds   EXTREMITIES: no clubbing, cyanosis, or edema  NEUROLOGICAL: alert and oriented x 3, non-focal  SKIN: no rashes or lesions   MUSCULOSKELETAL: no gross joint deformity                          14.5   7.4   )-----------( 132      ( 06 Dec 2018 05:27 )             43.0     12-06    138  |  104  |  16  ----------------------------<  104<H>  4.0   |  21<L>  |  0.89    Ca    8.7      06 Dec 2018 05:27  Phos  3.2     12-06  Mg     1.8     12-06    TPro  6.0  /  Alb  3.5  /  TBili  1.0  /  DBili  x   /  AST  38  /  ALT  129<H>  /  AlkPhos  52  12-06    PT/INR - ( 06 Dec 2018 05:27 )   PT: 12.4 sec;   INR: 1.08 ratio         PTT - ( 06 Dec 2018 05:27 )  PTT:27.6 sec    CAPILLARY BLOOD GLUCOSE        I&O's Summary    05 Dec 2018 07:01  -  06 Dec 2018 07:00  --------------------------------------------------------  IN: 1670.8 mL / OUT: 780 mL / NET: 890.8 mL        MEDICATIONS  (STANDING):  captopril 18.75 milliGRAM(s) Oral every 8 hours  DOBUTamine Infusion 2.5 MICROgram(s)/kG/Min (5.063 mL/Hr) IV Continuous <Continuous>  pantoprazole    Tablet 40 milliGRAM(s) Oral before breakfast  sodium chloride 0.9% Bolus 250 milliLiter(s) IV Bolus once  sodium chloride 0.9%. 250 milliLiter(s) (10 mL/Hr) IV Continuous <Continuous> Patient is a 47y old  Male who presents with a chief complaint of Cardiogenic Shock (05 Dec 2018 23:56)      Overnight Events:  No acute events ON.     REVIEW OF SYSTEMS:  CONSTITUTIONAL: No weakness, fevers or chills  EYES/ENT: No visual changes, no throat pain   RESPIRATORY: No cough, wheezing, hemoptysis; No shortness of breath  CARDIOVASCULAR: No chest pain or palpitations  GASTROINTESTINAL: No abdominal, nausea, vomiting, or hematemesis; No diarrhea or constipation. No melena or hematochezia.  GENITOURINARY: No dysuria, frequency or hematuria  NEUROLOGICAL: No dizziness, numbness, or weakness  SKIN: No itching, burning, rashes, or lesions   All other review of systems is negative unless indicated above.    VITAL SIGNS:  Vital Signs Last 24 Hrs  T(C): 36.3 (06 Dec 2018 06:00), Max: 36.7 (05 Dec 2018 14:14)  T(F): 97.3 (06 Dec 2018 06:00), Max: 98 (05 Dec 2018 14:14)  HR: 90 (06 Dec 2018 06:00) (80 - 118)  BP: 99/60 (06 Dec 2018 06:00) (79/58 - 113/71)  BP(mean): 73 (06 Dec 2018 06:00) (63 - 95)  RR: 18 (06 Dec 2018 06:00) (14 - 23)  SpO2: 97% (05 Dec 2018 20:00) (96% - 98%)    PHYSICAL EXAM:   GENERAL: no acute distress  HEENT: NC/AT, EOMI, neck supple, MMM  RESPIRATORY: LCTAB/L, no rhonchi, rales, or wheezing  CARDIOVASCULAR: RRR, no murmurs, gallops, rubs  ABDOMINAL: soft, non-tender, non-distended, positive bowel sounds   EXTREMITIES: Trace LE edema b/l  NEUROLOGICAL: alert and oriented x 3, non-focal  SKIN: no rashes or lesions   MUSCULOSKELETAL: no gross joint deformity                          14.5   7.4   )-----------( 132      ( 06 Dec 2018 05:27 )             43.0     12-06    138  |  104  |  16  ----------------------------<  104<H>  4.0   |  21<L>  |  0.89    Ca    8.7      06 Dec 2018 05:27  Phos  3.2     12-06  Mg     1.8     12-06    TPro  6.0  /  Alb  3.5  /  TBili  1.0  /  DBili  x   /  AST  38  /  ALT  129<H>  /  AlkPhos  52  12-06    PT/INR - ( 06 Dec 2018 05:27 )   PT: 12.4 sec;   INR: 1.08 ratio         PTT - ( 06 Dec 2018 05:27 )  PTT:27.6 sec    CAPILLARY BLOOD GLUCOSE        I&O's Summary    05 Dec 2018 07:01  -  06 Dec 2018 07:00  --------------------------------------------------------  IN: 1670.8 mL / OUT: 780 mL / NET: 890.8 mL        MEDICATIONS  (STANDING):  captopril 18.75 milliGRAM(s) Oral every 8 hours  DOBUTamine Infusion 2.5 MICROgram(s)/kG/Min (5.063 mL/Hr) IV Continuous <Continuous>  pantoprazole    Tablet 40 milliGRAM(s) Oral before breakfast  sodium chloride 0.9% Bolus 250 milliLiter(s) IV Bolus once  sodium chloride 0.9%. 250 milliLiter(s) (10 mL/Hr) IV Continuous <Continuous>

## 2018-12-06 NOTE — PROGRESS NOTE ADULT - SUBJECTIVE AND OBJECTIVE BOX
Behavioral Cardiology Progress Note   History of present illness: Mr. Charles is a 47 year old [place] man with a PMH of NICM LVIDd 7 cm (EF 10% 11/2018 - recently diagnosed in August 2018) a/w cardiogenic shock requiring IABP and inotropes. No reported psychiatric history.   Social history:  18 years, has 2 children (son 17, daughter 10). Lives with his wife (Trisha [phone]), mother-in-law, and 2 children in a private house in [place]. [employer].  Primary language [employer] [place] [employer]; fluent in English. 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    Mental Status Exam/Cognitive: Seen sitting in chair. Pleasant and cooperative; well related with good eye contact. Speech: normal range, volume, rate. Mood "I know I have to take medication and do things better."  Affect euthymic. Thought process logical. Thought content without evidence of any psychosis, arelis, or delusions. A&Ox3. Insight into disease limited. Judgment adequate.

## 2018-12-06 NOTE — DIETITIAN INITIAL EVALUATION ADULT. - PERTINENT LABORATORY DATA
Hgb/Hct:14.5/43.0, Na:138, K:4.0, Cl:104, BUN:16, Cr:0.89, Glucose:104-high, Ca:8.7, Total Protein:6.0, Albumin:3.5, Total Bilirubin:1.0, AlkPhos:52, AST:38, ALT:129-high, M,8, Phos: 3.2, Cholesterol: 109, Triglycerides: 77, HDL: 23-low, LDL: 71, Hgbs1c: 6.0%

## 2018-12-06 NOTE — DIETITIAN INITIAL EVALUATION ADULT. - OTHER INFO
Nutrition consult received for LVAD/heart Transplant evaluation. Pt seen in CCU, reports feeling well and admits to an improvement in PO intake. Pt reports poor intake x2 day after admission 2/2 bloating. Pt is now consumes 75% of meals. Pt has a scale at home but denies monitoring daily weights. Pt denies history of DM or pre DM. Pt showed some knowledge regarding following a low Na diet; was aware he should be reading food labels and not adding salt to meals. Pt was amenable to HF nutrition therapy diet education. During education, Pt showed limited interest and limited insight regarding relationship diet to health and disease. Pt reports he will do better to monitor weights, follow a low Na diet. He accepted written education materials and used brief teach back points. RD briefly introduced LVAD and Transplant Nutrition therapy, written materials left at bedside. Pt takes MVI PTA. Pt denies GI distress, chewing/swallowing difficulty, NKFA

## 2018-12-06 NOTE — PROGRESS NOTE ADULT - SUBJECTIVE AND OBJECTIVE BOX
Cardiology Progress Note    Interval events: Restarted 2.5 mcg/kg/min dobutamine overnight. Albumin given. Patient with no further syncope/dizziness.    Tele: SR    Medications:  aluminum hydroxide/magnesium hydroxide/simethicone Suspension 30 milliLiter(s) Oral every 6 hours PRN  captopril 12.5 milliGRAM(s) Oral three times a day  DOBUTamine Infusion 2.5 MICROgram(s)/kG/Min IV Continuous <Continuous>  pantoprazole    Tablet 40 milliGRAM(s) Oral before breakfast  simethicone 80 milliGRAM(s) Chew three times a day PRN  sodium chloride 0.9% Bolus 250 milliLiter(s) IV Bolus once  sodium chloride 0.9%. 250 milliLiter(s) IV Continuous <Continuous>      Review of Systems:  Constitutional: [ ] Fever [ ] Chills [ ] Fatigue [ ] Weight change   HEENT: [ ] Blurred vision [ ] Eye Pain [ ] Headache [ ] Runny nose [ ] Sore Throat   Respiratory: [ ] Cough [ ] Wheezing [ ] Shortness of breath  Cardiovascular: [ ] Chest Pain [ ] Palpitations [ ] DRUMMOND [ ] PND [ ] Orthopnea  Gastrointestinal: [ ] Abdominal Pain [ ] Diarrhea [ ] Constipation [ ] Hemorrhoids [ ] Nausea [ ] Vomiting  Genitourinary: [ ] Nocturia [ ] Dysuria [ ] Incontinence  Extremities: [ ] Swelling [ ] Joint Pain  Neurologic: [ ] Focal deficit [ ] Paresthesias [ ] Syncope  Lymphatic: [ ] Swelling [ ] Lymphadenopathy   Skin: [ ] Rash [ ] Ecchymoses [ ] Wounds [ ] Lesions  Psychiatry: [ ] Depression [ ] Suicidal/Homicidal Ideation [ ] Anxiety [ ] Sleep Disturbances  [ ] 10 point review of systems is otherwise negative except as mentioned above            [ ]Unable to obtain    Vitals:  T(C): 36.7 (18 @ 08:00), Max: 36.7 (18 @ 14:14)  HR: 104 (18 @ 12:00) (80 - 118)  BP: 96/68 (18 @ 12:00) (79/58 - 113/71)  BP(mean): 77 (18 @ 12:00) (63 - 95)  RR: 17 (18 @ 12:00) (14 - 18)  SpO2: 97% (12-06-18 @ 07:00) (97% - 98%)  Wt(kg): --  Daily     Daily Weight in k (06 Dec 2018 06:00)  I&O's Summary    05 Dec 2018 07:  -  06 Dec 2018 07:00  --------------------------------------------------------  IN: 1670.8 mL / OUT: 780 mL / NET: 890.8 mL    06 Dec 2018 07:01  -  06 Dec 2018 12:34  --------------------------------------------------------  IN: 75.5 mL / OUT: 1 mL / NET: 74.5 mL        Physical Exam:  NAD  PERRL, EOMI  Normal oral [place] NC/AT  S1, S2, RRR, No m/r/g appreciated, No edema, no elevation in JVP  Clear to auscultation bilaterally  Soft, Non-tender, Non-distended, BS+  No clubbing, No joint deformity   Non-focal  No lymphadenopathy  AAOx3, Mood & affect appropriate  No rashes, No ecchymoses, No cyanosis  Procedural Access Site: No hematoma, Non-tender to palpation, 2+ pulse , No bruit, No Ecchymosis    Labs:                        14.5   7.4   )-----------( 132      ( 06 Dec 2018 05:27 )             43.0     12    138  |  104  |  16  ----------------------------<  104<H>  4.0   |  21<L>  |  0.89    Ca    8.7      06 Dec 2018 05:27  Phos  3.2       Mg     1.8         TPro  6.0  /  Alb  3.5  /  TBili  1.0  /  DBili  x   /  AST  38  /  ALT  129<H>  /  AlkPhos  52      PT/INR - ( 06 Dec 2018 05:27 )   PT: 12.4 sec;   INR: 1.08 ratio         PTT - ( 06 Dec 2018 05:27 )  PTT:27.6 sec              New results/imaging:

## 2018-12-06 NOTE — DIETITIAN INITIAL EVALUATION ADULT. - NS FNS REASON FOR WEIGHT CHANG
other (specify)/Unknown. Pt denies any changes in PO intake, physical activity. Pt was 140lbs in August 2018, was now admitted at 149lbs, and is currently 141lbs. ? increase and then decrease in weight is likely due to fluid shifts.

## 2018-12-06 NOTE — PROGRESS NOTE ADULT - ASSESSMENT
46 yo M w/ recent diagnosis in august 2018 of nonischemic cardiomyopathy (EF 10%), initially presented to [facility] d/t SOB and Epigastric pain, now s/p R heart cardiac cath, showing clean coronaries, but with high wedge pressures and need for blood pressure support with pressors, transferred to [facility] CCU for cardiogenic shock. Patient is now s/p IABP (d/eugenie on 12/1). He suffered vagal episode on 12/3 in the setting overdiuresis. [place] removed ON.     #Neuro  -No active issues.  -Patient AAO x 3    #Pulm  -O2 sat well on RA  -Heart failure following, recs appreciated. Diuretics currently d/eugenie in the setting of hypotension 2/2 overdiuresis.     #Cardiovascular  - HFrEF with EF of 10%  - Cardiogenic Shock upon admission  - Patient recently diagnosed with NICM  - off levo, s/p IABP removal. No longer on diuretics  - Restarted dobutamine given increasing lactate ON  - Captopril dose decreased to 12.5 mg TID  - F/U CT Chest/Abd/Pelvis for cardiomyopathy eval  - HF following.  - Removed balloon pump 12/1  - EP consulted for AICD placement, recs appreciated. Per EP, AICD not indicated until transplant/LVAD w/u completed by heart failure team    Hypovolemia  -d/eugenie Lasix  -Fluid resuscitation as per HF    - Hypertension; Plan - captopril for afterload reduction  -Strict I/Os, daily wts, keep I < O, trend BMP, supplement lytes prn      #GI  - No active issues.   -Continue Protonix 40mg for GI ppx.  - f/u CT A/P    #Renal/  - LARISSA resolved, currently Cr 1.1  - trend Cr, trend lytes, supplement PRN    #ID  - Afebrile, no active issues.  -Administer influenza vaccine.     #Endo  - HgbA1c 6.0 on 11/24  - Continue to monitor     #Skin  -No active issues.    #DVT  -D/eugenie heparin drip since IABP removed 12/1. Will start on SCD for now, given low platelet

## 2018-12-07 LAB
ALBUMIN SERPL ELPH-MCNC: 3.8 G/DL — SIGNIFICANT CHANGE UP (ref 3.3–5)
ALP SERPL-CCNC: 56 U/L — SIGNIFICANT CHANGE UP (ref 40–120)
ALT FLD-CCNC: 112 U/L — HIGH (ref 10–45)
ANION GAP SERPL CALC-SCNC: 13 MMOL/L — SIGNIFICANT CHANGE UP (ref 5–17)
APTT BLD: 29.6 SEC — SIGNIFICANT CHANGE UP (ref 27.5–36.3)
AST SERPL-CCNC: 35 U/L — SIGNIFICANT CHANGE UP (ref 10–40)
BASOPHILS # BLD AUTO: 0.03 K/UL — SIGNIFICANT CHANGE UP (ref 0–0.2)
BASOPHILS NFR BLD AUTO: 0.4 % — SIGNIFICANT CHANGE UP (ref 0–2)
BILIRUB SERPL-MCNC: 0.8 MG/DL — SIGNIFICANT CHANGE UP (ref 0.2–1.2)
BUN SERPL-MCNC: 15 MG/DL — SIGNIFICANT CHANGE UP (ref 7–23)
CALCIUM SERPL-MCNC: 9.5 MG/DL — SIGNIFICANT CHANGE UP (ref 8.4–10.5)
CHLORIDE SERPL-SCNC: 105 MMOL/L — SIGNIFICANT CHANGE UP (ref 96–108)
CO2 SERPL-SCNC: 21 MMOL/L — LOW (ref 22–31)
CREAT SERPL-MCNC: 1.01 MG/DL — SIGNIFICANT CHANGE UP (ref 0.5–1.3)
EOSINOPHIL # BLD AUTO: 0.42 K/UL — SIGNIFICANT CHANGE UP (ref 0–0.5)
EOSINOPHIL NFR BLD AUTO: 5.4 % — SIGNIFICANT CHANGE UP (ref 0–6)
GLUCOSE SERPL-MCNC: 106 MG/DL — HIGH (ref 70–99)
HCT VFR BLD CALC: 45.1 % — SIGNIFICANT CHANGE UP (ref 39–50)
HGB BLD-MCNC: 15.1 G/DL — SIGNIFICANT CHANGE UP (ref 13–17)
IMM GRANULOCYTES NFR BLD AUTO: 0.6 % — SIGNIFICANT CHANGE UP (ref 0–1.5)
INR BLD: 1.05 RATIO — SIGNIFICANT CHANGE UP (ref 0.88–1.16)
LYMPHOCYTES # BLD AUTO: 1.61 K/UL — SIGNIFICANT CHANGE UP (ref 1–3.3)
LYMPHOCYTES # BLD AUTO: 20.7 % — SIGNIFICANT CHANGE UP (ref 13–44)
MAGNESIUM SERPL-MCNC: 2 MG/DL — SIGNIFICANT CHANGE UP (ref 1.6–2.6)
MCHC RBC-ENTMCNC: 32.3 PG — SIGNIFICANT CHANGE UP (ref 27–34)
MCHC RBC-ENTMCNC: 33.5 GM/DL — SIGNIFICANT CHANGE UP (ref 32–36)
MCV RBC AUTO: 96.4 FL — SIGNIFICANT CHANGE UP (ref 80–100)
MONOCYTES # BLD AUTO: 0.9 K/UL — SIGNIFICANT CHANGE UP (ref 0–0.9)
MONOCYTES NFR BLD AUTO: 11.6 % — SIGNIFICANT CHANGE UP (ref 2–14)
NEUTROPHILS # BLD AUTO: 4.76 K/UL — SIGNIFICANT CHANGE UP (ref 1.8–7.4)
NEUTROPHILS NFR BLD AUTO: 61.3 % — SIGNIFICANT CHANGE UP (ref 43–77)
PHOSPHATE SERPL-MCNC: 3.9 MG/DL — SIGNIFICANT CHANGE UP (ref 2.5–4.5)
PLATELET # BLD AUTO: 181 K/UL — SIGNIFICANT CHANGE UP (ref 150–400)
POTASSIUM SERPL-MCNC: 4 MMOL/L — SIGNIFICANT CHANGE UP (ref 3.5–5.3)
POTASSIUM SERPL-SCNC: 4 MMOL/L — SIGNIFICANT CHANGE UP (ref 3.5–5.3)
PROT SERPL-MCNC: 6.5 G/DL — SIGNIFICANT CHANGE UP (ref 6–8.3)
PROTHROM AB SERPL-ACNC: 11.8 SEC — SIGNIFICANT CHANGE UP (ref 10–13.1)
RBC # BLD: 4.68 M/UL — SIGNIFICANT CHANGE UP (ref 4.2–5.8)
RBC # FLD: 13.1 % — SIGNIFICANT CHANGE UP (ref 10.3–14.5)
SODIUM SERPL-SCNC: 139 MMOL/L — SIGNIFICANT CHANGE UP (ref 135–145)
WBC # BLD: 7.77 K/UL — SIGNIFICANT CHANGE UP (ref 3.8–10.5)
WBC # FLD AUTO: 7.77 K/UL — SIGNIFICANT CHANGE UP (ref 3.8–10.5)

## 2018-12-07 PROCEDURE — 99232 SBSQ HOSP IP/OBS MODERATE 35: CPT | Mod: GC

## 2018-12-07 PROCEDURE — 99233 SBSQ HOSP IP/OBS HIGH 50: CPT | Mod: GC

## 2018-12-07 RX ORDER — INFLUENZA VIRUS VACCINE 15; 15; 15; 15 UG/.5ML; UG/.5ML; UG/.5ML; UG/.5ML
0.5 SUSPENSION INTRAMUSCULAR ONCE
Qty: 0 | Refills: 0 | Status: DISCONTINUED | OUTPATIENT
Start: 2018-12-07 | End: 2018-12-09

## 2018-12-07 RX ORDER — BUMETANIDE 0.25 MG/ML
0.5 INJECTION INTRAMUSCULAR; INTRAVENOUS ONCE
Qty: 0 | Refills: 0 | Status: COMPLETED | OUTPATIENT
Start: 2018-12-07 | End: 2018-12-07

## 2018-12-07 RX ORDER — PNEUMOCOCCAL 13-VALENT CONJUGATE VACCINE 2.2; 2.2; 2.2; 2.2; 2.2; 4.4; 2.2; 2.2; 2.2; 2.2; 2.2; 2.2; 2.2 UG/.5ML; UG/.5ML; UG/.5ML; UG/.5ML; UG/.5ML; UG/.5ML; UG/.5ML; UG/.5ML; UG/.5ML; UG/.5ML; UG/.5ML; UG/.5ML; UG/.5ML
0.5 INJECTION, SUSPENSION INTRAMUSCULAR ONCE
Qty: 0 | Refills: 0 | Status: DISCONTINUED | OUTPATIENT
Start: 2018-12-07 | End: 2018-12-09

## 2018-12-07 RX ADMIN — CAPTOPRIL 12.5 MILLIGRAM(S): 12.5 TABLET ORAL at 20:56

## 2018-12-07 RX ADMIN — CAPTOPRIL 12.5 MILLIGRAM(S): 12.5 TABLET ORAL at 13:13

## 2018-12-07 RX ADMIN — HEPARIN SODIUM 5000 UNIT(S): 5000 INJECTION INTRAVENOUS; SUBCUTANEOUS at 20:56

## 2018-12-07 RX ADMIN — PANTOPRAZOLE SODIUM 40 MILLIGRAM(S): 20 TABLET, DELAYED RELEASE ORAL at 06:01

## 2018-12-07 RX ADMIN — HEPARIN SODIUM 5000 UNIT(S): 5000 INJECTION INTRAVENOUS; SUBCUTANEOUS at 13:13

## 2018-12-07 RX ADMIN — CAPTOPRIL 12.5 MILLIGRAM(S): 12.5 TABLET ORAL at 06:01

## 2018-12-07 RX ADMIN — HEPARIN SODIUM 5000 UNIT(S): 5000 INJECTION INTRAVENOUS; SUBCUTANEOUS at 06:01

## 2018-12-07 RX ADMIN — BUMETANIDE 0.5 MILLIGRAM(S): 0.25 INJECTION INTRAMUSCULAR; INTRAVENOUS at 17:52

## 2018-12-07 NOTE — PROGRESS NOTE ADULT - SUBJECTIVE AND OBJECTIVE BOX
Cardiology Progress Note    Interval events: No acute events overnight. No further episodes of dizziness. Patient feels well.    Tele: SR     Medications:  aluminum hydroxide/magnesium hydroxide/simethicone Suspension 30 milliLiter(s) Oral every 6 hours PRN  captopril 12.5 milliGRAM(s) Oral three times a day  heparin  Injectable 5000 Unit(s) SubCutaneous every 8 hours  pantoprazole    Tablet 40 milliGRAM(s) Oral before breakfast  simethicone 80 milliGRAM(s) Chew three times a day PRN  sodium chloride 0.9%. 250 milliLiter(s) IV Continuous <Continuous>      Review of Systems:  Constitutional: [ ] Fever [ ] Chills [ ] Fatigue [ ] Weight change   HEENT: [ ] Blurred vision [ ] Eye Pain [ ] Headache [ ] Runny nose [ ] Sore Throat   Respiratory: [ ] Cough [ ] Wheezing [ ] Shortness of breath  Cardiovascular: [ ] Chest Pain [ ] Palpitations [ ] DRUMMOND [ ] PND [ ] Orthopnea  Gastrointestinal: [ ] Abdominal Pain [ ] Diarrhea [ ] Constipation [ ] Hemorrhoids [ ] Nausea [ ] Vomiting  Genitourinary: [ ] Nocturia [ ] Dysuria [ ] Incontinence  Extremities: [ ] Swelling [ ] Joint Pain  Neurologic: [ ] Focal deficit [ ] Paresthesias [ ] Syncope  Lymphatic: [ ] Swelling [ ] Lymphadenopathy   Skin: [ ] Rash [ ] Ecchymoses [ ] Wounds [ ] Lesions  Psychiatry: [ ] Depression [ ] Suicidal/Homicidal Ideation [ ] Anxiety [ ] Sleep Disturbances  [ ] 10 point review of systems is otherwise negative except as mentioned above            [ ]Unable to obtain    Vitals:  T(C): 36.6 (18 @ 12:33), Max: 36.7 (18 @ 05:00)  HR: 102 (18 @ 12:33) (98 - 106)  BP: 98/66 (18 @ 12:33) (86/56 - 102/74)  BP(mean): 63 (18 @ 23:00) (63 - 84)  RR: 15 (18 @ 12:33) (15 - 20)  SpO2: 99% (18 @ 12:33) (97% - 99%)  Wt(kg): --  Daily     Daily Weight in k.8 (07 Dec 2018 06:35)  I&O's Summary    06 Dec 2018 07:  -  07 Dec 2018 07:00  --------------------------------------------------------  IN: 890.6 mL / OUT: 176 mL / NET: 714.6 mL    07 Dec 2018 07:  -  07 Dec 2018 15:57  --------------------------------------------------------  IN: 600 mL / OUT: 0 mL / NET: 600 mL        Physical Exam:  NAD  PERRL, EOMI  Normal oral [place] NC/AT  S1, S2, RRR, No m/r/g appreciated, No edema, no elevation in JVP  Clear to auscultation bilaterally  Soft, Non-tender, Non-distended, BS+  No clubbing, No joint deformity   Non-focal  No lymphadenopathy  AAOx3, Mood & affect appropriate  No rashes, No ecchymoses, No cyanosis    Labs:                        15.1   7.77  )-----------( 181      ( 07 Dec 2018 07:17 )             45.1     12-    139  |  105  |  15  ----------------------------<  106<H>  4.0   |  21<L>  |  1.01    Ca    9.5      07 Dec 2018 06:13  Phos  3.9     12-  Mg     2.0     12    TPro  6.5  /  Alb  3.8  /  TBili  0.8  /  DBili  x   /  AST  35  /  ALT  112<H>  /  AlkPhos  56  12-07    PT/INR - ( 07 Dec 2018 09:26 )   PT: 11.8 sec;   INR: 1.05 ratio         PTT - ( 07 Dec 2018 09:26 )  PTT:29.6 sec    New results/imaging:

## 2018-12-07 NOTE — PROGRESS NOTE ADULT - ASSESSMENT
48 yo M w/ recent diagnosis in august 2018 of nonischemic cardiomyopathy (EF 10%), initially presented to [facility] d/t SOB and Epigastric pain, now s/p R heart cardiac cath, showing clean coronaries, but with high wedge pressures and need for blood pressure support with pressors,transferred to [facility] CCU for cardiogenic shock and being worked up for prelvad vs heart transplant.     Pre-Transplant Workup  CMV IgG positive   Syphilis screen neg  EBV past infection  Toxo IgG positive   MMR immune   MRSA screen negative, MSSA positive   HIV negative  Hepatitis A nonreactive  Hepatitis B sAg nonreactive  Hepatitis B cAb nonreactive  Hepatitis B c IgM Ab nonreactive   Hepatitic C Ab nonreactive   HSV 1 IgG positive  HSV 2 IgG negative   Hep BSab+  HAV ab+  quantiferon negative but with a history of exposure to grandmother with active TB and granuloma finding on chest CT scan- no signs or sxs. of active TB will need prophylaxis for  latent disease if transplant is pursued  No contraindications from an ID perspective to VAD placement  Please give influenza and prevnar vaccines prior to discharge    Braeden Read MD  [phone]  After 5pm/weekends [phone]

## 2018-12-07 NOTE — PROGRESS NOTE ADULT - ASSESSMENT
48 yo [place] M, w/ PMH of NICM LVIDd 7 cm (EF 10% 11/2018 - recently diagnosed in August 2018) a/w cardiogenic shock requiring IABP and inotropes.    RHC on 11/29/2018 RA 18, RV 48/5/16 PA 49/23/37 PCWP 26 PA 62% CO 2.7 CI 1.87 SVR 2300 PVR 4    11/30 CVP 4, PA 33/16/23 MvO2 77% CO 4.97 CI 2.37 SVR 1200  12/4 PA 24/9/15 CVP -2 PCWP 10    Discontinued dobutamine 12/4, 12/6

## 2018-12-07 NOTE — PROGRESS NOTE ADULT - SUBJECTIVE AND OBJECTIVE BOX
PRESENTING CC:ADHF-Interim events noted-patient transferred from CCU-s/p Cardiogenic shock-HFrEF    SUBJ: 48 yo M w/ recent diagnosis in august 2018 of nonischemic cardiomyopathy (EF 10%), initially presented to [facility] d/t SOB and Epigastric pain, now s/p R heart cardiac cath, showing clean coronaries, but with high wedge pressures and need for blood pressure support with pressors, transferred to [facility] CCU for cardiogenic shock. Patient is now s/p IABP (d/eugenie on 12/1). He suffered vagal episode on 12/3 in the setting overdiuresis.Now on telemetry-feels better no dizziness dyspnea        PMH -reviewed admission note, no change since admission  Heart failure: acute [ ] chronic [ ] acute or chronic [x ] diastolic [ ] systolic [x ] combined systolic and diastolic[ ]  LARISSA: ATN[ ] renal medullary necrosis [ ] CKD I [ ]CKDII [ ]CKD III [ ]CKD IV [ ]CKD V [ ]Other pathological lesions [ ]    MEDICATIONS  (STANDING):  captopril 12.5 milliGRAM(s) Oral three times a day  heparin  Injectable 5000 Unit(s) SubCutaneous every 8 hours  pantoprazole    Tablet 40 milliGRAM(s) Oral before breakfast  sodium chloride 0.9%. 250 milliLiter(s) (10 mL/Hr) IV Continuous <Continuous>    MEDICATIONS  (PRN):  aluminum hydroxide/magnesium hydroxide/simethicone Suspension 30 milliLiter(s) Oral every 6 hours PRN Dyspepsia  simethicone 80 milliGRAM(s) Chew three times a day PRN Gas          FAMILY HISTORY:  Family history of lung disease (Grandparent): maternal grandmother (no history of smoking)  Family history of hypertension: father  Family history of heart disease: mother  No family history of premature coronary artery disease or sudden cardiac death      REVIEW OF SYSTEMS:  Constitutional: [ ] fever, [ ]weight loss,  [x ]fatigue  Eyes: [ ] visual changes  Respiratory: [x ]shortness of breath;  [ ] cough, [ ]wheezing, [ ]chills, [ ]hemoptysis  Cardiovascular: [ ] chest pain, [ ]palpitations, [ ]dizziness,  [ ]leg swelling[- ]orthopnea[- ]PND  Gastrointestinal: [ ] abdominal pain, [ ]nausea, [ ]vomiting,  [ ]diarrhea   Genitourinary: [ ] dysuria, [ ] hematuria  Neurologic: [ ] headaches [ ] tremors[ ]weakness  Skin: [ ] itching, [ ]burning, [ ] rashes  Endocrine: [ ] heat or cold intolerance  Musculoskeletal: [ ] joint pain or swelling; [ ] muscle, back, or extremity pain  Psychiatric: [ ] depression, [ ]anxiety, [ ]mood swings, or [ ]difficulty sleeping  Hematologic: [ ] easy bruising, [ ] bleeding gums    [x] All remaining systems negative except as per above.   [ ]Unable to obtain.    Vital Signs Last 24 Hrs  T(C): 36.7 (07 Dec 2018 05:00), Max: 36.7 (06 Dec 2018 08:00)  T(F): 98.1 (07 Dec 2018 05:00), Max: 98.1 (07 Dec 2018 05:00)  HR: 98 (07 Dec 2018 05:00) (90 - 108)  BP: 101/72 (07 Dec 2018 05:00) (80/59 - 105/75)  BP(mean): 63 (06 Dec 2018 23:00) (63 - 84)  RR: 17 (07 Dec 2018 05:00) (16 - 20)  SpO2: 97% (07 Dec 2018 05:00) (97% - 98%)  I&O's Summary    05 Dec 2018 07:01  -  06 Dec 2018 07:00  --------------------------------------------------------  IN: 1670.8 mL / OUT: 780 mL / NET: 890.8 mL    06 Dec 2018 07:01  -  07 Dec 2018 06:39  --------------------------------------------------------  IN: 840.6 mL / OUT: 1 mL / NET: 839.6 mL        PHYSICAL EXAM:  General: No acute distress BMI-25.5  HEENT: EOMI, PERRL  Neck: Supple, [ ] JVD  Lungs: Equal air entry bilaterally; [ ] rales [ ] wheezing [ ] rhonchi  Heart: Regular rate and rhythm; [x ] murmur  3 /6 [x ] systolic [ ] diastolic [ ] radiation[ ] rubs [ ]  gallops  Abdomen: Nontender, bowel sounds present  Extremities: No clubbing, cyanosis, [ ] edema  Nervous system:  Alert & Oriented X3, no focal deficits  Psychiatric: Normal affect  Skin: No rashes or lesions    LABS:  12-06    138  |  102  |  12  ----------------------------<  132<H>  4.3   |  20<L>  |  0.98    Ca    9.0      06 Dec 2018 16:30  Phos  4.0     12-06  Mg     2.0     12-06    TPro  6.8  /  Alb  4.1  /  TBili  0.7  /  DBili  x   /  AST  42<H>  /  ALT  131<H>  /  AlkPhos  68  12-06    Creatinine Trend: 0.98<--, 0.89<--, 0.95<--, 0.96<--, 1.01<--, <0.30<--                        15.8   8.3   )-----------( 175      ( 06 Dec 2018 16:30 )             46.8     PT/INR - ( 06 Dec 2018 05:27 )   PT: 12.4 sec;   INR: 1.08 ratio         PTT - ( 06 Dec 2018 05:27 )  PTT:27.6 sec      RADIOLOGY:-XR CHEST [id number]/04/2018  Impression:-The heart is normal in size. The lungs are clear.       TELEMETRY:Sinus rhythm sinus tachycardia-no arrhythmias    ECHO:-Study Date: 12/2/2018  Severe global left ventricular systolic dysfunction. EF-5-10 %  Moderate-severe mitral regurgitation.  Mildly dilated left atrium  Aortic Root: 2.9 cm.  Severe left ventricular enlargement.  Estimated right ventricular systolic pressure equals 35 mmHg,    IMPRESSION AND PLAN:    48 yo M w/ recent diagnosis in august 2018 of nonischemic cardiomyopathy (EF 10%), initially presented to [facility] d/t SOB and Epigastric pain, now s/p R heart cardiac cath, showing clean coronaries, but with high wedge pressures and need for blood pressure support with pressors, transferred to [facility] CCU for cardiogenic shock. Patient is now s/p IABP (d/eugenie on 12/1).Off Dobutrex undergoing evaluation for LVAD/OHT        #Cardiovascular-Acute on Chronic systolic heart failure-NICM-Stage C-D  - HFrEF with EF of 10%  - Cardiogenic Shock upon admission  - Patient recently diagnosed with NICM  - off levo, s/p IABP removal. No longer on diuretics  - Captopril dose decreased to 12.5 mg TID  - F/U CT Chest/Abd/Pelvis for cardiomyopathy eval  - HF following.  - Removed balloon pump 12/1  - LVAD/OHT Evaluation-ICD placement on hold    #Hematology-Polycythemia Vera  HCt-46.8-

## 2018-12-07 NOTE — PROGRESS NOTE ADULT - PROBLEM SELECTOR PLAN 1
- continue captopril 12.5 mg TID  - hold spironolactone  - ongoing LVAD evaluation    - hold diuresis  - trend LFTs  - monitor Cr  - monitor UOP  - I/Os  - daily weights  - K>4, Mg>2  - plan for lifevest, appreciate EP input. Discussed with patient at length, agrees to be compliant with wearing LifeVest. - continue captopril 12.5 mg TID  - hold spironolactone  - ongoing LVAD evaluation    - now more euvolemic with JVD approx 8-10; give 0.5 mg bumex x1 today  - trend LFTs  - monitor Cr  - monitor UOP  - I/Os  - daily weights  - K>4, Mg>2  - plan for lifevest, appreciate EP input. Discussed with patient at length, agrees to be compliant with wearing LifeVest.

## 2018-12-07 NOTE — PROGRESS NOTE ADULT - SUBJECTIVE AND OBJECTIVE BOX
INFECTIOUS DISEASES FOLLOW UP-- Teresa Casanovaach  [phone]    This is a follow up note for this  47yMale with  Chest pain CHF undergoing pre-VAD work up  feeling better ambulating in room      ROS:  CONSTITUTIONAL:  No fever, good appetite  CARDIOVASCULAR:  No chest pain or palpitations  RESPIRATORY:  No dyspnea  GASTROINTESTINAL:  No nausea, vomiting, diarrhea, or abdominal pain  GENITOURINARY:  No dysuria  NEUROLOGIC:  No headache,     Allergies    aspirin (Swelling)  Motrin (Hives)    Intolerances        ANTIBIOTICS/RELEVANT:  antimicrobials    immunologic:  influenza   Vaccine 0.5 milliLiter(s) IntraMuscular once  pneumococcal  13 Vaccine (PREVNAR 13) 0.5 milliLiter(s) IntraMuscular once    OTHER:  aluminum hydroxide/magnesium hydroxide/simethicone Suspension 30 milliLiter(s) Oral every 6 hours PRN  captopril 12.5 milliGRAM(s) Oral three times a day  heparin  Injectable 5000 Unit(s) SubCutaneous every 8 hours  pantoprazole    Tablet 40 milliGRAM(s) Oral before breakfast  simethicone 80 milliGRAM(s) Chew three times a day PRN  sodium chloride 0.9%. 250 milliLiter(s) IV Continuous <Continuous>      Objective:  Vital Signs Last 24 Hrs  T(C): 36.6 (07 Dec 2018 12:33), Max: 36.7 (07 Dec 2018 05:00)  T(F): 97.9 (07 Dec 2018 12:33), Max: 98.1 (07 Dec 2018 05:00)  HR: 102 (07 Dec 2018 17:53) (98 - 106)  BP: 107/56 (07 Dec 2018 17:53) (87/54 - 107/56)  BP(mean): 63 (06 Dec 2018 23:00) (63 - 84)  RR: 15 (07 Dec 2018 12:33) (15 - 20)  SpO2: 99% (07 Dec 2018 12:33) (97% - 99%)    PHYSICAL EXAM:  Constitutional:no acute distress  Eyes:BRENDEN, EOMI  Ear/Nose/Throat: no oral lesions, 	  Respiratory: clear BL  Cardiovascular: S1S2  Gastrointestinal:soft, (+) BS, no tenderness  Extremities:no e/e/c  No Lymphadenopathy  IV sites not inflammed.    LABS:                        15.1   7.77  )-----------( 181      ( 07 Dec 2018 07:17 )             45.1     12-07    139  |  105  |  15  ----------------------------<  106<H>  4.0   |  21<L>  |  1.01    Ca    9.5      07 Dec 2018 06:13  Phos  3.9     12-07  Mg     2.0     12-07    TPro  6.5  /  Alb  3.8  /  TBili  0.8  /  DBili  x   /  AST  35  /  ALT  112<H>  /  AlkPhos  56  12-07    PT/INR - ( 07 Dec 2018 09:26 )   PT: 11.8 sec;   INR: 1.05 ratio         PTT - ( 07 Dec 2018 09:26 )  PTT:29.6 sec      MICROBIOLOGY:            RECENT CULTURES:      RADIOLOGY & ADDITIONAL STUDIES:    < from: Xray Chest 1 View- PORTABLE-Routine (12.04.18 @ 09:16) >  Impression:    The heart is normal in size. The lungs are clear. A Dennison-Deirdre catheter is   in the entrance to the right main pulmonary artery. No pneumothorax.    < end of copied text >

## 2018-12-08 LAB
ALBUMIN SERPL ELPH-MCNC: 4.1 G/DL — SIGNIFICANT CHANGE UP (ref 3.3–5)
ALP SERPL-CCNC: 71 U/L — SIGNIFICANT CHANGE UP (ref 40–120)
ALT FLD-CCNC: 98 U/L — HIGH (ref 10–45)
AST SERPL-CCNC: 32 U/L — SIGNIFICANT CHANGE UP (ref 10–40)
BILIRUB DIRECT SERPL-MCNC: 0.2 MG/DL — SIGNIFICANT CHANGE UP (ref 0–0.2)
BILIRUB INDIRECT FLD-MCNC: 0.4 MG/DL — SIGNIFICANT CHANGE UP (ref 0.2–1)
BILIRUB SERPL-MCNC: 0.6 MG/DL — SIGNIFICANT CHANGE UP (ref 0.2–1.2)
PROT SERPL-MCNC: 7.1 G/DL — SIGNIFICANT CHANGE UP (ref 6–8.3)

## 2018-12-08 PROCEDURE — 99232 SBSQ HOSP IP/OBS MODERATE 35: CPT

## 2018-12-08 RX ORDER — LISINOPRIL 2.5 MG/1
10 TABLET ORAL DAILY
Qty: 0 | Refills: 0 | Status: DISCONTINUED | OUTPATIENT
Start: 2018-12-08 | End: 2018-12-09

## 2018-12-08 RX ORDER — METOPROLOL TARTRATE 50 MG
12.5 TABLET ORAL DAILY
Qty: 0 | Refills: 0 | Status: DISCONTINUED | OUTPATIENT
Start: 2018-12-08 | End: 2018-12-09

## 2018-12-08 RX ORDER — BUMETANIDE 0.25 MG/ML
0.5 INJECTION INTRAMUSCULAR; INTRAVENOUS EVERY OTHER DAY
Qty: 0 | Refills: 0 | Status: DISCONTINUED | OUTPATIENT
Start: 2018-12-08 | End: 2018-12-09

## 2018-12-08 RX ADMIN — PANTOPRAZOLE SODIUM 40 MILLIGRAM(S): 20 TABLET, DELAYED RELEASE ORAL at 05:25

## 2018-12-08 RX ADMIN — LISINOPRIL 10 MILLIGRAM(S): 2.5 TABLET ORAL at 16:20

## 2018-12-08 RX ADMIN — Medication 12.5 MILLIGRAM(S): at 16:20

## 2018-12-08 RX ADMIN — HEPARIN SODIUM 5000 UNIT(S): 5000 INJECTION INTRAVENOUS; SUBCUTANEOUS at 13:26

## 2018-12-08 RX ADMIN — HEPARIN SODIUM 5000 UNIT(S): 5000 INJECTION INTRAVENOUS; SUBCUTANEOUS at 21:08

## 2018-12-08 RX ADMIN — CAPTOPRIL 12.5 MILLIGRAM(S): 12.5 TABLET ORAL at 13:26

## 2018-12-08 RX ADMIN — HEPARIN SODIUM 5000 UNIT(S): 5000 INJECTION INTRAVENOUS; SUBCUTANEOUS at 05:25

## 2018-12-08 RX ADMIN — CAPTOPRIL 12.5 MILLIGRAM(S): 12.5 TABLET ORAL at 05:25

## 2018-12-08 NOTE — PROGRESS NOTE ADULT - SUBJECTIVE AND OBJECTIVE BOX
Interval History:  doing well; would like to be discharged  understands disease process and states will be more adherent to medications    Medications:  aluminum hydroxide/magnesium hydroxide/simethicone Suspension 30 milliLiter(s) Oral every 6 hours PRN  buMETAnide 0.5 milliGRAM(s) Oral every other day  heparin  Injectable 5000 Unit(s) SubCutaneous every 8 hours  influenza   Vaccine 0.5 milliLiter(s) IntraMuscular once  lisinopril 10 milliGRAM(s) Oral daily  metoprolol succinate ER 12.5 milliGRAM(s) Oral daily  pantoprazole    Tablet 40 milliGRAM(s) Oral before breakfast  pneumococcal  13 Vaccine (PREVNAR 13) 0.5 milliLiter(s) IntraMuscular once  simethicone 80 milliGRAM(s) Chew three times a day PRN  sodium chloride 0.9%. 250 milliLiter(s) IV Continuous <Continuous>      Vitals:  T(C): 36.3 (18 @ 21:04), Max: 36.6 (18 @ 04:54)  HR: 101 (18 @ 21:04) (97 - 104)  BP: 98/68 (18 @ 21:04) (97/57 - 103/70)  BP(mean): --  RR: 16 (18 @ 21:04) (16 - 17)  SpO2: 100% (18 @ 21:04) (97% - 100%)    Daily     Daily Weight in k.6 (08 Dec 2018 07:20)    I&O's Summary    07 Dec 2018 07:  -  08 Dec 2018 07:00  --------------------------------------------------------  IN: 1180 mL / OUT: 0 mL / NET: 1180 mL    08 Dec 2018 07:01  -  08 Dec 2018 21:28  --------------------------------------------------------  IN: 1000 mL / OUT: 450 mL / NET: 550 mL    Physical Exam:  Appearance: No Acute Distress  HEENT: PERRL  Neck: JVD approx 6-8 with mild HJR  Cardiovascular: Normal S1 S2, No murmurs/rubs/gallops  Respiratory: Clear to auscultation bilaterally  Gastrointestinal: Soft, Non-tender	  Skin: No cyanosis	  Neurologic: Non-focal  Extremities: No LE edema  Psychiatry: A & O x 3, Mood & affect appropriate    Labs:                        15.1   7.77  )-----------( 181      ( 07 Dec 2018 07:17 )             45.1     12-    139  |  105  |  15  ----------------------------<  106<H>  4.0   |  21<L>  |  1.01    Ca    9.5      07 Dec 2018 06:13  Phos  3.9     12-  Mg     2.0     -    TPro  7.1  /  Alb  4.1  /  TBili  0.6  /  DBili  0.2  /  AST  32  /  ALT  98<H>  /  AlkPhos  71  12-08    PT/INR - ( 07 Dec 2018 09:26 )   PT: 11.8 sec;   INR: 1.05 ratio         PTT - ( 07 Dec 2018 09:26 )  PTT:29.6 sec    TELEMETRY:  no events

## 2018-12-08 NOTE — PROGRESS NOTE ADULT - SUBJECTIVE AND OBJECTIVE BOX
PRESENTING CC:Shock    SUBJ: 46 yo M w/ recent diagnosis in august 2018 of nonischemic cardiomyopathy (EF 10%), initially presented to [facility] d/t SOB and Epigastric pain, now s/p R heart cardiac cath, showing clean coronaries, but with high wedge pressures and need for blood pressure support with pressors, transferred to [facility] CCU for cardiogenic shock. Patient is now s/p IABP (d/eugenie on 12/1). Off Dobutrex 12/4,ambulating no dizziness dyspnea-orthopnea has resolved-HF follow up noted      PMH -reviewed admission note, no change since admission  Heart failure: acute [ ] chronic [ ] acute or chronic [x ] diastolic [ ] systolic [x ] combined systolic and diastolic[ ]  LARISSA: ATN[ ] renal medullary necrosis [ ] CKD I [ ]CKDII [ ]CKD III [ ]CKD IV [ ]CKD V [ ]Other pathological lesions [ ]    MEDICATIONS  (STANDING):  captopril 12.5 milliGRAM(s) Oral three times a day  heparin  Injectable 5000 Unit(s) SubCutaneous every 8 hours  influenza   Vaccine 0.5 milliLiter(s) IntraMuscular once  pantoprazole    Tablet 40 milliGRAM(s) Oral before breakfast  pneumococcal  13 Vaccine (PREVNAR 13) 0.5 milliLiter(s) IntraMuscular once  sodium chloride 0.9%. 250 milliLiter(s) (10 mL/Hr) IV Continuous <Continuous>    MEDICATIONS  (PRN):  aluminum hydroxide/magnesium hydroxide/simethicone Suspension 30 milliLiter(s) Oral every 6 hours PRN Dyspepsia  simethicone 80 milliGRAM(s) Chew three times a day PRN Gas          FAMILY HISTORY:  Family history of lung disease (Grandparent): maternal grandmother (no history of smoking)  Family history of hypertension: father  Family history of heart disease: mother  No family history of premature coronary artery disease or sudden cardiac death      REVIEW OF SYSTEMS:  Constitutional: [ ] fever, [ ]weight loss,  [x ]fatigue  Eyes: [ ] visual changes  Respiratory: [ ]shortness of breath;  [ ] cough, [ ]wheezing, [ ]chills, [ ]hemoptysis  Cardiovascular: [ ] chest pain, [ ]palpitations, [ ]dizziness,  [ ]leg swelling[ ]orthopnea[ ]PND  Gastrointestinal: [ ] abdominal pain, [ ]nausea, [ ]vomiting,  [ ]diarrhea   Genitourinary: [ ] dysuria, [ ] hematuria  Neurologic: [ ] headaches [ ] tremors[ ]weakness  Skin: [ ] itching, [ ]burning, [ ] rashes  Endocrine: [ ] heat or cold intolerance  Musculoskeletal: [ ] joint pain or swelling; [ ] muscle, back, or extremity pain  Psychiatric: [ ] depression, [ ]anxiety, [ ]mood swings, or [ ]difficulty sleeping  Hematologic: [ ] easy bruising, [ ] bleeding gums    [x] All remaining systems negative except as per above.   [ ]Unable to obtain.    Vital Signs Last 24 Hrs  T(C): 36.6 (08 Dec 2018 04:54), Max: 36.6 (07 Dec 2018 12:33)  T(F): 97.8 (08 Dec 2018 04:54), Max: 97.9 (07 Dec 2018 12:33)  HR: 104 (08 Dec 2018 04:54) (102 - 105)  BP: 97/57 (08 Dec 2018 04:54) (97/57 - 107/56)  RR: 17 (08 Dec 2018 04:54) (15 - 17)  SpO2: 97% (08 Dec 2018 04:54) (97% - 100%)  I&O's Summary    07 Dec 2018 07:01  -  08 Dec 2018 07:00  --------------------------------------------------------  IN: 1130 mL / OUT: 0 mL / NET: 1130 mL        PHYSICAL EXAM:  General: No acute distress BMI-21  HEENT: EOMI, PERRL  Neck: Supple, [- ] JVD  Lungs: Equal air entry bilaterally; [ ] rales [ ] wheezing [ ] rhonchi  Heart: Regular rate and rhythm; [x ] murmur   2/6 [x ] systolic [ ] diastolic [ ] radiation[ ] rubs [ ]  gallops  Abdomen: Nontender, bowel sounds present  Extremities: No clubbing, cyanosis, [ ] edema  Nervous system:  Alert & Oriented X3, no focal deficits  Psychiatric: Normal affect  Skin: No rashes or lesions    LABS:  12-07    139  |  105  |  15  ----------------------------<  106<H>  4.0   |  21<L>  |  1.01    Ca    9.5      07 Dec 2018 06:13  Phos  3.9     12-07  Mg     2.0     12-07    TPro  7.1  /  Alb  4.1  /  TBili  0.6  /  DBili  0.2  /  AST  32  /  ALT  98<H>  /  AlkPhos  71  12-08    Creatinine Trend: 1.01<--, 0.98<--, 0.89<--, 0.95<--, 0.96<--, 1.01<--                        15.1   7.77  )-----------( 181      ( 07 Dec 2018 07:17 )             45.1     PT/INR - ( 07 Dec 2018 09:26 )   PT: 11.8 sec;   INR: 1.05 ratio    PTT - ( 07 Dec 2018 09:26 )  PTT:29.6 sec    TELEMETRY:Sinus tachycardia      IMPRESSION AND PLAN:      46 yo M w/ recent diagnosis in august 2018 of nonischemic cardiomyopathy (EF 10%), initially presented to [facility] d/t SOB and Epigastric pain, now s/p R heart cardiac cath, showing clean coronaries, but with high wedge pressures and need for blood pressure support with pressors, transferred to [facility] CCU for cardiogenic shock. Patient is now s/p IABP (d/eugenie on 12/1).Off Dobutrex undergoing evaluation for LVAD/OHT        #Cardiovascular-Acute on Chronic systolic heart failure-NICM-Stage C-D  - HFrEF with EF of 10%  - Cardiogenic Shock upon admission  - Patient recently diagnosed with NICM  - off levo, s/p IABP removal. No longer on diuretics  - Captopril dose decreased to 12.5 mg TID  - HF following.  - Removed balloon pump 12/1  - LVAD/OHT Evaluation-ICD placement on hold.  -Add Toprol if BP allows.      #Hematology-Polycythemia Vera  HCt-46.8-

## 2018-12-08 NOTE — PROGRESS NOTE ADULT - ATTENDING COMMENTS
Thais Bridges, PhD  [phone]
Patient was seen and examined,interim events noted,labs and radiology studies reviewed.  Reynold Roberts MD,FACC.  [address] [place] [address].  [id number].  [phone]
Patient was seen and examined,interim events noted,labs and radiology studies reviewed.  Reynold Roberts MD,FACC.  [address] [place] [address].  [id number].  [phone]
Patient is seen and examined with fellow, NP and the CCU house-staff. I agree with the history, physical and the assessment and plan.  NICM p/w cardiogenic shock - s/p IABP removal - on  2.5   awaiting CXR  will wean  and recheck the hemodynamics  c/w ace inh and spironalactone
Ongoing LVAD work-up.  Continue inotrope support and afterload reduction per Heart Failure team recommendations.  Patient euvolemic on exam.
Patient is seen and examined with fellow, NP and the CCU house-staff. I agree with the history, physical and the assessment and plan.  ADHF reuiring IABP and ionotropic support  calculated SVR is 1600 - will start low dose capropril  EP evaluation pending  will wean dobutamine  c/w diuresis
Patient is seen and examined with fellow, NP and the CCU house-staff. I agree with the history, physical and the assessment and plan.  plan to remove IABP  c/w ionotropic support  will increase the captopril  will start low dose aldactone  monitor electrolytes   check hemodynamics post IABP removal
Ongoing LVAD work-up.  Continue inotrope support and afterload reduction per Heart Failure team recommendations.  Patient euvolemic on exam.
 turned off with subsequent decrease in PA sat.  Now back on  2.5.  Would check SVC sat and correlate with PA sat. Then remove PA catheter and leave central line introducer to monitor CV sat and CVP.  Will likely need LVAD/OHT imminently. Try to wean  again tomorrow.  Uptitrate captopril as BP tolerates.
Briefly, 46 y/o English male with h/o NICM (EF 10%, LVEDD 7 cm; recently Dx ; possibly familial) who presented on  with decompensated HF/cardiogenic shock and required IABP/inotropes. Was noted to have shock liver which has since improved. He was diuresed and IABP was removed over weekend. He remains on  2.5 and has been feeling well. Had brief episode LOC 12/3 while having BM (no arrhythmia noted) but was notably dehydrated. Was tolerating captopril 18.75 three times/day but lately has been hypotensive likely due to low JVD. On exam, well appearing, NAD, no JVD, RRR, no m/r/g, lungs clear, no pedal edema. Labs reviewed - WBC 10, Hb 16 (elevated since admission), BUN/Cr 22/1.0 (improved from 1.6), AST/ALT continues to improve (improved from 1000/700), Tbili 0.7 (downtrending), albumin 4.1. Mixed sat 55% (CI by Manny), correlated with thermo. TTE LVEDD 7 cm, EF 10%, mod-severe MR. LHC  nl coronaries. Overall stage D HF, NYHA class IV who presented in cardiogenic shock improved with IABP/inotrope assistance now with low filling pressures but persistent low output (by Manny). Given tenuous state, LVAD/transplant eval launched.   - while appears well compensated, has had persistent low bP in setting of low CVP and is likely dehydrated however also is in low output state; given IVF w/o improvement; continue hydration today  - d/c  given improved BP; reduce captopril to 12.5 mg q8h  - unclear etiology of elevated Hb (possibly d/t chronic hypoxia); r/o PCV with JAK2 mutn  - no clear etiology of CM; iron studies, TSH wnl; HIV negative; possibly familial as has family history; recommend genetic screening  - hold diuretics; encourage hydration for goal CVP 6-8   - standing weights daily
Briefly, 48 y/o Citizen of [place] male with h/o NICM (EF 10%, LVEDD 7 cm; recently Dx ) who presented on  with decompensated HF/cardiogenic shock and required IABP/inotropes. Was noted to have shock liver which is since improving. He was diuresed and IABP was removed over weekend. He remains on  2.5 and has been feeling well. Had brief episode LOC today while having BM (no arrhythmia noted). Reportedly was not compliant with Lifevest. Has tolerated captopril 18.75 three times/day. On exam, well appearing, NAD, no JVD, RRR, no m/r/g, lungs clear, no pedal edema. Labs reviewed - WBC 10, Hb 18.4 (elevated since admission), BUN/Cr 22/0.9 (improved from 1.6), AST//480 (improved from 1000/700), Tbili 2.3 (downtrending), albumin 4.1. Mixed sat 68% (CI 1.9) however may be artificially low d/t high Hb. TTE LVEDD 7 cm, EF 10%, mod-severe MR. LHC  nl coronaries. Overall stage D HF, NYHA class IV who presented in cardiogenic shock improved with IABP/inotrope assistance now with low filling pressures but persistent low output (by Manny). Given tenuous state, LVAD/transplant eval launched.   - check thermodilution  - unclear etiology of elevated Hb (possibly d/t chronic hypoxia); r/o PCV with JAK2 mutn  - no clear etiology of CM; iron studies, TSH wnl; HIV negative  - continue  2.5 mcg/kg/min for now  - hold diuretics; encourage hydration for goal CVP 6-8  - standing weights daily
Briefly, 48 y/o Icelandic male with h/o NICM (EF 10%, LVEDD 7 cm; recently Dx ) who presented on  with decompensated HF/cardiogenic shock and required IABP/inotropes. Was noted to have shock liver which has since improved. He was diuresed and IABP was removed over weekend. He remains on  2.5 and has been feeling well. Had brief episode LOC 12/3 while having BM (no arrhythmia noted) but was notably dehydrated. Was tolerating captopril 18.75 three times/day but lately has been hypotensive likely due to low JVD. On exam, well appearing, NAD, no JVD, RRR, no m/r/g, lungs clear, no pedal edema. Labs reviewed - WBC 10, Hb 16 (elevated since admission), BUN/Cr 22/1.0 (improved from 1.6), AST/ALT continues to improve (improved from 1000/700), Tbili 0.7 (downtrending), albumin 4.1. Mixed sat 55% (CI by Manny), correlated with thermo. TTE LVEDD 7 cm, EF 10%, mod-severe MR. LHC  nl coronaries. Overall stage D HF, NYHA class IV who presented in cardiogenic shock improved with IABP/inotrope assistance now with low filling pressures but persistent low output (by Manny). Given tenuous state, LVAD/transplant eval launched.   - while appears well compensated, has had persistent low bP in setting of low CVP and is likely dehydrated however also is in low output state; given IVF w/o improvement; give albumin  - restart  2.5 as has lactate and persistent hypotension precluding captopril   - unclear etiology of elevated Hb (possibly d/t chronic hypoxia); r/o PCV with JAK2 mutn  - no clear etiology of CM; iron studies, TSH wnl; HIV negative  - once volume replete, will attempt captopril again at 18.75 q8h (hold for SBP <90)  - hold diuretics; encourage hydration for goal CVP 6-8   - standing weights daily
Briefly, 48 y/o Gabonese male with h/o NICM (EF 10%, LVEDD 7 cm; recently Dx ) who presented on  with decompensated HF/cardiogenic shock and required IABP/inotropes. Was noted to have shock liver which has since improved. He was diuresed and IABP was removed over weekend. He remains on  2.5 and has been feeling well. Had brief episode LOC 12/3 while having BM (no arrhythmia noted) but was notably dehydrated. Reportedly was not compliant with Lifevest in past. Has tolerated captopril 18.75 three times/day. On exam, well appearing, NAD, no JVD, RRR, no m/r/g, lungs clear, no pedal edema. Labs reviewed - WBC 10, Hb 16 (elevated since admission), BUN/Cr 22/1.0 (improved from 1.6), AST/ALT continues to improve (improved from 1000/700), Tbili 2.3 (downtrending), albumin 4.1. Mixed sat 68% (CI 1.9 by Manny) however may be artificially low d/t high Hb. TTE LVEDD 7 cm, EF 10%, mod-severe MR. LHC  nl coronaries. Overall stage D HF, NYHA class IV who presented in cardiogenic shock improved with IABP/inotrope assistance now with low filling pressures but persistent low output (by Manny). Given tenuous state, LVAD/transplant eval launched.   - check thermodilution to correlate with Manny as Manny may be inaccurate in setting of high Hb and he appears to be well compensated  - unclear etiology of elevated Hb (possibly d/t chronic hypoxia); r/o PCV with JAK2 mutn  - no clear etiology of CM; iron studies, TSH wnl; HIV negative  - continue  2.5 mcg/kg/min for now; can d/c and follow serial sats; if no change, can remain off   - continue captopril 18.75 mg q8h (hodl for SBP <90)  - hold diuretics; encourage hydration for goal CVP 6-8; can give 500 cc over 4 hours if needed  - will d/c spironolactone   - standing weights daily
Doing well. Appears slightly volume up. Asymptomatic.  - give bumex 0.5 mg PO x1 now  - will likely start toprol XL 12.5 mg daily tomorrow  - continue captopril 12.5 mg q8h; will attempt to escalate  - plan for Lifevest with close f/u and possible subQ ICD later  - patient and wife counseled
IABP out.  Wean  tomorrow.  Uptitrate captopril as BP tolerates.

## 2018-12-08 NOTE — PROGRESS NOTE ADULT - ASSESSMENT
Briefly, 48 y/o [place] male with h/o NICM (EF 10%, LVEDD 7 cm; recently Dx ; possibly familial) who presented on  with decompensated HF/cardiogenic shock and required IABP/inotropes. Was noted to have shock liver which has since improved. He was diuresed and IABP was removed over weekend. He remains on  2.5 and has been feeling well. Had brief episode LOC 12/3 while having BM (no arrhythmia noted) but was notably dehydrated. Was tolerating captopril 18.75 three times/day but was hypotensive due to hypovolemia. Now improved and tolerating captopril. On exam, well appearing, NAD, JVD approx 6-8 cm, RRR, no m/r/g, lungs clear, no pedal edema. Labs reviewed - WBC 10, Hb 16 (elevated since admission), BUN/Cr 22/1.0 (improved from 1.6), AST/ALT continues to improve (improved from 1000/700), Tbili 0.7 (downtrending), albumin 4.1. Mixed sat 55% (CI by Manny 1.9), correlated with thermo. TTE LVEDD 7 cm, EF 10%, mod-severe MR. LHC  nl coronaries. Overall stage D HF, NYHA class IV who presented in cardiogenic shock improved with IABP/inotrope assistance now with low filling pressures but persistent low output (by Manny). Given tenuous state, LVAD/transplant eval launched. Currently appears to be compensated.   - will give bumex 0.5 mg x1 now and then every other day; counseled pt on weights and taking additional bumex if weight increases  - change captopril to lisinopril 10 mg daily   - start toprol XL 12. 5mg daily  - unclear etiology of elevated Hb (possibly d/t chronic hypoxia); r/o PCV with JAK2 mutn  - no clear etiology of CM; iron studies, TSH wnl; HIV negative; possibly familial as has family history; recommend genetic screening  - plan to d/c with Lifevest and then possible subQ ICD as outpt  - standing weights daily .

## 2018-12-09 ENCOUNTER — TRANSCRIPTION ENCOUNTER (OUTPATIENT)
Age: 48
End: 2018-12-09

## 2018-12-09 VITALS — WEIGHT: 140.21 LBS

## 2018-12-09 LAB
ANION GAP SERPL CALC-SCNC: 12 MMOL/L — SIGNIFICANT CHANGE UP (ref 5–17)
BUN SERPL-MCNC: 20 MG/DL — SIGNIFICANT CHANGE UP (ref 7–23)
CALCIUM SERPL-MCNC: 9.2 MG/DL — SIGNIFICANT CHANGE UP (ref 8.4–10.5)
CHLORIDE SERPL-SCNC: 107 MMOL/L — SIGNIFICANT CHANGE UP (ref 96–108)
CO2 SERPL-SCNC: 20 MMOL/L — LOW (ref 22–31)
CREAT SERPL-MCNC: 0.87 MG/DL — SIGNIFICANT CHANGE UP (ref 0.5–1.3)
GLUCOSE SERPL-MCNC: 118 MG/DL — HIGH (ref 70–99)
POTASSIUM SERPL-MCNC: 4.2 MMOL/L — SIGNIFICANT CHANGE UP (ref 3.5–5.3)
POTASSIUM SERPL-SCNC: 4.2 MMOL/L — SIGNIFICANT CHANGE UP (ref 3.5–5.3)
SODIUM SERPL-SCNC: 139 MMOL/L — SIGNIFICANT CHANGE UP (ref 135–145)

## 2018-12-09 PROCEDURE — 84443 ASSAY THYROID STIM HORMONE: CPT

## 2018-12-09 PROCEDURE — 86762 RUBELLA ANTIBODY: CPT

## 2018-12-09 PROCEDURE — 82947 ASSAY GLUCOSE BLOOD QUANT: CPT

## 2018-12-09 PROCEDURE — 87640 STAPH A DNA AMP PROBE: CPT

## 2018-12-09 PROCEDURE — 86753 PROTOZOA ANTIBODY NOS: CPT

## 2018-12-09 PROCEDURE — 84132 ASSAY OF SERUM POTASSIUM: CPT

## 2018-12-09 PROCEDURE — 84295 ASSAY OF SERUM SODIUM: CPT

## 2018-12-09 PROCEDURE — 82435 ASSAY OF BLOOD CHLORIDE: CPT

## 2018-12-09 PROCEDURE — 74176 CT ABD & PELVIS W/O CONTRAST: CPT

## 2018-12-09 PROCEDURE — 83735 ASSAY OF MAGNESIUM: CPT

## 2018-12-09 PROCEDURE — 87799 DETECT AGENT NOS DNA QUANT: CPT

## 2018-12-09 PROCEDURE — 83690 ASSAY OF LIPASE: CPT

## 2018-12-09 PROCEDURE — 86663 EPSTEIN-BARR ANTIBODY: CPT

## 2018-12-09 PROCEDURE — 84481 FREE ASSAY (FT-3): CPT

## 2018-12-09 PROCEDURE — 86787 VARICELLA-ZOSTER ANTIBODY: CPT

## 2018-12-09 PROCEDURE — 86692 HEPATITIS DELTA AGENT ANTBDY: CPT

## 2018-12-09 PROCEDURE — 85610 PROTHROMBIN TIME: CPT

## 2018-12-09 PROCEDURE — 71250 CT THORAX DX C-: CPT

## 2018-12-09 PROCEDURE — 71045 X-RAY EXAM CHEST 1 VIEW: CPT

## 2018-12-09 PROCEDURE — 86664 EPSTEIN-BARR NUCLEAR ANTIGEN: CPT

## 2018-12-09 PROCEDURE — 80076 HEPATIC FUNCTION PANEL: CPT

## 2018-12-09 PROCEDURE — 86695 HERPES SIMPLEX TYPE 1 TEST: CPT

## 2018-12-09 PROCEDURE — 86735 MUMPS ANTIBODY: CPT

## 2018-12-09 PROCEDURE — 93923 UPR/LXTR ART STDY 3+ LVLS: CPT

## 2018-12-09 PROCEDURE — 93925 LOWER EXTREMITY STUDY: CPT

## 2018-12-09 PROCEDURE — 82575 CREATININE CLEARANCE TEST: CPT

## 2018-12-09 PROCEDURE — 86706 HEP B SURFACE ANTIBODY: CPT

## 2018-12-09 PROCEDURE — 82570 ASSAY OF URINE CREATININE: CPT

## 2018-12-09 PROCEDURE — 84100 ASSAY OF PHOSPHORUS: CPT

## 2018-12-09 PROCEDURE — 80048 BASIC METABOLIC PNL TOTAL CA: CPT

## 2018-12-09 PROCEDURE — 82977 ASSAY OF GGT: CPT

## 2018-12-09 PROCEDURE — 84439 ASSAY OF FREE THYROXINE: CPT

## 2018-12-09 PROCEDURE — 80074 ACUTE HEPATITIS PANEL: CPT

## 2018-12-09 PROCEDURE — C8929: CPT

## 2018-12-09 PROCEDURE — 93880 EXTRACRANIAL BILAT STUDY: CPT

## 2018-12-09 PROCEDURE — 86777 TOXOPLASMA ANTIBODY: CPT

## 2018-12-09 PROCEDURE — 86900 BLOOD TYPING SEROLOGIC ABO: CPT

## 2018-12-09 PROCEDURE — 82330 ASSAY OF CALCIUM: CPT

## 2018-12-09 PROCEDURE — 81001 URINALYSIS AUTO W/SCOPE: CPT

## 2018-12-09 PROCEDURE — 83540 ASSAY OF IRON: CPT

## 2018-12-09 PROCEDURE — 85730 THROMBOPLASTIN TIME PARTIAL: CPT

## 2018-12-09 PROCEDURE — 86140 C-REACTIVE PROTEIN: CPT

## 2018-12-09 PROCEDURE — 93005 ELECTROCARDIOGRAM TRACING: CPT

## 2018-12-09 PROCEDURE — 80061 LIPID PANEL: CPT

## 2018-12-09 PROCEDURE — 84134 ASSAY OF PREALBUMIN: CPT

## 2018-12-09 PROCEDURE — 80053 COMPREHEN METABOLIC PANEL: CPT

## 2018-12-09 PROCEDURE — 83605 ASSAY OF LACTIC ACID: CPT

## 2018-12-09 PROCEDURE — 82728 ASSAY OF FERRITIN: CPT

## 2018-12-09 PROCEDURE — 84153 ASSAY OF PSA TOTAL: CPT

## 2018-12-09 PROCEDURE — 86708 HEPATITIS A ANTIBODY: CPT

## 2018-12-09 PROCEDURE — 84156 ASSAY OF PROTEIN URINE: CPT

## 2018-12-09 PROCEDURE — 86696 HERPES SIMPLEX TYPE 2 TEST: CPT

## 2018-12-09 PROCEDURE — 86665 EPSTEIN-BARR CAPSID VCA: CPT

## 2018-12-09 PROCEDURE — 86765 RUBEOLA ANTIBODY: CPT

## 2018-12-09 PROCEDURE — 80307 DRUG TEST PRSMV CHEM ANLYZR: CPT

## 2018-12-09 PROCEDURE — 86780 TREPONEMA PALLIDUM: CPT

## 2018-12-09 PROCEDURE — 83615 LACTATE (LD) (LDH) ENZYME: CPT

## 2018-12-09 PROCEDURE — 87641 MR-STAPH DNA AMP PROBE: CPT

## 2018-12-09 PROCEDURE — 82803 BLOOD GASES ANY COMBINATION: CPT

## 2018-12-09 PROCEDURE — 87522 HEPATITIS C REVRS TRNSCRPJ: CPT

## 2018-12-09 PROCEDURE — 86038 ANTINUCLEAR ANTIBODIES: CPT

## 2018-12-09 PROCEDURE — 85027 COMPLETE CBC AUTOMATED: CPT

## 2018-12-09 PROCEDURE — 86704 HEP B CORE ANTIBODY TOTAL: CPT

## 2018-12-09 PROCEDURE — 86480 TB TEST CELL IMMUN MEASURE: CPT

## 2018-12-09 PROCEDURE — G0480: CPT

## 2018-12-09 PROCEDURE — 99232 SBSQ HOSP IP/OBS MODERATE 35: CPT

## 2018-12-09 PROCEDURE — 85014 HEMATOCRIT: CPT

## 2018-12-09 PROCEDURE — 82150 ASSAY OF AMYLASE: CPT

## 2018-12-09 PROCEDURE — 86644 CMV ANTIBODY: CPT

## 2018-12-09 PROCEDURE — 86850 RBC ANTIBODY SCREEN: CPT

## 2018-12-09 PROCEDURE — 94010 BREATHING CAPACITY TEST: CPT

## 2018-12-09 PROCEDURE — 76700 US EXAM ABDOM COMPLETE: CPT

## 2018-12-09 PROCEDURE — 84550 ASSAY OF BLOOD/URIC ACID: CPT

## 2018-12-09 PROCEDURE — 83550 IRON BINDING TEST: CPT

## 2018-12-09 PROCEDURE — 85652 RBC SED RATE AUTOMATED: CPT

## 2018-12-09 PROCEDURE — 86901 BLOOD TYPING SEROLOGIC RH(D): CPT

## 2018-12-09 PROCEDURE — 82962 GLUCOSE BLOOD TEST: CPT

## 2018-12-09 PROCEDURE — 87340 HEPATITIS B SURFACE AG IA: CPT

## 2018-12-09 PROCEDURE — 86790 VIRUS ANTIBODY NOS: CPT

## 2018-12-09 PROCEDURE — P9045: CPT

## 2018-12-09 RX ORDER — BUMETANIDE 0.25 MG/ML
1 INJECTION INTRAMUSCULAR; INTRAVENOUS
Qty: 15 | Refills: 0 | OUTPATIENT
Start: 2018-12-09 | End: 2019-01-07

## 2018-12-09 RX ORDER — PANTOPRAZOLE SODIUM 20 MG/1
1 TABLET, DELAYED RELEASE ORAL
Qty: 30 | Refills: 0 | OUTPATIENT
Start: 2018-12-09 | End: 2019-01-07

## 2018-12-09 RX ORDER — LISINOPRIL 2.5 MG/1
1 TABLET ORAL
Qty: 30 | Refills: 0 | OUTPATIENT
Start: 2018-12-09 | End: 2019-01-07

## 2018-12-09 RX ORDER — METOPROLOL TARTRATE 50 MG
0.5 TABLET ORAL
Qty: 15 | Refills: 0 | OUTPATIENT
Start: 2018-12-09 | End: 2019-01-07

## 2018-12-09 RX ORDER — SIMETHICONE 80 MG/1
1 TABLET, CHEWABLE ORAL
Qty: 0 | Refills: 0 | COMMUNITY
Start: 2018-12-09

## 2018-12-09 RX ADMIN — HEPARIN SODIUM 5000 UNIT(S): 5000 INJECTION INTRAVENOUS; SUBCUTANEOUS at 05:17

## 2018-12-09 RX ADMIN — LISINOPRIL 10 MILLIGRAM(S): 2.5 TABLET ORAL at 05:16

## 2018-12-09 RX ADMIN — PANTOPRAZOLE SODIUM 40 MILLIGRAM(S): 20 TABLET, DELAYED RELEASE ORAL at 05:16

## 2018-12-09 RX ADMIN — BUMETANIDE 0.5 MILLIGRAM(S): 0.25 INJECTION INTRAMUSCULAR; INTRAVENOUS at 05:18

## 2018-12-09 RX ADMIN — Medication 12.5 MILLIGRAM(S): at 05:17

## 2018-12-09 NOTE — DISCHARGE NOTE ADULT - MEDICATION SUMMARY - MEDICATIONS TO TAKE
I will START or STAY ON the medications listed below when I get home from the hospital:    lisinopril 10 mg oral tablet  -- 1 tab(s) by mouth once a day  -- Indication: For Dilated cardiomyopathy    metoprolol succinate 25 mg oral tablet, extended release  -- 0.5 tab(s) by mouth once a day   -- It is very important that you take or use this exactly as directed.  Do not skip doses or discontinue unless directed by your doctor.  May cause drowsiness.  Alcohol may intensify this effect.  Use care when operating dangerous machinery.  Some non-prescription drugs may aggravate your condition.  Read all labels carefully.  If a warning appears, check with your doctor before taking.  Swallow whole.  Do not crush.  Take with food or milk.  This drug may impair the ability to drive or operate machinery.  Use care until you become familiar with its effects.    -- Indication: For Dilated cardiomyopathy    bumetanide 0.5 mg oral tablet  -- 1 tab(s) by mouth every other day  -- Indication: For Dilated cardiomyopathy    simethicone 80 mg oral tablet, chewable  -- 1 tab(s) by mouth 3 times a day, As needed, Gas  -- Indication: For Dyspepsia    pantoprazole 40 mg oral delayed release tablet  -- 1 tab(s) by mouth once a day (before a meal)  -- Indication: For Dyspepsia

## 2018-12-09 NOTE — PROGRESS NOTE ADULT - REASON FOR ADMISSION
Cardiogenic Shock
Cardiogenic Shock  PRe VAD/OHTx work up
Cardiogenic Shock

## 2018-12-09 NOTE — DISCHARGE NOTE ADULT - ADDITIONAL INSTRUCTIONS
Make appointments to follow up with your out patient physicians.  Bring all discharge paperwork including discharge medication list to your follow up appointments.   Follow up with Dr. Roberts in his office next week.  Please call for an appointment.  Follow up in heart failure clinic with Dr. Hernadez.  Continue Lifevest and then possible subQ ICD as out patient

## 2018-12-09 NOTE — DISCHARGE NOTE ADULT - CARE PROVIDERS DIRECT ADDRESSES
,DirectAddress_Unknown,[email or web address]

## 2018-12-09 NOTE — PROGRESS NOTE ADULT - SUBJECTIVE AND OBJECTIVE BOX
Interval History:  feels well  responded to bumex 0.5 mg daily  denies CP/SOB   would like to go home with plan to f/u    Medications:  aluminum hydroxide/magnesium hydroxide/simethicone Suspension 30 milliLiter(s) Oral every 6 hours PRN  buMETAnide 0.5 milliGRAM(s) Oral every other day  heparin  Injectable 5000 Unit(s) SubCutaneous every 8 hours  influenza   Vaccine 0.5 milliLiter(s) IntraMuscular once  lisinopril 10 milliGRAM(s) Oral daily  metoprolol succinate ER 12.5 milliGRAM(s) Oral daily  pantoprazole    Tablet 40 milliGRAM(s) Oral before breakfast  pneumococcal  13 Vaccine (PREVNAR 13) 0.5 milliLiter(s) IntraMuscular once  simethicone 80 milliGRAM(s) Chew three times a day PRN  sodium chloride 0.9%. 250 milliLiter(s) IV Continuous <Continuous>    Vitals:  T(C): 36.4 (18 @ 05:02), Max: 36.4 (18 @ 05:02)  HR: 95 (18 @ 05:02) (95 - 101)  BP: 93/66 (18 @ 05:02) (93/66 - 98/68)  BP(mean): --  RR: 16 (18 @ 05:02) (16 - 16)  SpO2: 98% (18 @ 05:02) (98% - 100%)    Daily     Daily Weight in k.6 (09 Dec 2018 11:43)    I&O's Summary    08 Dec 2018 07:  -  09 Dec 2018 07:00  --------------------------------------------------------  IN: 1000 mL / OUT: 450 mL / NET: 550 mL    09 Dec 2018 07:  -  09 Dec 2018 17:12  --------------------------------------------------------  IN: 780 mL / OUT: 0 mL / NET: 780 mL    Physical Exam:  Appearance: No Acute Distress  HEENT: PERRL  Neck: JVD approx 8 cm with mild HJR  Cardiovascular: Normal S1 S2, No murmurs/rubs/gallops  Respiratory: Clear to auscultation bilaterally  Gastrointestinal: Soft, Non-tender	  Skin: No cyanosis	  Neurologic: Non-focal  Extremities: No LE edema  Psychiatry: A & O x 3, Mood & affect appropriate    Labs:        139  |  107  |  20  ----------------------------<  118<H>  4.2   |  20<L>  |  0.87    Ca    9.2      09 Dec 2018 05:29    TPro  7.1  /  Alb  4.1  /  TBili  0.6  /  DBili  0.2  /  AST  32  /  ALT  98<H>  /  AlkPhos  71      TELEMETRY:  no events

## 2018-12-09 NOTE — DISCHARGE NOTE ADULT - MEDICATION SUMMARY - MEDICATIONS TO STOP TAKING
I will STOP taking the medications listed below when I get home from the hospital:    DOBUTamine  -- 5 milligram(s) by continuous intravenous infusion now    norepinephrine  -- 0.3 milligram(s) by continuous intravenous infusion now    furosemide 100 mg/100 mL-0.9% intravenous solution  -- 10 milligram(s) by continuous intravenous infusion now    heparin  -- 10 milliliter(s) by continuous intravenous infusion now

## 2018-12-09 NOTE — DISCHARGE NOTE ADULT - CARE PROVIDER_API CALL
Reynold Roberts), Medicine  Dept Director  [address] [place] [address]  Phone: [phone]  Fax: [phone]    Jaiem Hernadez), Cardiovascular Disease; Internal Medicine  [address] [place] [address]  Phone: [phone]  Fax: [phone]

## 2018-12-09 NOTE — DISCHARGE NOTE ADULT - PATIENT PORTAL LINK FT
You can access the [facility] Patient Portal, offered by [facility], by registering with the following website: [email or web address]

## 2018-12-09 NOTE — PROGRESS NOTE ADULT - ASSESSMENT
Briefly, 48 y/o [place]gu[place] male with h/o NICM (EF 10%, LVEDD 7 cm; recently Dx ; possibly familial) who presented on  with decompensated HF/cardiogenic shock and required IABP/inotropes. Was noted to have shock liver which has since improved. He was diuresed and IABP was removed over weekend. He was on  2.5 and has been feeling well. Had brief episode LOC 12/3 while having BM (no arrhythmia noted) but was notably dehydrated. Was tolerating captopril 18.75 three times/day but was hypotensive due to hypovolemia. Now improved and tolerating captopril. On exam, well appearing, NAD, JVD approx 6-8 cm, RRR, no m/r/g, lungs clear, no pedal edema. Labs reviewed - WBC 10, Hb 16 (elevated since admission), BUN/Cr 22/1.0 (improved from 1.6), AST/ALT continues to improve (improved from 1000/700), Tbili 0.7 (downtrending), albumin 4.1. Mixed sat 55% (CI by Manny 1.9), correlated with thermo. TTE LVEDD 7 cm, EF 10%, mod-severe MR. LHC  nl coronaries. Overall stage D HF, NYHA class IV who presented in cardiogenic shock improved with IABP/inotrope assistance now with improved hemodynamics. Appears compensated.  - advised to take bumex 0.5 mg today when discharged and then take every other day; goal weight 138 pounds; counseled pt on weights and taking additional bumex if weight increases  - continue lisinopril 10 mg daily   - continue toprol XL 12. 5mg daily  - unclear etiology of elevated Hb (possibly d/t chronic hypoxia); r/o PCV with JAK2 mutn  - no clear etiology of CM; iron studies, TSH wnl; HIV negative; possibly familial as has family history; recommend genetic screening  - d/c with Lifevest and then possible subQ ICD as outpt  - will arrange f/u appt with me at [facility]

## 2018-12-09 NOTE — DISCHARGE NOTE ADULT - HOSPITAL COURSE
48 y/o [place] male with h/o NICM (EF 10%, LVEDD 7 cm; recently Dx ; possibly familial) who presented on  with decompensated HF/cardiogenic shock and required IABP/inotropes. Was noted to have shock liver which has since improved. He was diuresed and IABP was removed over weekend. He remains on  2.5 and has been feeling well. Had brief episode LOC 12/3 while having BM (no arrhythmia noted) but was notably dehydrated. Was tolerating captopril 18.75 three times/day but was hypotensive due to hypovolemia. Now improved and tolerating captopril. On exam, well appearing, NAD, JVD approx 6-8 cm, RRR, no m/r/g, lungs clear, no pedal edema. Labs reviewed - WBC 10, Hb 16 (elevated since admission), BUN/Cr 22/1.0 (improved from 1.6), AST/ALT continues to improve (improved from 1000/700), Tbili 0.7 (downtrending), albumin 4.1. Mixed sat 55% (CI by Manny 1.9), correlated with thermo. TTE LVEDD 7 cm, EF 10%, mod-severe MR. LHC  nl coronaries. Overall stage D HF, NYHA class IV who presented in cardiogenic shock improved with IABP/inotrope assistance now with low filling pressures but persistent low output (by Manny). Given tenuous state, LVAD/transplant eval launched. Currently appears to be compensated.   - will give bumex 0.5 mg x1 now and then every other day; counseled pt on weights and taking additional bumex if weight increases  - change captopril to lisinopril 10 mg daily   - start toprol XL 12. 5mg daily  - unclear etiology of elevated Hb (possibly d/t chronic hypoxia); r/o PCV with JAK2 mutn  - no clear etiology of CM; iron studies, TSH wnl; HIV negative; possibly familial as has family history; recommend genetic screening  - plan to d/c with Lifevest and then possible subQ ICD as outpt  D/C to home.

## 2018-12-09 NOTE — DISCHARGE NOTE ADULT - CARE PLAN
Principal Discharge DX:	SOB (shortness of breath)  Goal:	Remain without shortness of breath  Assessment and plan of treatment:	Weigh yourself daily.  If you gain 3lbs in 3 days, or 5lbs in a week call your Health Care Provider.  Do not eat or drink foods containing more than 2000mg of salt (sodium) in your diet every day.  Call your Health Care Provider if you have any swelling or increased swelling in your feet, ankles, and/or stomach.   Take all of your medication as directed.  If you become dizzy call your Health Care Provider.  Secondary Diagnosis:	Cardiogenic shock  Assessment and plan of treatment:	Weigh yourself daily.  If you gain 3lbs in 3 days, or 5lbs in a week call your Health Care Provider.  Do not eat or drink foods containing more than 2000mg of salt (sodium) in your diet every day.  Call your Health Care Provider if you have any swelling or increased swelling in your feet, ankles, and/or stomach.  Take all of your medication as directed.  If you become dizzy call your Health Care Provider.  Secondary Diagnosis:	Dilated cardiomyopathy  Assessment and plan of treatment:	Weigh yourself daily.  If you gain 3lbs in 3 days, or 5lbs in a week call your Health Care Provider.  Do not eat or drink foods containing more than 2000mg of salt (sodium) in your diet every day.  Call your Health Care Provider if you have any swelling or increased swelling in your feet, ankles, and/or stomach.  Take all of your medication as directed.  If you become dizzy call your Health Care Provider.  Secondary Diagnosis:	Shock liver  Assessment and plan of treatment:	Resolved.

## 2018-12-09 NOTE — DISCHARGE NOTE ADULT - PLAN OF CARE
Remain without shortness of breath Weigh yourself daily.  If you gain 3lbs in 3 days, or 5lbs in a week call your Health Care Provider.  Do not eat or drink foods containing more than 2000mg of salt (sodium) in your diet every day.  Call your Health Care Provider if you have any swelling or increased swelling in your feet, ankles, and/or stomach.   Take all of your medication as directed.  If you become dizzy call your Health Care Provider. Weigh yourself daily.  If you gain 3lbs in 3 days, or 5lbs in a week call your Health Care Provider.  Do not eat or drink foods containing more than 2000mg of salt (sodium) in your diet every day.  Call your Health Care Provider if you have any swelling or increased swelling in your feet, ankles, and/or stomach.  Take all of your medication as directed.  If you become dizzy call your Health Care Provider. Resolved.

## 2018-12-17 LAB — T CRUZI AB SER-ACNC: SIGNIFICANT CHANGE UP

## 2018-12-19 ENCOUNTER — APPOINTMENT (OUTPATIENT)
Dept: CARDIOLOGY | Facility: CLINIC | Age: 48
End: 2018-12-19
Payer: COMMERCIAL

## 2018-12-19 ENCOUNTER — NON-APPOINTMENT (OUTPATIENT)
Age: 48
End: 2018-12-19

## 2018-12-19 VITALS
HEART RATE: 94 BPM | HEIGHT: 64 IN | WEIGHT: 141 LBS | RESPIRATION RATE: 16 BRPM | OXYGEN SATURATION: 99 % | SYSTOLIC BLOOD PRESSURE: 96 MMHG | DIASTOLIC BLOOD PRESSURE: 67 MMHG | BODY MASS INDEX: 24.07 KG/M2

## 2018-12-19 PROCEDURE — 99215 OFFICE O/P EST HI 40 MIN: CPT

## 2018-12-19 PROCEDURE — 93000 ELECTROCARDIOGRAM COMPLETE: CPT

## 2018-12-19 PROCEDURE — 36415 COLL VENOUS BLD VENIPUNCTURE: CPT

## 2018-12-21 LAB
ALBUMIN SERPL ELPH-MCNC: 4.6 G/DL
ALP BLD-CCNC: 67 U/L
ALT SERPL-CCNC: 31 U/L
ANION GAP SERPL CALC-SCNC: 12 MMOL/L
AST SERPL-CCNC: 25 U/L
BASOPHILS # BLD AUTO: 0.03 K/UL
BASOPHILS NFR BLD AUTO: 0.4 %
BILIRUB SERPL-MCNC: 0.6 MG/DL
BUN SERPL-MCNC: 18 MG/DL
CALCIUM SERPL-MCNC: 10.1 MG/DL
CHLORIDE SERPL-SCNC: 101 MMOL/L
CO2 SERPL-SCNC: 25 MMOL/L
CREAT SERPL-MCNC: 1.18 MG/DL
EOSINOPHIL # BLD AUTO: 0.13 K/UL
EOSINOPHIL NFR BLD AUTO: 1.8 %
GLUCOSE SERPL-MCNC: 58 MG/DL
HCT VFR BLD CALC: 48.9 %
HGB BLD-MCNC: 15.9 G/DL
IMM GRANULOCYTES NFR BLD AUTO: 0.7 %
LYMPHOCYTES # BLD AUTO: 1.95 K/UL
LYMPHOCYTES NFR BLD AUTO: 26.9 %
MAN DIFF?: NORMAL
MCHC RBC-ENTMCNC: 32.3 PG
MCHC RBC-ENTMCNC: 32.5 GM/DL
MCV RBC AUTO: 99.2 FL
MONOCYTES # BLD AUTO: 0.48 K/UL
MONOCYTES NFR BLD AUTO: 6.6 %
NEUTROPHILS # BLD AUTO: 4.61 K/UL
NEUTROPHILS NFR BLD AUTO: 63.6 %
NT-PROBNP SERPL-MCNC: 1894 PG/ML
PLATELET # BLD AUTO: 314 K/UL
POTASSIUM SERPL-SCNC: 4.5 MMOL/L
PROT SERPL-MCNC: 8.2 G/DL
RBC # BLD: 4.93 M/UL
RBC # FLD: 12.9 %
SODIUM SERPL-SCNC: 138 MMOL/L
WBC # FLD AUTO: 7.25 K/UL

## 2018-12-21 NOTE — ASSESSMENT
[FreeTextEntry1] : 47 y/o [place]ese male with h/o NICM (EF 10%, LVEDD 7 cm; recently Dx 8/18; possibly familial), mod-severe MR who is here for f/u after recent hospitalization for cardiogenic shock from 11/24-12/9 treated with inotropes/IABP and transitioned to oral medications with improvement. Since has been doing well and appears to have class II symptoms and is euvolemic. Tolerating low dose neurohormonal agents.\par \par 1. HFrEF - likely familial\par - continue bumex 0.5 mg every other day; with additional as needed for weight gain; goal weight 140-141 pounds\par - continue toprol XL 25 mg daily for now\par - continue lisinopril 10 mg daily; will consider transitioning to Entresto at next visit \par - start rei 12.5 mg daily; repeat labs at next visit\par - counseled on sodium/fluid restriction\par - instructed to call if BP <85 or has any symptoms\par - labs checked in office and reviewed with patient\par - will need genetic testing; has two children as well; will refer for genetic testing at next visit\par - no prior [place]; continue lifevest; has appt with EP in January; would consider subQ ICD \par \par RTC 4 weeks

## 2018-12-21 NOTE — PHYSICAL EXAM
[General Appearance - Well Developed] : well developed [General Appearance - Well Nourished] : well nourished [Normal Conjunctiva] : the conjunctiva exhibited no abnormalities [Normal Oral Mucosa] : normal oral mucosa [] : no respiratory distress [Respiration, Rhythm And Depth] : normal respiratory rhythm and effort [Heart Rate And Rhythm] : heart rate and rhythm were normal [Heart Sounds] : normal S1 and S2 [Murmurs] : no murmurs present [Bowel Sounds] : normal bowel sounds [Abdomen Soft] : soft [Abdomen Tenderness] : non-tender [Abnormal Walk] : normal gait [Nail Clubbing] : no clubbing of the fingernails [Skin Color & Pigmentation] : normal skin color and pigmentation [Oriented To Time, Place, And Person] : oriented to person, place, and time [FreeTextEntry1] : no edema

## 2018-12-21 NOTE — HISTORY OF PRESENT ILLNESS
[FreeTextEntry1] : 49 y/o [place] male with h/o NICM (EF 10%, LVEDD 7 cm; recently Dx 8/18; possibly familial), mod-severe MR who is here for f/u after recent hospitalization for cardiogenic shock from 11/24-12/9. He was treated with inotropes/IABP and transitioned to oral medications with improvement. \par \par Since discharge, has been feeling well. Denies dyspnea, chest pain/pressure. Denies orthopnea/PND. Has been monitoring weight and BP. BP has ranged . Occasional BP in high 80s with some fatigue. Has been taking bumex every other day and weight has maintained 139-141 pounds. Feels cramps at times. Has been wearing Lifevest w/o alarms. Denies palpitations. \par \par Of note, patient had first been diagnosed 8/2/18 when had HF and presented to [facility]. Had R/LHC 8/6/18 which showed RA 15, RV 55/15, PA 56/19/40, PCWP 26, CO/CI 3/1.8 (PA sat 62%, Hb 15). LHC with nl coronaries. Was discharged on Lisinopril 2.5 mg daily and Lasix 40 mg twice daily but he intermittently took medications and was not wearing his Lifevest due to poor understanding of his disease process. He represented to [facility] on 11/24 with heart failure and was transferred to [facility] and then subsequently to [facility] as he was in cardiogenic shock.

## 2018-12-21 NOTE — REASON FOR VISIT
[Follow-Up - From Hospitalization] : follow-up of a recent hospitalization for [Spouse] : spouse [FreeTextEntry2] : heart failure

## 2019-01-07 ENCOUNTER — APPOINTMENT (OUTPATIENT)
Dept: ELECTROPHYSIOLOGY | Facility: CLINIC | Age: 49
End: 2019-01-07
Payer: COMMERCIAL

## 2019-01-07 ENCOUNTER — NON-APPOINTMENT (OUTPATIENT)
Age: 49
End: 2019-01-07

## 2019-01-07 VITALS
OXYGEN SATURATION: 97 % | SYSTOLIC BLOOD PRESSURE: 107 MMHG | HEART RATE: 90 BPM | HEIGHT: 64 IN | DIASTOLIC BLOOD PRESSURE: 74 MMHG | WEIGHT: 141 LBS | BODY MASS INDEX: 24.07 KG/M2

## 2019-01-07 PROCEDURE — 99215 OFFICE O/P EST HI 40 MIN: CPT

## 2019-01-07 PROCEDURE — 93000 ELECTROCARDIOGRAM COMPLETE: CPT

## 2019-01-09 ENCOUNTER — NON-APPOINTMENT (OUTPATIENT)
Age: 49
End: 2019-01-09

## 2019-01-09 NOTE — DISCUSSION/SUMMARY
[FreeTextEntry1] : In summary, this is a 48 year old man with NYHA II severe NICM. Since discharge at the end of December he has been compliant with medical therapy, which he is tolerating without issue. We discussed the increased risk of sudden cardiac death related to ventricular tachyarrhythmias in individuals with severe LV dysfunction, and the role of ICD therapy in improving longevity in qualifying candidates. He has been on medical therapy for just over a month and will need to demonstrate compliance and persistent LV dysfunction prior to device implantation. The chronicity of his disease would suggest he is unlikely to make a durable recovery. \par \par The rationale for device implantation and well as the procedural risks--including but not limited to pocket hematoma, infection, pneumothorax, pericardial bleed/tamponade, lead dislodgement, and death--were reviewed in detail.  He will undergo repeat TTE in early March and I will call him with the results. Given his age and non-ischemic myopathy, a subcutaneous device would be a good option should he be amenable.\par \par Mr. Charles appeared to understand the whole discussion and verbalized that all of his questions were answered to his satisfaction.\par \par Thank you for allowing me to be involved in the care of this pleasant man. Please feel free to contact me with any questions.\par \par

## 2019-01-09 NOTE — PHYSICAL EXAM
[General Appearance - Well Developed] : well developed [Normal Appearance] : normal appearance [Well Groomed] : well groomed [General Appearance - Well Nourished] : well nourished [No Deformities] : no deformities [General Appearance - In No Acute Distress] : no acute distress [Normal Jugular Venous A Waves Present] : normal jugular venous A waves present [Normal Jugular Venous V Waves Present] : normal jugular venous V waves present [No Jugular Venous Alvarez A Waves] : no jugular venous alvarez A waves [Heart Rate And Rhythm] : heart rate and rhythm were normal [Heart Sounds] : normal S1 and S2 [Murmurs] : no murmurs present [Respiration, Rhythm And Depth] : normal respiratory rhythm and effort [Exaggerated Use Of Accessory Muscles For Inspiration] : no accessory muscle use [Auscultation Breath Sounds / Voice Sounds] : lungs were clear to auscultation bilaterally [Abdomen Soft] : soft [Abdomen Tenderness] : non-tender [Abdomen Mass (___ Cm)] : no abdominal mass palpated [Nail Clubbing] : no clubbing of the fingernails [Cyanosis, Localized] : no localized cyanosis [Petechial Hemorrhages (___cm)] : no petechial hemorrhages [] : no ischemic changes

## 2019-01-09 NOTE — ED PROVIDER NOTE - CARDIAC RHYTHM
CHAMP:  MOM CALLED AND ADV THAT PT WORK UP THROWING THIS AM. PT VERY WEAK, AND KARAN HOLT IS GOING TO BRING PT TO [facility] AND SEE IF THEY CAN DO SCOPE TODAY.     NO NEED TO CALL [facility]
Sounds good, but they may need all the test results we've done. I put CT result on your desk [facility], in case needs to be faxed, and there are labs and and an u/s in chart if needed, too.  Thanks
regular

## 2019-01-09 NOTE — HISTORY OF PRESENT ILLNESS
[FreeTextEntry1] : Referring Physician: Jaime Hernadez MD\par \par Dear Jaime:\par \par Mr. Chichi Charles was seen in the [facility] today. For our records, please allow me to summarize the history and my findings.\par \par This pleasant 48 year old man has a cardiovascular history significant for NYHA II severe NICM (EF 10-15%) who was admitted in late November with cardiogenic shock requiring therapy IABP and inotropic support. He ultimately improved and was discharged home with a Lifevest. He has a history of prior medication non-compliance but since this most recent hospitalization has been rigorous about taking medications. He has not, however, been wearing his lifevest due to discomfort with the device. He presents now for evaluation for ICD implantation.\par \par He has noted markedi mprovement in symptoms on medical therapy.Mr. Charles denies any recent history of chest pain, shortness of breath, palpitations, dizziness, or syncope.\par \par DIAGNOSTIC TEST\par

## 2019-01-25 ENCOUNTER — APPOINTMENT (OUTPATIENT)
Dept: CARDIOLOGY | Facility: CLINIC | Age: 49
End: 2019-01-25
Payer: COMMERCIAL

## 2019-01-25 VITALS
HEART RATE: 85 BPM | DIASTOLIC BLOOD PRESSURE: 70 MMHG | WEIGHT: 141.25 LBS | OXYGEN SATURATION: 98 % | BODY MASS INDEX: 24.11 KG/M2 | SYSTOLIC BLOOD PRESSURE: 102 MMHG | RESPIRATION RATE: 14 BRPM | HEIGHT: 64 IN

## 2019-01-25 PROCEDURE — 36415 COLL VENOUS BLD VENIPUNCTURE: CPT

## 2019-01-25 PROCEDURE — 93000 ELECTROCARDIOGRAM COMPLETE: CPT

## 2019-01-25 PROCEDURE — 99214 OFFICE O/P EST MOD 30 MIN: CPT

## 2019-01-25 RX ORDER — PANTOPRAZOLE 40 MG/1
40 TABLET, DELAYED RELEASE ORAL
Qty: 90 | Refills: 1 | Status: DISCONTINUED | COMMUNITY
Start: 2018-12-19 | End: 2019-01-25

## 2019-01-28 NOTE — HISTORY OF PRESENT ILLNESS
[FreeTextEntry1] : 49 y/o Algerian male with h/o NICM (EF 10%, LVEDD 7 cm; recently Dx 8/18; possibly familial), mod-severe MR who is here for f/u after recent hospitalization for cardiogenic shock from 11/24-12/9. He was treated with inotropes/IABP and transitioned to oral medications with improvement. \par \par Of note, patient had first been diagnosed 8/2/18 when had HF and presented to [facility]. Had R/LHC 8/6/18 which showed RA 15, RV 55/15, PA 56/19/40, PCWP 26, CO/CI 3/1.8 (PA sat 62%, Hb 15). C with nl coronaries. Was discharged on Lisinopril 2.5 mg daily and Lasix 40 mg twice daily but he intermittently took medications and was not wearing his Lifevest due to poor understanding of his disease process. He represented to [facility] on 11/24 with heart failure and was transferred to [facility] and then subsequently to [facility] as he was in cardiogenic shock. \par \par Pt is here for a follow up appt. Last visit, he was started on spironolactone 12.5 mg daily. He has also been taking Bumex 0.5 mg QOD with his home weight in the range of 141-143 lbs and B/P 106/78 to 108/74. He has been feeling well and is able to walk 3 blocks and climb a flight of stairs without dyspnea. He sleeps with 2 pillows with no orthopnea or PND, but does get occasional leg cramps.  Denies dyspnea, chest pain/pressure, palpitations, dizziness/LH and syncope. He admits to eating out once per week and adding salt to meals at times but overall has been trying to follow a low salt diet and he is drinking less than 1.5 L per day. Complains of some constipation at times but no reflux and is not taking Protonix.  Has been wearing Lifevest most of the time w/o alarms, but finds it uncomfortable. Pt had a followup with EP and is scheduled for a repeat TTE 3/2/19 prior to consideration of ICD.

## 2019-01-28 NOTE — ASSESSMENT
[FreeTextEntry1] : 49 y/o Peruvian male with h/o NICM (EF 10%, LVEDD 7 cm; recently Dx 8/18; possibly familial), mod-severe MR who is here for f/u after recent hospitalization for cardiogenic shock from 11/24-12/9 treated with inotropes/IABP and transitioned to oral medications with improvement. Since has been doing well and appears to have class II symptoms and is euvolemic. Tolerating low dose neurohormonal agents.\par \par 1. HFrEF - likely familial\par - continue bumex 0.5 mg every other day; with additional as needed for weight gain; goal weight 140-141 pounds\par - increase toprol XL  to 50 mg daily from 25 mg daily \par - continue lisinopril 10 mg daily; will consider transitioning to Entresto if labs drawn today are stable\par - continue rei 12.5 mg daily\par - counseled on sodium/fluid restriction\par - instructed to call if BP <85 or has any symptoms\par - will need genetic testing; has two children as well; will refer for genetic testing at next visit\par - no prior VT; continue LifeVest; followed up with EP Dr. Perla and will repeat TTE in 3/2019; would consider subQ ICD if LVEF is < 35%.\par \par RTC 2 weeks for follow up with NP for further medication up titration

## 2019-01-29 ENCOUNTER — OTHER (OUTPATIENT)
Age: 49
End: 2019-01-29

## 2019-01-29 ENCOUNTER — RESULT CHARGE (OUTPATIENT)
Age: 49
End: 2019-01-29

## 2019-01-29 LAB
ANION GAP SERPL CALC-SCNC: 17 MMOL/L
BUN SERPL-MCNC: 19 MG/DL
CALCIUM SERPL-MCNC: 10.1 MG/DL
CHLORIDE SERPL-SCNC: 98 MMOL/L
CO2 SERPL-SCNC: 24 MMOL/L
CREAT SERPL-MCNC: 1.25 MG/DL
GLUCOSE SERPL-MCNC: 78 MG/DL
MAGNESIUM SERPL-MCNC: 2 MG/DL
NT-PROBNP SERPL-MCNC: 1007 PG/ML
POTASSIUM SERPL-SCNC: 4.3 MMOL/L
SODIUM SERPL-SCNC: 139 MMOL/L

## 2019-01-29 RX ORDER — LISINOPRIL 10 MG/1
10 TABLET ORAL DAILY
Qty: 90 | Refills: 1 | Status: DISCONTINUED | COMMUNITY
Start: 2018-12-19 | End: 2019-01-29

## 2019-02-08 ENCOUNTER — NON-APPOINTMENT (OUTPATIENT)
Age: 49
End: 2019-02-08

## 2019-02-08 ENCOUNTER — APPOINTMENT (OUTPATIENT)
Dept: CARDIOLOGY | Facility: CLINIC | Age: 49
End: 2019-02-08
Payer: COMMERCIAL

## 2019-02-08 VITALS
DIASTOLIC BLOOD PRESSURE: 64 MMHG | HEART RATE: 81 BPM | WEIGHT: 149 LBS | BODY MASS INDEX: 25.44 KG/M2 | HEIGHT: 64 IN | SYSTOLIC BLOOD PRESSURE: 91 MMHG | OXYGEN SATURATION: 99 %

## 2019-02-08 VITALS — HEART RATE: 81 BPM | SYSTOLIC BLOOD PRESSURE: 98 MMHG | DIASTOLIC BLOOD PRESSURE: 66 MMHG

## 2019-02-08 PROCEDURE — 93000 ELECTROCARDIOGRAM COMPLETE: CPT

## 2019-02-08 PROCEDURE — 36415 COLL VENOUS BLD VENIPUNCTURE: CPT

## 2019-02-08 PROCEDURE — 99214 OFFICE O/P EST MOD 30 MIN: CPT

## 2019-02-08 NOTE — HISTORY OF PRESENT ILLNESS
[FreeTextEntry1] : 49 y/o Citizen of [place] male with h/o NICM (EF 10%, LVEDD 7 cm; recently Dx 8/18; possibly familial), mod-severe MR who is here for f/u after recent hospitalization for cardiogenic shock from 11/24-12/9. He was treated with inotropes/IABP and transitioned to oral medications with improvement. \par \par Of note, patient had first been diagnosed 8/2/18 when had HF and presented to [facility]. Had R/LHC 8/6/18 which showed RA 15, RV 55/15, PA 56/19/40, PCWP 26, CO/CI 3/1.8 (PA sat 62%, Hb 15). [facility] with nl coronaries. Was discharged on Lisinopril 2.5 mg daily and Lasix 40 mg twice daily but he intermittently took medications and was not wearing his Lifevest due to poor understanding of his disease process. He represented to [facility] on 11/24 with heart failure and was transferred to [facility] and then subsequently to [facility] as he was in cardiogenic shock. \par \par Pt is here for a follow up appt. Last visit, he was transitioned to Entresto 24-26 mg bid from lisinopril and metoprolol was increased to 50 mg daily from 25 mg daily. He continued on spironolactone 12.5 mg daily. He has also been taking Bumex 0.5 mg QOD with his home weight in the range of 142.8-144.8 lbs and his weight is 144.8 lbs at home today, office is 149 lbs from 141 lbs. He states he has had a better appetite and attributes some of weight gain to eating more food. His home B/P has been in the range of 106/69 with no systolic readings below 100 and manual office today 98/66. He states he has been feeling better and has resumed household chores. He is able to walk 3 blocks and climb a flight of stairs without dyspnea. He sleeps with 1-2 pillows with no orthopnea or PND.   Denies dyspnea, chest pain/pressure, palpitations and syncope. He had one episode of dizziness when he was bending down. He admits to some dietary indiscretions but overall has been trying to follow a low salt diet and he is drinking less than 1.5 L per day. Complains of some constipation at times but no reflux and is not taking Protonix.  The constipation improves with taking Colace. Has been wearing Lifevest most of the time w/o alarms, but finds it uncomfortable. Pt had a followup with EP and is scheduled for a repeat TTE 3/2/19 prior to consideration of ICD.

## 2019-02-08 NOTE — ASSESSMENT
[FreeTextEntry1] : 47 y/o Citizen of [place] male with h/o NICM (EF 10%, LVEDD 7 cm; recently Dx 8/18; possibly familial), mod-severe MR S/P hospitalization for cardiogenic shock from 11/24-12/9 treated with inotropes/IABP and transitioned to oral medications with improvement. Since has been doing well and appears to have class II symptoms and is euvolemic. Tolerating low dose neurohormonal agents.\par \par 1. HFrEF - likely familial\par - continue bumex 0.5 mg every other day; with additional as needed for weight gain\par - increase toprol XL  to 75 mg daily from 50 mg daily and instructed to take at night to stagger medications\par - continue Entresto 24-26 mg bid and if labs drawn today are stable and B/P is stable on increased metoprolol, will consider increasing Entresto to 49-51 mg bid\par - continue rei 12.5 mg daily\par - counseled on sodium/fluid restriction\par - instructed to call if BP <85 or has any symptoms\par - will need genetic testing; has two children as well; will consider referral for genetic testing \par - no prior VT; continue LifeVest; followed up with EP Dr. Perla and will repeat TTE in 3/2019; would consider subQ ICD if LVEF is < 35%.\par \par RTC 2 weeks for follow up with NP or with Dr. Hernadez for further medication up titration

## 2019-02-08 NOTE — PHYSICAL EXAM
[General Appearance - Well Developed] : well developed [General Appearance - Well Nourished] : well nourished [Normal Conjunctiva] : the conjunctiva exhibited no abnormalities [Normal Oral Mucosa] : normal oral mucosa [] : no respiratory distress [Respiration, Rhythm And Depth] : normal respiratory rhythm and effort [Heart Rate And Rhythm] : heart rate and rhythm were normal [Heart Sounds] : normal S1 and S2 [Murmurs] : no murmurs present [Bowel Sounds] : normal bowel sounds [Abdomen Soft] : soft [Abdomen Tenderness] : non-tender [Abnormal Walk] : normal gait [Nail Clubbing] : no clubbing of the fingernails [Skin Color & Pigmentation] : normal skin color and pigmentation [Oriented To Time, Place, And Person] : oriented to person, place, and time [FreeTextEntry1] : no edema, warm peripherally

## 2019-02-11 ENCOUNTER — MEDICATION RENEWAL (OUTPATIENT)
Age: 49
End: 2019-02-11

## 2019-02-11 ENCOUNTER — RESULT REVIEW (OUTPATIENT)
Age: 49
End: 2019-02-11

## 2019-02-11 LAB
ANION GAP SERPL CALC-SCNC: 22 MMOL/L
BUN SERPL-MCNC: 16 MG/DL
CALCIUM SERPL-MCNC: 10.2 MG/DL
CHLORIDE SERPL-SCNC: 96 MMOL/L
CO2 SERPL-SCNC: 24 MMOL/L
CREAT SERPL-MCNC: 1.12 MG/DL
GLUCOSE SERPL-MCNC: NORMAL MG/DL
NT-PROBNP SERPL-MCNC: 976 PG/ML
POTASSIUM SERPL-SCNC: 4.1 MMOL/L
SODIUM SERPL-SCNC: 142 MMOL/L

## 2019-02-22 ENCOUNTER — NON-APPOINTMENT (OUTPATIENT)
Age: 49
End: 2019-02-22

## 2019-02-22 ENCOUNTER — APPOINTMENT (OUTPATIENT)
Dept: CARDIOLOGY | Facility: CLINIC | Age: 49
End: 2019-02-22
Payer: COMMERCIAL

## 2019-02-22 VITALS
BODY MASS INDEX: 24.92 KG/M2 | HEART RATE: 86 BPM | HEIGHT: 64 IN | DIASTOLIC BLOOD PRESSURE: 68 MMHG | WEIGHT: 146 LBS | OXYGEN SATURATION: 99 % | SYSTOLIC BLOOD PRESSURE: 98 MMHG

## 2019-02-22 PROCEDURE — 99214 OFFICE O/P EST MOD 30 MIN: CPT

## 2019-02-22 PROCEDURE — 36415 COLL VENOUS BLD VENIPUNCTURE: CPT

## 2019-02-22 PROCEDURE — 93000 ELECTROCARDIOGRAM COMPLETE: CPT

## 2019-02-25 LAB
ANION GAP SERPL CALC-SCNC: 19 MMOL/L
BUN SERPL-MCNC: 18 MG/DL
CALCIUM SERPL-MCNC: 10.2 MG/DL
CHLORIDE SERPL-SCNC: 101 MMOL/L
CO2 SERPL-SCNC: 24 MMOL/L
CREAT SERPL-MCNC: 1.11 MG/DL
GLUCOSE SERPL-MCNC: 87 MG/DL
NT-PROBNP SERPL-MCNC: 489 PG/ML
POTASSIUM SERPL-SCNC: 4 MMOL/L
SODIUM SERPL-SCNC: 144 MMOL/L

## 2019-02-25 NOTE — HISTORY OF PRESENT ILLNESS
[FreeTextEntry1] : 47 y/o Swazi male with h/o NICM (EF 10%, LVEDD 7 cm; recently Dx 8/18; possibly familial), mod-severe MR who is here for f/u after recent hospitalization for cardiogenic shock from 11/24-12/9. He was treated with inotropes/IABP and transitioned to oral medications with improvement. \par \par Of note, patient had first been diagnosed 8/2/18 when had HF and presented to [facility]. Had R/LHC 8/6/18 which showed RA 15, RV 55/15, PA 56/19/40, PCWP 26, CO/CI 3/1.8 (PA sat 62%, Hb 15). [facility] with nl coronaries. Was discharged on Lisinopril 2.5 mg daily and Lasix 40 mg twice daily but he intermittently took medications and was not wearing his Lifevest due to poor understanding of his disease process. He represented to [facility] on 11/24 with heart failure and was transferred to [facility] and then subsequently to [facility] as he was in cardiogenic shock. \par \par Pt is here for a follow up appt.  Since last visit 2/8/19, his Entresto was increased to 49-51 mg bid from  Entresto 24-26 mg bid and metoprolol was increased to 75 mg daily from 50 mg daily. He continued on spironolactone 12.5 mg daily. He has also been taking Bumex 0.5 mg QOD with his home weight in the range of 145-147 lbs and his weight is 145.6 lbs at home today, office is 146 lbs . He states he has had a better appetite since he is feeling better . His home B/P has been in the range of /69 and manual office today 98/66. He states he has been feeling better and has resumed household chores including shopping. He is able to walk 3 blocks and climb a flight of stairs without dyspnea. He sleeps with 1-2 pillows with no orthopnea or PND.   Denies dyspnea, chest pain/pressure, palpitations and syncope. He has occasional brief dizziness with changing position. He admits to some dietary indiscretions but overall has been trying to follow a low salt diet and he is drinking less than 1.5 L per day. Has been wearing Lifevest most of the time w/o alarms, but finds it uncomfortable. Pt had a followup with EP and is scheduled for a repeat TTE 3/2/19 prior to consideration of ICD.

## 2019-02-25 NOTE — ASSESSMENT
[FreeTextEntry1] : 49 y/o Stateless male with h/o NICM (EF 10%, LVEDD 7 cm; recently Dx 8/18; possibly familial), mod-severe MR S/P hospitalization for cardiogenic shock from 11/24-12/9 treated with inotropes/IABP and transitioned to oral medications with improvement. Since has been doing well and appears to have class II symptoms and is euvolemic. Tolerating low dose neurohormonal agents.\par \par 1. HFrEF - likely familial\par - continue bumex 0.5 mg every other day; with additional as needed for weight gain\par - increase Toprol XL  to 100 mg daily from 75 mg daily and instructed to take at night to stagger medications\par - continue Entresto 49-51 mg bid and if labs drawn today are stable and B/P is stable on increased metoprolol, will consider increasing Entresto to  mg bid\par - continue rei 12.5 mg daily\par - counseled on sodium/fluid restriction\par - instructed to call if BP <85 or has any symptoms\par - will need genetic testing; has two children as well; will consider referral for genetic testing \par - no prior VT; continue LifeVest; followed up with EP Dr. Perla and will repeat TTE in 3/3/2019; would consider subQ ICD if LVEF is < 35%.\par \par RTC 2 weeks for follow up with NP or with Dr. Hernadez for further medication up titration

## 2019-03-04 ENCOUNTER — APPOINTMENT (OUTPATIENT)
Dept: CV DIAGNOSITCS | Facility: HOSPITAL | Age: 49
End: 2019-03-04

## 2019-03-08 ENCOUNTER — NON-APPOINTMENT (OUTPATIENT)
Age: 49
End: 2019-03-08

## 2019-03-08 ENCOUNTER — APPOINTMENT (OUTPATIENT)
Dept: CARDIOLOGY | Facility: CLINIC | Age: 49
End: 2019-03-08
Payer: COMMERCIAL

## 2019-03-08 VITALS
DIASTOLIC BLOOD PRESSURE: 62 MMHG | WEIGHT: 147 LBS | OXYGEN SATURATION: 100 % | BODY MASS INDEX: 25.1 KG/M2 | HEART RATE: 77 BPM | SYSTOLIC BLOOD PRESSURE: 91 MMHG | HEIGHT: 64 IN

## 2019-03-08 DIAGNOSIS — Z87.891 PERSONAL HISTORY OF NICOTINE DEPENDENCE: ICD-10-CM

## 2019-03-08 PROCEDURE — 93000 ELECTROCARDIOGRAM COMPLETE: CPT

## 2019-03-08 PROCEDURE — 36415 COLL VENOUS BLD VENIPUNCTURE: CPT

## 2019-03-08 PROCEDURE — 99214 OFFICE O/P EST MOD 30 MIN: CPT

## 2019-03-09 ENCOUNTER — MEDICATION RENEWAL (OUTPATIENT)
Age: 49
End: 2019-03-09

## 2019-03-12 NOTE — HISTORY OF PRESENT ILLNESS
[FreeTextEntry1] : 47 y/o Bulgarian male with h/o NICM (EF 10%, LVEDD 7 cm; recently Dx 8/18; possibly familial), mod-severe MR who is here for f/u. Last hospitalized for cardiogenic shock from 11/24-12/9 treated with inotropes/IABP and transitioned to oral medications with improvement. \par \par Has tolerated increase in Entersto to 97/103 w/o adverse effect. Denies orthopnea/PND. Able to walk w/o limitation. Goes up flights of stairs w/o issues. BP range . \par \par Has been wearing Lifevest most of the time w/o alarms, but finds it uncomfortable. Pt had a followup with EP and is scheduled for a repeat TTE 3/19/19 prior to consideration of ICD.

## 2019-03-12 NOTE — ASSESSMENT
[FreeTextEntry1] : 47 y/o Marshallese male with h/o NICM (EF 10%, LVEDD 7 cm; recently Dx 8/18; possibly familial), mod-severe MR who has been doing well and appears to have class II symptoms and is euvolemic. Tolerating neurohormonal agents with improvement.\par \par 1. HFrEF - likely familial\par - occasionally lightheaded when takes bumex so instructed to reduce to 0.5 mg as needed; maintain weight 146-148 pounds\par - will increase Toprol XL to 100 mg twice/day from once/day; instructed to take 100 in AM, 50 mg in PM for 1 week and if tolerates to then increase to 100 mg twice/day\par - continue Entresto 97/103 twice/day\par - continue rei 12.5 mg daily\par - labs checked in office and reviewed with patient\par - counseled on sodium/fluid restriction\par - instructed to call if BP <85 or has any symptoms\par - will need genetic testing; has two children as well; will consider referral for genetic testing \par - no prior VT; continue LifeVest; followed up with EP Dr. Perla and will repeat TTE; would consider subQ ICD if LVEF is < 35%.\par \par RTC 4 weeks

## 2019-03-13 ENCOUNTER — MEDICATION RENEWAL (OUTPATIENT)
Age: 49
End: 2019-03-13

## 2019-03-13 LAB
ALBUMIN SERPL ELPH-MCNC: 4.9 G/DL
ALP BLD-CCNC: 68 U/L
ALT SERPL-CCNC: 36 U/L
ANION GAP SERPL CALC-SCNC: 16 MMOL/L
AST SERPL-CCNC: 35 U/L
BILIRUB SERPL-MCNC: 0.6 MG/DL
BUN SERPL-MCNC: 22 MG/DL
CALCIUM SERPL-MCNC: 10.5 MG/DL
CHLORIDE SERPL-SCNC: 97 MMOL/L
CO2 SERPL-SCNC: 24 MMOL/L
CREAT SERPL-MCNC: 1.12 MG/DL
GLUCOSE SERPL-MCNC: 76 MG/DL
NT-PROBNP SERPL-MCNC: 208 PG/ML
POTASSIUM SERPL-SCNC: 4.8 MMOL/L
PROT SERPL-MCNC: 8.4 G/DL
SODIUM SERPL-SCNC: 137 MMOL/L

## 2019-03-19 ENCOUNTER — OUTPATIENT (OUTPATIENT)
Dept: OUTPATIENT SERVICES | Facility: HOSPITAL | Age: 49
LOS: 1 days | End: 2019-03-19
Payer: COMMERCIAL

## 2019-03-19 ENCOUNTER — APPOINTMENT (OUTPATIENT)
Dept: CV DIAGNOSITCS | Facility: HOSPITAL | Age: 49
End: 2019-03-19

## 2019-03-19 DIAGNOSIS — I42.0 DILATED CARDIOMYOPATHY: ICD-10-CM

## 2019-03-19 PROCEDURE — 93306 TTE W/DOPPLER COMPLETE: CPT | Mod: 26

## 2019-03-19 PROCEDURE — 93306 TTE W/DOPPLER COMPLETE: CPT

## 2019-04-12 ENCOUNTER — APPOINTMENT (OUTPATIENT)
Dept: CARDIOLOGY | Facility: CLINIC | Age: 49
End: 2019-04-12
Payer: COMMERCIAL

## 2019-04-12 VITALS
WEIGHT: 152 LBS | BODY MASS INDEX: 25.95 KG/M2 | RESPIRATION RATE: 14 BRPM | HEART RATE: 83 BPM | HEIGHT: 64 IN | SYSTOLIC BLOOD PRESSURE: 103 MMHG | DIASTOLIC BLOOD PRESSURE: 69 MMHG | OXYGEN SATURATION: 100 %

## 2019-04-12 PROCEDURE — 93000 ELECTROCARDIOGRAM COMPLETE: CPT

## 2019-04-12 PROCEDURE — 36415 COLL VENOUS BLD VENIPUNCTURE: CPT

## 2019-04-12 PROCEDURE — 99214 OFFICE O/P EST MOD 30 MIN: CPT

## 2019-04-12 RX ORDER — SILDENAFIL 100 MG/1
100 TABLET, FILM COATED ORAL
Qty: 10 | Refills: 0 | Status: ACTIVE | COMMUNITY
Start: 2019-04-12 | End: 1900-01-01

## 2019-04-14 NOTE — ASSESSMENT
[FreeTextEntry1] : 49 y/o Chilean male with h/o NICM/HFrecEF (EF 10%, LVEDD 7 cm; Dx 8/18; possibly familial, now improved to 39%, LVEDD 5.3 cm) who has been doing well and appears to have class II symptoms and is euvolemic. Tolerating neurohormonal agents with improvement.\par \par 1. HFrEF - likely familial\par - continue bumex 0.5 mg every other day; maintain weight 152-153 pounds\par - will continue Toprol XL  100 mg twice/day; likely causing erectile dysfunction which was explained to pt\par - continue Entresto 97/103 twice/day\par - continue rei 12.5 mg daily\par - counseled on sodium/fluid restriction\par - instructed to call if BP <85 or has any symptoms\par - encouraged genetic testing; has two children as well; will consider referral for genetic testing \par - LVEF improved so pt does not need to follow up with  EP Dr. Perla for ICD at this time\par \par 2. Erectile dysfunction \par - may take sildenafil 100 mg tab, 1/2 tab before activity but hold for B/P less than 100\par \par RTC 3 months

## 2019-04-14 NOTE — PHYSICAL EXAM
[Normal Conjunctiva] : the conjunctiva exhibited no abnormalities [General Appearance - Well Nourished] : well nourished [General Appearance - Well Developed] : well developed [Normal Oral Mucosa] : normal oral mucosa [] : no respiratory distress [Respiration, Rhythm And Depth] : normal respiratory rhythm and effort [Heart Rate And Rhythm] : heart rate and rhythm were normal [Murmurs] : no murmurs present [Heart Sounds] : normal S1 and S2 [Bowel Sounds] : normal bowel sounds [Abdomen Soft] : soft [Abdomen Tenderness] : non-tender [Nail Clubbing] : no clubbing of the fingernails [Abnormal Walk] : normal gait [Skin Color & Pigmentation] : normal skin color and pigmentation [Oriented To Time, Place, And Person] : oriented to person, place, and time [Affect] : the affect was normal [FreeTextEntry1] : no edema, warm peripherally

## 2019-04-14 NOTE — HISTORY OF PRESENT ILLNESS
[FreeTextEntry1] : 49 y/o Malaysian male with h/o NICM (EF 10%, LVEDD 7 cm; recently Dx 8/18; possibly familial), mod-severe MR who is here for f/u. Last hospitalized for cardiogenic shock from 11/24-12/9 treated with inotropes/IABP and transitioned to oral medications with improvement.  3/19/19 TTE with LVEF 39% with moderate LV systolic function, mild MR, normal LA, decreased RV systolic function.\par \par Has tolerated increase in Entresto to 97/103 w/o adverse effect. Denies orthopnea/PND. Able to walk w/o limitation. Goes up flights of stairs w/o issues. BP range 100. He denies chest pain, palpitations, orthopnea/PND and syncope. He admits to eating more since he has a better appetite and his weight at home has been in the range of 152 lbs.\par \par Pt had a TTE on 3/19/19 with moderate LV systolic function LVEF 39% and is no longer wearing Lifevest and does not need to follow up with EP at this time for ICD.\par \par Reports some erectile dysfunction.

## 2019-04-17 LAB
ANION GAP SERPL CALC-SCNC: 14 MMOL/L
BUN SERPL-MCNC: 17 MG/DL
CALCIUM SERPL-MCNC: 10.3 MG/DL
CHLORIDE SERPL-SCNC: 99 MMOL/L
CO2 SERPL-SCNC: 25 MMOL/L
CREAT SERPL-MCNC: 0.98 MG/DL
GLUCOSE SERPL-MCNC: 65 MG/DL
NT-PROBNP SERPL-MCNC: 172 PG/ML
POTASSIUM SERPL-SCNC: 4.9 MMOL/L
SODIUM SERPL-SCNC: 138 MMOL/L

## 2019-06-05 ENCOUNTER — MEDICATION RENEWAL (OUTPATIENT)
Age: 49
End: 2019-06-05

## 2019-06-27 ENCOUNTER — MEDICATION RENEWAL (OUTPATIENT)
Age: 49
End: 2019-06-27

## 2019-07-12 ENCOUNTER — APPOINTMENT (OUTPATIENT)
Dept: CARDIOLOGY | Facility: CLINIC | Age: 49
End: 2019-07-12
Payer: COMMERCIAL

## 2019-07-12 ENCOUNTER — NON-APPOINTMENT (OUTPATIENT)
Age: 49
End: 2019-07-12

## 2019-07-12 VITALS
HEIGHT: 64 IN | SYSTOLIC BLOOD PRESSURE: 108 MMHG | HEART RATE: 76 BPM | WEIGHT: 160 LBS | BODY MASS INDEX: 27.31 KG/M2 | DIASTOLIC BLOOD PRESSURE: 70 MMHG | OXYGEN SATURATION: 100 %

## 2019-07-12 PROCEDURE — 99214 OFFICE O/P EST MOD 30 MIN: CPT

## 2019-07-12 PROCEDURE — 93000 ELECTROCARDIOGRAM COMPLETE: CPT

## 2019-07-12 NOTE — HISTORY OF PRESENT ILLNESS
[FreeTextEntry1] : 47 y/o Slovenian male with h/o NICM (EF 10%, LVEDD 7 cm; recently Dx 8/18; possibly familial), mod-severe MR who is here for f/u. Last hospitalized for cardiogenic shock from 11/24-12/9 treated with inotropes/IABP and transitioned to oral medications with improvement.  3/19/19 TTE with LVEF 39% with moderate LV systolic function, mild MR, normal LA, decreased RV systolic function.\par \par Pt is here for a follow up appt. Since last visit 4/12/19, he has been doing well but admits to approx 8 lb weight gain secondary to dietary indiscretions. Has tolerated increase in Entresto to 97/103 w/o adverse effect. He states he had 2 episodes of brief dizziness associated with standing from a sitting position with no syncope and held his evening metoprolol twice due to a B/P systolic < 95. Denies orthopnea/PND. Able to walk w/o limitation. Goes up flights of stairs w/o issues. BP range 100. He denies chest pain, palpitations, orthopnea/PND and syncope. He admits to eating more since he has a better appetite and his weight at home has been in the range of 158 lbs from prior 152 lbs.\par \par Pt had a TTE on 3/19/19 with moderate LV systolic function LVEF 39% and is no longer wearing Lifevest and does not need to follow up with EP at this time for ICD.\par \par Reports some erectile dysfunction and is tolerating sildenafil..

## 2019-07-12 NOTE — PHYSICAL EXAM
[General Appearance - Well Developed] : well developed [General Appearance - Well Nourished] : well nourished [Normal Oral Mucosa] : normal oral mucosa [Normal Conjunctiva] : the conjunctiva exhibited no abnormalities [Respiration, Rhythm And Depth] : normal respiratory rhythm and effort [] : no respiratory distress [Heart Sounds] : normal S1 and S2 [Heart Rate And Rhythm] : heart rate and rhythm were normal [Murmurs] : no murmurs present [Abdomen Soft] : soft [Bowel Sounds] : normal bowel sounds [Abnormal Walk] : normal gait [Abdomen Tenderness] : non-tender [Nail Clubbing] : no clubbing of the fingernails [Skin Color & Pigmentation] : normal skin color and pigmentation [Affect] : the affect was normal [Oriented To Time, Place, And Person] : oriented to person, place, and time [FreeTextEntry1] : JVP approx 6 cm

## 2019-07-12 NOTE — ASSESSMENT
[FreeTextEntry1] : 49 y/o Colombian male with h/o NICM/HFrecEF (EF 10%, LVEDD 7 cm; Dx 8/18; possibly familial, now improved to 39%, LVEDD 5.3 cm) who has been doing well and appears to have class II symptoms and is euvolemic. Tolerating neurohormonal agents with improvement. Weight gain likely d/t dietary indiscretion. \par \par 1. HFrEF - likely familial\par - reduce bumex 0.5 mg Monday and Friday; take extra as needed \par - will continue Toprol  mg twice/day; likely causing erectile dysfunction which was explained to pt\par - continue Entresto 97/103 twice/day\par - continue rei 12.5 mg daily\par - counseled on sodium/fluid restriction\par - instructed to call if BP <85 or has any symptoms\par - encouraged genetic testing; has two children as well; will refer for genetic testing \par - LVEF improved so pt does not need to follow up with  EP Dr. Perla for ICD at this time\par - encouraged exercise and reducing portions \par \par 2. Erectile dysfunction \par - may take sildenafil 100 mg tab, 1/2 tab before activity but hold for B/P less than 100\par \par RTC 3 months

## 2019-07-15 LAB
ALBUMIN SERPL ELPH-MCNC: 4.6 G/DL
ALP BLD-CCNC: 64 U/L
ALT SERPL-CCNC: 45 U/L
ANION GAP SERPL CALC-SCNC: 21 MMOL/L
AST SERPL-CCNC: 32 U/L
BILIRUB SERPL-MCNC: 0.5 MG/DL
BUN SERPL-MCNC: 12 MG/DL
CALCIUM SERPL-MCNC: 9.5 MG/DL
CHLORIDE SERPL-SCNC: 102 MMOL/L
CO2 SERPL-SCNC: 22 MMOL/L
CREAT SERPL-MCNC: 1.02 MG/DL
GLUCOSE SERPL-MCNC: 62 MG/DL
MAGNESIUM SERPL-MCNC: 2.1 MG/DL
NT-PROBNP SERPL-MCNC: 37 PG/ML
POTASSIUM SERPL-SCNC: 4 MMOL/L
PROT SERPL-MCNC: 7.9 G/DL
SODIUM SERPL-SCNC: 145 MMOL/L

## 2019-10-04 ENCOUNTER — APPOINTMENT (OUTPATIENT)
Dept: CARDIOLOGY | Facility: CLINIC | Age: 49
End: 2019-10-04
Payer: COMMERCIAL

## 2019-10-04 VITALS
WEIGHT: 164 LBS | TEMPERATURE: 97.9 F | HEIGHT: 64 IN | HEART RATE: 77 BPM | OXYGEN SATURATION: 99 % | SYSTOLIC BLOOD PRESSURE: 105 MMHG | BODY MASS INDEX: 28 KG/M2 | DIASTOLIC BLOOD PRESSURE: 75 MMHG | RESPIRATION RATE: 16 BRPM

## 2019-10-04 PROCEDURE — 36415 COLL VENOUS BLD VENIPUNCTURE: CPT

## 2019-10-04 PROCEDURE — 99214 OFFICE O/P EST MOD 30 MIN: CPT

## 2019-10-04 PROCEDURE — 93000 ELECTROCARDIOGRAM COMPLETE: CPT

## 2019-10-04 NOTE — ASSESSMENT
[FreeTextEntry1] : 49 y/o Thai male with h/o NICM/HFrecEF (EF 10%, LVEDD 7 cm; Dx 8/18; possibly familial, now improved to 39%, LVEDD 5.3 cm) who has been doing well and appears to have class I-II symptoms and is euvolemic. Tolerating neurohormonal agents with improvement. Weight gain likely d/t dietary indiscretion. \par \par 1. HFrEF - likely familial\par - reduce bumex to as needed\par - change toprol to 200 mg daily \par - continue Entresto 97/103 twice/day\par - continue rei 12.5 mg daily\par - counseled on sodium/fluid restriction\par - instructed to call if BP <85 or has any symptoms\par - encouraged genetic testing; has two children as well; will refer for genetic testing \par - LVEF improved so pt does not need to follow up with  EP Dr. Perla for ICD at this time\par - encouraged exercise and reducing portions \par \par 2. Erectile dysfunction \par - may take sildenafil 100 mg tab, 1/2 tab before activity but hold for B/P less than 100\par \par RTC 4 months

## 2019-10-04 NOTE — PHYSICAL EXAM
[General Appearance - Well Developed] : well developed [General Appearance - Well Nourished] : well nourished [Normal Conjunctiva] : the conjunctiva exhibited no abnormalities [Normal Oral Mucosa] : normal oral mucosa [] : no respiratory distress [Respiration, Rhythm And Depth] : normal respiratory rhythm and effort [Heart Rate And Rhythm] : heart rate and rhythm were normal [Heart Sounds] : normal S1 and S2 [Murmurs] : no murmurs present [Bowel Sounds] : normal bowel sounds [Abdomen Soft] : soft [Abdomen Tenderness] : non-tender [Abnormal Walk] : normal gait [Nail Clubbing] : no clubbing of the fingernails [Skin Color & Pigmentation] : normal skin color and pigmentation [Oriented To Time, Place, And Person] : oriented to person, place, and time [Affect] : the affect was normal [FreeTextEntry1] : JVP approx 6 cm

## 2019-10-04 NOTE — REASON FOR VISIT
[Follow-Up - Clinic] : a clinic follow-up of [Heart Failure] : congestive heart failure [FreeTextEntry2] : heart failure

## 2019-10-04 NOTE — HISTORY OF PRESENT ILLNESS
[FreeTextEntry1] : 49 y/o Palauan male with h/o NICM (EF 10%, LVEDD 7 cm; Dx 8/18; possibly familial, improved to 39% 3/19), preior mod-severe MR (now mild) who is here for f/u. \par \par Since last visit 6/19, he has been doing well. Had weight gain at the time and he was instructed to follow dietary restrictions and physical activity. His diuretics were reduced to bumex 0.5 mg Mon and Friday. Denies orthopnea/PND. Able to walk w/o limitation. Goes up flights of stairs w/o issues. BP range 100. He denies chest pain, palpitations, orthopnea/PND and syncope. He admits to eating more since he has a better appetite and his weight at home has been in the range of 158 lbs from prior 152 lbs.

## 2019-10-16 LAB
ALBUMIN SERPL ELPH-MCNC: 4.9 G/DL
ALP BLD-CCNC: 64 U/L
ALT SERPL-CCNC: 30 U/L
ANION GAP SERPL CALC-SCNC: 16 MMOL/L
AST SERPL-CCNC: 25 U/L
BILIRUB SERPL-MCNC: 0.5 MG/DL
BUN SERPL-MCNC: 12 MG/DL
CALCIUM SERPL-MCNC: 9.9 MG/DL
CHLORIDE SERPL-SCNC: 99 MMOL/L
CHOLEST SERPL-MCNC: 214 MG/DL
CHOLEST/HDLC SERPL: 5.2 RATIO
CO2 SERPL-SCNC: 25 MMOL/L
CREAT SERPL-MCNC: 0.86 MG/DL
ESTIMATED AVERAGE GLUCOSE: 120 MG/DL
GLUCOSE SERPL-MCNC: 93 MG/DL
HBA1C MFR BLD HPLC: 5.8 %
HDLC SERPL-MCNC: 41 MG/DL
LDLC SERPL CALC-MCNC: 99 MG/DL
POTASSIUM SERPL-SCNC: 4.2 MMOL/L
PROT SERPL-MCNC: 8.1 G/DL
SODIUM SERPL-SCNC: 140 MMOL/L
TRIGL SERPL-MCNC: 368 MG/DL

## 2019-10-30 ENCOUNTER — MEDICATION RENEWAL (OUTPATIENT)
Age: 49
End: 2019-10-30

## 2019-11-08 ENCOUNTER — MEDICATION RENEWAL (OUTPATIENT)
Age: 49
End: 2019-11-08

## 2019-11-14 ENCOUNTER — MEDICATION RENEWAL (OUTPATIENT)
Age: 49
End: 2019-11-14

## 2019-12-02 ENCOUNTER — MED ADMIN CHARGE (OUTPATIENT)
Age: 49
End: 2019-12-02

## 2019-12-18 NOTE — DIETITIAN INITIAL EVALUATION ADULT. - PROBLEM/PLAN-2
Post-Care Instructions: I reviewed with the patient in detail post-care instructions. Patient should stay away from the sun and wear sun protection until treated areas are fully healed. DISPLAY PLAN FREE TEXT

## 2020-02-14 ENCOUNTER — NON-APPOINTMENT (OUTPATIENT)
Age: 50
End: 2020-02-14

## 2020-02-14 ENCOUNTER — APPOINTMENT (OUTPATIENT)
Dept: CARDIOLOGY | Facility: CLINIC | Age: 50
End: 2020-02-14
Payer: COMMERCIAL

## 2020-02-14 VITALS
SYSTOLIC BLOOD PRESSURE: 110 MMHG | WEIGHT: 157 LBS | HEIGHT: 64 IN | DIASTOLIC BLOOD PRESSURE: 77 MMHG | OXYGEN SATURATION: 99 % | HEART RATE: 88 BPM | BODY MASS INDEX: 26.8 KG/M2 | RESPIRATION RATE: 16 BRPM

## 2020-02-14 PROCEDURE — 99214 OFFICE O/P EST MOD 30 MIN: CPT

## 2020-02-14 PROCEDURE — 93000 ELECTROCARDIOGRAM COMPLETE: CPT

## 2020-02-14 NOTE — HISTORY OF PRESENT ILLNESS
[FreeTextEntry1] : 48 y/o Gambian male with h/o NICM (EF 10%, LVEDD 7 cm; Dx 8/18; possibly familial, improved to 39% 3/19), preior mod-severe MR (now mild) who is here for f/u. \par \par Since last visit 10/4/19, he has been doing well and has lost 7 lbs by decreasing his food intake. His diuretics were reduced to bumex 0.5 mg Mon and Friday but he states he has dizziness at times. Denies orthopnea/PND. Able to walk w/o limitation. Goes up flights of stairs w/o issues. BP range 100. He denies chest pain, palpitations, orthopnea/PND and syncope. Pt does not have an ICD. Of note, last lipid profile 10/4/19 with Trig 368, chol 214 and LDL 99. States he eats a lot of pork and wants to decrease fats in his diet before repeating the test.

## 2020-02-14 NOTE — PHYSICAL EXAM
[General Appearance - Well Nourished] : well nourished [General Appearance - Well Developed] : well developed [Normal Conjunctiva] : the conjunctiva exhibited no abnormalities [Normal Oral Mucosa] : normal oral mucosa [Respiration, Rhythm And Depth] : normal respiratory rhythm and effort [] : no respiratory distress [Heart Sounds] : normal S1 and S2 [Heart Rate And Rhythm] : heart rate and rhythm were normal [Murmurs] : no murmurs present [Abdomen Soft] : soft [Bowel Sounds] : normal bowel sounds [Nail Clubbing] : no clubbing of the fingernails [Abdomen Tenderness] : non-tender [Abnormal Walk] : normal gait [Skin Color & Pigmentation] : normal skin color and pigmentation [Oriented To Time, Place, And Person] : oriented to person, place, and time [Affect] : the affect was normal [FreeTextEntry1] : no edema, warm peripherally

## 2020-02-14 NOTE — ASSESSMENT
[FreeTextEntry1] : 50 y/o Vincentian male with h/o NICM/HFrecEF (EF 10%, LVEDD 7 cm; Dx 8/18; possibly familial, now improved to 39%, LVEDD 5.3 cm) who has been doing well and appears to have class I-II symptoms and is euvolemic. Tolerating neurohormonal agents with improvement. Weight loss since last visit due to decrease in caloric intake. \par \par 1. HFrEF - likely familial\par - reduce Bumex to once a week and as needed\par - change Toprol to 200 mg daily \par - continue Entresto 97/103 twice/day\par - continue rei 12.5 mg daily\par - counseled on sodium/fluid restriction\par - instructed to call if BP <85 or has any symptoms\par - encouraged genetic testing; has two children as well; will refer for genetic testing \par - LVEF improved so pt does not need to follow up with  EP Dr. Perla for ICD at this time\par - encouraged increase exercise and reducing saturated fats in diet, will repeat fasting lipid profile with PCP or in office next visit\par \par 2. Erectile dysfunction \par - may take sildenafil 100 mg tab, 1/2 tab before activity but hold for B/P less than 100\par \par RTC 3-4 months

## 2020-04-17 ENCOUNTER — APPOINTMENT (OUTPATIENT)
Dept: CARDIOLOGY | Facility: CLINIC | Age: 50
End: 2020-04-17

## 2020-05-08 NOTE — ED PROCEDURE NOTE - PROCEDURE NAME, MLM
"Claudette Rey CNP      "
Point of Care Ultrasound Cardiac
Urinary Device Placement
Point of Care Ultrasound Lung

## 2020-06-07 ENCOUNTER — RX RENEWAL (OUTPATIENT)
Age: 50
End: 2020-06-07

## 2020-06-19 ENCOUNTER — APPOINTMENT (OUTPATIENT)
Dept: CARDIOLOGY | Facility: CLINIC | Age: 50
End: 2020-06-19

## 2020-06-19 NOTE — ASSESSMENT
[FreeTextEntry1] : 48 y/o Botswanan male with h/o NICM/HFrecEF (EF 10%, LVEDD 7 cm; Dx 8/18; possibly familial, now improved to 39%, LVEDD 5.3 cm) who has been doing well and appears to have class I-II symptoms and is euvolemic. Tolerating neurohormonal agents with improvement. Weight loss since last visit due to decrease in caloric intake. \par \par 1. HFrEF - likely familial\par - reduce Bumex to once a week and as needed\par - change Toprol to 200 mg daily \par - continue Entresto 97/103 twice/day\par - continue rei 12.5 mg daily\par - counseled on sodium/fluid restriction\par - instructed to call if BP <85 or has any symptoms\par - encouraged genetic testing; has two children as well; will refer for genetic testing \par - LVEF improved so pt does not need to follow up with  EP Dr. Perla for ICD at this time\par - encouraged increase exercise and reducing saturated fats in diet, will repeat fasting lipid profile with PCP or in office next visit\par \par 2. Erectile dysfunction \par - may take sildenafil 100 mg tab, 1/2 tab before activity but hold for B/P less than 100\par \par RTC 3-4 months

## 2020-06-19 NOTE — PHYSICAL EXAM
[General Appearance - Well Developed] : well developed [Normal Conjunctiva] : the conjunctiva exhibited no abnormalities [General Appearance - Well Nourished] : well nourished [Normal Oral Mucosa] : normal oral mucosa [] : no respiratory distress [Respiration, Rhythm And Depth] : normal respiratory rhythm and effort [Heart Rate And Rhythm] : heart rate and rhythm were normal [Heart Sounds] : normal S1 and S2 [Bowel Sounds] : normal bowel sounds [Murmurs] : no murmurs present [Abdomen Soft] : soft [Nail Clubbing] : no clubbing of the fingernails [Abdomen Tenderness] : non-tender [Abnormal Walk] : normal gait [FreeTextEntry1] : no edema, warm peripherally [Skin Color & Pigmentation] : normal skin color and pigmentation [Oriented To Time, Place, And Person] : oriented to person, place, and time [Affect] : the affect was normal

## 2020-06-19 NOTE — HISTORY OF PRESENT ILLNESS
[FreeTextEntry1] : 48 y/o Cymraes male with h/o NICM (EF 10%, LVEDD 7 cm; Dx 8/18; possibly familial, improved to 39% 3/19), prior mod-severe MR (now mild) who is here for f/u. \par \par Since last visit 2/14/20, he has been doing well and has lost 7 lbs by decreasing his food intake. His diuretics were reduced to bumex 0.5 mg Mon and Friday but he states he has dizziness at times. Denies orthopnea/PND. Able to walk w/o limitation. Goes up flights of stairs w/o issues. BP range 100. He denies chest pain, palpitations, orthopnea/PND and syncope. Pt does not have an ICD. Of note, last lipid profile 10/4/19 with Trig 368, chol 214 and LDL 99. States he eats a lot of pork and wants to decrease fats in his diet before repeating the test.

## 2020-09-04 NOTE — PROGRESS NOTE ADULT - PROBLEM SELECTOR PROBLEM 1
Dilated cardiomyopathy
(0) indicator not present

## 2020-12-08 NOTE — DISCHARGE NOTE ADULT - FLU SEASON?
[facility] INTERNAL MEDICINE ROUTINE NP SNF VISIT:      SUBJECTIVE:    Suzanna Qureshi is a 66 year old female nursing home resident seen today for routine NP visit, follow up chronic health problems  multiple sclerosis--she utilizes electric wheelchair for all distances and is transferred with [employer].  Depression/anxiety she has been followed by psychiatric nurse practitioner in facility however she feels that her depression and anxiety are not improving and will be seeing psychiatrist at [facility] in March.  Fibromyalgia. Chronic pain syndrome recently seen by pain specialist who felt that referral to Rheumatology would be more beneficial for her., essential hypertension, COPD, continues to smoke, hypokalemia, mixed incontinence.  She recently was seen by Orthopedics and had injection to both of her knees in August.  October was seen by Podiatry for foot drop and plantar fasciitis with injection into her right heel.     Today she is lying in bed watching TV.  No acute distress.  She is in a fairly good mood.  She denies any headache or dizziness.  She denies any difficulty chewing or swallowing.  She does continue to complain of chronic generalized pain.  She recently agreed to have a shower as she was always reluctant as it causes her pain to be in the shower room and having to move around that she has opted for bed bass.  She states that she thoroughly enjoyed having the shower and is planning on continuing this as she felt so much better after having a full shower able to wash her hair any him water over her joints seem to be helpful.  She denies any nausea or vomiting.  There has been no increased cough or shortness of breath.  She does continue to smoke cigarettes daily.  Has no desire to quit.  She does utilize electric wheelchair for all distances.  She is transferred with [employer].  She denies any urinary symptoms.  Bowels have been moving.  She did speak with hospice about possibly signing on over  hospice declined stating that she does not need hospice requirements.    Past medical history, family history, allergies, current medications/current plan of care reviewed at facility    CODE STATUS: No code    FACILITY: [facility]    DIET:  Regular diet.  Thin liquid    ACTIVITY:  Up as tolerated    REVIEW OF SYSTEMS:   CONSTITUTIONAL:  Negative for fever, chills.   SKIN: Negative for rash.   HEENT:  Negative for eye drainage, rhinorrhea, ear pain, sore throat.   RESPIRATORY:  Negative for cough, wheezing or shortness of breath.   CARDIOVASCULAR:  Negative for chest pain, chest pressure, palpitations or                                       Diaphoresis.   GASTROINTESTINAL:  Negative for nausea,vomiting,diarrhea or abdominal pain.   GENITOURINARY:  Negative for dysuria, urgency, frequency,hematuria or flank pain.   EXTREMITIES:  Negative for joint swelling or joint pain.   Chronic body pain  NEUROLOGICAL:  Negative for headache,negative for change in sensory or motor                                   function.   ENDOCRINE:  Negative for heat or cold intolerance, weight gain or loss.   HEMATOLOGICAL: Negative for bleeding, bruising or adenopathy.   PSYCHIATRIC:  Negative change in affect, mentation or sleep disturbance.       PHYSICAL EXAM:  VITAL SIGNS:  124/76, 140/68-97.7    SaO2 95-97% on room air  CONSTITUTIONAL:  Well nourished.  No acute distress.  EYES:  Sclera clear.  No discharge.   HENT:  Head negative.  Ears negative.  Nares negative.   NECK:  No JVD.  Range of motion to neck within normal limits.  No cervical tenderness.  Thyroid negative .   RESPIRATORY:  Clear A&P.  Respirations are easy nonlabored.  No wheeze, rhonchi, rales.  No cough     CARDIOVASCULAR:  Regular S1-S2.  No murmur.    GASTROINTESTINAL:  Soft, nontender.  Bowel sounds present.  No guarding.  No hepatosplenomegaly.  No palpable masses.    MUSCULOSKELETAL:  Range of motion to upper extremities within normal limits.  No  redness, warmth or swelling to any upper extremity joints.  Range of motion to hips, knees and ankles within normal limits.  No redness, warmth or swelling to any lower extremity joints.    EXTREMITIES:  No edema.   Pedal pulses palpable.  No open foot areas.    GENITOURINARY:  Mixed incontinence  RECTAL:  Deferred.    NEUROLOGICAL:  Alert and oriented X3.  CN II-XII intact.  Upper extremity weakness as well as bilateral lower extremity weakness.  SKIN:  Warm and dry, no rashes, lesions or masses   PRESSURE ULCER:  None    LYMPHATIC:  No lymphadenopathy   PSYCHIATRIC:  Cooperative, demonstrates good judgement, insight and affect             LAB RESULTS:   Recent Labs   Lab 10/20/20  0515 09/17/20  0440 08/12/20  0436   WBC 9.4 8.2 8.4   RBC 4.56 4.61 4.85   HGB 13.0 13.3 13.9   HCT 39.5 39.7 42.4   MCV 86.6 86.2 87.5   Absolute Neutrophil 6.8 5.8 4.6    225 244   Absolute Lymph 1.7 1.8 2.6   Absolute Mono 0.7* 0.4 0.9*   Absolute Eos 0.1 0.2 0.3   Absolute Baso 0.1 0.1 0.1     Recent Labs   Lab 10/20/20  0515 09/17/20  0445 08/12/20  0436 06/02/20  0505 02/06/20  0545   Glucose 111*  --  82 86 96   Sodium 142.0  --  140.0 143.0 138.0   Potassium 4.1  --  4.1 3.7 4.0   Chloride 105  --  105 105 105   BUN 14  --  14 9 11   Creatinine 0.44*  --  0.66 0.68 0.56*   Anion Gap 10.6  --  7.4 11.4 6.3   CALCIUM 9.5  --  9.9 9.8 9.4   GLOBULIN  --   --  4.4*  --   --    Albumin  --   --  3.3  --   --    AST/SGOT  --   --  15  --  17   ALK PHOSPHATASE  --   --  84  --   --    ALT/SGPT  --   --  16  --   --    ESR  --  23  --   --   --        RADIOLOGY RESULTS:  None    ASSESSMENT/PLAN:  MS (multiple sclerosis) (CMS/[facility])  (primary encounter diagnosis)    Generalized anxiety disorder    Depressive disorder, not elsewhere classified    Chronic left shoulder pain    Essential hypertension    Pain syndrome, chronic    Fibromyalgia    Constipation, unspecified constipation type    Smoker    Hypokalemia    Mixed  hyperlipidemia      ORDERS PLACED THIS VISIT:   Please schedule appointment with rheumatology    Emily MISHRA  Internal Medicine  NPI: [id number]  Pager [phone]  Fax [phone]   Yes...

## 2020-12-30 ENCOUNTER — RX RENEWAL (OUTPATIENT)
Age: 50
End: 2020-12-30

## 2021-06-20 NOTE — PHARMACOTHERAPY INTERVENTION NOTE - REASON FOR NOTE
Last vitals:   Vitals:    08/27/18 0820   BP: 119/56   Pulse: 86   Resp: 12   Temp: 36.7  C (98.1  F)   SpO2: 99%     Patient's level of consciousness is drowsy  Spontaneous respirations: yes  Maintains airway independently: yes  Dentition unchanged: yes  Oropharynx: oropharynx clear of all foreign objects    QCDR Measures:  ASA# 20 - Surgical Safety Checklist: WHO surgical safety checklist completed prior to induction  PQRS# 430 - Adult PONV Prevention: 4558F - Pt received => 2 anti-emetic agents (different classes) preop & intraop  ASA# 8 - Peds PONV Prevention: NA - Not pediatric patient, not GA or 2 or more risk factors NOT present  PQRS# 424 - [place]-op Temp Management: 4559F - At least one body temp DOCUMENTED => 35.5C or 95.9F within required timeframe  PQRS# 426 - PACU Transfer Protocol: - Transfer of care checklist used  ASA# 14 - Acute Post-op Pain: ASA14B - Patient did NOT experience pain >= 7 out of 10  
Heart transplant evaluation

## 2021-07-16 ENCOUNTER — RX RENEWAL (OUTPATIENT)
Age: 51
End: 2021-07-16

## 2021-09-09 ENCOUNTER — RESULT CHARGE (OUTPATIENT)
Age: 51
End: 2021-09-09

## 2021-09-10 ENCOUNTER — NON-APPOINTMENT (OUTPATIENT)
Age: 51
End: 2021-09-10

## 2021-09-10 ENCOUNTER — APPOINTMENT (OUTPATIENT)
Dept: CARDIOLOGY | Facility: CLINIC | Age: 51
End: 2021-09-10
Payer: COMMERCIAL

## 2021-09-10 VITALS
HEART RATE: 79 BPM | TEMPERATURE: 97 F | RESPIRATION RATE: 16 BRPM | OXYGEN SATURATION: 99 % | DIASTOLIC BLOOD PRESSURE: 84 MMHG | BODY MASS INDEX: 27.14 KG/M2 | WEIGHT: 159 LBS | SYSTOLIC BLOOD PRESSURE: 131 MMHG | HEIGHT: 64 IN

## 2021-09-10 PROCEDURE — 36415 COLL VENOUS BLD VENIPUNCTURE: CPT

## 2021-09-10 PROCEDURE — 93000 ELECTROCARDIOGRAM COMPLETE: CPT

## 2021-09-10 PROCEDURE — 99215 OFFICE O/P EST HI 40 MIN: CPT

## 2021-09-11 NOTE — HISTORY OF PRESENT ILLNESS
[FreeTextEntry1] : 51 y/o [place] M with h/o NICM/HFrEF (EF 10%, LVEDD 7 cm; Dx 8/18; possibly familial, improved to 39% 3/19), prior mod-severe MR (now mild) who is here for f/u. \par \par Since last visit 2/14/20, he was lost to follow-up due to Covid pandemic. Reports having a febrile illness for 1 day this year in March and was found to be Covid positive. Recovered at home. Received Covid vaccine (Pfizer) since w/o issues, last dose 6/21. \par \par Reports he has been doing otherwise well. Had tried stopping Entresto for couple of days since he was feeling well and per wife, he appeared weak. Denies orthopnea/PND. Able to walk w/o limitation. Goes up flights of stairs w/o issues. BP range 120-130. He denies chest pain, palpitations, orthopnea/PND and syncope. Pt does not have an ICD.

## 2021-09-11 NOTE — PHYSICAL EXAM
[Well Developed] : well developed [Well Nourished] : well nourished [No Acute Distress] : no acute distress [Normal Conjunctiva] : normal conjunctiva [No Carotid Bruit] : no carotid bruit [Normal Venous Pressure] : normal venous pressure [Normal S1, S2] : normal S1, S2 [No Murmur] : no murmur [No Rub] : no rub [No Gallop] : no gallop [Good Air Entry] : good air entry [Clear Lung Fields] : clear lung fields [No Respiratory Distress] : no respiratory distress  [Soft] : abdomen soft [Non Tender] : non-tender [No Masses/organomegaly] : no masses/organomegaly [Normal Bowel Sounds] : normal bowel sounds [Normal Gait] : normal gait [No Edema] : no edema [No Cyanosis] : no cyanosis [No Clubbing] : no clubbing [No Varicosities] : no varicosities [No Rash] : no rash [No Skin Lesions] : no skin lesions [Moves all extremities] : moves all extremities [No Focal Deficits] : no focal deficits [Normal Speech] : normal speech [Alert and Oriented] : alert and oriented [Normal memory] : normal memory

## 2021-09-11 NOTE — ASSESSMENT
[FreeTextEntry1] : 51 y/o [place]ian male with h/o NICM/HFrecEF (EF 10%, LVEDD 7 cm; Dx 8/18; possibly familial, now improved to 39%, LVEDD 5.3 cm) who has been doing well and appears to have class I-II symptoms and is euvolemic. Tolerating neurohormonal agents with improvement. Was lost to follow-up but now here to reestablish care. \par \par 1. HFrEF - likely familial\par - reduce Bumex to as needed\par - was taking toprol 100 mg daily; increase to 100 mg twice/day\par - continue Entresto 97/103 twice/day\par - continue rei 12.5 mg daily\par - counseled on sodium/fluid restriction\par - instructed to call if BP <85 or has any symptoms\par - encouraged genetic testing; has two children as well; will refer for genetic testing \par - LVEF improved so pt does not need ICD at this time\par - encouraged increase exercise and reducing saturated fats in diet, will repeat fasting lipid profile with PCP or in office next visit\par - repeat TTE ordered\par - labs done in office\par \par 2. Erectile dysfunction \par - may take sildenafil 100 mg tab, 1/2 tab before activity but hold for B/P less than 100\par \par RTC 6 months

## 2021-09-11 NOTE — CARDIOLOGY SUMMARY
[de-identified] : \par 9/10/21 - NSR, HR 79, PRWP, NSST\par 2/14/20 NSR 82, PRWP, NSST\par 7/12/19 NSR 76, PRWP,NSST- no change\par 4/12/19 NSR 81, PRWP, NSST\par 3/8/19 - NSR, HR 75, PRWP, nl axis\par 2/22/19 NSR 83, PRWP, NSST\par 2/8/19 NSR 81, LAE, PRWP\par 1/25/19 NSR 82, LAE, LAD, PRWP\par 12/19/18 - NSR, HR 90s, LAE, left axis deviation, PRWP [de-identified] : \par 3/19/19 LVEDD 5.3 cm, LVEF 39%, mild MR,  normal LA, moderate global LV systolic dysfunction with decreased RV systolic function TAPSE 1.3 cm\par \par 12/2/18 - LVEDD 7 cm, EF 10%, mod-severe MR; no LV thrombus, normal RV size and decreased RV function; RVSP 35\par  [de-identified] : \par 8/7/18 - severe LV dilatation, LV dysfunction (EF 13%), no delayed enhancement seen \par  [de-identified] : \par [facility] 8/18 nl coronaries\par [facility] 11/29/18 - /86/101; RA 18, RV 48/16, PA 49/23/37, PCWP 30, CO/CI 2.77/1.6 (PA sat 63%, Hb 15), PVR 2.55 KELLY\par

## 2021-09-17 ENCOUNTER — NON-APPOINTMENT (OUTPATIENT)
Age: 51
End: 2021-09-17

## 2021-09-17 LAB
ALBUMIN SERPL ELPH-MCNC: 4.7 G/DL
ALP BLD-CCNC: 68 U/L
ALT SERPL-CCNC: 26 U/L
ANION GAP SERPL CALC-SCNC: 19 MMOL/L
AST SERPL-CCNC: 26 U/L
BASOPHILS # BLD AUTO: 0.04 K/UL
BASOPHILS NFR BLD AUTO: 0.6 %
BILIRUB SERPL-MCNC: 0.5 MG/DL
BUN SERPL-MCNC: 13 MG/DL
CALCIUM SERPL-MCNC: 9.7 MG/DL
CHLORIDE SERPL-SCNC: 102 MMOL/L
CHOLEST SERPL-MCNC: 219 MG/DL
CO2 SERPL-SCNC: 21 MMOL/L
COVID-19 NUCLEOCAPSID  GAM ANTIBODY INTERPRETATION: POSITIVE
COVID-19 SPIKE DOMAIN ANTIBODY INTERPRETATION: POSITIVE
CREAT SERPL-MCNC: 0.83 MG/DL
EOSINOPHIL # BLD AUTO: 0.2 K/UL
EOSINOPHIL NFR BLD AUTO: 3.1 %
ESTIMATED AVERAGE GLUCOSE: 117 MG/DL
GLUCOSE SERPL-MCNC: 104 MG/DL
HBA1C MFR BLD HPLC: 5.7 %
HCT VFR BLD CALC: 48.2 %
HDLC SERPL-MCNC: 39 MG/DL
HGB BLD-MCNC: 15.8 G/DL
IMM GRANULOCYTES NFR BLD AUTO: 0.8 %
LDLC SERPL CALC-MCNC: 113 MG/DL
LYMPHOCYTES # BLD AUTO: 1.68 K/UL
LYMPHOCYTES NFR BLD AUTO: 26.3 %
MAN DIFF?: NORMAL
MCHC RBC-ENTMCNC: 32.2 PG
MCHC RBC-ENTMCNC: 32.8 GM/DL
MCV RBC AUTO: 98.2 FL
MONOCYTES # BLD AUTO: 0.56 K/UL
MONOCYTES NFR BLD AUTO: 8.8 %
NEUTROPHILS # BLD AUTO: 3.85 K/UL
NEUTROPHILS NFR BLD AUTO: 60.4 %
NONHDLC SERPL-MCNC: 180 MG/DL
NT-PROBNP SERPL-MCNC: 37 PG/ML
PLATELET # BLD AUTO: 268 K/UL
POTASSIUM SERPL-SCNC: 3.7 MMOL/L
PROT SERPL-MCNC: 7.7 G/DL
RBC # BLD: 4.91 M/UL
RBC # FLD: 11.3 %
SARS-COV-2 AB SERPL IA-ACNC: >250 U/ML
SARS-COV-2 AB SERPL QL IA: 30 INDEX
SODIUM SERPL-SCNC: 143 MMOL/L
TRIGL SERPL-MCNC: 335 MG/DL
TSH SERPL-ACNC: 1.91 UIU/ML
WBC # FLD AUTO: 6.38 K/UL

## 2021-11-15 ENCOUNTER — APPOINTMENT (OUTPATIENT)
Dept: CARDIOLOGY | Facility: CLINIC | Age: 51
End: 2021-11-15

## 2022-01-04 ENCOUNTER — RX RENEWAL (OUTPATIENT)
Age: 52
End: 2022-01-04

## 2022-03-11 ENCOUNTER — APPOINTMENT (OUTPATIENT)
Dept: CARDIOLOGY | Facility: CLINIC | Age: 52
End: 2022-03-11

## 2022-03-11 NOTE — HISTORY OF PRESENT ILLNESS
[FreeTextEntry1] : 51 y/o Lao M with h/o NICM/HFrEF (EF 10%, LVEDD 7 cm; Dx 8/18; possibly familial, improved to 39% 3/19), prior mod-severe MR (now mild) who is here for f/u. \par \par Since last visit 2/14/20, he was lost to follow-up due to Covid pandemic. Reports having a febrile illness for 1 day this year in March and was found to be Covid positive. Recovered at home. Received Covid vaccine (Pfizer) since w/o issues, last dose 6/21. \par \par Reports he has been doing otherwise well. Had tried stopping Entresto for couple of days since he was feeling well and per wife, he appeared weak. Denies orthopnea/PND. Able to walk w/o limitation. Goes up flights of stairs w/o issues. BP range 120-130. He denies chest pain, palpitations, orthopnea/PND and syncope. Pt does not have an ICD.

## 2022-03-11 NOTE — CARDIOLOGY SUMMARY
[de-identified] : \par 9/10/21 - NSR, HR 79, PRWP, NSST\par 2/14/20 NSR 82, PRWP, NSST\par 7/12/19 NSR 76, PRWP,NSST- no change\par 4/12/19 NSR 81, PRWP, NSST\par 3/8/19 - NSR, HR 75, PRWP, nl axis\par 2/22/19 NSR 83, PRWP, NSST\par 2/8/19 NSR 81, LAE, PRWP\par 1/25/19 NSR 82, LAE, LAD, PRWP\par 12/19/18 - NSR, HR 90s, LAE, left axis deviation, PRWP [de-identified] : \par 3/19/19 LVEDD 5.3 cm, LVEF 39%, mild MR,  normal LA, moderate global LV systolic dysfunction with decreased RV systolic function TAPSE 1.3 cm\par \par 12/2/18 - LVEDD 7 cm, EF 10%, mod-severe MR; no LV thrombus, normal RV size and decreased RV function; RVSP 35\par  [de-identified] : \par 8/7/18 - severe LV dilatation, LV dysfunction (EF 13%), no delayed enhancement seen \par  [de-identified] : \par [facility] 8/18 nl coronaries\par [facility] 11/29/18 - /86/101; RA 18, RV 48/16, PA 49/23/37, PCWP 30, CO/CI 2.77/1.6 (PA sat 63%, Hb 15), PVR 2.55 KELLY\par

## 2022-03-11 NOTE — ASSESSMENT
[FreeTextEntry1] : 49 y/o [place]og[place] male with h/o NICM/HFrecEF (EF 10%, LVEDD 7 cm; Dx 8/18; possibly familial, now improved to 39%, LVEDD 5.3 cm) who has been doing well and appears to have class I-II symptoms and is euvolemic. Tolerating neurohormonal agents with improvement. Was lost to follow-up but now here to reestablish care. \par \par 1. HFrEF - likely familial\par - reduce Bumex to as needed\par - was taking toprol 100 mg daily; increase to 100 mg twice/day\par - continue Entresto 97/103 twice/day\par - continue rei 12.5 mg daily\par - counseled on sodium/fluid restriction\par - instructed to call if BP <85 or has any symptoms\par - encouraged genetic testing; has two children as well; will refer for genetic testing \par - LVEF improved so pt does not need ICD at this time\par - encouraged increase exercise and reducing saturated fats in diet, will repeat fasting lipid profile with PCP or in office next visit\par - repeat TTE ordered\par - labs done in office\par \par 2. Erectile dysfunction \par - may take sildenafil 100 mg tab, 1/2 tab before activity but hold for B/P less than 100\par \par RTC 6 months

## 2022-06-21 ENCOUNTER — RX RENEWAL (OUTPATIENT)
Age: 52
End: 2022-06-21

## 2022-06-21 NOTE — ED ADULT TRIAGE NOTE - LOCATION:
Bronchoscopy completed yesterday 6/20/2022.          Spoke with patient.  Pt doing well post Bronchoscopy.  Pt is aware results are not yet final.  Pt is aware Dr Bucio's office will be giving Bronchoscopy results as Dr Campbell will be out of clinic 6/22/22 through 7/6/22. Pt confirmed she is scheduled to see Dr Bucio 6/24/22. Patient verbalized understanding and has no further questions at this time.   
Patient requesting to speak to nurse with some questions    
Left arm;

## 2022-07-13 NOTE — DIETITIAN INITIAL EVALUATION ADULT. - +GENDER
[FreeTextEntry3] : I, Earnest Cordova, acted solely as a scribe for Dr. Jose Harrison on this date 07/13/2022.  Statement Selected

## 2023-04-18 NOTE — ED PROVIDER NOTE - NS ED NOTE AC HIGH RISK COUNTRIES
How Severe Are Your Spot(S)?: moderate
What Type Of Note Output Would You Prefer (Optional)?: Bullet Format
What Is The Reason For Today's Visit?: Full Body Skin Examination
What Is The Reason For Today's Visit? (Being Monitored For X): concerning skin lesions on an annual basis
No

## 2023-12-14 ENCOUNTER — RX RENEWAL (OUTPATIENT)
Age: 53
End: 2023-12-14

## 2024-04-24 ENCOUNTER — NON-APPOINTMENT (OUTPATIENT)
Age: 54
End: 2024-04-24

## 2024-04-24 ENCOUNTER — APPOINTMENT (OUTPATIENT)
Dept: HEART FAILURE | Facility: CLINIC | Age: 54
End: 2024-04-24
Payer: COMMERCIAL

## 2024-04-24 VITALS
OXYGEN SATURATION: 95 % | HEART RATE: 96 BPM | DIASTOLIC BLOOD PRESSURE: 89 MMHG | WEIGHT: 160 LBS | BODY MASS INDEX: 27.31 KG/M2 | HEIGHT: 64 IN | SYSTOLIC BLOOD PRESSURE: 129 MMHG

## 2024-04-24 DIAGNOSIS — E11.9 TYPE 2 DIABETES MELLITUS W/OUT COMPLICATIONS: ICD-10-CM

## 2024-04-24 DIAGNOSIS — E78.5 HYPERLIPIDEMIA, UNSPECIFIED: ICD-10-CM

## 2024-04-24 DIAGNOSIS — I42.0 DILATED CARDIOMYOPATHY: ICD-10-CM

## 2024-04-24 PROCEDURE — 93000 ELECTROCARDIOGRAM COMPLETE: CPT

## 2024-04-24 PROCEDURE — 99214 OFFICE O/P EST MOD 30 MIN: CPT

## 2024-04-24 PROCEDURE — G2211 COMPLEX E/M VISIT ADD ON: CPT

## 2024-04-24 PROCEDURE — 36415 COLL VENOUS BLD VENIPUNCTURE: CPT

## 2024-04-24 RX ORDER — SACUBITRIL AND VALSARTAN 97; 103 MG/1; MG/1
97-103 TABLET, FILM COATED ORAL
Qty: 90 | Refills: 0 | Status: ACTIVE | COMMUNITY
Start: 2019-01-29 | End: 1900-01-01

## 2024-04-24 RX ORDER — BUMETANIDE 0.5 MG/1
0.5 TABLET ORAL
Qty: 30 | Refills: 4 | Status: ACTIVE | COMMUNITY
Start: 2018-12-19 | End: 1900-01-01

## 2024-04-24 RX ORDER — METOPROLOL SUCCINATE 200 MG/1
200 TABLET, EXTENDED RELEASE ORAL TWICE DAILY
Qty: 120 | Refills: 4 | Status: ACTIVE | COMMUNITY
Start: 2018-12-19 | End: 1900-01-01

## 2024-04-24 RX ORDER — SPIRONOLACTONE 25 MG/1
25 TABLET ORAL
Qty: 90 | Refills: 3 | Status: ACTIVE | COMMUNITY
Start: 2018-12-19 | End: 1900-01-01

## 2024-04-25 PROBLEM — E78.5 HYPERLIPIDEMIA, UNSPECIFIED HYPERLIPIDEMIA TYPE: Status: ACTIVE | Noted: 2024-04-25

## 2024-04-25 PROBLEM — E11.9 DIABETES MELLITUS, TYPE 2: Status: ACTIVE | Noted: 2024-04-25

## 2024-04-25 LAB
ALBUMIN SERPL ELPH-MCNC: 4.8 G/DL
ALP BLD-CCNC: 86 U/L
ALT SERPL-CCNC: 41 U/L
ANION GAP SERPL CALC-SCNC: 20 MMOL/L
AST SERPL-CCNC: 27 U/L
BILIRUB SERPL-MCNC: 0.8 MG/DL
BUN SERPL-MCNC: 13 MG/DL
CALCIUM SERPL-MCNC: 9.6 MG/DL
CHLORIDE SERPL-SCNC: 95 MMOL/L
CHOLEST SERPL-MCNC: 224 MG/DL
CO2 SERPL-SCNC: 22 MMOL/L
CREAT SERPL-MCNC: 1.02 MG/DL
EGFR: 89 ML/MIN/1.73M2
ESTIMATED AVERAGE GLUCOSE: 177 MG/DL
GLUCOSE SERPL-MCNC: 181 MG/DL
HBA1C MFR BLD HPLC: 7.8 %
HCT VFR BLD CALC: 50.4 %
HDLC SERPL-MCNC: 49 MG/DL
HGB BLD-MCNC: 16.3 G/DL
LDLC SERPL CALC-MCNC: 109 MG/DL
MAGNESIUM SERPL-MCNC: 2.1 MG/DL
MCHC RBC-ENTMCNC: 30.7 PG
MCHC RBC-ENTMCNC: 32.3 GM/DL
MCV RBC AUTO: 94.9 FL
NONHDLC SERPL-MCNC: 175 MG/DL
NT-PROBNP SERPL-MCNC: 68 PG/ML
PLATELET # BLD AUTO: 263 K/UL
POTASSIUM SERPL-SCNC: 4 MMOL/L
PROT SERPL-MCNC: 7.7 G/DL
RBC # BLD: 5.31 M/UL
RBC # FLD: 11.4 %
SODIUM SERPL-SCNC: 137 MMOL/L
TRIGL SERPL-MCNC: 388 MG/DL
TSH SERPL-ACNC: 1.6 UIU/ML
WBC # FLD AUTO: 7.09 K/UL

## 2024-04-25 RX ORDER — ATORVASTATIN CALCIUM 20 MG/1
20 TABLET, FILM COATED ORAL
Qty: 1 | Refills: 2 | Status: ACTIVE | COMMUNITY
Start: 2024-04-25 | End: 1900-01-01

## 2024-04-25 RX ORDER — DAPAGLIFLOZIN 10 MG/1
10 TABLET, FILM COATED ORAL
Qty: 30 | Refills: 3 | Status: ACTIVE | COMMUNITY
Start: 2024-04-25 | End: 1900-01-01

## 2024-04-27 NOTE — PROGRESS NOTE ADULT - PROBLEM SELECTOR PLAN 4
Continue home medication  Monitor blood pressure Moderate: Comprehensive teaching/Verbalized Understanding

## 2024-04-28 NOTE — HISTORY OF PRESENT ILLNESS
[FreeTextEntry1] : 52 y/o Russian M with h/o NICM/HFrEF (EF 10%, LVEDD 7 cm; Dx 8/18; possibly familial, improved to 39% 3/19), prior mod-severe MR (now mild) who is here for f/u after long hiatus (last visit 9/10/21).   Since last visit 9/10/21, he was lost to follow-up due to Covid pandemic. Reports having a febrile illness for 1 day this year in March 2022 and was found to be Covid positive. Recovered at home. Received Covid vaccine (Pfizer) since w/o issues, last dose 6/21.   He lives with his wife and 2 children 16 and 21. They have not had genetic testing. Reports he has been doing otherwise well. Working part time as a . States his activity is not limited by shortness of breath. He walks on the treadmill for 10 miles once a week without dyspnea. He can walk > 10 blocks on flat ground. He can climb 2 flights of stairs without dyspnea.   He ran out of Entresto 4 days ago and notes when stopping Entresto for couple of days he gets some dyspnea with walking. He is out of spironolactone 12.5 mg for many months. He is taking bumex 0.5 mg once a week and urinates alot after. He takes if weight goes up. Adhering to low salt diet and is drinking 1.5 liters.   Sleeps with 2 pillows with no orthopnea/PND. Able to walk w/o limitation. Home weight 160-165 lbs and home BP range 120-130. Admits to some mild snoring.  He denies chest pain, palpitations, orthopnea/PND and syncope. Pt does not have an ICD.

## 2024-04-28 NOTE — ASSESSMENT
[FreeTextEntry1] : 89y/o [place]i male with h/o NICM/HFrecEF (EF 10%, LVEDD 7 cm; Dx 8/18; possibly familial, now improved to 39%, LVEDD 5.3 cm) who has been doing well but lost to follow up from 9/2021and appears to have class I-II symptoms and is euvolemic. Tolerating neurohormonal agents with improvement. Was lost to follow-up but now here to re-establish care. Appears euvolemic and normotensive. Emphasized importance of following up regularly and that we will be unable to refill medications without periodic visits.   1. HFrEF - likely familial - he is Bumex as needed (takes once a week) - taking toprol 100 mg bid - continue Entresto 97/103 twice/day - restart rei 12.5 mg daily - counseled on sodium/fluid restriction - instructed to call if BP <85 or has any symptoms - encouraged genetic testing; has two children as well; will refer for genetic testing to Dr. Wayne - LVEF improved so pt did not need ICD at this time - encouraged increase exercise and reducing saturated fats in diet, will repeat fasting lipid profile with PCP or in office next visit - repeat TTE ordered - labs done in office today notable for elevated HgA1C 7.8%, will start Farxiga 10 mg daily and refer to PCP/endo to improve glycemic control - lipid profile elevated, will start a statin atorvastatin 20 mg daily and follow up with PCP to recheck fasting lipid profile - reports some dysphagia at times, will refer to GI for EGD and colonoscopy  2. Erectile dysfunction  - may take sildenafil 100 mg tab, 1/2 tab before activity but hold for B/P less than 100  RTC 6 months

## 2024-07-23 ENCOUNTER — APPOINTMENT (OUTPATIENT)
Dept: HEART FAILURE | Facility: CLINIC | Age: 54
End: 2024-07-23

## 2024-07-23 NOTE — ASSESSMENT
[FreeTextEntry1] : 89y/o [place]yr[place] male with h/o NICM/HFrecEF (EF 10%, LVEDD 7 cm; Dx 8/18; possibly familial, now improved to 39%, LVEDD 5.3 cm) who has been doing well but lost to follow up from 9/2021and appears to have class I-II symptoms and is euvolemic. Tolerating neurohormonal agents with improvement. Was lost to follow-up but now here to re-establish care. Appears euvolemic and normotensive. Emphasized importance of following up regularly and that we will be unable to refill medications without periodic visits.   1. HFrEF - likely familial - he is Bumex as needed (takes once a week) - taking toprol 100 mg bid - continue Entresto 97/103 twice/day - restart rei 12.5 mg daily - counseled on sodium/fluid restriction - instructed to call if BP <85 or has any symptoms - encouraged genetic testing; has two children as well; will refer for genetic testing to Dr. Wayne - LVEF improved so pt did not need ICD at this time - encouraged increase exercise and reducing saturated fats in diet, will repeat fasting lipid profile with PCP or in office next visit - repeat TTE ordered - labs done in office today notable for elevated HgA1C 7.8%, will start Farxiga 10 mg daily and refer to PCP/endo to improve glycemic control - lipid profile elevated, will start a statin atorvastatin 20 mg daily and follow up with PCP to recheck fasting lipid profile - reports some dysphagia at times, will refer to GI for EGD and colonoscopy  2. Erectile dysfunction  - may take sildenafil 100 mg tab, 1/2 tab before activity but hold for B/P less than 100  RTC 6 months

## 2024-07-23 NOTE — CARDIOLOGY SUMMARY
[de-identified] : 7/23/24 -  4/24/24 NSR, HR 88, PRWP, NSST 9/10/21 - NSR, HR 79, PRWP, NSST 2/14/20 NSR 82, PRWP, NSST 7/12/19 NSR 76, PRWP,NSST- no change 4/12/19 NSR 81, PRWP, NSST 3/8/19 - NSR, HR 75, PRWP, nl axis 2/22/19 NSR 83, PRWP, NSST 2/8/19 NSR 81, LAE, PRWP 1/25/19 NSR 82, LAE, LAD, PRWP 12/19/18 - NSR, HR 90s, LAE, left axis deviation, PRWP [de-identified] : \par  3/19/19 LVEDD 5.3 cm, LVEF 39%, mild MR,  normal LA, moderate global LV systolic dysfunction with decreased RV systolic function TAPSE 1.3 cm\par  \par  12/2/18 - LVEDD 7 cm, EF 10%, mod-severe MR; no LV thrombus, normal RV size and decreased RV function; RVSP 35\par   [de-identified] : \par  8/7/18 - severe LV dilatation, LV dysfunction (EF 13%), no delayed enhancement seen \par   [de-identified] : \par  [facility] 8/18 nl coronaries\par  [facility] 11/29/18 - /86/101; RA 18, RV 48/16, PA 49/23/37, PCWP 30, CO/CI 2.77/1.6 (PA sat 63%, Hb 15), PVR 2.55 KELLY\par

## 2024-07-23 NOTE — HISTORY OF PRESENT ILLNESS
[FreeTextEntry1] : 54 y/o [place] M with h/o NICM/HFimpEF (EF 10%, LVEDD 7 cm; Dx 8/18; possibly familial, improved to 39% 3/19), prior mod-severe MR (now mild), NIDDM (A1c 7.8 4/24) who is here for f/u.   For full initial details, please refer to note from 12/19/18.   Since visit 9/10/21, he was lost to follow-up due to Covid pandemic. At last visit, had been well but had run out of medications for undefined period of time. Was resumed on medications and an echo was ordered which he has not had yet.  States his activity is not limited by shortness of breath. He walks on the treadmill for 10 miles once a week without dyspnea. He can walk > 10 blocks on flat ground. He can climb 2 flights of stairs without dyspnea.   Adhering to low salt diet and is drinking 1.5 liters.   Sleeps with 2 pillows with no orthopnea/PND. Able to walk w/o limitation. Home weight 160-165 lbs and home BP range 120-130. Admits to some mild snoring.  He denies chest pain, palpitations, orthopnea/PND and syncope. Pt does not have an ICD.

## 2024-11-29 NOTE — DISCHARGE NOTE ADULT - FUNCTIONAL SCREEN CURRENT LEVEL: AMBULATION, MLM
Consent for procedure signed and placed in pt's chart.    Electronically signed by Josephine Paulino RN on 11/29/2024 at 5:16 PM    0 = independent

## 2025-08-04 ENCOUNTER — APPOINTMENT (OUTPATIENT)
Dept: COLORECTAL SURGERY | Facility: CLINIC | Age: 55
End: 2025-08-04